# Patient Record
Sex: MALE | Race: WHITE | Employment: OTHER | URBAN - METROPOLITAN AREA
[De-identification: names, ages, dates, MRNs, and addresses within clinical notes are randomized per-mention and may not be internally consistent; named-entity substitution may affect disease eponyms.]

---

## 2017-05-11 ENCOUNTER — HOSPITAL ENCOUNTER (OUTPATIENT)
Dept: OTHER | Age: 57
Discharge: HOME | End: 2017-05-11
Attending: INTERNAL MEDICINE | Admitting: INTERNAL MEDICINE
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
## 2018-01-22 PROBLEM — A41.9 SEPSIS (HCC): Status: ACTIVE | Noted: 2018-01-22

## 2018-01-23 PROBLEM — J18.9 RLL PNEUMONIA: Status: ACTIVE | Noted: 2018-01-23

## 2018-01-23 PROBLEM — D68.32 WARFARIN-INDUCED COAGULOPATHY (HCC): Status: ACTIVE | Noted: 2018-01-23

## 2018-01-23 PROBLEM — I48.0 PAROXYSMAL ATRIAL FIBRILLATION (HCC): Status: ACTIVE | Noted: 2018-01-23

## 2018-01-23 PROBLEM — M79.606 LEG PAIN: Status: ACTIVE | Noted: 2018-01-23

## 2018-01-23 PROBLEM — T45.515A WARFARIN-INDUCED COAGULOPATHY (HCC): Status: ACTIVE | Noted: 2018-01-23

## 2018-01-24 PROBLEM — D64.9 NORMOCYTIC ANEMIA: Status: ACTIVE | Noted: 2018-01-24

## 2018-01-24 PROBLEM — D69.6 THROMBOCYTOPENIA (HCC): Status: ACTIVE | Noted: 2018-01-24

## 2021-03-05 ENCOUNTER — HOSPITAL ENCOUNTER (OUTPATIENT)
Dept: GENERAL RADIOLOGY | Age: 61
Discharge: HOME | End: 2021-03-05
Payer: MEDICARE

## 2021-03-05 ENCOUNTER — HOSPITAL ENCOUNTER (OUTPATIENT)
Age: 61
Discharge: HOME | End: 2021-03-05
Payer: MEDICARE

## 2021-03-05 DIAGNOSIS — M54.2 NECK PAIN: ICD-10-CM

## 2021-03-05 DIAGNOSIS — R76.11 PPD POSITIVE: ICD-10-CM

## 2021-03-05 PROCEDURE — 71046 X-RAY EXAM CHEST 2 VIEWS: CPT

## 2021-03-05 PROCEDURE — 72050 X-RAY EXAM NECK SPINE 4/5VWS: CPT

## 2021-05-09 ENCOUNTER — HOSPITAL ENCOUNTER (INPATIENT)
Age: 61
LOS: 1 days | Discharge: HOME | DRG: 640 | End: 2021-05-10
Attending: EMERGENCY MEDICINE | Admitting: INTERNAL MEDICINE
Payer: COMMERCIAL

## 2021-05-09 ENCOUNTER — APPOINTMENT (OUTPATIENT)
Dept: GENERAL RADIOLOGY | Age: 61
DRG: 640 | End: 2021-05-09
Payer: COMMERCIAL

## 2021-05-09 DIAGNOSIS — R06.02 SHORTNESS OF BREATH: ICD-10-CM

## 2021-05-09 DIAGNOSIS — Z99.2 ESRD NEEDING DIALYSIS (HCC): Primary | ICD-10-CM

## 2021-05-09 DIAGNOSIS — N18.6 ESRD ON HEMODIALYSIS (HCC): ICD-10-CM

## 2021-05-09 DIAGNOSIS — N18.6 ESRD NEEDING DIALYSIS (HCC): Primary | ICD-10-CM

## 2021-05-09 DIAGNOSIS — E87.5 HYPERKALEMIA: ICD-10-CM

## 2021-05-09 DIAGNOSIS — Z99.2 ESRD ON HEMODIALYSIS (HCC): ICD-10-CM

## 2021-05-09 DIAGNOSIS — R79.1 SUPRATHERAPEUTIC INR: ICD-10-CM

## 2021-05-09 PROBLEM — E87.70 FLUID OVERLOAD: Status: ACTIVE | Noted: 2021-05-09

## 2021-05-09 LAB
A/G RATIO: 1.3 (ref 1.1–2.2)
ALBUMIN SERPL-MCNC: 3.7 G/DL (ref 3.4–5)
ALP BLD-CCNC: 66 U/L (ref 40–129)
ALT SERPL-CCNC: 6 U/L (ref 10–40)
ANION GAP SERPL CALCULATED.3IONS-SCNC: 23 MMOL/L (ref 3–16)
AST SERPL-CCNC: 7 U/L (ref 15–37)
BASOPHILS ABSOLUTE: 0 K/UL (ref 0–0.2)
BASOPHILS RELATIVE PERCENT: 1.2 %
BILIRUB SERPL-MCNC: 0.4 MG/DL (ref 0–1)
BUN BLDV-MCNC: 74 MG/DL (ref 7–20)
CALCIUM SERPL-MCNC: 8.7 MG/DL (ref 8.3–10.6)
CHLORIDE BLD-SCNC: 96 MMOL/L (ref 99–110)
CO2: 21 MMOL/L (ref 21–32)
CREAT SERPL-MCNC: 12.7 MG/DL (ref 0.8–1.3)
EKG ATRIAL RATE: 91 BPM
EKG DIAGNOSIS: NORMAL
EKG P AXIS: 45 DEGREES
EKG P-R INTERVAL: 178 MS
EKG Q-T INTERVAL: 410 MS
EKG QRS DURATION: 138 MS
EKG QTC CALCULATION (BAZETT): 504 MS
EKG R AXIS: -78 DEGREES
EKG T AXIS: 31 DEGREES
EKG VENTRICULAR RATE: 91 BPM
EOSINOPHILS ABSOLUTE: 0.1 K/UL (ref 0–0.6)
EOSINOPHILS RELATIVE PERCENT: 3.8 %
GFR AFRICAN AMERICAN: 5
GFR NON-AFRICAN AMERICAN: 4
GLOBULIN: 2.8 G/DL
GLUCOSE BLD-MCNC: 113 MG/DL (ref 70–99)
GLUCOSE BLD-MCNC: 92 MG/DL (ref 70–99)
HBV SURFACE AB TITR SER: 74.56 MIU/ML
HCT VFR BLD CALC: 33.4 % (ref 40.5–52.5)
HEMOGLOBIN: 11.2 G/DL (ref 13.5–17.5)
HEPATITIS B SURFACE ANTIGEN INTERPRETATION: NORMAL
INR BLD: 3.75 (ref 0.86–1.14)
LYMPHOCYTES ABSOLUTE: 0.7 K/UL (ref 1–5.1)
LYMPHOCYTES RELATIVE PERCENT: 23.1 %
MCH RBC QN AUTO: 35.1 PG (ref 26–34)
MCHC RBC AUTO-ENTMCNC: 33.6 G/DL (ref 31–36)
MCV RBC AUTO: 104.3 FL (ref 80–100)
MONOCYTES ABSOLUTE: 0.3 K/UL (ref 0–1.3)
MONOCYTES RELATIVE PERCENT: 10.1 %
NEUTROPHILS ABSOLUTE: 2 K/UL (ref 1.7–7.7)
NEUTROPHILS RELATIVE PERCENT: 61.8 %
PDW BLD-RTO: 16.3 % (ref 12.4–15.4)
PERFORMED ON: ABNORMAL
PLATELET # BLD: 101 K/UL (ref 135–450)
PMV BLD AUTO: 7.7 FL (ref 5–10.5)
POTASSIUM SERPL-SCNC: 5.5 MMOL/L (ref 3.5–5.1)
PRO-BNP: ABNORMAL PG/ML (ref 0–124)
PROTHROMBIN TIME: 44.1 SEC (ref 10–13.2)
RBC # BLD: 3.2 M/UL (ref 4.2–5.9)
SODIUM BLD-SCNC: 140 MMOL/L (ref 136–145)
TOTAL PROTEIN: 6.5 G/DL (ref 6.4–8.2)
TROPONIN: 0.06 NG/ML
WBC # BLD: 3.2 K/UL (ref 4–11)

## 2021-05-09 PROCEDURE — 85025 COMPLETE CBC W/AUTO DIFF WBC: CPT

## 2021-05-09 PROCEDURE — 5A1D70Z PERFORMANCE OF URINARY FILTRATION, INTERMITTENT, LESS THAN 6 HOURS PER DAY: ICD-10-PCS | Performed by: INTERNAL MEDICINE

## 2021-05-09 PROCEDURE — 86704 HEP B CORE ANTIBODY TOTAL: CPT

## 2021-05-09 PROCEDURE — 85610 PROTHROMBIN TIME: CPT

## 2021-05-09 PROCEDURE — 1200000000 HC SEMI PRIVATE

## 2021-05-09 PROCEDURE — 71046 X-RAY EXAM CHEST 2 VIEWS: CPT

## 2021-05-09 PROCEDURE — 99283 EMERGENCY DEPT VISIT LOW MDM: CPT

## 2021-05-09 PROCEDURE — 86706 HEP B SURFACE ANTIBODY: CPT

## 2021-05-09 PROCEDURE — 6370000000 HC RX 637 (ALT 250 FOR IP): Performed by: INTERNAL MEDICINE

## 2021-05-09 PROCEDURE — 93010 ELECTROCARDIOGRAM REPORT: CPT | Performed by: INTERNAL MEDICINE

## 2021-05-09 PROCEDURE — 36415 COLL VENOUS BLD VENIPUNCTURE: CPT

## 2021-05-09 PROCEDURE — 93005 ELECTROCARDIOGRAM TRACING: CPT | Performed by: PHYSICIAN ASSISTANT

## 2021-05-09 PROCEDURE — 80053 COMPREHEN METABOLIC PANEL: CPT

## 2021-05-09 PROCEDURE — 90935 HEMODIALYSIS ONE EVALUATION: CPT

## 2021-05-09 PROCEDURE — 94760 N-INVAS EAR/PLS OXIMETRY 1: CPT

## 2021-05-09 PROCEDURE — 84484 ASSAY OF TROPONIN QUANT: CPT

## 2021-05-09 PROCEDURE — 2580000003 HC RX 258: Performed by: INTERNAL MEDICINE

## 2021-05-09 PROCEDURE — 83880 ASSAY OF NATRIURETIC PEPTIDE: CPT

## 2021-05-09 PROCEDURE — 87340 HEPATITIS B SURFACE AG IA: CPT

## 2021-05-09 RX ORDER — MIDODRINE HYDROCHLORIDE 10 MG/1
10 TABLET ORAL PRN
Status: DISCONTINUED | OUTPATIENT
Start: 2021-05-09 | End: 2021-05-10 | Stop reason: HOSPADM

## 2021-05-09 RX ORDER — SEVELAMER CARBONATE 800 MG/1
2400 TABLET, FILM COATED ORAL
Status: DISCONTINUED | OUTPATIENT
Start: 2021-05-09 | End: 2021-05-10 | Stop reason: HOSPADM

## 2021-05-09 RX ORDER — POLYETHYLENE GLYCOL 3350 17 G/17G
17 POWDER, FOR SOLUTION ORAL DAILY PRN
Status: DISCONTINUED | OUTPATIENT
Start: 2021-05-09 | End: 2021-05-10 | Stop reason: HOSPADM

## 2021-05-09 RX ORDER — ROPINIROLE 1 MG/1
1 TABLET, FILM COATED ORAL NIGHTLY
Status: DISCONTINUED | OUTPATIENT
Start: 2021-05-09 | End: 2021-05-10 | Stop reason: HOSPADM

## 2021-05-09 RX ORDER — SODIUM CHLORIDE 0.9 % (FLUSH) 0.9 %
5-40 SYRINGE (ML) INJECTION EVERY 12 HOURS SCHEDULED
Status: DISCONTINUED | OUTPATIENT
Start: 2021-05-09 | End: 2021-05-10 | Stop reason: HOSPADM

## 2021-05-09 RX ORDER — DEXTROSE MONOHYDRATE 50 MG/ML
100 INJECTION, SOLUTION INTRAVENOUS PRN
Status: DISCONTINUED | OUTPATIENT
Start: 2021-05-09 | End: 2021-05-10 | Stop reason: HOSPADM

## 2021-05-09 RX ORDER — GABAPENTIN 300 MG/1
300 CAPSULE ORAL DAILY
Status: DISCONTINUED | OUTPATIENT
Start: 2021-05-09 | End: 2021-05-10 | Stop reason: HOSPADM

## 2021-05-09 RX ORDER — ACETAMINOPHEN 650 MG/1
650 SUPPOSITORY RECTAL EVERY 6 HOURS PRN
Status: DISCONTINUED | OUTPATIENT
Start: 2021-05-09 | End: 2021-05-10

## 2021-05-09 RX ORDER — SODIUM CHLORIDE 0.9 % (FLUSH) 0.9 %
5-40 SYRINGE (ML) INJECTION PRN
Status: DISCONTINUED | OUTPATIENT
Start: 2021-05-09 | End: 2021-05-10 | Stop reason: HOSPADM

## 2021-05-09 RX ORDER — PROMETHAZINE HYDROCHLORIDE 25 MG/1
12.5 TABLET ORAL EVERY 6 HOURS PRN
Status: DISCONTINUED | OUTPATIENT
Start: 2021-05-09 | End: 2021-05-10 | Stop reason: HOSPADM

## 2021-05-09 RX ORDER — HEPARIN SODIUM 5000 [USP'U]/ML
5000 INJECTION, SOLUTION INTRAVENOUS; SUBCUTANEOUS EVERY 8 HOURS SCHEDULED
Status: DISCONTINUED | OUTPATIENT
Start: 2021-05-09 | End: 2021-05-09 | Stop reason: ALTCHOICE

## 2021-05-09 RX ORDER — ASPIRIN 81 MG/1
81 TABLET, CHEWABLE ORAL DAILY
Status: DISCONTINUED | OUTPATIENT
Start: 2021-05-09 | End: 2021-05-10 | Stop reason: HOSPADM

## 2021-05-09 RX ORDER — METOPROLOL TARTRATE 50 MG/1
50 TABLET, FILM COATED ORAL 2 TIMES DAILY
Status: DISCONTINUED | OUTPATIENT
Start: 2021-05-09 | End: 2021-05-10 | Stop reason: HOSPADM

## 2021-05-09 RX ORDER — DEXTROSE MONOHYDRATE 25 G/50ML
12.5 INJECTION, SOLUTION INTRAVENOUS PRN
Status: DISCONTINUED | OUTPATIENT
Start: 2021-05-09 | End: 2021-05-10 | Stop reason: HOSPADM

## 2021-05-09 RX ORDER — PANTOPRAZOLE SODIUM 40 MG/1
40 TABLET, DELAYED RELEASE ORAL DAILY
Status: DISCONTINUED | OUTPATIENT
Start: 2021-05-09 | End: 2021-05-10 | Stop reason: HOSPADM

## 2021-05-09 RX ORDER — WARFARIN SODIUM 5 MG/1
10 TABLET ORAL DAILY
Status: DISCONTINUED | OUTPATIENT
Start: 2021-05-09 | End: 2021-05-09 | Stop reason: DRUGHIGH

## 2021-05-09 RX ORDER — ONDANSETRON 2 MG/ML
4 INJECTION INTRAMUSCULAR; INTRAVENOUS EVERY 6 HOURS PRN
Status: DISCONTINUED | OUTPATIENT
Start: 2021-05-09 | End: 2021-05-10 | Stop reason: HOSPADM

## 2021-05-09 RX ORDER — ISOSORBIDE MONONITRATE 30 MG/1
30 TABLET, EXTENDED RELEASE ORAL DAILY
Status: DISCONTINUED | OUTPATIENT
Start: 2021-05-09 | End: 2021-05-10 | Stop reason: HOSPADM

## 2021-05-09 RX ORDER — NICOTINE POLACRILEX 4 MG
15 LOZENGE BUCCAL PRN
Status: DISCONTINUED | OUTPATIENT
Start: 2021-05-09 | End: 2021-05-10 | Stop reason: HOSPADM

## 2021-05-09 RX ORDER — CINACALCET 30 MG/1
120 TABLET, FILM COATED ORAL NIGHTLY
Status: DISCONTINUED | OUTPATIENT
Start: 2021-05-09 | End: 2021-05-10 | Stop reason: HOSPADM

## 2021-05-09 RX ORDER — ACETAMINOPHEN 325 MG/1
650 TABLET ORAL EVERY 6 HOURS PRN
Status: DISCONTINUED | OUTPATIENT
Start: 2021-05-09 | End: 2021-05-10

## 2021-05-09 RX ORDER — ATORVASTATIN CALCIUM 40 MG/1
40 TABLET, FILM COATED ORAL NIGHTLY
Status: DISCONTINUED | OUTPATIENT
Start: 2021-05-09 | End: 2021-05-10 | Stop reason: HOSPADM

## 2021-05-09 RX ORDER — SODIUM CHLORIDE 9 MG/ML
25 INJECTION, SOLUTION INTRAVENOUS PRN
Status: DISCONTINUED | OUTPATIENT
Start: 2021-05-09 | End: 2021-05-10 | Stop reason: HOSPADM

## 2021-05-09 RX ADMIN — CINACALCET HYDROCHLORIDE 120 MG: 30 TABLET, FILM COATED ORAL at 20:41

## 2021-05-09 RX ADMIN — GABAPENTIN 300 MG: 300 CAPSULE ORAL at 20:46

## 2021-05-09 RX ADMIN — ATORVASTATIN CALCIUM 40 MG: 40 TABLET, FILM COATED ORAL at 20:43

## 2021-05-09 RX ADMIN — ISOSORBIDE MONONITRATE 30 MG: 30 TABLET, EXTENDED RELEASE ORAL at 20:43

## 2021-05-09 RX ADMIN — ASPIRIN 81 MG: 81 TABLET, CHEWABLE ORAL at 20:43

## 2021-05-09 RX ADMIN — PANTOPRAZOLE SODIUM 40 MG: 40 TABLET, DELAYED RELEASE ORAL at 20:54

## 2021-05-09 RX ADMIN — METOPROLOL TARTRATE 50 MG: 50 TABLET, FILM COATED ORAL at 20:43

## 2021-05-09 RX ADMIN — ACETAMINOPHEN 650 MG: 325 TABLET ORAL at 20:43

## 2021-05-09 RX ADMIN — SODIUM CHLORIDE, PRESERVATIVE FREE 10 ML: 5 INJECTION INTRAVENOUS at 20:54

## 2021-05-09 RX ADMIN — ROPINIROLE HYDROCHLORIDE 1 MG: 1 TABLET, FILM COATED ORAL at 20:43

## 2021-05-09 ASSESSMENT — ENCOUNTER SYMPTOMS
COUGH: 1
SHORTNESS OF BREATH: 1
VOMITING: 0
ABDOMINAL PAIN: 0
NAUSEA: 0

## 2021-05-09 ASSESSMENT — PAIN SCALES - GENERAL: PAINLEVEL_OUTOF10: 4

## 2021-05-09 ASSESSMENT — PAIN DESCRIPTION - FREQUENCY: FREQUENCY: INTERMITTENT

## 2021-05-09 ASSESSMENT — PAIN DESCRIPTION - DESCRIPTORS
DESCRIPTORS: HEADACHE
DESCRIPTORS: HEADACHE

## 2021-05-09 ASSESSMENT — PAIN DESCRIPTION - ONSET: ONSET: ON-GOING

## 2021-05-09 ASSESSMENT — PAIN DESCRIPTION - PROGRESSION
CLINICAL_PROGRESSION: GRADUALLY WORSENING
CLINICAL_PROGRESSION: GRADUALLY IMPROVING

## 2021-05-09 ASSESSMENT — PAIN DESCRIPTION - LOCATION: LOCATION: HEAD

## 2021-05-09 NOTE — ED PROVIDER NOTES
[address] [facility]      Pt Name: Red Miller  MRN: [id number]  [place] 1960  Date of evaluation: 5/9/2021  Provider: JEMIMA Brown    This patient was seen and evaluated by the attending physician Tamie Heredia MD.        [address] [place] [address]       Chief Complaint   Patient presents with    Shortness of Breath     worsening over the past week. hasn't been to dialysis since tuesday          HISTORY OF PRESENT ILLNESS  (Location/Symptom, Timing/Onset, Context/Setting, Quality, Duration, Modifying Factors, Severity.)   Red Miller is a 57 y.o. male who presents to the emergency department for shortness of breath for the past 2 days. He is on dialysis Tu/Thur/Sat, but missed Thursday and Saturday due to transportation issues. Last had dialysis Tuesday which was 5 days ago. Started with shortness of breath 2 days ago and worsened last night. Reports prior episode in the past which was due  missing dialysis. Started with a cough last night productive of clear sputum. No hemoptysis. Did have some blood when he blew his nose but no nosebleed. He is on warfarin. Reports when he last had dialysis they told him his blood was too thin so adjusted his dose. He denies any chest pain. Denies fever, chills, nausea, vomiting, abdominal pain. Goes to [facility] for dialysis and nephrologist is Dr. Sherri Yates through [facility]. Nursing Notes were reviewed and I agree. REVIEW OF SYSTEMS    (2-9 systems for level 4, 10 or more for level 5)     Review of Systems   Constitutional: Negative for chills and fever. HENT: Negative for nosebleeds. Respiratory: Positive for cough and shortness of breath. Neg hemoptysis   Cardiovascular: Negative for chest pain. Gastrointestinal: Negative for abdominal pain, nausea and vomiting. Except as noted above the remainder of the review of systems was reviewed and negative.        PAST MEDICAL HISTORY         Diagnosis Date    Atrial fibrillation ([facility]     CAD (coronary artery disease) 4/3/2014    Chronic kidney disease     Depression     Diabetes mellitus ([facility]     Dialysis patient ([facility]     left upper arm fistula    Glaucoma     Hemodialysis patient ([facility]     Hyperlipidemia     Hypertension     RLL pneumonia 1/23/2018    Sleep apnea     uses CPAP       SURGICAL HISTORY           Procedure Laterality Date    COLONOSCOPY      PACEMAKER INSERTION      PACEMAKER PLACEMENT         CURRENT MEDICATIONS       Previous Medications    ASPIRIN 81 MG TABLET    Take 81 mg by mouth daily. ATORVASTATIN (LIPITOR) 40 MG TABLET    Take 40 mg by mouth daily. CINACALCET (SENSIPAR) 60 MG TABLET    Take 120 mg by mouth every evening Takes on dialysis days    GABAPENTIN (NEURONTIN) 300 MG CAPSULE    Take 300 mg by mouth daily . INSULIN GLARGINE (BASAGLAR KWIKPEN) 100 UNIT/ML INJECTION PEN    Inject 10 Units into the skin nightly    ISOSORBIDE MONONITRATE (IMDUR) 30 MG EXTENDED RELEASE TABLET    Take 30 mg by mouth daily    METOPROLOL (LOPRESSOR) 25 MG TABLET    Take 2 tablets by mouth 2 times daily. Need to verify if immediate release    PANTOPRAZOLE (PROTONIX) 40 MG TABLET    Take 40 mg by mouth daily. ROPINIROLE (REQUIP) 1 MG TABLET    Take 1 mg by mouth nightly. SERTRALINE (ZOLOFT) 50 MG TABLET    Take 50 mg by mouth daily. SEVELAMER (RENVELA) 800 MG TABLET    Take 3 tablets by mouth 3 times daily. WARFARIN (COUMADIN) 10 MG TABLET    Take 10 mg by mouth daily. ALLERGIES     Patient has no known allergies. FAMILY HISTORY           Problem Relation Age of Onset    Heart Disease Father     High Blood Pressure Sister      Family Status   Relation Name Status    Father  (Not Specified)    Sister  (Not Specified)        SOCIAL HISTORY      reports that he has never smoked.  He has never used smokeless tobacco. He reports current alcohol use of about 2.0 standard drinks of alcohol per week. He reports current drug use. Drug: Marijuana. PHYSICAL EXAM    (up to 7 for level 4, 8 or more for level 5)     ED Triage Vitals [05/09/21 0921]   BP Temp Temp Source Pulse Resp SpO2 Height Weight   (!) 182/91 98.4 °F (36.9 °C) Oral 102 16 100 % 5' 10\" (1.778 m) 247 lb 9.2 oz (112.3 kg)       Physical Exam  Constitutional:       General: He is not in acute distress. Appearance: Normal appearance. He is well-developed. He is not ill-appearing, toxic-appearing or diaphoretic. HENT:      Head: Normocephalic and atraumatic. Neck:      Musculoskeletal: Normal range of motion and neck supple. Cardiovascular:      Rate and Rhythm: Normal rate and regular rhythm. Heart sounds: Normal heart sounds. Pulmonary:      Effort: Pulmonary effort is normal. No respiratory distress. Breath sounds: No stridor. No wheezing or rhonchi. Comments: Diminished breath sounds bilaterally  Abdominal:      General: There is no distension. Palpations: Abdomen is soft. There is no mass. Tenderness: There is no abdominal tenderness. There is no guarding or rebound. Hernia: No hernia is present. Musculoskeletal:      Right lower leg: No edema. Left lower leg: No edema. Comments: No calf tenderness bilaterally   Skin:     General: Skin is warm. Neurological:      Mental Status: He is alert. Psychiatric:         Mood and Affect: Mood normal.         Behavior: Behavior normal.         Thought Content: Thought content normal.         Judgment: Judgment normal.         DIFFERENTIAL DIAGNOSIS   Volume overload, pulmonary edema, ACS, PE, other    DIAGNOSTICRESULTS     EKG: All EKG's are interpreted by JEMIMA Sánchez in the absence of a cardiologist.    EKG obtained. See Dr. Erendira Dia note for interpretation.     RADIOLOGY:   Non-plain film images such as CT, Ultrasound and MRI are read by the radiologist. Plain radiographic images are visualized and preliminarily interpreted by JEMIMA Brown with the below findings:      Interpretation per the Radiologist below, if available at the time of this note:    XR CHEST (2 VW)   Final Result   No acute cardiopulmonary disease. LABS:      All other labs were withinnormal range or not returned as of this dictation. EMERGENCY DEPARTMENT COURSE and DIFFERENTIAL DIAGNOSIS/MDM:   Vitals:    Vitals:    05/09/21 1030 05/09/21 1045 05/09/21 1100 05/09/21 1115   BP: (!) 174/85 (!) 178/83 (!) 167/77 (!) 168/82   Pulse: 83 82  83   Resp: 23 23 19 18   Temp:       TempSrc:       SpO2: 93% 99% 97% 99%   Weight:       Height:           Patient was nontoxic, well appearing, afebrile with normal vital signs with the exception of hypertension. . Saturating well on room air. Dry weight is 105 kg. Today, he is 112.3 kg so he is up 7.3 kg. Shortness o of breath is likely related to his volume overload. He denies chest pain. Laboratory work-up remarkable for potassium of 5.5, anion gap of 23, BUN 74, creatinine 12.7, BNP 44,589. Chest x-ray is negative. Troponin is 0.06 but that is consistent with prior. INR is 3.75 so he is supratherapeutic. I consulted his nephrologist Dr. Sherri Yates at [facility] and discussed labs along with possibility of dialysis tomorrow. Dr. Sherri Yates reports patient is very sensitive from cardiovascular standpoint to volume overload and would like him dialyzed today. Additionally cannot guarantee transport if he was able to arrange for dialysis tomorrow which may not be possible anyways.  will contact dialysis center  to address transportation issue. Medicine consulted. Upon reevaluations, patient is sitting in stretcher in no distress. Appears well. In agreement with plan for admission. I spoke with Dr. Arnie Medina who requested consult to Dr. Irma Lane group. I spoke with Dr. Luzmaria Edmonds who will arrange for dialysis today.           PROCEDURES:  None    FINAL IMPRESSION      1. ESRD needing dialysis ([facility]    2. Hyperkalemia    3. Shortness of breath    4. Supratherapeutic INR          DISPOSITION/PLAN   DISPOSITION Admitted 05/09/2021 12:40:58 PM      PATIENT REFERRED TO:  No follow-up provider specified.     DISCHARGE MEDICATIONS:  New Prescriptions    No medications on file       (Please note that portions of this note werecompleted with a voice recognition program.  Efforts were made to edit the dictations but occasionally words are mis-transcribed.)    Bre Dunbar, [address] [place] [address], [address] [place] [address]  05/09/21 [address]  05/09/21 [facility]

## 2021-05-09 NOTE — CONSULTS
Clinical Pharmacy Note  Warfarin Consult    Lai Rothman is a 57 y.o. male receiving warfarin managed by pharmacy. Patient being bridged with none. Warfarin Indication: afib  Target INR range:    Dose prior to admission:     Current warfarin drug-drug interactions:     Recent Labs     05/09/21  0942   HGB 11.2*   HCT 33.4*   INR 3.75*       Assessment/Plan:    Warfarin 0 mg tonight. Daily PT/INR until stable within therapeutic range. Thank you for the consult. Will continue to follow.

## 2021-05-09 NOTE — ED PROVIDER NOTES
[facility]  eMERGENCY dEPARTMENT eNCOUnter   Physician Attestation    Pt Name: Fernanda Bernabe  MRN: [id number]  [place] 1960  Date of evaluation: 5/9/21        Physician Note:    I havepersonally performed and/or participated in the history, exam and medical decision making and agree with all pertinent clinical information. I have also reviewed and agree with the past medical, family and social historyunless otherwise noted. I have personally performed a face to face diagnostic evaluation onthis patient. I have reviewed the mid-levels findings and agree. History: This is a 42-year-old gentleman who is on dialysis. Due to some transportation problems he was unable to go Thursday or yesterday. His usual regimen is Tuesday Thursday and Saturday. He believes he has transportation set up for Tuesday but that still just a little bit unclear. Basically last night he was very short of breath so he comes in to be evaluated. He does state that right now her shortness of breath is for the most part gone. Physical Exam  Constitutional:       Appearance: He is well-developed. He is not diaphoretic. HENT:      Head: Normocephalic and atraumatic. Right Ear: External ear normal.      Left Ear: External ear normal.   Eyes:      General: No scleral icterus. Right eye: No discharge. Left eye: No discharge. Neck:      Musculoskeletal: Normal range of motion. Thyroid: No thyromegaly. Vascular: No JVD. Trachea: No tracheal deviation. Cardiovascular:      Rate and Rhythm: Normal rate and regular rhythm. Heart sounds: No murmur. No friction rub. No gallop. Pulmonary:      Effort: Pulmonary effort is normal. No respiratory distress. Breath sounds: Normal breath sounds. No stridor. No wheezing or rales. Abdominal:      General: There is no distension. Palpations: Abdomen is soft. Tenderness: There is no abdominal tenderness.  There is no guarding or rebound. Musculoskeletal:         General: No tenderness. Skin:     General: Skin is warm and dry. Findings: No rash (On exposed body surfaces). Neurological:      Mental Status: He is alert and oriented to person, place, and time. Coordination: Coordination normal.   Psychiatric:         Behavior: Behavior normal.         Thought Content: Thought content normal.         EKG visualized preliminarily interpreted by myself. Rhythm appears to be sinus with a first-degree AV block. He does have a history of A. fib and there is few beats on the cardiogram that look like it might be atrial fibrillation. Regardless he has a bifascicular block which is a old finding. I am not seeing T wave changes consistent with hyperkalemia. QT is little bit prolonged compared to previous with a QTC of 504 ms. Xr Chest (2 Vw)    Result Date: 5/9/2021  EXAMINATION: TWO XRAY VIEWS OF THE CHEST 5/9/2021 9:48 am COMPARISON: 03/05/2021 HISTORY: ORDERING SYSTEM PROVIDED HISTORY: SOB TECHNOLOGIST PROVIDED HISTORY: Reason for exam:->SOB Reason for Exam: sob worsening over the past week. hasn't been to dialysis since tuesday Acuity: Acute Type of Exam: Initial FINDINGS: Right subclavian cardiac pacemaker with 2 lead wires is noted. A left subclavian metallic stent is present. The cardiac silhouette, mediastinal hilar contours are normal.  No consolidation, pleural effusion or pneumothorax is seen. No acute cardiopulmonary disease. 1. ESRD needing dialysis ([facility]    2. Hyperkalemia    3. Shortness of breath    4. Supratherapeutic INR          DISPOSITION/PLAN  PATIENT REFERRED TO:  No follow-up provider specified.   DISCHARGE MEDICATIONS:  New Prescriptions    No medications on file         MD Lynn Vera MD  05/09/21 2150

## 2021-05-09 NOTE — CONSULTS
[facility]  Consult Note           Reason for Consult:  ESRD Hyperkalemia  Requesting Physician:  Juliet Abel      History Obtained From:  patient, electronic medical record    History of Present Ilness:  58 y/o AAM with ESRD on HD at [address] [place] [address] under [facility] Nephrology care on TTS schedule  Missed HD on Thursday and Saturday last week due to transportation not picking him up  Presented to ER with SOB that started last night  No associated CP, fever chills cough  Voids minimal Urine  Noted to have K= 5.5 meq Elevated Pro BNP  On Warfarin for AF ib INR elevated      Past Medical History:        Diagnosis Date    Atrial fibrillation ([facility]     CAD (coronary artery disease) 4/3/2014    Chronic kidney disease     Depression     Diabetes mellitus ([facility]     Dialysis patient ([facility]     left upper arm fistula    Glaucoma     Hemodialysis patient ([facility]     Hyperlipidemia     Hypertension     RLL pneumonia 1/23/2018    Sleep apnea     uses CPAP       Past Surgical History:        Procedure Laterality Date    COLONOSCOPY      PACEMAKER INSERTION      PACEMAKER PLACEMENT         Home Medications:    No current facility-administered medications on file prior to encounter. Current Outpatient Medications on File Prior to Encounter   Medication Sig Dispense Refill    insulin glargine (BASAGLAR KWIKPEN) 100 UNIT/ML injection pen Inject 10 Units into the skin nightly      isosorbide mononitrate (IMDUR) 30 MG extended release tablet Take 30 mg by mouth daily      cinacalcet (SENSIPAR) 60 MG tablet Take 120 mg by mouth every evening Takes on dialysis days      sevelamer (RENVELA) 800 MG tablet Take 3 tablets by mouth 3 times daily.  atorvastatin (LIPITOR) 40 MG tablet Take 40 mg by mouth daily.  warfarin (COUMADIN) 10 MG tablet Take 10 mg by mouth daily.  metoprolol (LOPRESSOR) 25 MG tablet Take 2 tablets by mouth 2 times daily.  Need to verify if immediate release 60 tablet 0  aspirin 81 MG tablet Take 81 mg by mouth daily.  gabapentin (NEURONTIN) 300 MG capsule Take 300 mg by mouth daily .  rOPINIRole (REQUIP) 1 MG tablet Take 1 mg by mouth nightly.  sertraline (ZOLOFT) 50 MG tablet Take 50 mg by mouth daily.  pantoprazole (PROTONIX) 40 MG tablet Take 40 mg by mouth daily. Allergies:  Patient has no known allergies. Social History:    Social History     Socioeconomic History    Marital status: Single     Spouse name: Not on file    Number of children: Not on file    Years of education: Not on file    Highest education level: Not on file   Occupational History    Not on file   Social Needs    Financial resource strain: Not on file    Food insecurity     Worry: Not on file     Inability: Not on file    Transportation needs     Medical: Not on file     Non-medical: Not on file   Tobacco Use    Smoking status: Never Smoker    Smokeless tobacco: Never Used   Substance and Sexual Activity    Alcohol use:  Yes     Alcohol/week: 2.0 standard drinks     Types: 2 Cans of beer per week     Comment: once monthly    Drug use: Yes     Types: Marijuana    Sexual activity: Not on file     Comment: marijuana daily, heroin once monthly, smokes last used 2/6/16   Lifestyle    Physical activity     Days per week: Not on file     Minutes per session: Not on file    Stress: Not on file   Relationships    Social connections     Talks on phone: Not on file     Gets together: Not on file     Attends Gnosticist service: Not on file     Active member of club or organization: Not on file     Attends meetings of clubs or organizations: Not on file     Relationship status: Not on file    Intimate partner violence     Fear of current or ex partner: Not on file     Emotionally abused: Not on file     Physically abused: Not on file     Forced sexual activity: Not on file   Other Topics Concern    Not on file   Social History Narrative    Not on file       Family History:   Family History   Problem Relation Age of Onset    Heart Disease Father     High Blood Pressure Sister        Review of Systems:   Pertinent positives stated above in HPI. All other systems were reviewed and were negative.     Physical exam:   Constitutional:  VITALS:  BP (!) 211/108   Pulse 97   Temp 99.2 °F (37.3 °C)   Resp 22   Ht 5' 10\" (1.778 m)   Wt 243 lb 6.2 oz (110.4 kg)   SpO2 98%   BMI 34.92 kg/m²   Gen: alert, awake, nad  Skin: no rash, turgor wnl  Heent:  eomi, mmm  Neck: no bruits or jvd noted, thyroid normal  Cardiovascular:  S1, S2 without m/r/g  Respiratory: CTA B without w/r/r; respiratory effort normal  Abdomen:  +bs, soft, nt, nd, no hepatosplenomegaly  Ext: trace lower extremity edema L upper arm AVG+T/B  Psychiatric: mood and affect appropriate; judgement and insight intact  Musculoskeletal:  Rom, muscular strength intact; digits, nails normal    Data/  CBC:   Lab Results   Component Value Date    WBC 3.2 05/09/2021    RBC 3.20 05/09/2021    HGB 11.2 05/09/2021    HCT 33.4 05/09/2021    .3 05/09/2021    MCH 35.1 05/09/2021    MCHC 33.6 05/09/2021    RDW 16.3 05/09/2021     05/09/2021    MPV 7.7 05/09/2021     BMP:    Lab Results   Component Value Date     05/09/2021    K 5.5 05/09/2021    K 3.7 01/25/2018    CL 96 05/09/2021    CO2 21 05/09/2021    BUN 74 05/09/2021    LABALBU 3.7 05/09/2021    CREATININE 12.7 05/09/2021    CALCIUM 8.7 05/09/2021    GFRAA 5 05/09/2021    LABGLOM 4 05/09/2021    GLUCOSE 92 05/09/2021         Assessment/  1-ESRD HD TTS [facility] Under  Nephrology care Missed HD x 2 Last HD on Tuesday presents with Hyperkalemia and fluid overload  2-Hyperkalemia   3-HTN BP elevated  4 A Fib on Coumadin inr 3.75  5 Anemia ESRD    Plan/  1-Urgent HD for Hyperkalemia fluid overload and Azotemia  2-Monitor BP response to fluid removal Continue Metoprolol  3-No need for LARRY at this time HGB > 11 gm  4  consult to help arrange transportation     Thank you for the consultation. Please do not hesitate to call with questions.     Moises Carson MD, FACP        Addendum 5.51 pm    HD note    Seen on HD   Wt up 5.5 kg from TW of 105 kg  HD in progress 2 K bath UF Goal 3 L today  BP elevated but is sensitive to fluid removal BP suppoort with Midodrine prn   No LARRY at this time

## 2021-05-10 VITALS
DIASTOLIC BLOOD PRESSURE: 76 MMHG | WEIGHT: 239.86 LBS | RESPIRATION RATE: 19 BRPM | HEIGHT: 70 IN | TEMPERATURE: 98 F | BODY MASS INDEX: 34.34 KG/M2 | OXYGEN SATURATION: 98 % | SYSTOLIC BLOOD PRESSURE: 135 MMHG | HEART RATE: 61 BPM

## 2021-05-10 LAB
ANION GAP SERPL CALCULATED.3IONS-SCNC: 18 MMOL/L (ref 3–16)
BASOPHILS ABSOLUTE: 0.1 K/UL (ref 0–0.2)
BASOPHILS RELATIVE PERCENT: 1.2 %
BUN BLDV-MCNC: 44 MG/DL (ref 7–20)
CALCIUM SERPL-MCNC: 8.1 MG/DL (ref 8.3–10.6)
CHLORIDE BLD-SCNC: 95 MMOL/L (ref 99–110)
CO2: 25 MMOL/L (ref 21–32)
CREAT SERPL-MCNC: 8.3 MG/DL (ref 0.8–1.3)
EOSINOPHILS ABSOLUTE: 0.1 K/UL (ref 0–0.6)
EOSINOPHILS RELATIVE PERCENT: 2.5 %
GFR AFRICAN AMERICAN: 8
GFR NON-AFRICAN AMERICAN: 7
GLUCOSE BLD-MCNC: 102 MG/DL (ref 70–99)
GLUCOSE BLD-MCNC: 90 MG/DL (ref 70–99)
GLUCOSE BLD-MCNC: 93 MG/DL (ref 70–99)
HCT VFR BLD CALC: 31.6 % (ref 40.5–52.5)
HEMOGLOBIN: 10.9 G/DL (ref 13.5–17.5)
INR BLD: 3.7 (ref 0.86–1.14)
LYMPHOCYTES ABSOLUTE: 1.1 K/UL (ref 1–5.1)
LYMPHOCYTES RELATIVE PERCENT: 27 %
MCH RBC QN AUTO: 35.6 PG (ref 26–34)
MCHC RBC AUTO-ENTMCNC: 34.5 G/DL (ref 31–36)
MCV RBC AUTO: 103.3 FL (ref 80–100)
MONOCYTES ABSOLUTE: 0.6 K/UL (ref 0–1.3)
MONOCYTES RELATIVE PERCENT: 14.7 %
NEUTROPHILS ABSOLUTE: 2.3 K/UL (ref 1.7–7.7)
NEUTROPHILS RELATIVE PERCENT: 54.6 %
PDW BLD-RTO: 16.3 % (ref 12.4–15.4)
PERFORMED ON: ABNORMAL
PERFORMED ON: NORMAL
PLATELET # BLD: 123 K/UL (ref 135–450)
PMV BLD AUTO: 8 FL (ref 5–10.5)
POTASSIUM REFLEX MAGNESIUM: 4.8 MMOL/L (ref 3.5–5.1)
PROTHROMBIN TIME: 43.5 SEC (ref 10–13.2)
RBC # BLD: 3.06 M/UL (ref 4.2–5.9)
SODIUM BLD-SCNC: 138 MMOL/L (ref 136–145)
WBC # BLD: 4.2 K/UL (ref 4–11)

## 2021-05-10 PROCEDURE — 97165 OT EVAL LOW COMPLEX 30 MIN: CPT

## 2021-05-10 PROCEDURE — 2580000003 HC RX 258: Performed by: INTERNAL MEDICINE

## 2021-05-10 PROCEDURE — 97116 GAIT TRAINING THERAPY: CPT

## 2021-05-10 PROCEDURE — 97530 THERAPEUTIC ACTIVITIES: CPT

## 2021-05-10 PROCEDURE — 36415 COLL VENOUS BLD VENIPUNCTURE: CPT

## 2021-05-10 PROCEDURE — 6370000000 HC RX 637 (ALT 250 FOR IP): Performed by: INTERNAL MEDICINE

## 2021-05-10 PROCEDURE — 85025 COMPLETE CBC W/AUTO DIFF WBC: CPT

## 2021-05-10 PROCEDURE — 97162 PT EVAL MOD COMPLEX 30 MIN: CPT

## 2021-05-10 PROCEDURE — 6370000000 HC RX 637 (ALT 250 FOR IP): Performed by: NURSE PRACTITIONER

## 2021-05-10 PROCEDURE — 80048 BASIC METABOLIC PNL TOTAL CA: CPT

## 2021-05-10 PROCEDURE — 85610 PROTHROMBIN TIME: CPT

## 2021-05-10 PROCEDURE — 94760 N-INVAS EAR/PLS OXIMETRY 1: CPT

## 2021-05-10 PROCEDURE — 97535 SELF CARE MNGMENT TRAINING: CPT

## 2021-05-10 RX ORDER — ACETAMINOPHEN 650 MG/1
650 SUPPOSITORY RECTAL EVERY 4 HOURS PRN
Status: DISCONTINUED | OUTPATIENT
Start: 2021-05-10 | End: 2021-05-10 | Stop reason: HOSPADM

## 2021-05-10 RX ORDER — ACETAMINOPHEN 325 MG/1
650 TABLET ORAL EVERY 4 HOURS PRN
Status: DISCONTINUED | OUTPATIENT
Start: 2021-05-10 | End: 2021-05-10 | Stop reason: HOSPADM

## 2021-05-10 RX ADMIN — SERTRALINE 50 MG: 50 TABLET, FILM COATED ORAL at 08:09

## 2021-05-10 RX ADMIN — ISOSORBIDE MONONITRATE 30 MG: 30 TABLET, EXTENDED RELEASE ORAL at 08:09

## 2021-05-10 RX ADMIN — ACETAMINOPHEN 650 MG: 325 TABLET ORAL at 03:22

## 2021-05-10 RX ADMIN — ASPIRIN 81 MG: 81 TABLET, CHEWABLE ORAL at 08:09

## 2021-05-10 RX ADMIN — SEVELAMER CARBONATE 2400 MG: 800 TABLET, FILM COATED ORAL at 12:09

## 2021-05-10 RX ADMIN — GABAPENTIN 300 MG: 300 CAPSULE ORAL at 08:09

## 2021-05-10 RX ADMIN — SEVELAMER CARBONATE 2400 MG: 800 TABLET, FILM COATED ORAL at 08:09

## 2021-05-10 RX ADMIN — SODIUM CHLORIDE, PRESERVATIVE FREE 10 ML: 5 INJECTION INTRAVENOUS at 08:09

## 2021-05-10 RX ADMIN — PANTOPRAZOLE SODIUM 40 MG: 40 TABLET, DELAYED RELEASE ORAL at 08:09

## 2021-05-10 RX ADMIN — METOPROLOL TARTRATE 50 MG: 50 TABLET, FILM COATED ORAL at 08:09

## 2021-05-10 ASSESSMENT — PAIN DESCRIPTION - PROGRESSION
CLINICAL_PROGRESSION: GRADUALLY WORSENING

## 2021-05-10 ASSESSMENT — PAIN SCALES - GENERAL: PAINLEVEL_OUTOF10: 0

## 2021-05-10 ASSESSMENT — PAIN DESCRIPTION - PAIN TYPE: TYPE: ACUTE PAIN

## 2021-05-10 ASSESSMENT — PAIN DESCRIPTION - DESCRIPTORS: DESCRIPTORS: HEADACHE

## 2021-05-10 NOTE — DISCHARGE INSTR - COC
Continuity of Care Form    Patient Name: Danette Dias   :    MRN:  [id number]    Admit date:  2021  Discharge date:  ***    Code Status Order: Full Code   Advance Directives:   [facility] Documentation     Date/Time Healthcare Directive Type of Healthcare Directive Copy in [address] [place] [address] [address] Agent's Name Healthcare Agent's Phone Number    21 1742  No, patient does not have an advance directive for healthcare treatment -- -- -- -- --          Admitting Physician:  Nitin Griffin MD  PCP: [facility] (Inactive)    Discharging Nurse: [facility] Unit/Room#: M8O-9600/4709-33  Discharging Unit Phone Number: ***    Emergency Contact:   Extended Emergency Contact Information  Primary Emergency Contact: Gisel Torres  [place]  Home Phone: [phone]  Relation: Brother/Sister    Past Surgical History:  Past Surgical History:   Procedure Laterality Date    COLONOSCOPY      PACEMAKER INSERTION      PACEMAKER PLACEMENT         Immunization History:   Immunization History   Administered Date(s) Administered    Influenza Vaccine, unspecified formulation 2017    Influenza Virus Vaccine 10/09/2018       Active Problems:  Patient Active Problem List   Diagnosis Code    Chest pain R07.9    Type 2 diabetes mellitus ([facility] E11.9    Chronic renal failure N18.9    CAD (coronary artery disease) I25.10    ESRD on hemodialysis ([facility] N18.6, Z99.2    Diabetes type 2, controlled ([facility] E11.9    Obesity (BMI 30-39. 9) E66.9    Depression F32.9    Sepsis ([facility] A41.9    RLL pneumonia J18.9    Warfarin-induced coagulopathy (HCC) D68.32, T45.515A    Paroxysmal atrial fibrillation (HCC) I48.0    Leg pain M79.606    Normocytic anemia D64.9    Thrombocytopenia (HCC) D69.6    Fluid overload E87.70       Isolation/Infection:   Isolation          No Isolation        Patient Infection Status     None to display          Nurse Assessment:  Last Vital Signs: /81   Pulse 62   Temp 98.9 °F (37.2 °C) (Oral)   Resp 18   Ht 5' 10\" (1.778 m)   Wt 239 lb 13.8 oz (108.8 kg)   SpO2 96%   BMI 34.42 kg/m²     Last documented pain score (0-10 scale): Pain Level: 0  Last Weight:   Wt Readings from Last 1 Encounters:   05/10/21 239 lb 13.8 oz (108.8 kg)     Mental Status:  {IP PT MENTAL STATUS:}    IV Access:  { DELFINO IV ACCESS:246617798}    Nursing Mobility/ADLs:  Walking   {CHP DME DNPV:322263476}  Transfer  {CHP DME NZMK:921031749}  Bathing  {CHP DME KVLI:264391456}  Dressing  {CHP DME QKOO:984813714}  Toileting  {CHP DME FHDI:459669099}  Feeding  {CHP DME XBIA:500362592}  Med Admin  {P DME WLGD:729077044}  Med Delivery   { DELFINO MED Delivery:857109107}    Wound Care Documentation and Therapy:        Elimination:  Continence:   · Bowel: {YES / B}  · Bladder: {YES / OP:22666}  Urinary Catheter: {Urinary Catheter:659532062}   Colostomy/Ileostomy/Ileal Conduit: {YES / PV:32549}       Date of Last BM: ***    Intake/Output Summary (Last 24 hours) at 5/10/2021 1215  Last data filed at 5/10/2021 0811  Gross per 24 hour   Intake 740 ml   Output 3500 ml   Net -2760 ml     I/O last 3 completed shifts:   In: 500   Out: 3500     Safety Concerns:     [employer] Safety Concerns:811773239}    Impairments/Disabilities:      [employer] Impairments/Disabilities:446869899}    Nutrition Therapy:  Current Nutrition Therapy:   [employer] Diet List:316349915}    Routes of Feeding: {CHP DME Other Feedings:863925575}  Liquids: {Slp liquid thickness:01903}  Daily Fluid Restriction: {CHP DME Yes amt example:357049451}  Last Modified Barium Swallow with Video (Video Swallowing Test): {Done Not Done ORNF:410374502}    Treatments at the Time of Hospital Discharge:   Respiratory Treatments: ***  Oxygen Therapy:  {Therapy; copd oxygen:18148}  Ventilator:    {LILIAN ACOSTA [facility]1749[facility]}    Rehab Therapies: {THERAPEUTIC INTERVENTION:6742013945}  Weight Bearing Status/Restrictions: {LILIAN ACOSTA Weight Bearin}  Other Medical Equipment (for information only, NOT a DME order):  {EQUIPMENT:981498542}  Other Treatments: ***    Patient's personal belongings (please select all that are sent with patient):  {CHP DME Belongings:699420495}    RN SIGNATURE:  {Esignature:256464138}    CASE MANAGEMENT/SOCIAL WORK SECTION    Inpatient Status Date: 2021    Readmission Risk Assessment Score:  Readmission Risk              Risk of Unplanned Readmission:        23           Discharging to Facility/ Agency   · Name: [facility]  · Address:  · Phone:  · Fax:    Dialysis Facility (if applicable)   · Name: [address] [place] [address]   · Address: [facility], [address] [place] [address]  · Dialysis Schedule: TTS 7:00am  · Phone: [phone]    · Fax: [phone]    / signature:     PHYSICIAN SECTION    Prognosis: {Prognosis:1542429011}    Condition at Discharge: [address] [place] [address] Patient Condition:231757581}    Rehab Potential (if transferring to Rehab): {Prognosis:6336371680}    Recommended Labs or Other Treatments After Discharge: ***    Physician Certification: I certify the above information and transfer of Marily Barrett  is necessary for the continuing treatment of the diagnosis listed and that he requires {Admit to Appropriate Level of Care:70256} for {GREATER/LESS:381097524} 30 days.      Update Admission H&P: {CHP DME Changes in WCZBV:449716161}    PHYSICIAN SIGNATURE:  {Esignature:983420042}

## 2021-05-10 NOTE — PROGRESS NOTES
Per Rachel NP - Monitor fever, could be due to urgent HD, increase frequency of Tylenol. NP aware of 5 runs of V-tach.

## 2021-05-10 NOTE — PROGRESS NOTES
Occupational Therapy   Occupational Therapy Initial Assessment  Date: 5/10/2021   Patient Name: Fernanda Bernabe  MRN: [id number]     : 1960    Date of Service: 5/10/2021    Discharge Recommendations:  Home with assist PRN  OT Equipment Recommendations  Equipment Needed: Yes  Mobility Devices: ADL Assistive Devices  ADL Assistive Devices: Shower Chair with back(d/t decreased endurance and SOB)    Fernanda Bernabe scored a 19/24 on the AM-PAC ADL Inpatient form. At this time, no further OT is recommended upon discharge due to functioning near baseline. Recommend patient returns to prior setting with prior services. Assessment   Performance deficits / Impairments: Decreased functional mobility ; Decreased balance;Decreased ADL status; Decreased endurance  Assessment: 56 yo male admitted  for ESRD needing dialysis with SOB. Pt had missed last 2 dialysis d/t transportation issues. PMH: CAD, DM, HTN. PTA, pt reports living with niece and nephew in an apartment and was independent with ADLs, cleaning, laundry, and mobility without AD- with SOB at baseline with mobility. Pt's niece complets cooking and grocery shopping. Today, pt functioning near baseline, however, most limited by endurance. Pt SBA for transfers, fxl mobility without AD, seated socks, and standing sink side grooming. Anticipate SBA/CGA for LB ADLs and set up seated UB ADLs based on balance, endurance, cognition and BUE strength/ROM. Pt does require seated rest break within halls d/t SOB and SOB noted at EOS- however, resolves quickly. Pt educated on benefits of shower chair for energy conservation and interested in seeing if insurance can cover.  Cont acute OT to address above deficits and rec d/c home with current level of assist  Prognosis: Fair  Decision Making: Low Complexity  OT Education: OT Role;Plan of Care;Transfer Training;Energy Conservation  Patient Education: shower chair benefits  REQUIRES OT FOLLOW UP: Yes  Activity Tolerance  Activity Tolerance: Patient limited by fatigue  Activity Tolerance: does become SOB and fatigued requiring seated rest break half way in rubalcava. SOB resolves, however, noted again once back in room seated. Quickly resolves  Safety Devices  Safety Devices in place: Yes  Type of devices: Gait belt;Nurse notified; Patient at risk for falls; Left in chair(no alarm placed upon arrival)         Patient Diagnosis(es): The primary encounter diagnosis was ESRD needing dialysis [facility]). Diagnoses of Hyperkalemia, Shortness of breath, and Supratherapeutic INR were also pertinent to this visit. has a past medical history of Atrial fibrillation ([facility] CAD (coronary artery disease), Chronic kidney disease, Depression, Diabetes mellitus ([facility] Dialysis patient ([facility] Glaucoma, Hemodialysis patient ([facility] Hyperlipidemia, Hypertension, RLL pneumonia, and Sleep apnea. has a past surgical history that includes Pacemaker insertion; pacemaker placement; and Colonoscopy. Restrictions  Restrictions/Precautions  Restrictions/Precautions: Fall Risk, Up Ad Stefanie    Subjective   General  Chart Reviewed: Yes  Patient assessed for rehabilitation services?: Yes  Additional Pertinent Hx: 58 yo male admitted 5/9 for ESRD needing dialysis with SOB. Pt had missed last 2 dialysis d/t transportation issues. PMH: CAD, DM, HTN  Family / Caregiver Present: No  Referring Practitioner: Amos Adler MD  Diagnosis: Fluid overload  Subjective  Subjective: Pt resting in recliner upon arrival. Pt agreeable to OT/PT eval. No reports of pain.  reports SOB at baseline with community mobility  General Comment  Comments: RN ok to see    Social/Functional History  Social/Functional History  Lives With: Family(niece and nephew (reports sometimes there alone))  Type of Home: Apartment(2nd floor)  Home Layout: One level  Home Access: Stairs to enter with rails  Entrance Stairs - Number of Steps: 5 + 10 ANGELITO  Entrance Stairs - Rails: Both  Bathroom Shower/Tub: Tub/Shower unit  Bathroom Toilet: Standard  Bathroom Equipment: (no DME)  Bathroom Accessibility: Walker accessible  Home Equipment: Rolling walker, Cane  ADL Assistance: Independent  Homemaking Assistance: (niece does the cooking and gets groceries; the pt can do won laundry and clean)  Ambulation Assistance: Independent(no device)  Transfer Assistance: Independent(sleeps in regular bed)  Active : No  Patient's  Info: medical transportation  Occupation: On disability  Type of occupation: construction  Leisure & Hobbies: read; fish  Additional Comments: reports last fall was a month and a half ago       Objective   Vision: Impaired  Vision Exceptions: Wears glasses for reading(glaucoma)  Hearing: Exceptions to RawFlow  Hearing Exceptions: Hard of hearing/hearing concerns; No hearing aid    Orientation  Overall Orientation Status: Within Normal Limits     Balance  Sitting Balance: Supervision  Standing Balance: Stand by assistance  Standing Balance  Time: 5 min  Activity: SBA standing sink side for grooming  Functional Mobility  Functional - Mobility Device: No device  Activity: (recliner>bathroom>rubalcava (50 ft x2)>recliner)  Assist Level: Stand by assistance  Functional Mobility Comments: with wide KULWINDER and sways with mobility. No LOB or unsteadiness, does become SOB and fatigued requiring seated rest break half way in rubalcava  Toilet Transfers  Toilet - Technique: Ambulating  Equipment Used: Standard toilet  Toilet Transfer: Stand by assistance  ADL  Grooming: Stand by assistance(sink side face washing, hair brushing and oral care)  LE Dressing: Stand by assistance(increased time, seated socks)  Additional Comments: Anticipate SBA/CGA for LB ADLs and set up seated UB ADLs based on balance, endurance, cognition and BUE strength/ROM  Tone RUE  RUE Tone: Normotonic  Tone LUE  LUE Tone: Normotonic  Coordination  Movements Are Fluid And Coordinated: Yes     Bed mobility  Comment: unable to assess.  recliner at start and EOS  Transfers  Sit to stand: Stand by assistance  Stand to sit: Stand by assistance  Transfer Comments: No AD     Cognition  Overall Cognitive Status: WNL        Sensation  Overall Sensation Status: Impaired  Additional Comments: reports bottoms of B feet are numb from neuropathy        LUE AROM (degrees)  LUE AROM : WNL  RUE AROM (degrees)  RUE AROM : WNL  LUE Strength  LUE Strength Comment: strong  RUE Strength  RUE Strength Comment: strong                   Plan   Plan  Times per week: 3-5  Current Treatment Recommendations: Endurance Training, Patient/Caregiver Education & Training, Self-Care / ADL, Functional Mobility Training, Safety Education & Training    AM-PAC Score        AM-[facility] Inpatient Daily Activity Raw Score: 19 (05/10/21 0852)  AM-PAC Inpatient ADL T-Scale Score : 40.22 (05/10/21 0852)  ADL Inpatient CMS 0-100% Score: 42.8 (05/10/21 0852)  ADL Inpatient CMS G-Code Modifier : CK (05/10/21 9311)    Goals  Short term goals  Time Frame for Short term goals: prior to d/c  Short term goal 1: toileting Mod I  Short term goal 2: ADL tx Mod I  Short term goal 3: tolerate 5 min fxl standing task Mod I  Short term goal 4: LB dressing Mod I  Short term goal 5: tolerate BUE exercises in all planes to improve endurance for ADLs  Patient Goals   Patient goals : get fluid off me       Therapy Time   Individual Concurrent Group Co-treatment   Time In 0812         Time Out 0852         Minutes 40         Timed Code Treatment Minutes: 25 Minutes(15 eval)       WILLIAM Infante, OTR/L

## 2021-05-10 NOTE — PROGRESS NOTES
Nephrology ([facility]) Progress Note    CC: ESRD management    Subjective:    HPI:  Breathing comfortably. No CP.  ROS:  In bed. [address]:  medications reviewed. Objective:  Blood pressure 135/81, pulse 62, temperature 98.9 °F (37.2 °C), temperature source Oral, resp. rate 18, height 5' 10\" (1.778 m), weight 239 lb 13.8 oz (108.8 kg), SpO2 96 %. Intake/Output Summary (Last 24 hours) at 5/10/2021 1052  Last data filed at 5/10/2021 0811  Gross per 24 hour   Intake 740 ml   Output 3500 ml   Net -2760 ml     General:  NAD, A+Ox3  Chest:  CTAB  CVS:  RRR  Abdominal:  NTND, soft, +BS  Extremities:  no edema  Skin:  no rash    Labs:  Renal panel:  Lab Results   Component Value Date/Time     05/10/2021 05:58 AM    K 4.8 05/10/2021 05:58 AM    CO2 25 05/10/2021 05:58 AM    BUN 44 (H) 05/10/2021 05:58 AM    CREATININE 8.3 (HH) 05/10/2021 05:58 AM    CALCIUM 8.1 (L) 05/10/2021 05:58 AM    PHOS 3.5 01/23/2018 04:45 PM    MG 1.80 01/23/2018 04:58 AM     CBC:  Lab Results   Component Value Date/Time    WBC 4.2 05/10/2021 05:58 AM    HGB 10.9 (L) 05/10/2021 05:58 AM    HCT 31.6 (L) 05/10/2021 05:58 AM     (L) 05/10/2021 05:58 AM       Assessment/Plan:  Reviewed old records and labs. 1) ESRD   - belongs to [facility] nephrology   - HD TTS    2) pulmonary edema   - resolved    3) hyperkalemia   - corrected    4) HTN   - improved    Patient can be discharged from renal standpoint.

## 2021-05-10 NOTE — CARE COORDINATION
[facility] delivered requested Shower Chair w/Back to patient and reviewed insurance coverage and equipment set up with patient.     Thank you for the referral.  Electronically signed by Jd Wylie on 5/10/2021 at 4:00 PM Cell ph# [phone]

## 2021-05-10 NOTE — CARE COORDINATION
INITIAL CASE MANAGEMENT ASSESSMENT    Reviewed chart, met with patient to assess possible discharge needs. Explained Case Management role/services. SW interviewed patient alone at bedside today. Living Situation: Patient reports that he resides in an apartment home with his niece, nephew and one cat. There are 4-5 stairs leading up to the front door and another 10 stairs once inside to get up to his second floor apartment. Prior to medical admission patient reports that he had no trouble getting in and out of the property. ADLs: Prior to medical admission patient reports that he received assistance with cooking. He stated that he doesn't anticipate any needs at discharge. DME: Prior to medical admission patient reports that he used no durable medical equipment. He stated that he doesn't anticipate any needs at discharge. PT/OT Recs: Home with home care 2-3 times per week. Active Services: Prior to medical admission patient reports that he received assistance with cooking. He stated that he doesn't anticipate any needs at discharge. Transportation: Prior to medical admission patient reports that he used medical transportation to get back and forth to appointments and errands. RADHA called [facility] transportation at [phone]. His services were on hold due to COVID positive over two weeks ago. SW will ensure that transportation is resumed prior to discharge. Medications: Patient receives [facility]. He is agreeable to receiving discharge medications from [address] [place] [address]. PCP: [facility]specified [facility] (Inactive)       HD/PD: [facility] located at [facility], [address] [place] [address] [phone] (phone) and [phone] (fax number). Patient reports that he attends dialysis treatments every Tuesday, Thursday and Saturday at 7:00am.     PLAN/COMMENTS:   1) Verify dialysis transportation. They may need updated COVID. 2) Forward collateral info to dialysis. SW provided contact information for patient or family to call with any questions. SW will follow and assist as needed. Respectfully submitted,    WINNIE Munroe-S  [facility]   [phone]    Electronically signed by MUNIR Vargas on 5/10/2021 at 12:11 PM

## 2021-05-10 NOTE — CARE COORDINATION
SW placed phone call to dialysis social worker Domingo Laughlin at [facility] today regarding issues with the patient's transportation. She stated that the patient was tested again on 5/4 and was resulted on 5/6 for COVID. She stated that it was a PCR test.     SW informed that she spoke to [employer] [place] [employer] and they stated that his rides were on hold. She asked this writer to call [facility] transportation as they have the COVID results. She can fax them to us if needed. SW will follow up with her if we need her. Today she's at [facility] and their phone number is [phone].     Respectfully submitted,     JEREMIAH SaleemS  [facility]   [phone]    Electronically signed by MUNIR Ruiz on 5/10/2021 at 12:59 PM

## 2021-05-10 NOTE — CONSULTS
Clinical Pharmacy Note  Warfarin Consult    Red Miller is a 57 y.o. male receiving warfarin managed by pharmacy. Patient being bridged with none. Warfarin Indication: afib  Target INR range:    Dose prior to admission:     Current warfarin drug-drug interactions:     Recent Labs     05/09/21  0942 05/10/21  0558 05/10/21  1131   HGB 11.2* 10.9*  --    HCT 33.4* 31.6*  --    INR 3.75*  --  3.70*       Assessment/Plan:    No Warfarin tonight. Daily PT/INR until stable within therapeutic range. Thank you for the consult. Will continue to follow.   Jennifer Saab, [facility]

## 2021-05-10 NOTE — H&P
sevelamer (RENVELA) 800 MG tablet Take 3 tablets by mouth 3 times daily. Yes Historical Provider, MD   atorvastatin (LIPITOR) 40 MG tablet Take 40 mg by mouth daily. Yes Historical Provider, MD   warfarin (COUMADIN) 10 MG tablet Take 10 mg by mouth daily. Yes Historical Provider, MD   metoprolol (LOPRESSOR) 25 MG tablet Take 2 tablets by mouth 2 times daily. Need to verify if immediate release 4/3/14  Yes Robert Bolivar MD   aspirin 81 MG tablet Take 81 mg by mouth daily. Yes Historical Provider, MD   gabapentin (NEURONTIN) 300 MG capsule Take 300 mg by mouth daily . Yes Historical Provider, MD   rOPINIRole (REQUIP) 1 MG tablet Take 1 mg by mouth nightly. Yes Historical Provider, MD   sertraline (ZOLOFT) 50 MG tablet Take 50 mg by mouth daily. Historical Provider, MD   pantoprazole (PROTONIX) 40 MG tablet Take 40 mg by mouth daily. Historical Provider, MD       Allergies:  Patient has no known allergies. Social History:  The patient currently lives at home    TOBACCO:   reports that he has never smoked. He has never used smokeless tobacco.  ETOH:   reports current alcohol use of about 2.0 standard drinks of alcohol per week. Family History:  Reviewed in detail and negative for DM, Early CAD, Cancer, CVA. Positive as follows:        Problem Relation Age of Onset    Heart Disease Father     High Blood Pressure Sister        REVIEW OF SYSTEMS:   Positive for SOB and as noted in the HPI. All other systems reviewed and negative. PHYSICAL EXAM:    /61   Pulse 76   Temp 101.1 °F (38.4 °C) (Oral)   Resp 16   Ht 5' 10\" (1.778 m)   Wt 236 lb 12.4 oz (107.4 kg)   SpO2 96%   BMI 33.97 kg/m²     General appearance: No apparent distress appears stated age and cooperative. HEENT Normal cephalic, atraumatic without obvious deformity. Pupils equal, round, and reactive to light. Extra ocular muscles intact. Conjunctivae/corneas clear.   Neck: Supple, No jugular venous distention/bruits. Trachea midline without thyromegaly or adenopathy with full range of motion. Lungs: Clear to auscultation, bilaterally without Rales/Wheezes/Rhonchi with good respiratory effort. Heart: Regular rate and rhythm with Normal S1/S2   Abdomen: Soft, non-tender or non-distended without rigidity or guarding and positive bowel sounds   Extremities: No clubbing, cyanosis, or edema bilaterally. Skin: Skin color, texture, turgor normal.  No rashes or lesions. Neurologic: Alert and oriented X 3, neurovascularly intact with sensory/motor intact upper extremities/lower extremities, bilaterally. Cranial nerves: II-XII intact, grossly non-focal.  Mental status: Alert, oriented, thought content appropriate.   Capillary Refill: Acceptable  < 3 seconds  Peripheral Pulses: +3 Easily felt, not easily obliterated with pressure      CXR:  I have reviewed the CXR with the following interpretation: clear    CBC   Recent Labs     05/09/21  0942   WBC 3.2*   HGB 11.2*   HCT 33.4*   *      RENAL  Recent Labs     05/09/21 0942      K 5.5*   CL 96*   CO2 21   BUN 74*   CREATININE 12.7*     LFT'S  Recent Labs     05/09/21  0942   AST 7*   ALT 6*   BILITOT 0.4   ALKPHOS 66     COAG  Recent Labs     05/09/21 0942   INR 3.75*     CARDIAC ENZYMES  Recent Labs     05/09/21  0942   TROPONINI 0.06*       U/A:    Lab Results   Component Value Date    COLORU YELLOW 01/23/2018    WBCUA 2 01/23/2018    RBCUA 62 01/23/2018    CLARITYU Clear 01/23/2018    SPECGRAV 1.016 01/23/2018    LEUKOCYTESUR Negative 01/23/2018    BLOODU LARGE 01/23/2018    GLUCOSEU Negative 01/23/2018       ABG  No results found for: DMB0NZQ, BEART, G1JPHCPU, PHART, THGBART, TLM6YZA, PO2ART, HQP2TYE        Active Hospital Problems    Diagnosis Date Noted    Fluid overload [E87.70] 05/09/2021         PHYSICIANS CERTIFICATION:    I certify that Kathy Layne is expected to be hospitalized for > than 2 midnights based on the following assessment

## 2021-05-10 NOTE — CARE COORDINATION
Daren/Zain received referral from  for Shower Chair w/Back    Will need DME Orders. Will verify patient's insurance and follow up with patient to deliver the ordered item(s) prior to discharge.     Thank you for the referral.  Electronically signed by Les Cornejo on 5/10/2021 at 2:10 PM  Cell ph# [phone]

## 2021-05-10 NOTE — PROGRESS NOTES
Patient continues to c/o a headache, had a small BM temperature 99.5    Five beats of V-tach - per [facility] at this time.     Back of neck has hurt since the Covid vaccine    2345 - 101.1 - States headache is better 3-4

## 2021-05-12 LAB — HEPATITIS B CORE TOTAL ANTIBODY: NEGATIVE

## 2021-05-17 NOTE — DISCHARGE SUMMARY
bilaterally without Rales/Wheezes/Rhonchi with good respiratory effort. Heart: Regular rate and rhythm with Normal S1/S2   Abdomen: Soft, non-tender or non-distended without rigidity or guarding and positive bowel sounds   Extremities: No clubbing, cyanosis, or edema bilaterally. Skin: Skin color, texture, turgor normal.  No rashes or lesions. Neurologic: Alert and oriented X 3, neurovascularly intact with sensory/motor intact upper extremities/lower extremities, bilaterally. Cranial nerves: II-XII intact, grossly non-focal.  Mental status: Alert, oriented, thought content appropriate. Capillary Refill: Acceptable  < 3 seconds  Peripheral Pulses: +3 Easily felt, not easily obliterated with pressure       Labs: For convenience and continuity at follow-up the following most recent labs are provided:      CBC:    Lab Results   Component Value Date    WBC 4.2 05/10/2021    HGB 10.9 05/10/2021    HCT 31.6 05/10/2021     05/10/2021       Renal:    Lab Results   Component Value Date     05/10/2021    K 4.8 05/10/2021    CL 95 05/10/2021    CO2 25 05/10/2021    BUN 44 05/10/2021    CREATININE 8.3 05/10/2021    CALCIUM 8.1 05/10/2021    PHOS 3.5 01/23/2018         Significant Diagnostic Studies    Radiology:   XR CHEST (2 VW)   Final Result   No acute cardiopulmonary disease.                 Consults:     IP CONSULT TO NEPHROLOGY  IP CONSULT TO NEPHROLOGY  IP CONSULT TO NEPHROLOGY    Disposition:  home     Condition at Discharge: Stable    Discharge Instructions/Follow-up:  PCP in 1 week    Code Status:  Prior     Activity: activity as tolerated    Diet: diabetic diet      Discharge Medications:     Discharge Medication List as of 5/10/2021  3:42 PM           Details   insulin glargine (BASAGLAR KWIKPEN) 100 UNIT/ML injection pen Inject 10 Units into the skin nightlyHistorical Med      isosorbide mononitrate (IMDUR) 30 MG extended release tablet Take 30 mg by mouth dailyHistorical Med      cinacalcet (SENSIPAR) 60 MG tablet Take 120 mg by mouth every evening Takes on dialysis daysHistorical Med      sevelamer (RENVELA) 800 MG tablet Take 3 tablets by mouth 3 times daily. atorvastatin (LIPITOR) 40 MG tablet Take 40 mg by mouth daily. warfarin (COUMADIN) 10 MG tablet Take 10 mg by mouth daily. metoprolol (LOPRESSOR) 25 MG tablet Take 2 tablets by mouth 2 times daily. Need to verify if immediate release, Disp-60 tablet, R-0      aspirin 81 MG tablet Take 81 mg by mouth daily. gabapentin (NEURONTIN) 300 MG capsule Take 300 mg by mouth daily . Historical Med      rOPINIRole (REQUIP) 1 MG tablet Take 1 mg by mouth nightly. sertraline (ZOLOFT) 50 MG tablet Take 50 mg by mouth daily. pantoprazole (PROTONIX) 40 MG tablet Take 40 mg by mouth daily. Time Spent on discharge is more than 30 minutes in the examination, evaluation, counseling and review of medications and discharge plan. Signed:    Ernst Sorensen MD   5/17/2021      Thank you [facility] ([facility]) for the opportunity to be involved in this patient's care. If you have any questions or concerns please feel free to contact me at [phone].

## 2022-02-17 ENCOUNTER — HOSPITAL ENCOUNTER (EMERGENCY)
Age: 62
Discharge: HOME | End: 2022-02-18
Attending: EMERGENCY MEDICINE
Payer: COMMERCIAL

## 2022-02-17 DIAGNOSIS — U07.1 COVID: Primary | ICD-10-CM

## 2022-02-17 PROCEDURE — 99282 EMERGENCY DEPT VISIT SF MDM: CPT

## 2022-02-17 PROCEDURE — 93005 ELECTROCARDIOGRAM TRACING: CPT | Performed by: EMERGENCY MEDICINE

## 2022-02-18 ENCOUNTER — APPOINTMENT (OUTPATIENT)
Dept: GENERAL RADIOLOGY | Age: 62
End: 2022-02-18
Payer: COMMERCIAL

## 2022-02-18 VITALS
HEART RATE: 108 BPM | OXYGEN SATURATION: 96 % | RESPIRATION RATE: 23 BRPM | SYSTOLIC BLOOD PRESSURE: 96 MMHG | BODY MASS INDEX: 30.37 KG/M2 | WEIGHT: 211.64 LBS | DIASTOLIC BLOOD PRESSURE: 83 MMHG | TEMPERATURE: 98.4 F

## 2022-02-18 LAB
ANION GAP SERPL CALCULATED.3IONS-SCNC: 21 MMOL/L (ref 3–16)
BASOPHILS ABSOLUTE: 0.1 K/UL (ref 0–0.2)
BASOPHILS RELATIVE PERCENT: 2 %
BUN BLDV-MCNC: 30 MG/DL (ref 7–20)
CALCIUM SERPL-MCNC: 9.2 MG/DL (ref 8.3–10.6)
CHLORIDE BLD-SCNC: 91 MMOL/L (ref 99–110)
CO2: 22 MMOL/L (ref 21–32)
CREAT SERPL-MCNC: 5.7 MG/DL (ref 0.8–1.3)
EKG ATRIAL RATE: 78 BPM
EKG DIAGNOSIS: NORMAL
EKG Q-T INTERVAL: 394 MS
EKG QRS DURATION: 144 MS
EKG QTC CALCULATION (BAZETT): 516 MS
EKG R AXIS: -89 DEGREES
EKG T AXIS: 58 DEGREES
EKG VENTRICULAR RATE: 103 BPM
EOSINOPHILS ABSOLUTE: 0 K/UL (ref 0–0.6)
EOSINOPHILS RELATIVE PERCENT: 0.8 %
GFR AFRICAN AMERICAN: 12
GFR NON-AFRICAN AMERICAN: 10
GLUCOSE BLD-MCNC: 78 MG/DL (ref 70–99)
HCT VFR BLD CALC: 36 % (ref 40.5–52.5)
HEMOGLOBIN: 12.3 G/DL (ref 13.5–17.5)
INR BLD: 1.03 (ref 0.88–1.12)
LYMPHOCYTES ABSOLUTE: 0.9 K/UL (ref 1–5.1)
LYMPHOCYTES RELATIVE PERCENT: 31.5 %
MCH RBC QN AUTO: 32.6 PG (ref 26–34)
MCHC RBC AUTO-ENTMCNC: 34.1 G/DL (ref 31–36)
MCV RBC AUTO: 95.6 FL (ref 80–100)
MONOCYTES ABSOLUTE: 0.7 K/UL (ref 0–1.3)
MONOCYTES RELATIVE PERCENT: 23.1 %
NEUTROPHILS ABSOLUTE: 1.2 K/UL (ref 1.7–7.7)
NEUTROPHILS RELATIVE PERCENT: 42.6 %
PDW BLD-RTO: 16.8 % (ref 12.4–15.4)
PLATELET # BLD: ABNORMAL K/UL (ref 135–450)
PLATELET SLIDE REVIEW: ABNORMAL
PMV BLD AUTO: ABNORMAL FL (ref 5–10.5)
POTASSIUM REFLEX MAGNESIUM: 4.9 MMOL/L (ref 3.5–5.1)
PRO-BNP: ABNORMAL PG/ML (ref 0–124)
PROTHROMBIN TIME: 11.7 SEC (ref 9.9–12.7)
RBC # BLD: 3.77 M/UL (ref 4.2–5.9)
SARS-COV-2, NAAT: DETECTED
SLIDE REVIEW: ABNORMAL
SODIUM BLD-SCNC: 134 MMOL/L (ref 136–145)
TROPONIN: 0.06 NG/ML
WBC # BLD: 2.8 K/UL (ref 4–11)

## 2022-02-18 PROCEDURE — 85610 PROTHROMBIN TIME: CPT

## 2022-02-18 PROCEDURE — 87635 SARS-COV-2 COVID-19 AMP PRB: CPT

## 2022-02-18 PROCEDURE — 85025 COMPLETE CBC W/AUTO DIFF WBC: CPT

## 2022-02-18 PROCEDURE — 83880 ASSAY OF NATRIURETIC PEPTIDE: CPT

## 2022-02-18 PROCEDURE — 84484 ASSAY OF TROPONIN QUANT: CPT

## 2022-02-18 PROCEDURE — 80048 BASIC METABOLIC PNL TOTAL CA: CPT

## 2022-02-18 PROCEDURE — 93010 ELECTROCARDIOGRAM REPORT: CPT | Performed by: INTERNAL MEDICINE

## 2022-02-18 PROCEDURE — 71046 X-RAY EXAM CHEST 2 VIEWS: CPT

## 2022-02-18 NOTE — ED PROVIDER NOTES
[address] [facility]      Pt Name: Liam Mahan  MRN: [id number]  [place] 1960  Date of evaluation: 2/17/2022  Provider: Ellouise Riedel, MD    CHIEF COMPLAINT       Chief Complaint   Patient presents with    Chest Pain    Shortness of Breath       HISTORY OF PRESENT ILLNESS    Liam Mahan is a 57 y.o. male who presents to the emergency department with cough, chest pain, shortness of breath. Has been going on the last 24 hours. Endorses fatigue and generalized weakness. Chest pain is from coughing. 3 of 10 burning in nature. Positive for tactile fevers. Started spontaneously. Denies any sick contacts. No other associated symptoms. Nursing Notes were reviewed. Including nursing noted for FM, Surgical History, Past Medical History, Social History, vitals, and allergies; agree with all. REVIEW OF SYSTEMS       Review of Systems    Except as noted above the remainder of the review of systems was reviewed and negative.      PAST MEDICAL HISTORY     Past Medical History:   Diagnosis Date    Atrial fibrillation ([facility]     CAD (coronary artery disease) 4/3/2014    Chronic kidney disease     Depression     Diabetes mellitus ([address]     Dialysis patient ([facility]     left upper arm fistula    Glaucoma     Hemodialysis patient ([facility]     Hyperlipidemia     Hypertension     RLL pneumonia 1/23/2018    Sleep apnea     uses CPAP       SURGICAL HISTORY       Past Surgical History:   Procedure Laterality Date    COLONOSCOPY      PACEMAKER INSERTION      PACEMAKER PLACEMENT         CURRENT MEDICATIONS       Discharge Medication List as of 2/18/2022  1:46 AM      CONTINUE these medications which have NOT CHANGED    Details   insulin glargine (BASAGLAR KWIKPEN) 100 UNIT/ML injection pen Inject 10 Units into the skin nightlyHistorical Med      isosorbide mononitrate (IMDUR) 30 MG extended release tablet Take 30 mg by mouth dailyHistorical Med cinacalcet (SENSIPAR) 60 MG tablet Take 120 mg by mouth every evening Takes on dialysis daysHistorical Med      sevelamer (RENVELA) 800 MG tablet Take 3 tablets by mouth 3 times daily. atorvastatin (LIPITOR) 40 MG tablet Take 40 mg by mouth daily. warfarin (COUMADIN) 10 MG tablet Take 10 mg by mouth daily. metoprolol (LOPRESSOR) 25 MG tablet Take 2 tablets by mouth 2 times daily. Need to verify if immediate release, Disp-60 tablet, R-0      aspirin 81 MG tablet Take 81 mg by mouth daily. gabapentin (NEURONTIN) 300 MG capsule Take 300 mg by mouth daily . Historical Med      rOPINIRole (REQUIP) 1 MG tablet Take 1 mg by mouth nightly. sertraline (ZOLOFT) 50 MG tablet Take 50 mg by mouth daily. pantoprazole (PROTONIX) 40 MG tablet Take 40 mg by mouth daily. ALLERGIES     Patient has no known allergies. FAMILY HISTORY        Family History   Problem Relation Age of Onset    Heart Disease Father     High Blood Pressure Sister        SOCIAL HISTORY       Social History     Socioeconomic History    Marital status: Single     Spouse name: Not on file    Number of children: Not on file    Years of education: Not on file    Highest education level: Not on file   Occupational History    Not on file   Tobacco Use    Smoking status: Never Smoker    Smokeless tobacco: Never Used   Substance and Sexual Activity    Alcohol use:  Yes     Alcohol/week: 2.0 standard drinks     Types: 2 Cans of beer per week     Comment: once monthly    Drug use: Yes     Types: Marijuana Alpheus Flakita)    Sexual activity: Not on file     Comment: marijuana daily, heroin once monthly, smokes last used 2/6/16   Other Topics Concern    Not on file   Social History Narrative    Not on file     Social Determinants of Health     Financial Resource Strain:     Difficulty of Paying Living Expenses: Not on file   Food Insecurity:     Worried About Running Out of Food in the Last Year: Not on file    Prachi Palpations: Abdomen is soft. There is no mass. Tenderness: There is no abdominal tenderness. There is no guarding or rebound. Musculoskeletal:         General: No tenderness or deformity. Normal range of motion. Cervical back: Normal range of motion. Skin:     General: Skin is warm. Coloration: Skin is not pale. Findings: No erythema or rash. Neurological:      Mental Status: He is alert. Cranial Nerves: No cranial nerve deficit. Motor: No abnormal muscle tone.       Coordination: Coordination normal.         DIAGNOSTIC RESULTS     RADIOLOGY:   Non-plain film images such as CT, Ultrasoundand MRI are read by the radiologist. Plain radiographic images are visualized and preliminarily interpreted by the emergency physician with the below findings:    Impression   Stable chronic changes with no acute abnormality seen         ED BEDSIDE ULTRASOUND:   Performed by ED Physician - none    LABS:  Labs Reviewed   COVID-19, RAPID - Abnormal; Notable for the following components:       Result Value    SARS-CoV-2, NAAT DETECTED (*)     All other components within normal limits    Narrative:     Performed at:  [facility]  [address] [place], [place] [address]   Phone [phone]   CBC WITH AUTO DIFFERENTIAL - Abnormal; Notable for the following components:    WBC 2.8 (*)     RBC 3.77 (*)     Hemoglobin 12.3 (*)     Hematocrit 36.0 (*)     RDW 16.8 (*)     Neutrophils Absolute 1.2 (*)     Lymphocytes Absolute 0.9 (*)     All other components within normal limits    Narrative:     Performed at:  [facility]  [address] [place], [place] [address]   Phone [phone]   BASIC METABOLIC PANEL W/ REFLEX TO MG FOR LOW K - Abnormal; Notable for the following components:    Sodium 134 (*)     Chloride 91 (*)     Anion Gap 21 (*)     BUN 30 (*)     CREATININE 5.7 (*)     GFR Non- 10 (*)     GFR  12 (*)     All other components within normal limits    Narrative:     CALL  Parra  SKERK tel. [phone],  Previous panic on this admission - call not needed per SOP, 02/18/2022 00:47,  by SYLVIA  Performed at:  [place] [address], [place] [address]   Phone [phone]   TROPONIN - Abnormal; Notable for the following components:    Troponin 0.06 (*)     All other components within normal limits    Narrative:     Wyatt Spotted tel. [phone],  Previous panic on this admission - call not needed per SOP, 02/18/2022 00:47,  by SYLVIA  Performed at:  [place] [address] [facility] [employer] [address]   Phone [phone]   BRAIN NATRIURETIC PEPTIDE - Abnormal; Notable for the following components:    Pro-BNP 34,363 (*)     All other components within normal limits    Narrative:     Wyatt Spotted tel. [phone],  Previous panic on this admission - call not needed per SOP, 02/18/2022 00:47,  by SYLVIA  Performed at:  [place] [address], [facility] [place] [facility] [place] [address]   Phone [phone], RAPID   PROTIME-INR    Narrative:     Performed at:  [address] [place] [address]   Phone [phone]       All other labs were withinnormal range or not returned as of this dictation. EMERGENCY DEPARTMENT COURSE and DIFFERENTIAL DIAGNOSIS/MDM:     PMH, Surgical Hx, FH, Social Hx reviewed by myself (ETOH usage, Tobacco usage, Drug usage reviewed by myself, no pertinent Hx)- No Pertinent Hx     Old records were reviewed by me    69-year-old with flulike symptoms found to have Covid. Troponin at baseline. Nonexertional chest pain. Chest pain is from coughing. Supportive care. Not hypoxic. Ambulates with steady gait without desaturation.  Outpatient follow-up with PCP    I estimate there is LOW risk for Sepsis, MI, Stroke, Tamponade, PTX, Toxicity or other life threatening etiology thus I consider the discharge disposition reasonable. The patient is at low risk for mortality based on demographic, history and clinical factors. Given the best available information and clinical assessment, I estimate the risk of hospitalization to be greater than risk of treatment at home. I have explained to the patient that the risk could rapidly change, given precautions for return and instructions. Explained to patient that the risk for mortality is low based on demographic, history and clinical factors. I discussed with patient the results of evaluation in the ED, diagnosis, care, and prognosis. The plan is to discharge to home. Patient is in agreement with plan and questions have been answered. I also discussed with patient the reasons which may require a return visit and the importance of follow-up care. The patient is well-appearing, nontoxic, and improved at the time of discharge. Patient agrees to call to arrange follow-up care as directed. Patient understands to return immediately for worsening/change in symptoms. CRITICAL CARE TIME   Total Critical Caretime was 21 minutes, excluding separately reportable procedures. There was a high probability of clinically significant/life threatening deterioration in the patient's condition which required my urgent intervention.         PROCEDURES:  Unlessotherwise noted below, none    FINAL IMPRESSION      1. COVID          DISPOSITION/PLAN   DISPOSITION Decision To Discharge 02/18/2022 01:35:21 AM    PATIENT REFERRED TO:  PCP            DISCHARGE MEDICATIONS:  Discharge Medication List as of 2/18/2022  1:46 AM             (Please note that portions ofthis note were completed with a voice recognition program.  Efforts were made to edit the dictations but occasionally words are mis-transcribed.)    Kinza Beltrna MD(electronically signed)  Attending Emergency Physician        Mica Gibson Darell Zepeda MD  02/18/22 4129

## 2022-02-18 NOTE — ED NOTES
Provider order placed for patient's discharge. Provider reviewed decision to discharge with the patient. Discharge paperwork and any prescriptions were reviewed with the patient. Patient verbalized understanding of discharge education and any prescriptions and has no further questions or further needs at this time. Patient left with all personal belongings and was stable upon departure. Patient thanked for choosing [facility] ([facility]) and informed to return should any need arise.        Bisi Christianson RN  02/18/22 3530

## 2022-02-21 ENCOUNTER — CARE COORDINATION (OUTPATIENT)
Dept: CARE COORDINATION | Age: 62
End: 2022-02-21

## 2022-02-21 NOTE — CARE COORDINATION
Outreach attempt 1 regarding recent ED Visit. Patient was unable to reach, ACM unable to leave HIPAA compliant message with contact information d/t recording saying patient is unavailable.     Plan   Outreach attempt 2 regarding recent ED Visit      [facility]  [phone]  [facility] [address]  [address] [place] [address]

## 2022-02-22 ENCOUNTER — CARE COORDINATION (OUTPATIENT)
Dept: CARE COORDINATION | Age: 62
End: 2022-02-22

## 2022-02-22 NOTE — CARE COORDINATION
Outreach attempt 2 regarding recent ED Visit.  Patient was unable to reach, ACM unable to leave HIPAA compliant message with contact information d/t recording saying patient is unavailable.     Plan   Outreach attempt 3 regarding recent ED Visit        Venus Zavala 1999 Manager  [phone]  [facility] [address]  [address] [place] [address]

## 2022-03-02 ENCOUNTER — CARE COORDINATION (OUTPATIENT)
Dept: CARE COORDINATION | Age: 62
End: 2022-03-02

## 2022-03-02 NOTE — CARE COORDINATION
Outreach attempt 3 regarding recent ED Visit. Patient was unable to reach, ACM unable to leave HIPAA compliant message with contact information d/t recording saying patient is unavailable and unable to leave VM. No further outreach scheduled with this ACM/CTN. Episode of Care resolved.        [phone]  [address]  [address] [facility]

## 2022-08-25 NOTE — PROGRESS NOTES
that the pt will be safe for home with current level of assist from family but the pt reports his MD wanted him to have some out pt PT a month ago and he is interested in this. The pt would benefit from con't skilled PT at d/c for strengthening and balance training along with high level gait activities. Will con't to follow while on acute. Prognosis: Good  Decision Making: Medium Complexity  History: see above  Exam: see above  Clinical Presentation: evolving  PT Education: PT Role;General Safety; Energy Conservation  Barriers to Learning: [place]  REQUIRES PT FOLLOW UP: Yes  Activity Tolerance  Activity Tolerance: Patient limited by endurance  Activity Tolerance: the pt gets SOB with activity and needed seated rest break       Patient Diagnosis(es): The primary encounter diagnosis was ESRD needing dialysis ([facility] Diagnoses of Hyperkalemia, Shortness of breath, and Supratherapeutic INR were also pertinent to this visit. has a past medical history of Atrial fibrillation ([facility] CAD (coronary artery disease), Chronic kidney disease, Depression, Diabetes mellitus ([facility] Dialysis patient ([facility] Glaucoma, Hemodialysis patient ([facility] Hyperlipidemia, Hypertension, RLL pneumonia, and Sleep apnea. has a past surgical history that includes Pacemaker insertion; pacemaker placement; and Colonoscopy. Restrictions  Restrictions/Precautions  Restrictions/Precautions: Fall Risk, Up Ad Stefanie  Vision/Hearing  Vision: Impaired  Vision Exceptions: Wears glasses for reading(glaucoma)  Hearing: Exceptions to [facility] [address] [facility]  Hearing Exceptions: Hard of hearing/hearing concerns; No hearing aid     Subjective  General  Chart Reviewed: Yes  Patient assessed for rehabilitation services?: Yes  Additional Pertinent Hx: Per ED note from Roberth Booker on 5-9-2021: The pt is a 56 yo male who came to the ED reporting SOB x 2 days. He missed 2 outpt dialysis appointments due to transportation.       PMHx: a-fib, CAD, CKD, depression, DM, 75 HD(T-Th-Sat), glaucoma, HTN, sleep apnea, pacemaker  Response To Previous Treatment: Not applicable  Family / Caregiver Present: No  Referring Practitioner: Dinora Farmer MD  Referral Date : 05/09/21  Diagnosis: ESRD needing dialysis, hyperkalemia, SOB, Supratherapeutic INR  Follows Commands: Within Functional Limits  Subjective  Subjective: the pt found to be up in the chair; denies pain but reports he had a headache last night  Pain Screening  Patient Currently in Pain: Yes          Orientation  Orientation  Overall Orientation Status: Within Functional Limits  Social/Functional History  Social/Functional History  Lives With: Family(niece and nephew (reports sometimes there alone))  Type of Home: Apartment(2nd floor)  Home Layout: One level  Home Access: Stairs to enter with rails  Entrance Stairs - Number of Steps: 5 + 10 ANGELITO  Entrance Stairs - Rails: Both  Bathroom Shower/Tub: Tub/Shower unit  Bathroom Toilet: Standard  Bathroom Equipment: (no DME)  Bathroom Accessibility: Walker accessible  Home Equipment: Rolling walker, Cane  ADL Assistance: Independent  Homemaking Assistance: (niece does the cooking and gets groceries; the pt can do won laundry and clean)  Ambulation Assistance: Independent(no device)  Transfer Assistance: Independent(sleeps in regular bed)  Active : No  Patient's  Info: medical transportation  Occupation: On disability  Type of occupation: construction  Leisure & Hobbies: read; fish  Additional Comments: reports last fall was a month and a half ago  Cognition   Cognition  Overall Cognitive Status: WFL    Objective  AROM RLE (degrees)  RLE AROM: WFL  AROM LLE (degrees)  LLE AROM : WFL  Strength RLE  Strength RLE: WNL  Strength LLE  Strength LLE: WNL     Sensation  Overall Sensation Status: Impaired  Additional Comments: reports bottoms of B feet are numb from neuropathy  Bed mobility  Comment: n/a this session, the pt already up in the chair but anticipate that he will be indep  Transfers  Sit to Stand: Stand by assistance  Stand to sit: Stand by assistance  Ambulation  Ambulation?: Yes  Ambulation 1  Surface: level tile  Device: No Device  Assistance: Stand by assistance  Quality of Gait: slowed pace, slightly deviated gait path with scissoring of feet; the pt able to recover per self and had no real LOB; becomes SOB with activity and needed seated rest break  Distance: 25 feet in the room and then 50 feet x 2 in the hallway  Comments: the pt complete grooming during session (see OT note for assist levels)  Stairs/Curb  Stairs?: No     Balance  Sitting - Static: Good  Sitting - Dynamic: Good  Standing - Static: Good(at the sink)  Standing - Dynamic: Good;-(without device)  Comments: the pt stood at the sink to complete grooming tasks x 5 minutes with SBA        Plan   Plan  Times per week: 2-3x/week  Current Treatment Recommendations: Functional Mobility Training, Transfer Training, Gait Training, Stair training, Strengthening  Safety Devices  Type of devices: Call light within reach, Gait belt, Left in chair, Patient at risk for falls, Nurse notified(the pt is up ad antoni in the room per nursing)      AM-PAC Score  AM-PAC Inpatient Mobility Raw Score : 22 (05/10/21 0843)  AM-PAC Inpatient T-Scale Score : 53.28 (05/10/21 0843)  Mobility Inpatient CMS 0-100% Score: 20.91 (05/10/21 0843)  Mobility Inpatient CMS G-Code Modifier : Sapphire Roach (05/10/21 8637)          Goals  Short term goals  Time Frame for Short term goals: upon d/c  Short term goal 1: Bed mobility indep. Short term goal 2: Transfers sit <> stand with mod I/I.   Short term goal 3: Ambulate without device 100 feet with mod I/I  Short term goal 4: Negotiate steps with B rails with SBA  Patient Goals   Patient goals : to go home       Therapy Time   Individual Concurrent Group Co-treatment   Time In [address]         Time Out 0852         Minutes 40         Timed Code Treatment Minutes: 25 Minutes       Electronically signed by Andrew Rivera JANY Tello [id number] on 5/10/2021 at 8:53 AM

## 2022-12-17 ENCOUNTER — APPOINTMENT (OUTPATIENT)
Dept: GENERAL RADIOLOGY | Age: 62
End: 2022-12-17
Payer: COMMERCIAL

## 2022-12-17 ENCOUNTER — HOSPITAL ENCOUNTER (EMERGENCY)
Age: 62
Discharge: HOME | End: 2022-12-17
Attending: EMERGENCY MEDICINE
Payer: COMMERCIAL

## 2022-12-17 VITALS
WEIGHT: 211.64 LBS | SYSTOLIC BLOOD PRESSURE: 122 MMHG | TEMPERATURE: 97.7 F | HEART RATE: 101 BPM | RESPIRATION RATE: 15 BRPM | OXYGEN SATURATION: 96 % | DIASTOLIC BLOOD PRESSURE: 97 MMHG | BODY MASS INDEX: 30.37 KG/M2

## 2022-12-17 DIAGNOSIS — R06.02 SHORTNESS OF BREATH: Primary | ICD-10-CM

## 2022-12-17 LAB
ANION GAP SERPL CALCULATED.3IONS-SCNC: 22 MMOL/L (ref 3–16)
ANISOCYTOSIS: ABNORMAL
BASOPHILS ABSOLUTE: 0 K/UL (ref 0–0.2)
BASOPHILS RELATIVE PERCENT: 0.5 %
BUN BLDV-MCNC: 28 MG/DL (ref 7–20)
CALCIUM SERPL-MCNC: 9.9 MG/DL (ref 8.3–10.6)
CHLORIDE BLD-SCNC: 95 MMOL/L (ref 99–110)
CO2: 19 MMOL/L (ref 21–32)
CREAT SERPL-MCNC: 5.7 MG/DL (ref 0.8–1.3)
EOSINOPHILS ABSOLUTE: 0 K/UL (ref 0–0.6)
EOSINOPHILS RELATIVE PERCENT: 0.9 %
GFR SERPL CREATININE-BSD FRML MDRD: 10 ML/MIN/{1.73_M2}
GLUCOSE BLD-MCNC: 125 MG/DL (ref 70–99)
HCT VFR BLD CALC: 49.6 % (ref 40.5–52.5)
HEMATOLOGY PATH CONSULT: YES
HEMOGLOBIN: 16.2 G/DL (ref 13.5–17.5)
INR BLD: 0.98 (ref 0.87–1.14)
LYMPHOCYTES ABSOLUTE: 1.1 K/UL (ref 1–5.1)
LYMPHOCYTES RELATIVE PERCENT: 22.6 %
MCH RBC QN AUTO: 36.2 PG (ref 26–34)
MCHC RBC AUTO-ENTMCNC: 32.7 G/DL (ref 31–36)
MCV RBC AUTO: 110.7 FL (ref 80–100)
MONOCYTES ABSOLUTE: 0.6 K/UL (ref 0–1.3)
MONOCYTES RELATIVE PERCENT: 11.8 %
NEUTROPHILS ABSOLUTE: 3.1 K/UL (ref 1.7–7.7)
NEUTROPHILS RELATIVE PERCENT: 64.2 %
PDW BLD-RTO: 17.3 % (ref 12.4–15.4)
PLATELET # BLD: 123 K/UL (ref 135–450)
PLATELET SLIDE REVIEW: ABNORMAL
PMV BLD AUTO: 8.5 FL (ref 5–10.5)
POTASSIUM SERPL-SCNC: 4.9 MMOL/L (ref 3.5–5.1)
PROTHROMBIN TIME: 12.9 SEC (ref 11.7–14.5)
RAPID INFLUENZA  B AGN: NEGATIVE
RAPID INFLUENZA A AGN: NEGATIVE
RBC # BLD: 4.48 M/UL (ref 4.2–5.9)
SARS-COV-2, NAAT: NOT DETECTED
SLIDE REVIEW: ABNORMAL
SODIUM BLD-SCNC: 136 MMOL/L (ref 136–145)
TROPONIN: 0.04 NG/ML
TROPONIN: 0.04 NG/ML
WBC # BLD: 4.9 K/UL (ref 4–11)

## 2022-12-17 PROCEDURE — 85025 COMPLETE CBC W/AUTO DIFF WBC: CPT

## 2022-12-17 PROCEDURE — 87804 INFLUENZA ASSAY W/OPTIC: CPT

## 2022-12-17 PROCEDURE — 85610 PROTHROMBIN TIME: CPT

## 2022-12-17 PROCEDURE — 6370000000 HC RX 637 (ALT 250 FOR IP): Performed by: EMERGENCY MEDICINE

## 2022-12-17 PROCEDURE — 87635 SARS-COV-2 COVID-19 AMP PRB: CPT

## 2022-12-17 PROCEDURE — 36415 COLL VENOUS BLD VENIPUNCTURE: CPT

## 2022-12-17 PROCEDURE — 94640 AIRWAY INHALATION TREATMENT: CPT

## 2022-12-17 PROCEDURE — 99285 EMERGENCY DEPT VISIT HI MDM: CPT

## 2022-12-17 PROCEDURE — 94760 N-INVAS EAR/PLS OXIMETRY 1: CPT

## 2022-12-17 PROCEDURE — 80048 BASIC METABOLIC PNL TOTAL CA: CPT

## 2022-12-17 PROCEDURE — 93005 ELECTROCARDIOGRAM TRACING: CPT | Performed by: EMERGENCY MEDICINE

## 2022-12-17 PROCEDURE — 84484 ASSAY OF TROPONIN QUANT: CPT

## 2022-12-17 PROCEDURE — 71045 X-RAY EXAM CHEST 1 VIEW: CPT

## 2022-12-17 RX ORDER — IPRATROPIUM BROMIDE AND ALBUTEROL SULFATE 2.5; .5 MG/3ML; MG/3ML
1 SOLUTION RESPIRATORY (INHALATION) ONCE
Status: COMPLETED | OUTPATIENT
Start: 2022-12-17 | End: 2022-12-17

## 2022-12-17 RX ADMIN — IPRATROPIUM BROMIDE AND ALBUTEROL SULFATE 1 AMPULE: .5; 3 SOLUTION RESPIRATORY (INHALATION) at 12:12

## 2022-12-17 ASSESSMENT — PAIN - FUNCTIONAL ASSESSMENT
PAIN_FUNCTIONAL_ASSESSMENT: NONE - DENIES PAIN
PAIN_FUNCTIONAL_ASSESSMENT: NONE - DENIES PAIN

## 2022-12-17 NOTE — LETTER
December 21, 2022    Arminda Tony  [address]       Dear  Arminda Hensonwijaqueline,    During your Emergency Department visit on 12/17/2022, radiology and/or lab tests were taken and sent for analysis. The Emergency Department physician has reviewed the results and would like to discuss the findings with you. As of this time, attempts to reach you have been unsuccessful. Please contact the Emergency Department at [phone] and ask to speak to the Charge Nurse regarding your results and the possible need for further treatment.     Sincerely,     Dr. Elberta Skiff  [facility]  [address]

## 2022-12-17 NOTE — ED NOTES
Pt arrived via EMS from [facility] after dialysis feeling short of breath. EMS reports difficulty getting SPO2 reading but reported intermittent 77% upon arrival at the scene. Pt arrived on 15 liters NRB, normal saline infusing to gravity in a 20ga PIV in the right wrist, FSBG 150. Pt AOx4. Skin warm and dry. Tachycardic on the monitor. Respirations even, easy, unlabored.      Misael Davey RN  12/17/22 9052

## 2022-12-17 NOTE — PROGRESS NOTES
[facility]  Respiratory Therapy  Patient Education      Name:  Bryce Figueroa  Medical Record Number:  [id number]  Age: 60 y.o.   : 1960  Today's Date:  2022  Room:  [address] [place] [address]         Assessment      BP 86/67   Pulse (!) 114   Temp 97.7 °F (36.5 °C) (Oral)   Resp 15   Wt 211 lb 10.3 oz (96 kg)   SpO2 98%   BMI 30.37 kg/m²     Patient Active Problem List   Diagnosis    Chest pain    Type 2 diabetes mellitus (HCC)    Chronic renal failure    CAD (coronary artery disease)    ESRD on hemodialysis ([facility]    Diabetes type 2, [facility] ([facility]    Obesity (BMI 30-39. 9)    Depression    Sepsis ([facility]    RLL pneumonia    Warfarin-induced coagulopathy (HCC)    Paroxysmal atrial fibrillation (HCC)    Leg pain    Normocytic anemia    Thrombocytopenia (HCC)    Fluid overload       Social History  Social History     Tobacco Use    Smoking status: Never    Smokeless tobacco: Never   Substance Use Topics    Alcohol use: Not Currently     Alcohol/week: 2.0 standard drinks     Types: 2 Cans of beer per week     Comment: once monthly    Drug use: Yes     Types: Marijuana Hugo Calamity)       Modality:     HHN      Education       Patient/caregiver was educated on the proper method of use:  Yes        Level of patient/caregiver understanding able to:  Verbalize understanding and Demonstrate understanding     Education status:   Provided instructions on medications and Provided instructions on technique and indications      Response to education:    Good     Teaching Time: 10  minutes         Electronically signed by Ghassan Archibald RCP on 2022 at 12:15 PM

## 2022-12-17 NOTE — ED PROVIDER NOTES
[facility]  eMERGENCY dEPARTMENT eNCOUnter      Pt Name: Jose Manuel Ferrer  MRN: [id number]  [place] 1960  Date of evaluation: 12/17/2022  Provider: Paty Villarreal MD    [address]       Chief Complaint   Patient presents with    Shortness of Breath     SOB shortly after finishing dialysis. Pt walked from dialysis to [address], became SOB, bystander called [address] time was 0 minutes, excluding separately reportable procedures. There was a high probability of clinically significant/life threatening deterioration in the patient's condition which required my urgent intervention. HISTORY OF PRESENT ILLNESS  (Location/Symptom, Timing/Onset, Context/Setting, Quality, Duration, Modifying Factors, Severity.)   Jose Manuel Ferrer is a 60 y.o. male who presents to the emergency department via life squad with difficulty breathing. Patient has chronic kidney disease. He is on dialysis. He states he had his dialysis today. He came off 10 or 15 minutes early because he had to go to the grocery store and do some shopping. When he got to the grocery store he got short of breath after walking across a store. EMS was called. They reported oxygen saturations in the 70s. They put him on a nonrebreather. He denies chest pain. He denies recent illness or cough. He does have a history of COPD. He states he normally uses inhalers at home. On arrival here he was placed on room air. His oxygen saturations are in the mid to high 90s. Nursing Notes were reviewed and I agree. REVIEW OF SYSTEMS    (2-9 systems for level 4, 10 or more for level 5)     HEENT: No earache rhinorrhea or sore throat. Cardiovascular: No chest pain. Pulmonary: Shortness of breath. Denies cough. GI: No abdominal pain, nausea, vomiting. Musculoskeletal: No leg pain or leg swelling.     Except as noted above the remainder of the review of systems was reviewed and negative. PAST MEDICAL HISTORY     Past Medical History:   Diagnosis Date    Atrial fibrillation ([facility]     CAD (coronary artery disease) 4/3/2014    Chronic kidney disease     Depression     Diabetes mellitus ([facility]     Dialysis patient [facility])     left upper arm fistula    Glaucoma     Hemodialysis patient ([facility]     Hyperlipidemia     Hypertension     RLL pneumonia 1/23/2018    Sleep apnea     uses CPAP         SURGICAL HISTORY       Past Surgical History:   Procedure Laterality Date    COLONOSCOPY      PACEMAKER INSERTION      PACEMAKER PLACEMENT           CURRENT MEDICATIONS       Previous Medications    ASPIRIN 81 MG TABLET    Take 81 mg by mouth daily. ATORVASTATIN (LIPITOR) 40 MG TABLET    Take 40 mg by mouth daily. CINACALCET (SENSIPAR) 60 MG TABLET    Take 120 mg by mouth every evening Takes on dialysis days    GABAPENTIN (NEURONTIN) 300 MG CAPSULE    Take 300 mg by mouth daily . INSULIN GLARGINE (BASAGLAR KWIKPEN) 100 UNIT/ML INJECTION PEN    Inject 10 Units into the skin nightly    ISOSORBIDE MONONITRATE (IMDUR) 30 MG EXTENDED RELEASE TABLET    Take 30 mg by mouth daily    METOPROLOL (LOPRESSOR) 25 MG TABLET    Take 2 tablets by mouth 2 times daily. Need to verify if immediate release    PANTOPRAZOLE (PROTONIX) 40 MG TABLET    Take 40 mg by mouth daily. ROPINIROLE (REQUIP) 1 MG TABLET    Take 1 mg by mouth nightly. SERTRALINE (ZOLOFT) 50 MG TABLET    Take 50 mg by mouth daily. SEVELAMER (RENVELA) 800 MG TABLET    Take 3 tablets by mouth 3 times daily. WARFARIN (COUMADIN) 10 MG TABLET    Take 10 mg by mouth daily. ALLERGIES     Patient has no known allergies.     FAMILY HISTORY       Family History   Problem Relation Age of Onset    Heart Disease Father     High Blood Pressure Sister           SOCIAL HISTORY       Social History     Socioeconomic History    Marital status: Single     Spouse name: None    Number of children: None    Years of education: None Highest education level: None   Tobacco Use    Smoking status: Never    Smokeless tobacco: Never   Substance and Sexual Activity    Alcohol use: Not Currently     Alcohol/week: 2.0 standard drinks     Types: 2 Cans of beer per week     Comment: once monthly    Drug use: Yes     Types: Marijuana (Weed)         PHYSICAL EXAM    (up to 7 for level 4, 8 or more for level 5)     ED Triage Vitals   BP Temp Temp src Pulse Resp SpO2 Height Weight   -- -- -- -- -- -- -- --       General: Alert white male no acute distress. Head: Atraumatic and normocephalic. Eyes: No conjunctival injection. No pallor. Pupils equal round reactive. ENT: Eligah Slider is clear. Oropharynx moist without erythema. Neck: Supple, nontender, no adenopathy. Heart: Tachycardic, irregularly irregular. No murmurs or gallops noted. Lungs: Breath sounds decreased bilaterally, no rales or rhonchi. Prolonged expiratory phase. No wheezing. Abdomen: Soft, nondistended, nontender. Musculoskeletal: 1+ nonpitting pretibial edema. No calf tenderness. Skin: Warm and dry, good turgor. No pallor or cyanosis. No diaphoresis. Neuro: Awake, alert, oriented. No focal motor deficits. DIFFERENTIAL DIAGNOSIS   Differential includes but is not limited to COPD exacerbation, pneumonia, congestive heart failure, COVID-19, influenza, other. DIAGNOSTIC RESULTS     EKG: All EKG's are interpreted by Ivon Smith MD in the absence of a cardiologist.    Atrial fibrillation with RVR, ventricular rate of 108, right bundle branch block. Rhythm strip shows atrial fibrillation with RVR with a ventricular rate of 108,  ms with no other ectopy as interpreted by me. Compared to 2/17/2022, previously noted lateral ischemic changes have resolved. No other significant changes noted.     RADIOLOGY:   Non-plain film images such as CT, Ultrasound and MRI are read by the radiologist. Plain radiographic images are visualized and preliminarily interpreted Ivon Smith MD with the below findings:        Interpretation per the Radiologist below, if available at the time of this note:    XR CHEST PORTABLE   Final Result   No acute process. ED BEDSIDE ULTRASOUND:   Performed by ED Physician - none    LABS:  Labs Reviewed   CBC WITH AUTO DIFFERENTIAL - Abnormal; Notable for the following components:       Result Value    .7 (*)     MCH 36.2 (*)     RDW 17.3 (*)     Platelets 287 (*)     Anisocytosis 1+ (*)     All other components within normal limits   BASIC METABOLIC PANEL - Abnormal; Notable for the following components:    Chloride 95 (*)     CO2 19 (*)     Anion Gap 22 (*)     Glucose 125 (*)     BUN 28 (*)     Creatinine 5.7 (*)     Est, Glom Filt Rate 10 (*)     All other components within normal limits    Narrative:     Navid Marinelli tel. [phone],  Chemistry results called to and read back by Anamaria Perez, 12/17/2022 13:04,  by [facility]   TROPONIN - Abnormal; Notable for the following components:    Troponin 0.04 (*)     All other components within normal limits    Narrative:     Navid Marinelli tel. [phone],  Chemistry results called to and read back by Anamaria Perez, 12/17/2022 13:04,  by [facility]   TROPONIN - Abnormal; Notable for the following components:    Troponin 0.04 (*)     All other components within normal limits   COVID-19, RAPID   RAPID INFLUENZA A/B ANTIGENS   PROTIME-INR       All other labs were within normal range or not returned as of this dictation. EMERGENCY DEPARTMENT COURSE and DIFFERENTIAL DIAGNOSIS/MDM:   Vitals:    Vitals:    12/17/22 1345 12/17/22 1516 12/17/22 1530 12/17/22 1600   BP: 120/81 108/77 121/83 118/88   Pulse: (!) 101 98 (!) 101 99   Resp: 17 21 22 21   Temp:       TempSrc:       SpO2:  96% 94%    Weight: This patient presented via EMS with shortness of breath. They reported low oxygen saturations as above.   We put him on room air when he got here and got normal oxygen

## 2022-12-17 NOTE — ED NOTES
Pt discharged at this time. Discharge instructions and medications reviewed,  Questions were answered. PT verbalized understanding. VSS, Afebrile. Follow up appointments were discussed.            Archie Gupta RN  12/17/22 2605

## 2022-12-17 NOTE — DISCHARGE INSTRUCTIONS
Return as needed for any new symptoms of concern.   Otherwise follow-up with your nephrologist.  Continue with your routine dialysis

## 2022-12-18 LAB
EKG DIAGNOSIS: NORMAL
EKG Q-T INTERVAL: 344 MS
EKG QRS DURATION: 152 MS
EKG QTC CALCULATION (BAZETT): 460 MS
EKG R AXIS: 260 DEGREES
EKG T AXIS: 62 DEGREES
EKG VENTRICULAR RATE: 108 BPM

## 2022-12-18 PROCEDURE — 93010 ELECTROCARDIOGRAM REPORT: CPT | Performed by: INTERNAL MEDICINE

## 2022-12-19 LAB — HEMATOLOGY PATH CONSULT: NORMAL

## 2023-09-01 ENCOUNTER — HOSPITAL ENCOUNTER (INPATIENT)
Age: 63
LOS: 1 days | Discharge: HOME | DRG: 640 | End: 2023-09-02
Attending: EMERGENCY MEDICINE | Admitting: INTERNAL MEDICINE
Payer: COMMERCIAL

## 2023-09-01 ENCOUNTER — APPOINTMENT (OUTPATIENT)
Dept: GENERAL RADIOLOGY | Age: 63
DRG: 640 | End: 2023-09-01
Payer: COMMERCIAL

## 2023-09-01 DIAGNOSIS — Z91.199 MEDICALLY NONCOMPLIANT: ICD-10-CM

## 2023-09-01 DIAGNOSIS — Z99.2 END-STAGE RENAL DISEASE NEEDING DIALYSIS (HCC): ICD-10-CM

## 2023-09-01 DIAGNOSIS — N18.6 ENCOUNTER FOR HEMODIALYSIS FOR ESRD (HCC): Primary | ICD-10-CM

## 2023-09-01 DIAGNOSIS — N18.6 END-STAGE RENAL DISEASE NEEDING DIALYSIS (HCC): ICD-10-CM

## 2023-09-01 DIAGNOSIS — Z99.2 ENCOUNTER FOR HEMODIALYSIS FOR ESRD (HCC): Primary | ICD-10-CM

## 2023-09-01 DIAGNOSIS — E87.5 HYPERKALEMIA: ICD-10-CM

## 2023-09-01 LAB
ALBUMIN SERPL-MCNC: 3.9 G/DL (ref 3.4–5)
ALP SERPL-CCNC: 79 U/L (ref 40–129)
ALT SERPL-CCNC: 20 U/L (ref 10–40)
ANION GAP SERPL CALCULATED.3IONS-SCNC: 23 MMOL/L (ref 3–16)
ANION GAP SERPL CALCULATED.3IONS-SCNC: 24 MMOL/L (ref 3–16)
AST SERPL-CCNC: 22 U/L (ref 15–37)
BASE EXCESS BLDV CALC-SCNC: -4.6 MMOL/L
BASOPHILS # BLD: 0 K/UL (ref 0–0.2)
BASOPHILS NFR BLD: 0.7 %
BILIRUB DIRECT SERPL-MCNC: <0.2 MG/DL (ref 0–0.3)
BILIRUB INDIRECT SERPL-MCNC: NORMAL MG/DL (ref 0–1)
BILIRUB SERPL-MCNC: 0.3 MG/DL (ref 0–1)
BUN SERPL-MCNC: 110 MG/DL (ref 7–20)
BUN SERPL-MCNC: 111 MG/DL (ref 7–20)
CALCIUM SERPL-MCNC: 9.8 MG/DL (ref 8.3–10.6)
CALCIUM SERPL-MCNC: 9.9 MG/DL (ref 8.3–10.6)
CHLORIDE SERPL-SCNC: 93 MMOL/L (ref 99–110)
CHLORIDE SERPL-SCNC: 93 MMOL/L (ref 99–110)
CO2 BLDV-SCNC: 24 MMOL/L
CO2 SERPL-SCNC: 18 MMOL/L (ref 21–32)
CO2 SERPL-SCNC: 19 MMOL/L (ref 21–32)
COHGB MFR BLDV: 2.1 %
CREAT SERPL-MCNC: 14.5 MG/DL (ref 0.8–1.3)
CREAT SERPL-MCNC: 14.9 MG/DL (ref 0.8–1.3)
DEPRECATED RDW RBC AUTO: 17.9 % (ref 12.4–15.4)
EKG DIAGNOSIS: NORMAL
EKG Q-T INTERVAL: 416 MS
EKG QRS DURATION: 168 MS
EKG QTC CALCULATION (BAZETT): 503 MS
EKG R AXIS: -89 DEGREES
EKG T AXIS: 32 DEGREES
EKG VENTRICULAR RATE: 88 BPM
EOSINOPHIL # BLD: 0.1 K/UL (ref 0–0.6)
EOSINOPHIL NFR BLD: 1.7 %
GFR SERPLBLD CREATININE-BSD FMLA CKD-EPI: 3 ML/MIN/{1.73_M2}
GFR SERPLBLD CREATININE-BSD FMLA CKD-EPI: 3 ML/MIN/{1.73_M2}
GLUCOSE BLD-MCNC: 82 MG/DL (ref 70–99)
GLUCOSE SERPL-MCNC: 71 MG/DL (ref 70–99)
GLUCOSE SERPL-MCNC: 80 MG/DL (ref 70–99)
HCO3 BLDV-SCNC: 22 MMOL/L (ref 23–29)
HCT VFR BLD AUTO: 41.7 % (ref 40.5–52.5)
HGB BLD-MCNC: 13.9 G/DL (ref 13.5–17.5)
INR PPP: 0.92 (ref 0.84–1.16)
LACTATE BLDV-SCNC: 1.3 MMOL/L (ref 0.4–2)
LYMPHOCYTES # BLD: 1.2 K/UL (ref 1–5.1)
LYMPHOCYTES NFR BLD: 18.6 %
MCH RBC QN AUTO: 34.3 PG (ref 26–34)
MCHC RBC AUTO-ENTMCNC: 33.3 G/DL (ref 31–36)
MCV RBC AUTO: 103 FL (ref 80–100)
METHGB MFR BLDV: 0.6 %
MONOCYTES # BLD: 0.6 K/UL (ref 0–1.3)
MONOCYTES NFR BLD: 8.6 %
NEUTROPHILS # BLD: 4.5 K/UL (ref 1.7–7.7)
NEUTROPHILS NFR BLD: 70.4 %
NT-PROBNP SERPL-MCNC: ABNORMAL PG/ML (ref 0–124)
O2 THERAPY: ABNORMAL
PCO2 BLDV: 47.7 MMHG (ref 40–50)
PERFORMED ON: NORMAL
PH BLDV: 7.28 [PH] (ref 7.35–7.45)
PLATELET # BLD AUTO: 114 K/UL (ref 135–450)
PLATELET BLD QL SMEAR: ABNORMAL
PMV BLD AUTO: 8.6 FL (ref 5–10.5)
PO2 BLDV: <30 MMHG
POTASSIUM SERPL-SCNC: 6.8 MMOL/L (ref 3.5–5.1)
POTASSIUM SERPL-SCNC: 6.9 MMOL/L (ref 3.5–5.1)
PROT SERPL-MCNC: 6.7 G/DL (ref 6.4–8.2)
PROTHROMBIN TIME: 12.3 SEC (ref 11.5–14.8)
RBC # BLD AUTO: 4.05 M/UL (ref 4.2–5.9)
REASON FOR REJECTION: NORMAL
REJECTED TEST: NORMAL
SAO2 % BLDV: 41 %
SLIDE REVIEW: ABNORMAL
SODIUM SERPL-SCNC: 135 MMOL/L (ref 136–145)
SODIUM SERPL-SCNC: 135 MMOL/L (ref 136–145)
TROPONIN, HIGH SENSITIVITY: 136 NG/L (ref 0–22)
WBC # BLD AUTO: 6.5 K/UL (ref 4–11)

## 2023-09-01 PROCEDURE — 36415 COLL VENOUS BLD VENIPUNCTURE: CPT

## 2023-09-01 PROCEDURE — 6370000000 HC RX 637 (ALT 250 FOR IP): Performed by: INTERNAL MEDICINE

## 2023-09-01 PROCEDURE — 5A1D70Z PERFORMANCE OF URINARY FILTRATION, INTERMITTENT, LESS THAN 6 HOURS PER DAY: ICD-10-PCS | Performed by: INTERNAL MEDICINE

## 2023-09-01 PROCEDURE — 6360000002 HC RX W HCPCS: Performed by: INTERNAL MEDICINE

## 2023-09-01 PROCEDURE — 99285 EMERGENCY DEPT VISIT HI MDM: CPT

## 2023-09-01 PROCEDURE — 93010 ELECTROCARDIOGRAM REPORT: CPT | Performed by: INTERNAL MEDICINE

## 2023-09-01 PROCEDURE — 2060000000 HC ICU INTERMEDIATE R&B

## 2023-09-01 PROCEDURE — 84484 ASSAY OF TROPONIN QUANT: CPT

## 2023-09-01 PROCEDURE — 83605 ASSAY OF LACTIC ACID: CPT

## 2023-09-01 PROCEDURE — 80048 BASIC METABOLIC PNL TOTAL CA: CPT

## 2023-09-01 PROCEDURE — 93005 ELECTROCARDIOGRAM TRACING: CPT | Performed by: PHYSICIAN ASSISTANT

## 2023-09-01 PROCEDURE — 85610 PROTHROMBIN TIME: CPT

## 2023-09-01 PROCEDURE — 83880 ASSAY OF NATRIURETIC PEPTIDE: CPT

## 2023-09-01 PROCEDURE — 2580000003 HC RX 258: Performed by: INTERNAL MEDICINE

## 2023-09-01 PROCEDURE — 85025 COMPLETE CBC W/AUTO DIFF WBC: CPT

## 2023-09-01 PROCEDURE — 71045 X-RAY EXAM CHEST 1 VIEW: CPT

## 2023-09-01 PROCEDURE — 80076 HEPATIC FUNCTION PANEL: CPT

## 2023-09-01 PROCEDURE — 82803 BLOOD GASES ANY COMBINATION: CPT

## 2023-09-01 PROCEDURE — 90935 HEMODIALYSIS ONE EVALUATION: CPT

## 2023-09-01 RX ORDER — INSULIN LISPRO 100 [IU]/ML
0-4 INJECTION, SOLUTION INTRAVENOUS; SUBCUTANEOUS NIGHTLY
Status: DISCONTINUED | OUTPATIENT
Start: 2023-09-01 | End: 2023-09-02 | Stop reason: HOSPADM

## 2023-09-01 RX ORDER — MIDODRINE HYDROCHLORIDE 10 MG/1
10 TABLET ORAL SEE ADMIN INSTRUCTIONS
COMMUNITY
Start: 2023-08-31

## 2023-09-01 RX ORDER — POLYETHYLENE GLYCOL 3350 17 G/17G
17 POWDER, FOR SOLUTION ORAL DAILY PRN
Status: DISCONTINUED | OUTPATIENT
Start: 2023-09-01 | End: 2023-09-02 | Stop reason: HOSPADM

## 2023-09-01 RX ORDER — VENLAFAXINE HYDROCHLORIDE 75 MG/1
75 CAPSULE, EXTENDED RELEASE ORAL
Status: DISCONTINUED | OUTPATIENT
Start: 2023-09-01 | End: 2023-09-02 | Stop reason: HOSPADM

## 2023-09-01 RX ORDER — SODIUM CHLORIDE 0.9 % (FLUSH) 0.9 %
5-40 SYRINGE (ML) INJECTION EVERY 12 HOURS SCHEDULED
Status: DISCONTINUED | OUTPATIENT
Start: 2023-09-01 | End: 2023-09-02 | Stop reason: HOSPADM

## 2023-09-01 RX ORDER — ACETAMINOPHEN 325 MG/1
650 TABLET ORAL EVERY 6 HOURS PRN
Status: DISCONTINUED | OUTPATIENT
Start: 2023-09-01 | End: 2023-09-02 | Stop reason: HOSPADM

## 2023-09-01 RX ORDER — QUETIAPINE 150 MG/1
150 TABLET, FILM COATED, EXTENDED RELEASE ORAL NIGHTLY
Status: DISCONTINUED | OUTPATIENT
Start: 2023-09-01 | End: 2023-09-02 | Stop reason: HOSPADM

## 2023-09-01 RX ORDER — CALCIUM GLUCONATE 94 MG/ML
1000 INJECTION, SOLUTION INTRAVENOUS ONCE
Status: DISCONTINUED | OUTPATIENT
Start: 2023-09-01 | End: 2023-09-01

## 2023-09-01 RX ORDER — HEPARIN SODIUM 1000 [USP'U]/ML
3800 INJECTION, SOLUTION INTRAVENOUS; SUBCUTANEOUS PRN
Status: DISCONTINUED | OUTPATIENT
Start: 2023-09-01 | End: 2023-09-02 | Stop reason: HOSPADM

## 2023-09-01 RX ORDER — ASPIRIN 325 MG
325 TABLET ORAL DAILY
COMMUNITY
Start: 2023-08-31

## 2023-09-01 RX ORDER — SODIUM CHLORIDE 0.9 % (FLUSH) 0.9 %
5-40 SYRINGE (ML) INJECTION PRN
Status: DISCONTINUED | OUTPATIENT
Start: 2023-09-01 | End: 2023-09-02 | Stop reason: HOSPADM

## 2023-09-01 RX ORDER — BUPROPION HYDROCHLORIDE 75 MG/1
150 TABLET ORAL
Status: DISCONTINUED | OUTPATIENT
Start: 2023-09-02 | End: 2023-09-02 | Stop reason: HOSPADM

## 2023-09-01 RX ORDER — SODIUM CHLORIDE 9 MG/ML
INJECTION, SOLUTION INTRAVENOUS PRN
Status: DISCONTINUED | OUTPATIENT
Start: 2023-09-01 | End: 2023-09-02 | Stop reason: HOSPADM

## 2023-09-01 RX ORDER — CINACALCET 60 MG/1
60 TABLET, FILM COATED ORAL
Status: DISCONTINUED | OUTPATIENT
Start: 2023-09-01 | End: 2023-09-02 | Stop reason: HOSPADM

## 2023-09-01 RX ORDER — ONDANSETRON 2 MG/ML
4 INJECTION INTRAMUSCULAR; INTRAVENOUS EVERY 6 HOURS PRN
Status: DISCONTINUED | OUTPATIENT
Start: 2023-09-01 | End: 2023-09-02 | Stop reason: HOSPADM

## 2023-09-01 RX ORDER — DEXTROSE MONOHYDRATE 100 MG/ML
INJECTION, SOLUTION INTRAVENOUS CONTINUOUS PRN
Status: DISCONTINUED | OUTPATIENT
Start: 2023-09-01 | End: 2023-09-02 | Stop reason: HOSPADM

## 2023-09-01 RX ORDER — ONDANSETRON 4 MG/1
4 TABLET, ORALLY DISINTEGRATING ORAL EVERY 8 HOURS PRN
Status: DISCONTINUED | OUTPATIENT
Start: 2023-09-01 | End: 2023-09-02 | Stop reason: HOSPADM

## 2023-09-01 RX ORDER — PANTOPRAZOLE SODIUM 40 MG/1
40 TABLET, DELAYED RELEASE ORAL DAILY
Status: DISCONTINUED | OUTPATIENT
Start: 2023-09-01 | End: 2023-09-02 | Stop reason: HOSPADM

## 2023-09-01 RX ORDER — QUETIAPINE 150 MG/1
150 TABLET, FILM COATED, EXTENDED RELEASE ORAL NIGHTLY
COMMUNITY
Start: 2023-08-31

## 2023-09-01 RX ORDER — GABAPENTIN 300 MG/1
300 CAPSULE ORAL NIGHTLY
Status: DISCONTINUED | OUTPATIENT
Start: 2023-09-01 | End: 2023-09-02 | Stop reason: HOSPADM

## 2023-09-01 RX ORDER — SEVELAMER CARBONATE 800 MG/1
2400 TABLET, FILM COATED ORAL
Status: DISCONTINUED | OUTPATIENT
Start: 2023-09-01 | End: 2023-09-02 | Stop reason: HOSPADM

## 2023-09-01 RX ORDER — ASPIRIN 325 MG
325 TABLET ORAL DAILY
Status: DISCONTINUED | OUTPATIENT
Start: 2023-09-01 | End: 2023-09-02 | Stop reason: HOSPADM

## 2023-09-01 RX ORDER — ACETAMINOPHEN 650 MG/1
650 SUPPOSITORY RECTAL EVERY 6 HOURS PRN
Status: DISCONTINUED | OUTPATIENT
Start: 2023-09-01 | End: 2023-09-02 | Stop reason: HOSPADM

## 2023-09-01 RX ORDER — HEPARIN SODIUM 5000 [USP'U]/ML
5000 INJECTION, SOLUTION INTRAVENOUS; SUBCUTANEOUS EVERY 8 HOURS SCHEDULED
Status: DISCONTINUED | OUTPATIENT
Start: 2023-09-01 | End: 2023-09-02 | Stop reason: HOSPADM

## 2023-09-01 RX ORDER — VENLAFAXINE HYDROCHLORIDE 75 MG/1
75 CAPSULE, EXTENDED RELEASE ORAL
COMMUNITY
Start: 2023-08-31

## 2023-09-01 RX ORDER — INSULIN LISPRO 100 [IU]/ML
0-4 INJECTION, SOLUTION INTRAVENOUS; SUBCUTANEOUS
Status: DISCONTINUED | OUTPATIENT
Start: 2023-09-01 | End: 2023-09-02 | Stop reason: HOSPADM

## 2023-09-01 RX ORDER — BUPROPION HYDROCHLORIDE 75 MG/1
150 TABLET ORAL
COMMUNITY
Start: 2023-08-31

## 2023-09-01 RX ORDER — MIDODRINE HYDROCHLORIDE 10 MG/1
10 TABLET ORAL DAILY PRN
Status: DISCONTINUED | OUTPATIENT
Start: 2023-09-01 | End: 2023-09-02 | Stop reason: HOSPADM

## 2023-09-01 RX ADMIN — ONDANSETRON 4 MG: 4 TABLET, ORALLY DISINTEGRATING ORAL at 22:58

## 2023-09-01 RX ADMIN — GABAPENTIN 300 MG: 300 CAPSULE ORAL at 22:58

## 2023-09-01 RX ADMIN — QUETIAPINE FUMARATE 150 MG: 150 TABLET, EXTENDED RELEASE ORAL at 22:58

## 2023-09-01 RX ADMIN — Medication 10 ML: at 22:59

## 2023-09-01 RX ADMIN — HEPARIN SODIUM 5000 UNITS: 5000 INJECTION INTRAVENOUS; SUBCUTANEOUS at 22:58

## 2023-09-01 ASSESSMENT — PAIN - FUNCTIONAL ASSESSMENT: PAIN_FUNCTIONAL_ASSESSMENT: NONE - DENIES PAIN

## 2023-09-01 NOTE — H&P
Rales/Wheezes/Rhonchi. Cardiovascular:  Regular rate and rhythm with normal S1/S2 without murmurs, rubs or gallops. Abdomen: Soft, non-tender  Musculoskeletal:  No clubbing, cyanosis  Skin: Skin color, texture, turgor normal.  No rashes or lesions. Neurologic: No focal weakness  Psychiatric:  Alert and oriented  Capillary Refill: Brisk,3 seconds, normal  Peripheral Pulses: +2 palpable, equal bilaterally       Labs:     Recent Labs     09/01/23  1524   WBC 6.5   HGB 13.9   HCT 41.7   *     Recent Labs     09/01/23  1524   *   K 6.9*   CL 93*   CO2 19*   *   CREATININE 14.5*   CALCIUM 9.8     Recent Labs     09/01/23  1524   AST 22   ALT 20   BILIDIR <0.2   BILITOT 0.3   ALKPHOS 79     Recent Labs     09/01/23  1524   INR 0.92     Recent Labs     09/01/23  1524   TROPHS 136*       Urinalysis:      Lab Results   Component Value Date/Time    NITRU Negative 01/23/2018 02:58 AM    WBCUA 2 01/23/2018 02:58 AM    RBCUA 62 01/23/2018 02:58 AM    BLOODU LARGE 01/23/2018 02:58 AM    SPECGRAV 1.016 01/23/2018 02:58 AM    GLUCOSEU Negative 01/23/2018 02:58 AM       Radiology:       EKG:  I have reviewed the EKG with the following interpretation: Bifascicular block A-fib    XR CHEST PORTABLE    (Results Pending)       Consults:    IP CONSULT TO NEPHROLOGY    ASSESSMENT:    Active Hospital Problems    Diagnosis Date Noted    Hyperkalemia [E87.5] 09/01/2023         PLAN:    Hyperkalemia, sample hemolyzed, nephrology consulted recommended to admit to the hospital for hemodialysis, discussed with ED MD as repeated potassium pending EDMD will treat as an acute hyperkalemia based on pending results.   End-stage renal disease with multiple missed dialysis, constipation, nephrology already consulted for dialysis tonight  A-fib, not on anticoagulation apparently  Generalized weakness due to above  Elevated troponin due to end-stage renal disease no chest pain  Reported diabetes mellitus not on home medications

## 2023-09-01 NOTE — ED NOTES
Call placed to lab for difficult stick, potassium recollect     Jose Guadalupe Hoffman, RN  09/01/23 1911

## 2023-09-01 NOTE — PROGRESS NOTES
Medication Reconciliation    List of medications patient is currently taking is complete. Source of information: 1. Conversation with patient at bedside                                      2. EPIC records      Allergies  Patient has no known allergies. Notes regarding home medications:   1. Patient did not receive any of his home medications prior to arrival to the emergency department.   2. Atorvastatin, Basaglar, metoprolol tartrate, isosorbide mononitrate, ropinirole, sertraline, and warfarin removed from list.     Oanh Bradshaw [facility], PharmD, 9/1/2023 5:16 PM

## 2023-09-01 NOTE — ED PROVIDER NOTES
[facility] [address] [address]      Pt Name: Eliseo Padilla  MRN: [id number]  [address] [facility], [place], [facility] 1960  Date of evaluation: 9/1/2023  Provider: JEMIMA Jerome  [facility]P: [facility] (Inactive)  Note Started: 2:10 PM EDT     This patient was also seen and evaluated by the attending physician Thais Mera       Chief Complaint   Patient presents with    Shortness of Breath     Missed dialysis appointments all week, complains of SOB when lying down       HISTORY OF PRESENT ILLNESS   (Location, Timing/Onset, Context/Setting, Quality, Duration, Modifying Factors, Severity, Associated Signs and Symptoms)  Note limiting factors. Eliseo Padilla is a 58 y.o. male who presents by EMS reporting that he is in need of dialysis. He states his last dialysis was 8 days ago, and his ride service has been unreliable and has not shown up to take him to dialysis since then. Says he has been feeling somewhat short of breath with exertion of the last few days. Somewhat decreased appetite. No significant abdominal pain. No vomiting. Denies lightheadedness or loss of consciousness. No chest pain. Nursing Notes were all reviewed and agreed with or any disagreements were addressed in the HPI. REVIEW OF SYSTEMS    (2-9 systems for level 4, 10 or more for level 5)     Positives and pertinent negatives as per HPI.      PAST MEDICAL HISTORY     Past Medical History:   Diagnosis Date    Atrial fibrillation ([address])     CAD (coronary artery disease) 4/3/2014    Chronic kidney disease     Depression     Diabetes mellitus ([facility])     Dialysis patient [facility])     left upper arm fistula    Glaucoma     Hemodialysis patient ([facility])     Hyperlipidemia     Hypertension     RLL pneumonia 1/23/2018    Sleep apnea     uses CPAP       SURGICAL HISTORY     Past Surgical History:   Procedure Laterality Date    COLONOSCOPY      PACEMAKER INSERTION      PACEMAKER

## 2023-09-01 NOTE — ED PROVIDER NOTES
[address]      Pt Name: Patricia Muse  MRN: [id number]  [address] [facility] [place] 1960  Date of evaluation: 9/1/2023  Provider: Thais Paniagua  Chief Complaint   Patient presents with    Shortness of Breath     Missed dialysis appointments all week, complains of SOB when lying down       I have fully participated in the care of Patricia Muse and have had a face-to-face evaluation. I have reviewed and agree with all pertinent clinical information, and midlevel provider's history, and physical exam. I have also reviewed the labs, EKG, and imaging studies and treatment plan. I have also reviewed and agree with the medications, allergies and past medical history section for this Patricia Muse. I agree with the diagnosis, and I concur. This patient is at risk for a communicable infection. Therefore, personal protection equipment consisting of a mask was worn for the exam.    Past Medical History:   Diagnosis Date    Atrial fibrillation ([address])     CAD (coronary artery disease) 4/3/2014    Chronic kidney disease     Depression     Diabetes mellitus ([address])     Dialysis patient ([address])     left upper arm fistula    Glaucoma     Hemodialysis patient ([address])     Hyperlipidemia     Hypertension     RLL pneumonia 1/23/2018    Sleep apnea     uses CPAP       MDM:  Patricia Muse is a 58 y.o. male who presents with no dialysis for 8 days. He denies any fevers or chills. [place] any chest pain. Is been short of breath. He has not been able go to dialysis because his \"right is unreliable. \"  He dialyzes at [address] on Tuesday Thursday Saturday. He denies any fevers or chills. Denies any vomiting but has been nauseated. He denies any diarrhea. He has had generalized weakness. He has a catheter in the right upper chest for dialysis. Physical exam reveals heart to be regular rate and rhythm without murmurs clicks or rubs.   Lungs are clear auscultation equal

## 2023-09-02 VITALS
HEIGHT: 70 IN | DIASTOLIC BLOOD PRESSURE: 89 MMHG | WEIGHT: 199.74 LBS | RESPIRATION RATE: 16 BRPM | OXYGEN SATURATION: 99 % | TEMPERATURE: 97.4 F | BODY MASS INDEX: 28.59 KG/M2 | HEART RATE: 82 BPM | SYSTOLIC BLOOD PRESSURE: 146 MMHG

## 2023-09-02 LAB
ALBUMIN SERPL-MCNC: 3.4 G/DL (ref 3.4–5)
ALBUMIN/GLOB SERPL: 1.4 {RATIO} (ref 1.1–2.2)
ALP SERPL-CCNC: 73 U/L (ref 40–129)
ALT SERPL-CCNC: 17 U/L (ref 10–40)
ANION GAP SERPL CALCULATED.3IONS-SCNC: 17 MMOL/L (ref 3–16)
AST SERPL-CCNC: 19 U/L (ref 15–37)
BILIRUB SERPL-MCNC: 0.3 MG/DL (ref 0–1)
BUN SERPL-MCNC: 64 MG/DL (ref 7–20)
CALCIUM SERPL-MCNC: 8.7 MG/DL (ref 8.3–10.6)
CHLORIDE SERPL-SCNC: 94 MMOL/L (ref 99–110)
CO2 SERPL-SCNC: 23 MMOL/L (ref 21–32)
CREAT SERPL-MCNC: 9.3 MG/DL (ref 0.8–1.3)
GFR SERPLBLD CREATININE-BSD FMLA CKD-EPI: 6 ML/MIN/{1.73_M2}
GLUCOSE BLD-MCNC: 68 MG/DL (ref 70–99)
GLUCOSE BLD-MCNC: 80 MG/DL (ref 70–99)
GLUCOSE SERPL-MCNC: 62 MG/DL (ref 70–99)
PERFORMED ON: ABNORMAL
PERFORMED ON: NORMAL
POTASSIUM SERPL-SCNC: 5.1 MMOL/L (ref 3.5–5.1)
PROT SERPL-MCNC: 5.9 G/DL (ref 6.4–8.2)
SODIUM SERPL-SCNC: 134 MMOL/L (ref 136–145)

## 2023-09-02 PROCEDURE — 6370000000 HC RX 637 (ALT 250 FOR IP): Performed by: INTERNAL MEDICINE

## 2023-09-02 PROCEDURE — 80053 COMPREHEN METABOLIC PANEL: CPT

## 2023-09-02 PROCEDURE — 2580000003 HC RX 258: Performed by: INTERNAL MEDICINE

## 2023-09-02 PROCEDURE — 36415 COLL VENOUS BLD VENIPUNCTURE: CPT

## 2023-09-02 PROCEDURE — 94760 N-INVAS EAR/PLS OXIMETRY 1: CPT

## 2023-09-02 PROCEDURE — 6360000002 HC RX W HCPCS: Performed by: INTERNAL MEDICINE

## 2023-09-02 RX ADMIN — VENLAFAXINE HYDROCHLORIDE 75 MG: 75 CAPSULE, EXTENDED RELEASE ORAL at 09:35

## 2023-09-02 RX ADMIN — ASPIRIN 325 MG: 325 TABLET ORAL at 09:35

## 2023-09-02 RX ADMIN — SEVELAMER CARBONATE 2400 MG: 800 TABLET, FILM COATED ORAL at 09:35

## 2023-09-02 RX ADMIN — Medication 10 ML: at 09:35

## 2023-09-02 RX ADMIN — PANTOPRAZOLE SODIUM 40 MG: 40 TABLET, DELAYED RELEASE ORAL at 06:10

## 2023-09-02 RX ADMIN — BUPROPION HYDROCHLORIDE 150 MG: 75 TABLET, FILM COATED ORAL at 09:35

## 2023-09-02 RX ADMIN — ONDANSETRON 4 MG: 2 INJECTION INTRAMUSCULAR; INTRAVENOUS at 06:13

## 2023-09-02 RX ADMIN — SEVELAMER CARBONATE 2400 MG: 800 TABLET, FILM COATED ORAL at 12:29

## 2023-09-02 RX ADMIN — HEPARIN SODIUM 5000 UNITS: 5000 INJECTION INTRAVENOUS; SUBCUTANEOUS at 06:10

## 2023-09-02 NOTE — DISCHARGE INSTR - COC
oxygen:91098}  Ventilator:    {MH CC Vent VCJO:784025908}    Rehab Therapies: {THERAPEUTIC INTERVENTION:6892696463}  Weight Bearing Status/Restrictions: [address] CC Weight Bearin}  Other Medical Equipment (for information only, NOT a DME order):  {EQUIPMENT:960920509}  Other Treatments: ***    Patient's personal belongings (please select all that are sent with patient):  {CHP DME Belongings:461713351}    RN SIGNATURE:  {Esignature:313524630}    CASE MANAGEMENT/SOCIAL WORK SECTION    Inpatient Status Date: 23    Readmission Risk Assessment Score:  Readmission Risk              Risk of Unplanned Readmission:  19           Discharging to Facility/ Agency   Name:   Address:  Phone:  Fax:    Dialysis Facility (if applicable)   Kianna K[facility]  STIMTTV:[id number], [place], [address] [facility]  Dialysis Schedule:700  Phone:[phone]  Fax:[phone]    / signature: Electronically signed by Steffi Bonner RN Case Management on 23 at 1:51 PM EDT    PHYSICIAN SECTION    Prognosis: {Prognosis:4884796017}    Condition at Discharge: [address] Patient Condition:806445650}    Rehab Potential (if transferring to Rehab): {Prognosis:7783872689}    Recommended Labs or Other Treatments After Discharge: ***    Physician Certification: I certify the above information and transfer of Anali Morse  is necessary for the continuing treatment of the diagnosis listed and that he requires {Admit to Appropriate Level of Care:40320} for {GREATER/LESS:491890711} 30 days.      Update Admission H&P: {CHP DME Changes in CVCPN:971367282}    PHYSICIAN SIGNATURE:  {Esignature:314986681}

## 2023-09-02 NOTE — CARE COORDINATION
CASE MANAGEMENT DISCHARGE SUMMARY:    DISCHARGE DATE: 9/2/23    DISCHARGED TO: Home with niece     HOME CARE AGENCY: [facility] home [facility]      TRANSPORTATION: [employer] ride             TIME: TBD    DIALYSIS: [facility]   Address: [address], [place], [address] [place] [address]  Schedule: TTS 0700  Phone: [phone]  Fax: [phone]    COMMENTS: Patient will discharge home today & resume outpatient HD at [facility]. Confirmed patient has standing orders for transportation to & from dialysis with his [employer]. Patient says they came late last time and he just decided to go back to sleep. Educated patient on compliance with dialysis and encouraged him to call insurance himself if issues arise, number provided. No additional needs to note. RN aware of dc plan.     Electronically signed by Brandon Aguilar RN Case Management on 9/2/2023 at 1:46 PM

## 2023-09-02 NOTE — PROGRESS NOTES
Discharge orders acknowledged by RN . Discharge teaching completed with pt and family. AVS reviewed and all questions answered. Medication regimen reviewed and pt understands schedule. No medication changes. Follow up appointments also reviewed with pt and resources given for discharge. IV removed. Bedside monitor removed from pt. Pt vitals WNL. Pt discharged with all belongings to home. Pt transported off of unit via wheelchair. En route to home via lyft ride. No complications.      Electronically signed by Willy Hansen RN on 9/2/2023 at 2:07 PM

## 2023-09-02 NOTE — DIALYSIS
Treatment time: 3 hours  Net UF: 3800 ml     Pre weight: 96.1 kg  Post weight: 92.3 kg    Access used: R TDC     Access function: well with  ml/min     Medications or blood products given: None      Regular outpatient schedule: TTS     Summary of response to treatment: Patient tolerated treatment well and without any complications. Patient remained stable throughout entire treatment and upon exiting the dialysis suite via transport. Report given to Marylene Pol, RN and copy of dialysis treatment record placed in chart, to be scanned into EMR.

## 2023-09-02 NOTE — PLAN OF CARE
Problem: Discharge Planning  Goal: Discharge to home or other facility with appropriate resources  9/2/2023 1405 by Kaylen Jarrett RN  Outcome: Completed     Problem: Safety - Adult  Goal: Free from fall injury  9/2/2023 1405 by Kaylen Jarrett RN  Outcome: Completed     Problem: Skin/Tissue Integrity  Goal: Absence of new skin breakdown  Description: 1. Monitor for areas of redness and/or skin breakdown  2. Assess vascular access sites hourly  3. Every 4-6 hours minimum:  Change oxygen saturation probe site  4. Every 4-6 hours:  If on nasal continuous positive airway pressure, respiratory therapy assess nares and determine need for appliance change or resting period.   9/2/2023 1405 by Kaylen Jarrett RN  Outcome: Completed     Problem: Cardiovascular - Adult  Goal: Maintains optimal cardiac output and hemodynamic stability  9/2/2023 1405 by Kaylen Jarrett RN  Outcome: Completed     Problem: Cardiovascular - Adult  Goal: Absence of cardiac dysrhythmias or at baseline  9/2/2023 1405 by Kaylen Jarrett RN  Outcome: Completed     Problem: Skin/Tissue Integrity - Adult  Goal: Skin integrity remains intact  9/2/2023 1405 by Kaylen Jarrett RN  Outcome: Completed     Problem: Skin/Tissue Integrity - Adult  Goal: Incisions, wounds, or drain sites healing without S/S of infection  9/2/2023 1405 by Kaylen Jarrett RN  Outcome: Completed     Problem: Metabolic/Fluid and Electrolytes - Adult  Goal: Electrolytes maintained within normal limits  9/2/2023 1405 by Kaylen Jarrett RN  Outcome: Completed     Problem: Metabolic/Fluid and Electrolytes - Adult  Goal: Hemodynamic stability and optimal renal function maintained  9/2/2023 1405 by Kaylen Jarrett RN  Outcome: Completed     Problem: Metabolic/Fluid and Electrolytes - Adult  Goal: Glucose maintained within prescribed range  9/2/2023 1405 by Kaylen Jarrett RN  Outcome: Completed

## 2023-09-02 NOTE — PLAN OF CARE
Problem: Discharge Planning  Goal: Discharge to home or other facility with appropriate resources  9/2/2023 1013 by Daphne Gerardo RN  Outcome: Progressing     Problem: Safety - Adult  Goal: Free from fall injury  9/2/2023 1013 by Daphne Gerardo RN  Outcome: Progressing     Problem: Skin/Tissue Integrity  Goal: Absence of new skin breakdown  Description: 1. Monitor for areas of redness and/or skin breakdown  2. Assess vascular access sites hourly  3. Every 4-6 hours minimum:  Change oxygen saturation probe site  4. Every 4-6 hours:  If on nasal continuous positive airway pressure, respiratory therapy assess nares and determine need for appliance change or resting period.   Outcome: Progressing     Problem: Cardiovascular - Adult  Goal: Maintains optimal cardiac output and hemodynamic stability  Outcome: Progressing  Goal: Absence of cardiac dysrhythmias or at baseline  Outcome: Progressing     Problem: Skin/Tissue Integrity - Adult  Goal: Skin integrity remains intact  Outcome: Progressing  Goal: Incisions, wounds, or drain sites healing without S/S of infection  Outcome: Progressing     Problem: Metabolic/Fluid and Electrolytes - Adult  Goal: Electrolytes maintained within normal limits  Outcome: Progressing  Goal: Hemodynamic stability and optimal renal function maintained  Outcome: Progressing  Goal: Glucose maintained within prescribed range  Outcome: Progressing

## 2023-09-02 NOTE — PROGRESS NOTES
4 Eyes Skin Assessment     NAME:  Rina Vera Danger OF BIRTH:  1960  MEDICAL RECORD NUMBER:  [id number]    The patient is being assessed for  Admission    I agree that at least one RN has performed a thorough Head to Toe Skin Assessment on the patient. ALL assessment sites listed below have been assessed. Areas assessed by both nurses:    Head, Face, Ears, Shoulders, Back, Chest, Arms, Elbows, Hands, Sacrum. Buttock, Coccyx, Ischium, Legs. Feet and Heels, and Under Medical Devices         Does the Patient have a Wound?  No noted wound(s)       Smith Prevention initiated by RN: No  Wound Care Orders initiated by RN: No    Pressure Injury (Stage 3,4, Unstageable, DTI, NWPT, and Complex wounds) if present, place Wound referral order by RN under : No    New Ostomies, if present place, Ostomy referral order under : No     Nurse 1 eSignature: Electronically signed by Angela Burnette RN on 9/2/23 at 5:29 AM EDT    **SHARE this note so that the co-signing nurse can place an eSignature**    Nurse 2 eSignature: Electronically signed by Baron Mario RN on 9/2/23 at 5:31 AM EDT

## 2023-11-29 ENCOUNTER — APPOINTMENT (OUTPATIENT)
Dept: CARDIAC CATH/INVASIVE PROCEDURES | Age: 63
DRG: 252 | End: 2023-11-29
Payer: COMMERCIAL

## 2023-11-29 ENCOUNTER — HOSPITAL ENCOUNTER (INPATIENT)
Age: 63
LOS: 8 days | Discharge: HOME | DRG: 252 | End: 2023-12-07
Attending: EMERGENCY MEDICINE | Admitting: INTERNAL MEDICINE
Payer: COMMERCIAL

## 2023-11-29 ENCOUNTER — APPOINTMENT (OUTPATIENT)
Dept: GENERAL RADIOLOGY | Age: 63
DRG: 252 | End: 2023-11-29
Payer: COMMERCIAL

## 2023-11-29 DIAGNOSIS — I96 TOE GANGRENE (HCC): ICD-10-CM

## 2023-11-29 DIAGNOSIS — Z99.2 ESRD (END STAGE RENAL DISEASE) ON DIALYSIS (HCC): ICD-10-CM

## 2023-11-29 DIAGNOSIS — N18.6 ESRD (END STAGE RENAL DISEASE) ON DIALYSIS (HCC): ICD-10-CM

## 2023-11-29 DIAGNOSIS — I96 NECROTIC TOES (HCC): Primary | ICD-10-CM

## 2023-11-29 PROBLEM — I70.223 CRITICAL LIMB ISCHEMIA OF BOTH LOWER EXTREMITIES (HCC): Status: ACTIVE | Noted: 2023-11-29

## 2023-11-29 LAB
ALBUMIN SERPL-MCNC: 4 G/DL (ref 3.4–5)
ALBUMIN/GLOB SERPL: 1.3 {RATIO} (ref 1.1–2.2)
ALP SERPL-CCNC: 91 U/L (ref 40–129)
ALT SERPL-CCNC: 13 U/L (ref 10–40)
ANION GAP SERPL CALCULATED.3IONS-SCNC: 15 MMOL/L (ref 3–16)
AST SERPL-CCNC: 19 U/L (ref 15–37)
BASOPHILS # BLD: 0.1 K/UL (ref 0–0.2)
BASOPHILS NFR BLD: 2.7 %
BILIRUB SERPL-MCNC: 0.3 MG/DL (ref 0–1)
BUN SERPL-MCNC: 18 MG/DL (ref 7–20)
CALCIUM SERPL-MCNC: 9.5 MG/DL (ref 8.3–10.6)
CHLORIDE SERPL-SCNC: 96 MMOL/L (ref 99–110)
CO2 SERPL-SCNC: 27 MMOL/L (ref 21–32)
CREAT SERPL-MCNC: 4.1 MG/DL (ref 0.8–1.3)
DEPRECATED RDW RBC AUTO: 15.9 % (ref 12.4–15.4)
EOSINOPHIL # BLD: 0.1 K/UL (ref 0–0.6)
EOSINOPHIL NFR BLD: 2.3 %
GFR SERPLBLD CREATININE-BSD FMLA CKD-EPI: 15 ML/MIN/{1.73_M2}
GLUCOSE BLD-MCNC: 73 MG/DL (ref 70–99)
GLUCOSE BLD-MCNC: 85 MG/DL (ref 70–99)
GLUCOSE SERPL-MCNC: 67 MG/DL (ref 70–99)
HCT VFR BLD AUTO: 40.2 % (ref 40.5–52.5)
HGB BLD-MCNC: 13.7 G/DL (ref 13.5–17.5)
LACTATE BLDV-SCNC: 1.5 MMOL/L (ref 0.4–1.9)
LYMPHOCYTES # BLD: 1 K/UL (ref 1–5.1)
LYMPHOCYTES NFR BLD: 17.9 %
MCH RBC QN AUTO: 36.1 PG (ref 26–34)
MCHC RBC AUTO-ENTMCNC: 33.9 G/DL (ref 31–36)
MCV RBC AUTO: 106.2 FL (ref 80–100)
MONOCYTES # BLD: 0.5 K/UL (ref 0–1.3)
MONOCYTES NFR BLD: 9.2 %
NEUTROPHILS # BLD: 3.7 K/UL (ref 1.7–7.7)
NEUTROPHILS NFR BLD: 67.9 %
PERFORMED ON: NORMAL
PERFORMED ON: NORMAL
PLATELET # BLD AUTO: 113 K/UL (ref 135–450)
PMV BLD AUTO: 7.6 FL (ref 5–10.5)
POTASSIUM SERPL-SCNC: 3.9 MMOL/L (ref 3.5–5.1)
PROT SERPL-MCNC: 7 G/DL (ref 6.4–8.2)
RBC # BLD AUTO: 3.79 M/UL (ref 4.2–5.9)
SODIUM SERPL-SCNC: 138 MMOL/L (ref 136–145)
WBC # BLD AUTO: 5.5 K/UL (ref 4–11)

## 2023-11-29 PROCEDURE — C1894 INTRO/SHEATH, NON-LASER: HCPCS | Performed by: INTERNAL MEDICINE

## 2023-11-29 PROCEDURE — 87040 BLOOD CULTURE FOR BACTERIA: CPT

## 2023-11-29 PROCEDURE — 99285 EMERGENCY DEPT VISIT HI MDM: CPT

## 2023-11-29 PROCEDURE — 2500000003 HC RX 250 WO HCPCS

## 2023-11-29 PROCEDURE — 37224 HC FEM POP TERRITORY PLASTY: CPT

## 2023-11-29 PROCEDURE — C1760 CLOSURE DEV, VASC: HCPCS | Performed by: INTERNAL MEDICINE

## 2023-11-29 PROCEDURE — 96365 THER/PROPH/DIAG IV INF INIT: CPT

## 2023-11-29 PROCEDURE — 85025 COMPLETE CBC W/AUTO DIFF WBC: CPT

## 2023-11-29 PROCEDURE — 6370000000 HC RX 637 (ALT 250 FOR IP): Performed by: INTERNAL MEDICINE

## 2023-11-29 PROCEDURE — 6360000002 HC RX W HCPCS

## 2023-11-29 PROCEDURE — 2709999900 HC NON-CHARGEABLE SUPPLY: Performed by: INTERNAL MEDICINE

## 2023-11-29 PROCEDURE — 6360000004 HC RX CONTRAST MEDICATION: Performed by: INTERNAL MEDICINE

## 2023-11-29 PROCEDURE — 75710 ARTERY X-RAYS ARM/LEG: CPT

## 2023-11-29 PROCEDURE — 96375 TX/PRO/DX INJ NEW DRUG ADDON: CPT

## 2023-11-29 PROCEDURE — 83036 HEMOGLOBIN GLYCOSYLATED A1C: CPT

## 2023-11-29 PROCEDURE — C1887 CATHETER, GUIDING: HCPCS | Performed by: INTERNAL MEDICINE

## 2023-11-29 PROCEDURE — 2580000003 HC RX 258: Performed by: INTERNAL MEDICINE

## 2023-11-29 PROCEDURE — 37233 PR REVSC OPN/PRQ TIB/PERO W/ATHRC/ANGIOP UNI EA VSL: CPT | Performed by: INTERNAL MEDICINE

## 2023-11-29 PROCEDURE — C1725 CATH, TRANSLUMIN NON-LASER: HCPCS | Performed by: INTERNAL MEDICINE

## 2023-11-29 PROCEDURE — 87340 HEPATITIS B SURFACE AG IA: CPT

## 2023-11-29 PROCEDURE — 047Q3ZZ DILATION OF LEFT ANTERIOR TIBIAL ARTERY, PERCUTANEOUS APPROACH: ICD-10-PCS | Performed by: INTERNAL MEDICINE

## 2023-11-29 PROCEDURE — 75710 ARTERY X-RAYS ARM/LEG: CPT | Performed by: INTERNAL MEDICINE

## 2023-11-29 PROCEDURE — 6360000002 HC RX W HCPCS: Performed by: INTERNAL MEDICINE

## 2023-11-29 PROCEDURE — 80053 COMPREHEN METABOLIC PANEL: CPT

## 2023-11-29 PROCEDURE — 047N34Z DILATION OF LEFT POPLITEAL ARTERY WITH DRUG-ELUTING INTRALUMINAL DEVICE, PERCUTANEOUS APPROACH: ICD-10-PCS | Performed by: INTERNAL MEDICINE

## 2023-11-29 PROCEDURE — 37228 HC TIB PER TERRITORY PLASTY: CPT

## 2023-11-29 PROCEDURE — 37224 PR REVSC OPN/PRG FEM/POP W/ANGIOPLASTY UNI: CPT | Performed by: INTERNAL MEDICINE

## 2023-11-29 PROCEDURE — 75774 ARTERY X-RAY EACH VESSEL: CPT

## 2023-11-29 PROCEDURE — 37228 PR REVSC OPN/PRQ TIB/PERO W/ANGIOPLASTY UNI: CPT | Performed by: INTERNAL MEDICINE

## 2023-11-29 PROCEDURE — C1769 GUIDE WIRE: HCPCS | Performed by: INTERNAL MEDICINE

## 2023-11-29 PROCEDURE — 047U3ZZ DILATION OF LEFT PERONEAL ARTERY, PERCUTANEOUS APPROACH: ICD-10-PCS | Performed by: INTERNAL MEDICINE

## 2023-11-29 PROCEDURE — 6360000002 HC RX W HCPCS: Performed by: PHYSICIAN ASSISTANT

## 2023-11-29 PROCEDURE — 86706 HEP B SURFACE ANTIBODY: CPT

## 2023-11-29 PROCEDURE — 99153 MOD SED SAME PHYS/QHP EA: CPT

## 2023-11-29 PROCEDURE — 99152 MOD SED SAME PHYS/QHP 5/>YRS: CPT

## 2023-11-29 PROCEDURE — 2580000003 HC RX 258: Performed by: PHYSICIAN ASSISTANT

## 2023-11-29 PROCEDURE — 1200000000 HC SEMI PRIVATE

## 2023-11-29 PROCEDURE — 37232 HC TIB PER TERR ADDL PLASTY: CPT

## 2023-11-29 PROCEDURE — 6360000002 HC RX W HCPCS: Performed by: REGISTERED NURSE

## 2023-11-29 PROCEDURE — 73630 X-RAY EXAM OF FOOT: CPT

## 2023-11-29 PROCEDURE — B41G1ZZ FLUOROSCOPY OF LEFT LOWER EXTREMITY ARTERIES USING LOW OSMOLAR CONTRAST: ICD-10-PCS | Performed by: INTERNAL MEDICINE

## 2023-11-29 PROCEDURE — 83605 ASSAY OF LACTIC ACID: CPT

## 2023-11-29 RX ORDER — ASPIRIN 325 MG
325 TABLET ORAL DAILY
Status: DISCONTINUED | OUTPATIENT
Start: 2023-11-29 | End: 2023-12-07 | Stop reason: HOSPADM

## 2023-11-29 RX ORDER — INSULIN LISPRO 100 [IU]/ML
0-4 INJECTION, SOLUTION INTRAVENOUS; SUBCUTANEOUS NIGHTLY
Status: DISCONTINUED | OUTPATIENT
Start: 2023-11-29 | End: 2023-12-07 | Stop reason: HOSPADM

## 2023-11-29 RX ORDER — ONDANSETRON 2 MG/ML
4 INJECTION INTRAMUSCULAR; INTRAVENOUS ONCE
Status: COMPLETED | OUTPATIENT
Start: 2023-11-29 | End: 2023-11-29

## 2023-11-29 RX ORDER — DEXTROSE MONOHYDRATE 100 MG/ML
INJECTION, SOLUTION INTRAVENOUS CONTINUOUS PRN
Status: DISCONTINUED | OUTPATIENT
Start: 2023-11-29 | End: 2023-12-07 | Stop reason: HOSPADM

## 2023-11-29 RX ORDER — ACETAMINOPHEN 325 MG/1
650 TABLET ORAL EVERY 6 HOURS PRN
Status: DISCONTINUED | OUTPATIENT
Start: 2023-11-29 | End: 2023-12-07 | Stop reason: HOSPADM

## 2023-11-29 RX ORDER — SODIUM CHLORIDE 9 MG/ML
INJECTION, SOLUTION INTRAVENOUS PRN
Status: DISCONTINUED | OUTPATIENT
Start: 2023-11-29 | End: 2023-12-07 | Stop reason: HOSPADM

## 2023-11-29 RX ORDER — ONDANSETRON 2 MG/ML
4 INJECTION INTRAMUSCULAR; INTRAVENOUS EVERY 6 HOURS PRN
Status: DISCONTINUED | OUTPATIENT
Start: 2023-11-29 | End: 2023-12-07 | Stop reason: HOSPADM

## 2023-11-29 RX ORDER — ONDANSETRON 2 MG/ML
4 INJECTION INTRAMUSCULAR; INTRAVENOUS EVERY 6 HOURS PRN
Status: DISCONTINUED | OUTPATIENT
Start: 2023-11-29 | End: 2023-12-01 | Stop reason: SDUPTHER

## 2023-11-29 RX ORDER — OXYCODONE HYDROCHLORIDE AND ACETAMINOPHEN 5; 325 MG/1; MG/1
1 TABLET ORAL EVERY 6 HOURS PRN
Status: ACTIVE | OUTPATIENT
Start: 2023-11-29 | End: 2023-11-30

## 2023-11-29 RX ORDER — OXYCODONE AND ACETAMINOPHEN 10; 325 MG/1; MG/1
1 TABLET ORAL EVERY 6 HOURS PRN
Status: DISPENSED | OUTPATIENT
Start: 2023-11-29 | End: 2023-11-30

## 2023-11-29 RX ORDER — SODIUM CHLORIDE 0.9 % (FLUSH) 0.9 %
5-40 SYRINGE (ML) INJECTION EVERY 12 HOURS SCHEDULED
Status: DISCONTINUED | OUTPATIENT
Start: 2023-11-29 | End: 2023-12-07 | Stop reason: HOSPADM

## 2023-11-29 RX ORDER — SODIUM CHLORIDE 9 MG/ML
INJECTION, SOLUTION INTRAVENOUS PRN
Status: DISCONTINUED | OUTPATIENT
Start: 2023-11-29 | End: 2023-11-29

## 2023-11-29 RX ORDER — MORPHINE SULFATE 4 MG/ML
4 INJECTION, SOLUTION INTRAMUSCULAR; INTRAVENOUS ONCE
Status: COMPLETED | OUTPATIENT
Start: 2023-11-29 | End: 2023-11-29

## 2023-11-29 RX ORDER — ACETAMINOPHEN 325 MG/1
650 TABLET ORAL EVERY 4 HOURS PRN
Status: DISCONTINUED | OUTPATIENT
Start: 2023-11-29 | End: 2023-11-29

## 2023-11-29 RX ORDER — SODIUM CHLORIDE 0.9 % (FLUSH) 0.9 %
5-40 SYRINGE (ML) INJECTION PRN
Status: DISCONTINUED | OUTPATIENT
Start: 2023-11-29 | End: 2023-11-29

## 2023-11-29 RX ORDER — BUPROPION HYDROCHLORIDE 75 MG/1
150 TABLET ORAL
Status: DISCONTINUED | OUTPATIENT
Start: 2023-11-30 | End: 2023-12-07 | Stop reason: HOSPADM

## 2023-11-29 RX ORDER — ONDANSETRON 4 MG/1
4 TABLET, ORALLY DISINTEGRATING ORAL EVERY 8 HOURS PRN
Status: DISCONTINUED | OUTPATIENT
Start: 2023-11-29 | End: 2023-12-07 | Stop reason: HOSPADM

## 2023-11-29 RX ORDER — SODIUM CHLORIDE 0.9 % (FLUSH) 0.9 %
5-40 SYRINGE (ML) INJECTION PRN
Status: DISCONTINUED | OUTPATIENT
Start: 2023-11-29 | End: 2023-12-07 | Stop reason: HOSPADM

## 2023-11-29 RX ORDER — MIDODRINE HYDROCHLORIDE 10 MG/1
10 TABLET ORAL
Status: DISCONTINUED | OUTPATIENT
Start: 2023-12-01 | End: 2023-12-07 | Stop reason: HOSPADM

## 2023-11-29 RX ORDER — SEVELAMER CARBONATE 800 MG/1
2400 TABLET, FILM COATED ORAL
Status: DISCONTINUED | OUTPATIENT
Start: 2023-11-29 | End: 2023-12-07 | Stop reason: HOSPADM

## 2023-11-29 RX ORDER — GABAPENTIN 300 MG/1
300 CAPSULE ORAL DAILY
Status: DISCONTINUED | OUTPATIENT
Start: 2023-11-29 | End: 2023-12-07 | Stop reason: HOSPADM

## 2023-11-29 RX ORDER — INSULIN LISPRO 100 [IU]/ML
0-4 INJECTION, SOLUTION INTRAVENOUS; SUBCUTANEOUS
Status: DISCONTINUED | OUTPATIENT
Start: 2023-11-29 | End: 2023-12-07 | Stop reason: HOSPADM

## 2023-11-29 RX ORDER — POLYETHYLENE GLYCOL 3350 17 G/17G
17 POWDER, FOR SOLUTION ORAL DAILY PRN
Status: DISCONTINUED | OUTPATIENT
Start: 2023-11-29 | End: 2023-12-07 | Stop reason: HOSPADM

## 2023-11-29 RX ORDER — QUETIAPINE 150 MG/1
150 TABLET, FILM COATED, EXTENDED RELEASE ORAL NIGHTLY
Status: DISCONTINUED | OUTPATIENT
Start: 2023-11-29 | End: 2023-12-07 | Stop reason: HOSPADM

## 2023-11-29 RX ORDER — SODIUM CHLORIDE 0.9 % (FLUSH) 0.9 %
5-40 SYRINGE (ML) INJECTION EVERY 12 HOURS SCHEDULED
Status: DISCONTINUED | OUTPATIENT
Start: 2023-11-29 | End: 2023-11-29

## 2023-11-29 RX ORDER — ACETAMINOPHEN 650 MG/1
650 SUPPOSITORY RECTAL EVERY 6 HOURS PRN
Status: DISCONTINUED | OUTPATIENT
Start: 2023-11-29 | End: 2023-12-07 | Stop reason: HOSPADM

## 2023-11-29 RX ORDER — CINACALCET 30 MG/1
60 TABLET, FILM COATED ORAL
Status: DISCONTINUED | OUTPATIENT
Start: 2023-11-29 | End: 2023-12-07 | Stop reason: HOSPADM

## 2023-11-29 RX ORDER — ENOXAPARIN SODIUM 100 MG/ML
30 INJECTION SUBCUTANEOUS DAILY
Status: DISCONTINUED | OUTPATIENT
Start: 2023-11-29 | End: 2023-12-01

## 2023-11-29 RX ORDER — VENLAFAXINE HYDROCHLORIDE 75 MG/1
75 CAPSULE, EXTENDED RELEASE ORAL
Status: DISCONTINUED | OUTPATIENT
Start: 2023-11-30 | End: 2023-12-07 | Stop reason: HOSPADM

## 2023-11-29 RX ORDER — PANTOPRAZOLE SODIUM 40 MG/1
40 TABLET, DELAYED RELEASE ORAL DAILY
Status: CANCELLED | OUTPATIENT
Start: 2023-11-29

## 2023-11-29 RX ADMIN — GABAPENTIN 300 MG: 300 CAPSULE ORAL at 18:51

## 2023-11-29 RX ADMIN — ONDANSETRON 4 MG: 2 INJECTION INTRAMUSCULAR; INTRAVENOUS at 13:10

## 2023-11-29 RX ADMIN — CEFEPIME 2000 MG: 2 INJECTION, POWDER, FOR SOLUTION INTRAVENOUS at 13:16

## 2023-11-29 RX ADMIN — SEVELAMER CARBONATE 2400 MG: 800 TABLET, FILM COATED ORAL at 18:51

## 2023-11-29 RX ADMIN — VANCOMYCIN HYDROCHLORIDE 1500 MG: 1.5 INJECTION, POWDER, LYOPHILIZED, FOR SOLUTION INTRAVENOUS at 14:17

## 2023-11-29 RX ADMIN — CINACALCET HYDROCHLORIDE 60 MG: 30 TABLET, FILM COATED ORAL at 18:51

## 2023-11-29 RX ADMIN — ENOXAPARIN SODIUM 30 MG: 100 INJECTION SUBCUTANEOUS at 18:51

## 2023-11-29 RX ADMIN — HYDROMORPHONE HYDROCHLORIDE 1 MG: 1 INJECTION, SOLUTION INTRAMUSCULAR; INTRAVENOUS; SUBCUTANEOUS at 22:02

## 2023-11-29 RX ADMIN — MORPHINE SULFATE 4 MG: 4 INJECTION, SOLUTION INTRAMUSCULAR; INTRAVENOUS at 13:11

## 2023-11-29 RX ADMIN — Medication 10 ML: at 22:02

## 2023-11-29 RX ADMIN — IOPAMIDOL 85 ML: 755 INJECTION, SOLUTION INTRAVENOUS at 16:24

## 2023-11-29 RX ADMIN — QUETIAPINE FUMARATE 150 MG: 150 TABLET, EXTENDED RELEASE ORAL at 22:02

## 2023-11-29 RX ADMIN — ASPIRIN 325 MG: 325 TABLET ORAL at 18:51

## 2023-11-29 ASSESSMENT — PAIN DESCRIPTION - LOCATION
LOCATION: TOE (COMMENT WHICH ONE)
LOCATION: LEG;FOOT
LOCATION: TOE (COMMENT WHICH ONE)
LOCATION: TOE (COMMENT WHICH ONE)

## 2023-11-29 ASSESSMENT — PAIN DESCRIPTION - PAIN TYPE
TYPE: ACUTE PAIN
TYPE: ACUTE PAIN

## 2023-11-29 ASSESSMENT — PAIN DESCRIPTION - ORIENTATION
ORIENTATION: LEFT

## 2023-11-29 ASSESSMENT — PAIN DESCRIPTION - DESCRIPTORS
DESCRIPTORS: BURNING;SHARP
DESCRIPTORS: BURNING;THROBBING
DESCRIPTORS: ACHING;THROBBING

## 2023-11-29 ASSESSMENT — PAIN SCALES - GENERAL
PAINLEVEL_OUTOF10: 9
PAINLEVEL_OUTOF10: 8

## 2023-11-29 ASSESSMENT — LIFESTYLE VARIABLES: HOW OFTEN DO YOU HAVE A DRINK CONTAINING ALCOHOL: MONTHLY OR LESS

## 2023-11-29 ASSESSMENT — PAIN DESCRIPTION - FREQUENCY
FREQUENCY: CONTINUOUS
FREQUENCY: CONTINUOUS

## 2023-11-29 ASSESSMENT — PAIN - FUNCTIONAL ASSESSMENT: PAIN_FUNCTIONAL_ASSESSMENT: 0-10

## 2023-11-29 NOTE — PROGRESS NOTES
4 Eyes Skin Assessment     NAME:  Karrie Smith OF BIRTH:  1960  MEDICAL RECORD NUMBER:  [id number]    The patient is being assessed for  Admission    I agree that at least one RN has performed a thorough Head to Toe Skin Assessment on the patient. ALL assessment sites listed below have been assessed. Areas assessed by both nurses:    Head, Face, Ears, Shoulders, Back, Chest, Arms, Elbows, Hands, Sacrum. Buttock, Coccyx, Ischium, and Legs. Feet and Heels        Does the Patient have a Wound? Yes wound(s) were present on assessment.  LDA wound assessment was Initiated and completed by RN       Smith Prevention initiated by RN: Yes  Wound Care Orders initiated by RN: Yes    Pressure Injury (Stage 3,4, Unstageable, DTI, NWPT, and Complex wounds) if present, place Wound referral order by RN under : No    New Ostomies, if present place, Ostomy referral order under : No     Nurse 1 eSignature: Electronically signed by Jon Jason RN on 11/29/23 at 6:37 PM EST    **SHARE this note so that the co-signing nurse can place an eSignature**    Nurse 2 eSignature: Electronically signed by Yue Baum RN on 11/29/23 at 6:40 PM EST

## 2023-11-29 NOTE — ED PROVIDER NOTES
[address] [facility] [address] [address]        Pt Name: Sammi Wilcox  MRN: [id number]  [address] [facility], [place] 1960  Date of evaluation: 11/29/2023  Provider: JEMIMA Eason  PCP: Corby Coughlin MD  Note Started: 12:38 PM EST 11/29/23       I have seen and evaluated this patient with my supervising physician Dr. Eric Zimmerman       Chief Complaint   Patient presents with    Toe Pain     Left 2nd/3rd toe pain/discolored starting 3 weeks ago. Pt is dialysis mwf hx of diabetes, afibb, pacemaker        HISTORY OF PRESENT ILLNESS: 1 or more Elements     History From: patient    Sammi Wilcox is a 58 y.o. male who presents for pain in the left second and third toe with discoloration that started about 3 weeks ago. Also reports there is a cut or wound on one of the toes but unsure which 1. He is unsure what fluid that is draining from it looks like. He reports dialysis nurses have been trying to get him to come to ED and finally agreed to today. He gets dialysis MWF at [facility] in [place]. Went to dialysis today and had basically full run of dialysis (stopped 5 minutes early due to cramping). Nephrologist was Bob Brown but reports he left and he is not sure who new nephrologist is that he has been assigned to. Denies fever nausea vomiting. Hx jossy. His is on ASA 81 mg. No other anticoagulant. Nursing Notes were reviewed and agreed with or any disagreements were addressed in the HPI. REVIEW OF SYSTEMS :      Review of Systems    Positives and Pertinent negatives as per HPI.      SURGICAL HISTORY     Past Surgical History:   Procedure Laterality Date    COLONOSCOPY      PACEMAKER INSERTION      PACEMAKER PLACEMENT         CURRENTMEDICATIONS       Previous Medications    ASPIRIN 325 MG TABLET    Take 1 tablet by mouth daily    BUPROPION (WELLBUTRIN) 75 MG TABLET    Take 2 tablets by mouth every 72 hours    CINACALCET (SENSIPAR) 60 MG

## 2023-11-29 NOTE — CONSULTS
[facility]  Cardiology Consult    Missy Brown  [birth year]    November 29, 2023        CC: Left lower extremity toe black change in coloration of the foot        Subjective:     History of Present Illness:    Missy Brown is a 58 y.o. patient with a PMH significant for end-stage renal disease coronary disease atrial fibrillation presented with complaints of left lower extremity pain     He complains of rest pain left lower extremity with change in color. Pain gets worse when he hangs his foot down. Limb or leg pain present at rest     ? Sores or wounds on toes, feet,     ? Color changes in the skin of the feet, including increasing erythema. ? A lower temperature in one leg compared to the other leg. ? Poor nail growth and decreased hair growth on toes and legs. Past Medical History:   has a past medical history of Atrial fibrillation ([address]), CAD (coronary artery disease), Chronic kidney disease, Depression, Diabetes mellitus ([address]), Dialysis patient ([address]), Glaucoma, Hemodialysis patient ([address]), Hyperlipidemia, Hypertension, RLL pneumonia, and Sleep apnea. Surgical History:   has a past surgical history that includes Pacemaker insertion; pacemaker placement; and Colonoscopy. Social History:   reports that he has never smoked. He has never used smokeless tobacco. He reports that he does not currently use alcohol after a past usage of about 2.0 standard drinks of alcohol per week. He reports current drug use. Drugs: Marijuana (Weed) and Opiates . Family History:  family history includes Heart Disease in his father; High Blood Pressure in his sister. Home Medications:  Were reviewed and are listed in nursing record and/or below  Prior to Admission medications    Medication Sig Start Date End Date Taking?  Authorizing Provider   aspirin 325 MG tablet Take 1 tablet by mouth daily 8/31/23   Provider, MD Khris   buPROPion (WELLBUTRIN) 75 MG tablet Take 2 tablets by mouth

## 2023-11-29 NOTE — PROCEDURES
[address], [address]                            CARDIAC CATHETERIZATION    PATIENT NAME: Esthela Watters                     :        1960  MED REC NO:   [id number]                          ROOM:       9485  ACCOUNT NO:   [de-identified]                           ADMIT DATE: 2023  PROVIDER:     Ricci Giles MD    DATE OF PROCEDURE:  2023    PROCEDURES PERFORMED:  1. Left lower extremity selective angiography. 2.  Balloon angioplasty of the left popliteal artery. 3.  Balloon angioplasty of the left anterior tibial artery. 4.  Balloon angioplasty of the left peroneal artery. INDICATION FOR PROCEDURE:  Critical limb ischemia. Informed consent obtained. ASA is II. Mallampati score II. No complications. Estimated blood loss less than 30 mL. PROCEDURE IN DETAIL:  Right groin prepped and draped in sterile fashion. Accessed right common femoral artery. SARAH inserted in the left common  iliac artery and performed left lower extremity selective angiography. A peripheral sheath was then advanced and crossed into the left common  femoral artery. Using a 0.014 wire crossed into the peroneal artery  performed balloon angioplasty of the left peroneal artery first and the  wire positioned in the left anterior tibial artery and performed balloon  angioplasty of the left anterior tibial artery using a 2.5-mm balloon  catheter. Then, using a drug-eluting balloon 5 mm performed a balloon  angioplasty of the left popliteal artery. Final angiography was  performed showed flow. All catheters, wires, and sheaths were removed. MYNX applied to achieve hemostasis in the right common femoral artery. ANGIOGRAPHIC FINDINGS:  1. Shows patent left common external iliac artery. 2.  Patent left common femoral artery and superficial femoral arteries. 3.  99% stenosis of the left popliteal artery.   4.  99%

## 2023-11-29 NOTE — PROGRESS NOTES
Medication Reconciliation    List of medications patient is currently taking is complete. Source of information: 1. Conversation with patient at bedside                                      2. EPIC records      Allergies  Patient has no known allergies. Notes regarding home medications:   1.  Patient reports not having any medication this morning PASCALE [facility]   11/29/2023  3:05 PM

## 2023-11-29 NOTE — ED TRIAGE NOTES
Left 2nd/3rd toe pain/discolored starting 3 weeks ago. Pt is dialysis mwf hx of diabetes, afibb, pacemaker. Pt rates pain as 8/10 describing as throbbing and burning that radiates up to his knee. Pt alert and oriented x4. 2nd and 3rd left toes appear necrotic upon assessment. Pt states not as much feeling on those toes. Pain is coming from big toe. No sores noted on big toe. No acute distress noted at this time.  Call light in reach and pt resting

## 2023-11-29 NOTE — CONSULTS
Clinical Pharmacy Note  Vancomycin Consult    Pharmacy consult received for one-time dose of vancomycin in the Emergency Department per JEMIMA Ochoa. Ht Readings from Last 1 Encounters:   11/29/23 1.778 m (5' 10\")        Wt Readings from Last 1 Encounters:   11/29/23 91.9 kg (202 lb 9.6 oz)         Assessment/Plan:  Vancomycin 1500 mg x 1 in ED. If vancomycin is to continue on admission and pharmacy is to manage dosing, please re-consult with admission orders.       Henny Benites [facility]  11/29/2023 1:53 PM

## 2023-11-29 NOTE — H&P
Hospital Medicine History & Physical      PCP: Emma Garzon MD    Date of Admission: 11/29/2023    Date of Service: Pt seen/examined on 11/29/2023 and Admitted to Inpatient with expected LOS greater than two midnights due to medical therapy. Chief Complaint: Toe gangrene      History Of Present Illness:      58 y.o. male who presented to [facility] with increased pain in left second and third toe with dark discoloration that started 3 weeks ago, pain is dull, 3-4 out of 10 no obvious alleviating or aggravating factors, in the context of above and end-stage renal disease. Apparently his dialysis nurse was trying to convince him to go to ED for some time now went to dialysis today and was advised to come to emergency department in emergency department lab work done and patient's having darkish discoloration of his toes being admitted for further management and treatment discussed with ED provider agree with plan to admit    Past Medical History:          Diagnosis Date    Atrial fibrillation ([address] )     CAD (coronary artery disease) 4/3/2014    Chronic kidney disease     Depression     Diabetes mellitus ([address] [facility])     Dialysis patient [facility])     left upper arm fistula    Glaucoma     Hemodialysis patient ([address] [facility])     Hyperlipidemia     Hypertension     RLL pneumonia 1/23/2018    Sleep apnea     uses CPAP       Past Surgical History:          Procedure Laterality Date    COLONOSCOPY      PACEMAKER INSERTION      PACEMAKER PLACEMENT         Medications Prior to Admission:      Prior to Admission medications    Medication Sig Start Date End Date Taking?  Authorizing Provider   aspirin 325 MG tablet Take 1 tablet by mouth daily 8/31/23   Khris Zavala MD   buPROPion (WELLBUTRIN) 75 MG tablet Take 2 tablets by mouth every 72 hours 8/31/23   Khris Zavala MD   midodrine (PROAMATINE) 10 MG tablet Take 1 tablet by mouth See Admin Instructions Before and during dialysis three times weekly Tues, Rales/Wheezes/Rhonchi. Cardiovascular:  Regular rate and rhythm with normal S1/S2 without murmurs, rubs or gallops. Abdomen: Soft, non-tender, non-distended with normal bowel sounds. Musculoskeletal:  No clubbing, cyanosis. Discoloration blackish discoloration tips of second and third toe on the left, ulceration of the medial aspect of second toe minimal erythema of distal foot  Skin: As above  Neurologic: No focal weakness  Psychiatric:  Alert and oriented  Capillary Refill: Brisk,3 seconds, normal  Peripheral Pulses: +2 palpable, equal bilaterally       Labs:     Recent Labs     11/29/23  1304   WBC 5.5   HGB 13.7   HCT 40.2*   *     Recent Labs     11/29/23  1304      K 3.9   CL 96*   CO2 27   BUN 18   CREATININE 4.1*   CALCIUM 9.5     Recent Labs     11/29/23  1304   AST 19   ALT 13   BILITOT 0.3   ALKPHOS 91     No results for input(s): \"INR\" in the last 72 hours. No results for input(s): \"CKTOTAL\", \"TROPHS\" in the last 72 hours. Urinalysis:      Lab Results   Component Value Date/Time    NITRU Negative 01/23/2018 02:58 AM    WBCUA 2 01/23/2018 02:58 AM    RBCUA 62 01/23/2018 02:58 AM    BLOODU LARGE 01/23/2018 02:58 AM    SPECGRAV 1.016 01/23/2018 02:58 AM    GLUCOSEU Negative 01/23/2018 02:58 AM       Radiology:       XR FOOT LEFT (MIN 3 VIEWS)   Final Result   No acute osseous abnormality. Consults:    PHARMACY TO DOSE VANCOMYCIN  IP CONSULT TO PHARMACY  IP CONSULT TO PODIATRY  IP CONSULT TO NEPHROLOGY    ASSESSMENT:    Active Hospital Problems    Diagnosis Date Noted    Toe gangrene ([address]) Justo Sanchez 11/29/2023         PLAN:    Possible gangrene of second and third toe, could be infectious, does not look septic at this time still will start with vancomycin and cefepime podiatry consulted, discussed with ED provider requested to consult cardiovascular for further recommendations from vascular perspective as certainly patient is having peripheral vascular disease to some extent.

## 2023-11-29 NOTE — ED PROVIDER NOTES
[address]      Pt Name: Levy Mejia  MRN: [id number]  [address] [facility] [place] 1960  Date of evaluation: 11/29/2023  Provider: Thais Ross  Chief Complaint   Patient presents with    Toe Pain     Left 2nd/3rd toe pain/discolored starting 3 weeks ago. Pt is dialysis mwf hx of diabetes, afibb, pacemaker        I have fully participated in the care of Levy Mejia and have had a face-to-face evaluation. I have reviewed and agree with all pertinent clinical information, and midlevel provider's history, and physical exam. I have also reviewed the labs and imaging studies and treatment plan. I have also reviewed and agree with the medications, allergies and past medical history section for this Levy Mejia. I agree with the diagnosis, and I concur. This patient is at risk for a communicable infection. Therefore, personal protection equipment consisting of a mask was worn for the exam.    Past Medical History:   Diagnosis Date    Atrial fibrillation ([address])     CAD (coronary artery disease) 4/3/2014    Chronic kidney disease     Depression     Diabetes mellitus ([address])     Dialysis patient ([address])     left upper arm fistula    Glaucoma     Hemodialysis patient ([address])     Hyperlipidemia     Hypertension     RLL pneumonia 1/23/2018    Sleep apnea     uses CPAP       MDM:  Levy Mejia is a 58 y.o. male who presents with pain in left second toe been present for 3 weeks. Is gotten progressively worse. Pain started extending up his foot. He denies any fevers or chills. [place] any nausea or vomiting. He is a dialysis patient. He cut his dialysis short today by 5 minutes due to cramping of his muscles. He does not know of any injuries to his toe. He states that the pain is gotten progressively worse over the past 3 weeks. Nothing makes it better or worse. He describes it as moderate.   Physical exam reveals dark necrotic skin desquamation of the ceFEPIme (MAXIPIME) 2,000 mg in sodium chloride 0.9 % 100 mL IVPB (mini-bag) (has no administration in time range)   glucose chewable tablet 16 g (has no administration in time range)   dextrose bolus 10% 125 mL (has no administration in time range)     Or   dextrose bolus 10% 250 mL (has no administration in time range)   glucagon (rDNA) injection 1 mg (has no administration in time range)   dextrose 10 % infusion (has no administration in time range)   insulin lispro (HUMALOG) injection vial 0-4 Units ( SubCUTAneous Not Given 11/29/23 1747)   insulin lispro (HUMALOG) injection vial 0-4 Units ( SubCUTAneous Not Given 11/29/23 2021)   sodium chloride flush 0.9 % injection 5-40 mL (has no administration in time range)   sodium chloride flush 0.9 % injection 5-40 mL (has no administration in time range)   0.9 % sodium chloride infusion (has no administration in time range)   ondansetron (ZOFRAN) injection 4 mg (has no administration in time range)   vancomycin (VANCOCIN) intermittent dosing (placeholder) (has no administration in time range)   morphine (PF) injection 4 mg (4 mg IntraVENous Given 11/29/23 1311)   ondansetron (ZOFRAN) injection 4 mg (4 mg IntraVENous Given 11/29/23 1310)   ceFEPIme (MAXIPIME) 2,000 mg in sodium chloride 0.9 % 100 mL IVPB (mini-bag) (0 mg IntraVENous Stopped 11/29/23 1351)   vancomycin (VANCOCIN) 1,500 mg in sodium chloride 0.9 % 250 mL IVPB (ADDAVIAL) (0 mg IntraVENous Stopped 11/29/23 1640)   iopamidol (ISOVUE-370) 76 % injection 85 mL (85 mLs Other Given by Other 11/29/23 1624)       Current Discharge Medication List          The patient's blood pressure was found to be elevated according to CMS/Medicare and the Affordable Care Act/[facility] criteria. Elevated blood pressure could occur because of pain or anxiety or other reasons and does not mean that they need to have their blood pressure treated or medications otherwise adjusted.  However, this could also be a sign that they will

## 2023-11-30 LAB
EST. AVERAGE GLUCOSE BLD GHB EST-MCNC: 76.7 MG/DL
GLUCOSE BLD-MCNC: 107 MG/DL (ref 70–99)
GLUCOSE BLD-MCNC: 114 MG/DL (ref 70–99)
GLUCOSE BLD-MCNC: 88 MG/DL (ref 70–99)
GLUCOSE BLD-MCNC: 96 MG/DL (ref 70–99)
HBA1C MFR BLD: 4.3 %
HBV SURFACE AB SERPL IA-ACNC: 114.9 MIU/ML
HBV SURFACE AG SERPL QL IA: NORMAL
PERFORMED ON: ABNORMAL
PERFORMED ON: ABNORMAL
PERFORMED ON: NORMAL
PERFORMED ON: NORMAL

## 2023-11-30 PROCEDURE — 1200000000 HC SEMI PRIVATE

## 2023-11-30 PROCEDURE — 99232 SBSQ HOSP IP/OBS MODERATE 35: CPT | Performed by: NURSE PRACTITIONER

## 2023-11-30 PROCEDURE — 97116 GAIT TRAINING THERAPY: CPT | Performed by: PHYSICAL THERAPIST

## 2023-11-30 PROCEDURE — 97166 OT EVAL MOD COMPLEX 45 MIN: CPT

## 2023-11-30 PROCEDURE — 6360000002 HC RX W HCPCS: Performed by: INTERNAL MEDICINE

## 2023-11-30 PROCEDURE — 97530 THERAPEUTIC ACTIVITIES: CPT

## 2023-11-30 PROCEDURE — 97535 SELF CARE MNGMENT TRAINING: CPT

## 2023-11-30 PROCEDURE — 97162 PT EVAL MOD COMPLEX 30 MIN: CPT | Performed by: PHYSICAL THERAPIST

## 2023-11-30 PROCEDURE — 6370000000 HC RX 637 (ALT 250 FOR IP): Performed by: INTERNAL MEDICINE

## 2023-11-30 PROCEDURE — 93925 LOWER EXTREMITY STUDY: CPT

## 2023-11-30 PROCEDURE — 6370000000 HC RX 637 (ALT 250 FOR IP): Performed by: STUDENT IN AN ORGANIZED HEALTH CARE EDUCATION/TRAINING PROGRAM

## 2023-11-30 PROCEDURE — 6370000000 HC RX 637 (ALT 250 FOR IP): Performed by: REGISTERED NURSE

## 2023-11-30 PROCEDURE — 94760 N-INVAS EAR/PLS OXIMETRY 1: CPT

## 2023-11-30 PROCEDURE — 97530 THERAPEUTIC ACTIVITIES: CPT | Performed by: PHYSICAL THERAPIST

## 2023-11-30 PROCEDURE — 2580000003 HC RX 258: Performed by: INTERNAL MEDICINE

## 2023-11-30 RX ORDER — OXYCODONE HYDROCHLORIDE AND ACETAMINOPHEN 5; 325 MG/1; MG/1
1 TABLET ORAL EVERY 6 HOURS PRN
Status: DISCONTINUED | OUTPATIENT
Start: 2023-11-30 | End: 2023-12-07 | Stop reason: HOSPADM

## 2023-11-30 RX ADMIN — SEVELAMER CARBONATE 2400 MG: 800 TABLET, FILM COATED ORAL at 15:42

## 2023-11-30 RX ADMIN — CEFEPIME 2000 MG: 2 INJECTION, POWDER, FOR SOLUTION INTRAVENOUS at 15:55

## 2023-11-30 RX ADMIN — GABAPENTIN 300 MG: 300 CAPSULE ORAL at 09:13

## 2023-11-30 RX ADMIN — OXYCODONE AND ACETAMINOPHEN 1 TABLET: 5; 325 TABLET ORAL at 15:42

## 2023-11-30 RX ADMIN — ENOXAPARIN SODIUM 30 MG: 100 INJECTION SUBCUTANEOUS at 09:13

## 2023-11-30 RX ADMIN — SEVELAMER CARBONATE 2400 MG: 800 TABLET, FILM COATED ORAL at 09:12

## 2023-11-30 RX ADMIN — ASPIRIN 325 MG: 325 TABLET ORAL at 09:13

## 2023-11-30 RX ADMIN — OXYCODONE AND ACETAMINOPHEN 1 TABLET: 10; 325 TABLET ORAL at 03:59

## 2023-11-30 RX ADMIN — QUETIAPINE FUMARATE 150 MG: 150 TABLET, EXTENDED RELEASE ORAL at 21:14

## 2023-11-30 RX ADMIN — ONDANSETRON 4 MG: 4 TABLET, ORALLY DISINTEGRATING ORAL at 21:14

## 2023-11-30 RX ADMIN — OXYCODONE AND ACETAMINOPHEN 1 TABLET: 10; 325 TABLET ORAL at 09:13

## 2023-11-30 RX ADMIN — SEVELAMER CARBONATE 2400 MG: 800 TABLET, FILM COATED ORAL at 11:51

## 2023-11-30 RX ADMIN — BUPROPION HYDROCHLORIDE 150 MG: 75 TABLET, FILM COATED ORAL at 09:13

## 2023-11-30 RX ADMIN — VENLAFAXINE HYDROCHLORIDE 75 MG: 75 CAPSULE, EXTENDED RELEASE ORAL at 09:13

## 2023-11-30 RX ADMIN — ACETAMINOPHEN 650 MG: 325 TABLET ORAL at 09:13

## 2023-11-30 ASSESSMENT — PAIN SCALES - GENERAL
PAINLEVEL_OUTOF10: 8
PAINLEVEL_OUTOF10: 7
PAINLEVEL_OUTOF10: 7

## 2023-11-30 ASSESSMENT — PAIN DESCRIPTION - LOCATION
LOCATION: LEG
LOCATION: FOOT
LOCATION: TOE (COMMENT WHICH ONE)

## 2023-11-30 ASSESSMENT — PAIN DESCRIPTION - ORIENTATION
ORIENTATION: LEFT

## 2023-11-30 NOTE — PROGRESS NOTES
V2.0  [facility] Hospitalist Progress Note      Name:  Larry Jeffrey /Age/Sex: 1960  (58 y.o. male)   MRN & CSN:  [id number] & [id number] Encounter Date/Time: 2023 10:33 AM EST    Location:  [id number] PCP: Renard Barnes MD       Hospital Day: 2      Subjective:     Chief Complaint:  Toe Pain (Left 2nd/3rd toe pain/discolored starting 3 weeks ago. Pt is dialysis mwf hx of diabetes, afibb, pacemaker )     Pt had cath angiogram yesterday with successful revascularization. Had pain overnight but slightly better today. Awaiting to see podiatry. No new complaints or concerns. Assessment and Plan:   Possible gangrene of second and third toe. Could be infectious, pt does not look septic   - continue with vancomycin and cefepime   - podiatry consulted  - cardiology consulted as below  - follow blood cx x2    Peripheral vascular disease. S/p urgent left lower extremity angiography w successful revascularization of the left popliteal artery, left anterior tibial artery, and left peroneal artery  - continue ASA  - f/u cards recs  - f/u arterial duplex ordered by cards    ESRD. consulted nephrology    Diabetes mellitus per pt. Last HAC1 2023 4.3. LDSSI    Coronary artery disease. no chest pain    Personally reviewed Lab Studies and Imaging     Drugs that require monitoring for toxicity include lovenox and the method of monitoring was cbc      Diet ADULT DIET; Regular; 4 carb choices (60 gm/meal); Low Potassium (Less than 3000 mg/day)   DVT Prophylaxis [x] Lovenox, []  Heparin, [] SCDs, [] Ambulation,  [] Eliquis, [] Xarelto  [] Coumadin   Code Status Full Code   Disposition From: home  Expected Disposition: home  Estimated Date of Discharge: 1-2 days  Patient requires continued admission due to podiatry, cardiology clearance   Surrogate Decision Maker/ POA      Review of Systems:    Review of Systems    See above    Objective:      Intake/Output Summary (Last 24 hours) at Performed on ACCU-CHEK    POCT Glucose    Collection Time: 11/29/23  7:34 PM   Result Value Ref Range    POC Glucose 73 70 - 99 mg/dl    Performed on ACCU-CHEK    POCT Glucose    Collection Time: 11/30/23  8:20 AM   Result Value Ref Range    POC Glucose 88 70 - 99 mg/dl    Performed on ACCU-CatapoooltK         Imaging/Diagnostics Last 24 Hours   XR FOOT LEFT (MIN 3 VIEWS)    Result Date: 11/29/2023  EXAMINATION: THREE XRAY VIEWS OF THE LEFT FOOT 11/29/2023 12:26 pm COMPARISON: None. HISTORY: ORDERING SYSTEM PROVIDED HISTORY: discoloration 2/3 toe TECHNOLOGIST PROVIDED HISTORY: Reason for exam:->discoloration 2/3 toe Reason for Exam: discoloration 2/3 toe FINDINGS: There is no evidence of acute fracture. There is normal alignment of the tarsometatarsal joints. No acute joint abnormality. No focal osseous lesion. No focal soft tissue abnormality. No acute osseous abnormality.        Electronically signed by Ivonne Hernandes MD on 11/30/2023 at 12:29 PM

## 2023-11-30 NOTE — PROGRESS NOTES
Physical Therapy  Facility/Department: [address] PROGRESSIVE CARE  Physical Therapy Initial Assessment    Name: Amarilys Mantilla  : 1960  MRN: [id number]  Date of Service: 2023    Discharge Recommendations:  Home with assist PRN, Continue to assess pending progress (possible home PT if pt's WB status changes)   PT Equipment Recommendations  Equipment Needed: No      Amarilys Mantilla scored a 18/24 on the AM-PAC short mobility form. Current research shows that an AM-PAC score of 18 or greater is typically associated with a discharge to the patient's home setting. Based on the patient's AM-PAC score and their current functional mobility deficits, no further therapy is recommended at home unless pt's WB status changes and becomes more limited. Will follow for podiatry orders. If patient discharges prior to next session this note will serve as a discharge summary. Please see below for the latest assessment towards goals. Patient Diagnosis(es): The primary encounter diagnosis was Necrotic toes ([address]). A diagnosis of ESRD (end stage renal disease) on dialysis [facility]) was also pertinent to this visit. Past Medical History:  has a past medical history of Atrial fibrillation ([address]), CAD (coronary artery disease), Chronic kidney disease, Depression, Diabetes mellitus ([address]), Dialysis patient ([address]), Glaucoma, Hemodialysis patient ([address]), Hyperlipidemia, Hypertension, RLL pneumonia, and Sleep apnea. Past Surgical History:  has a past surgical history that includes Pacemaker insertion; pacemaker placement; and Colonoscopy. Assessment   Body Structures, Functions, Activity Limitations Requiring Skilled Therapeutic Intervention: Decreased functional mobility   Assessment: pt is a 59 yo male who was adm to hosp with L foot toe gangrene s/p angioplasty; Pt reports at baseline he is Ind without an AD; pt currently is SBA for bed mob and transfers and CGA/SBA for amb with RW due to foot pain.   Pt appears to be safe at end of session  Transfers  Sit to Stand: Stand by assistance  Stand to Sit: Stand by assistance  Ambulation  Surface: Level tile  Device: Rolling Walker  Assistance: Contact guard assistance;Stand by assistance  Quality of Gait: slightly unsteady due to increased pain in L foot; walks on toes of his left foot with some difficulty placing foot flat due to pain; slighlty impulsive with quick pace  Distance: 10', 3' and 25'  Comments: pt used commode and stood at sink to wash his hands; set up in chair for comfort with LEs elevated and set up for breakfast tray; waffle cushion in place and pillows placed for support     Balance  Sitting - Static: Good  Sitting - Dynamic: Good  Standing - Static: Fair;+  Standing - Dynamic: Fair;+           OutComes Score                                                  -PAC - Mobility    AM-PAC Basic Mobility - Inpatient   How much help is needed turning from your back to your side while in a flat bed without using bedrails?: None  How much help is needed moving from lying on your back to sitting on the side of a flat bed without using bedrails?: A Little  How much help is needed moving to and from a bed to a chair?: A Little  How much help is needed standing up from a chair using your arms?: A Little  How much help is needed walking in hospital room?: A Little  How much help is needed climbing 3-5 steps with a railing?: A Lot  AM-PAC Inpatient Mobility Raw Score : 18  AM-PAC Inpatient T-Scale Score : 43.63  Mobility Inpatient CMS 0-100% Score: 46.58  Mobility Inpatient CMS G-Code Modifier : CK         Tinneti Score       Goals  Short Term Goals  Time Frame for Short Term Goals: by discharge  Short Term Goal 1: bed mob MI  Short Term Goal 2: transfers MI  Short Term Goal 3: amb 100' with or without AD SBA/sup  Patient Goals   Patient Goals : to go home       Education  Patient Education  Education Given To: Patient  Education Provided: Role of Therapy  Education Provided Comments:

## 2023-11-30 NOTE — CARE COORDINATION
Case Management Assessment  Initial Evaluation    Date/Time of Evaluation: 11/30/2023 12:40 PM  Assessment Completed by: MIGUEL Teague    If patient is discharged prior to next notation, then this note serves as note for discharge by case management. Patient Name: Barbara Carvajal                   YOB: 1960  Diagnosis: Toe gangrene ([address]) Will Raúl  ESRD (end stage renal disease) on dialysis ([address]) [N18.6, Z99.2]  Necrotic toes ([address]) Will Olsen                   Date / Time: 11/29/2023 11:55 AM    Patient Admission Status: Inpatient   Readmission Risk (Low < 19, Mod (19-27), High > 27): Readmission Risk Score: 15.5    Current PCP: Carola Licona MD  PCP verified by CM? (P) Yes    Chart Reviewed: Yes      History Provided by: (P) Patient  Patient Orientation:      Patient Cognition: (P) Alert    Hospitalization in the last 30 days (Readmission):  No    If yes, Readmission Assessment in  Navigator will be completed.     Advance Directives:      Code Status: Full Code   Patient's Primary Decision Maker is: (P) Legal Next of Kin      Discharge Planning:    Patient lives with: (P) Alone Type of Home: (P) House  Primary Care Giver: (P) Self  Patient Support Systems include: (P) None   Current Financial resources: (P) None  Current community resources: (P) None  Current services prior to admission: (P) None            Current DME:              Type of Home Care services:  (P) None    ADLS  Prior functional level: (P) Independent in ADLs/IADLs  Current functional level: (P) Independent in ADLs/IADLs    PT AM-PAC: 18 /24  OT AM-PAC: 20 /24    Family can provide assistance at DC: (P) No  Would you like Case Management to discuss the discharge plan with any other family members/significant others, and if so, who? (P) No  Plans to Return to Present Housing: (P) Yes  Other Identified Issues/Barriers to RETURNING to current housing: Dialysis, lives alone  Potential Assistance needed at discharge: (P)

## 2023-11-30 NOTE — CARE COORDINATION
Wound consult noted; chart reviewed. Pt admitted with critical limb ischemia. Taken to cath lab by Dr. Belén Bunn for revascularization. Podiatry consulted. Defer to podiatry to wound care. Please re-consult if needed.  Carrie Donaldson, MSN, RN, [employer]

## 2023-11-30 NOTE — CONSULTS
[address], [facility] [address]                                  CONSULTATION    PATIENT NAME: Barbara Carvajal                     :        1960  MED REC NO:   [id number]                          ROOM:       6521  ACCOUNT NO:   [de-identified]                           ADMIT DATE: 2023  PROVIDER:     Kimberlyn Liu MD    CONSULT DATE:  2023    REASON FOR CONSULTATION:  Maintenance dialysis. HISTORY OF PRESENTING ILLNESS:  He is a 65-year-old male with past  medical history significant for diabetes mellitus type 2, hypertension,  hypercholesterolemia, peripheral vascular disease, chronic atrial  fibrillation, coronary artery disease, endstage renal disease on dialysis F  follows up with   Nephrology came to ER complaining of second and third toe pain and  discoloration. Apparently, the patient has been struggling with this  symptom for the last three weeks, but came to ER because of worsening  symptoms. He denies any fever, chills, or rigors, but admit to  worsening discoloration. He has been admitted for further workup and  management. Renal consultation has been called for maintenance  dialysis. At the time of consultation, the patient denies any chest pain,  shortness of breath, nausea, or vomiting, but admit to poor appetite. PAST MEDICAL HISTORY:  1.  Obstructive sleep apnea. 2.  Chronic atrial fibrillation. 3.  Coronary artery disease. 4.  Endstage renal disease on hemodialysis Monday, Wednesday, and  Friday. 5.  Depression. 6.  Anemia of chronic kidney disease. 7.  Hypercholesterolemia. 8.  Renal osteodystrophy. 9.  Peripheral vascular disease. PAST SURGICAL HISTORY:  1.  Status post pacemaker placement. 2.  Status post colonoscopy. 3.  Status post dialysis access placement.     FAMILY HISTORY:  Not significant for any kidney disease, but multiple  family members had high blood

## 2023-11-30 NOTE — CONSULTS
Full note dictated. Patient seen at bedside. Gangrenous digits 2,3, left foot, s/p successful revascularization by Dr. Mariella Salinas. To OR tomorrow at 11 am for amputation of 2nd and 3rd digits by Dr. Jazz Baca. NPO after midnight  Lovenox held  Consent to be signed. All risk vs benefits of the planned procedure were discussed with the patient. All questions were answered. No guarantees were given.      Wyatt Kumari DPM

## 2023-11-30 NOTE — CONSULTS
PACEMAKER PLACEMENT              Allergies:  No Known Allergies     Social Determinants of Health with Concerns     Alcohol Use: Unknown (11/29/2023)    AUDIT-C     Frequency of Alcohol Consumption: Monthly or less     Average Number of Drinks: Not on file     Frequency of Binge Drinking: Not on file   Financial Resource Strain: Not on file   Food Insecurity: Food Insecurity Present (11/29/2023)    Hunger Vital Sign     Worried About Running Out of Food in the Last Year: Sometimes true     Ran Out of Food in the Last Year: Sometimes true   Transportation Needs: Unmet Transportation Needs (11/29/2023)    PRAPARE - Transportation     Lack of Transportation (Medical): Yes     Lack of Transportation (Non-Medical): Yes   Physical Activity: Not on file   Stress: Not on file   Social Connections: Not on file   Intimate Partner Violence: Not on file   Depression: Not on file   Housing Stability: High Risk (11/29/2023)    Housing Stability Vital Sign     Unable to Pay for Housing in the Last Year: Yes     Number of [address] in the Last Year: 1     Unstable Housing in the Last Year: No   Interpersonal Safety: Not on file (11/29/2023)          LAB DATA:    CBC:   Lab Results   Component Value Date/Time    WBC 5.5 11/29/2023 01:04 PM    RBC 3.79 11/29/2023 01:04 PM    HGB 13.7 11/29/2023 01:04 PM    HCT 40.2 11/29/2023 01:04 PM    .2 11/29/2023 01:04 PM    MCH 36.1 11/29/2023 01:04 PM    MCHC 33.9 11/29/2023 01:04 PM    RDW 15.9 11/29/2023 01:04 PM     11/29/2023 01:04 PM    MPV 7.6 11/29/2023 01:04 PM     BMP:    Lab Results   Component Value Date/Time     11/29/2023 01:04 PM    K 3.9 11/29/2023 01:04 PM    CL 96 11/29/2023 01:04 PM    CO2 27 11/29/2023 01:04 PM    BUN 18 11/29/2023 01:04 PM    CREATININE 4.1 11/29/2023 01:04 PM    CALCIUM 9.5 11/29/2023 01:04 PM    GFRAA 12 02/18/2022 12:11 AM    LABGLOM 15 11/29/2023 01:04 PM    GLUCOSE 67 11/29/2023 01:04 PM     Ionized Calcium:  No results found

## 2023-11-30 NOTE — PROGRESS NOTES
Occupational Therapy  Facility/Department: [facility]  Occupational Therapy Initial Assessment    Name: Barbara Carvajal  :   MRN: [id number]  Date of Service: 2023    Discharge Recommendations:  Home with assist PRN, Continue to assess pending progress  OT Equipment Recommendations  Other: TBD  Barbara Carvajal scored a 20/24 on the AM-PAC ADL Inpatient form. At this time, no further OT is recommended upon discharge due to no needs. Recommend patient returns to prior setting with prior services. Patient Diagnosis(es): The primary encounter diagnosis was Necrotic toes ([address] A diagnosis of ESRD (end stage renal disease) on dialysis [facility] was also pertinent to this visit. Past Medical History:  has a past medical history of Atrial fibrillation ([address]), CAD (coronary artery disease), Chronic kidney disease, Depression, Diabetes mellitus ([address]), Dialysis patient ([address]), Glaucoma, Hemodialysis patient ([address]), Hyperlipidemia, Hypertension, RLL pneumonia, and Sleep apnea. Past Surgical History:  has a past surgical history that includes Pacemaker insertion; pacemaker placement; and Colonoscopy. Treatment Diagnosis: decreased fxl transfers/mobility and ADL status      Assessment   Performance deficits / Impairments: Decreased functional mobility ; Decreased safe awareness;Decreased balance;Decreased coordination;Decreased ADL status; Decreased cognition;Decreased high-level IADLs  Assessment: PTA, pt reporting he was able to complete fxl transfers/mobility and ADLs w/ Ind. Today- pt is functioning below his baseline as he required SBA for bed mobility and fxl transfers to/from RW, SBA-CGA for fxl mobility w/ RW, toileting, LB dressing, and grooming at the sink. Pt is anticipated to require up to CGA for full ADLs based on his current strength, endurance, and coordination as observed today. Pt able to complete feeding w/ Ind.  Pt will benefit from being seen on acute to address his listed

## 2023-12-01 ENCOUNTER — APPOINTMENT (OUTPATIENT)
Dept: GENERAL RADIOLOGY | Age: 63
DRG: 252 | End: 2023-12-01
Payer: COMMERCIAL

## 2023-12-01 ENCOUNTER — ANESTHESIA EVENT (OUTPATIENT)
Dept: OPERATING ROOM | Age: 63
End: 2023-12-01
Payer: COMMERCIAL

## 2023-12-01 ENCOUNTER — ANESTHESIA (OUTPATIENT)
Dept: OPERATING ROOM | Age: 63
End: 2023-12-01
Payer: COMMERCIAL

## 2023-12-01 LAB
ALBUMIN SERPL-MCNC: 3.3 G/DL (ref 3.4–5)
ALBUMIN SERPL-MCNC: 3.4 G/DL (ref 3.4–5)
ALP SERPL-CCNC: 75 U/L (ref 40–129)
ALT SERPL-CCNC: 8 U/L (ref 10–40)
ANION GAP SERPL CALCULATED.3IONS-SCNC: 19 MMOL/L (ref 3–16)
AST SERPL-CCNC: 12 U/L (ref 15–37)
BILIRUB DIRECT SERPL-MCNC: <0.2 MG/DL (ref 0–0.3)
BILIRUB INDIRECT SERPL-MCNC: ABNORMAL MG/DL (ref 0–1)
BILIRUB SERPL-MCNC: <0.2 MG/DL (ref 0–1)
BUN SERPL-MCNC: 36 MG/DL (ref 7–20)
CALCIUM SERPL-MCNC: 9.4 MG/DL (ref 8.3–10.6)
CHLORIDE SERPL-SCNC: 90 MMOL/L (ref 99–110)
CO2 SERPL-SCNC: 24 MMOL/L (ref 21–32)
CREAT SERPL-MCNC: 7.1 MG/DL (ref 0.8–1.3)
DEPRECATED RDW RBC AUTO: 15.2 % (ref 12.4–15.4)
GFR SERPLBLD CREATININE-BSD FMLA CKD-EPI: 8 ML/MIN/{1.73_M2}
GLUCOSE BLD-MCNC: 74 MG/DL (ref 70–99)
GLUCOSE BLD-MCNC: 76 MG/DL (ref 70–99)
GLUCOSE BLD-MCNC: 78 MG/DL (ref 70–99)
GLUCOSE BLD-MCNC: 91 MG/DL (ref 70–99)
GLUCOSE SERPL-MCNC: 75 MG/DL (ref 70–99)
HCT VFR BLD AUTO: 36.4 % (ref 40.5–52.5)
HGB BLD-MCNC: 12.7 G/DL (ref 13.5–17.5)
MCH RBC QN AUTO: 36.6 PG (ref 26–34)
MCHC RBC AUTO-ENTMCNC: 34.9 G/DL (ref 31–36)
MCV RBC AUTO: 104.7 FL (ref 80–100)
PERFORMED ON: NORMAL
PHOSPHATE SERPL-MCNC: 4.1 MG/DL (ref 2.5–4.9)
PLATELET # BLD AUTO: 100 K/UL (ref 135–450)
PMV BLD AUTO: 7.8 FL (ref 5–10.5)
POTASSIUM SERPL-SCNC: 4.7 MMOL/L (ref 3.5–5.1)
PROT SERPL-MCNC: 6.2 G/DL (ref 6.4–8.2)
RBC # BLD AUTO: 3.47 M/UL (ref 4.2–5.9)
SODIUM SERPL-SCNC: 133 MMOL/L (ref 136–145)
VANCOMYCIN SERPL-MCNC: 13.1 UG/ML
WBC # BLD AUTO: 5.6 K/UL (ref 4–11)

## 2023-12-01 PROCEDURE — 1200000000 HC SEMI PRIVATE

## 2023-12-01 PROCEDURE — 36415 COLL VENOUS BLD VENIPUNCTURE: CPT

## 2023-12-01 PROCEDURE — 90935 HEMODIALYSIS ONE EVALUATION: CPT

## 2023-12-01 PROCEDURE — 6360000002 HC RX W HCPCS

## 2023-12-01 PROCEDURE — 6370000000 HC RX 637 (ALT 250 FOR IP)

## 2023-12-01 PROCEDURE — 80069 RENAL FUNCTION PANEL: CPT

## 2023-12-01 PROCEDURE — 80202 ASSAY OF VANCOMYCIN: CPT

## 2023-12-01 PROCEDURE — 3600000012 HC SURGERY LEVEL 2 ADDTL 15MIN: Performed by: PODIATRIST

## 2023-12-01 PROCEDURE — 97530 THERAPEUTIC ACTIVITIES: CPT | Performed by: PHYSICAL THERAPIST

## 2023-12-01 PROCEDURE — 0Y6S0Z1 DETACHMENT AT LEFT 2ND TOE, HIGH, OPEN APPROACH: ICD-10-PCS | Performed by: PODIATRIST

## 2023-12-01 PROCEDURE — 2709999900 HC NON-CHARGEABLE SUPPLY: Performed by: PODIATRIST

## 2023-12-01 PROCEDURE — 94760 N-INVAS EAR/PLS OXIMETRY 1: CPT

## 2023-12-01 PROCEDURE — 73630 X-RAY EXAM OF FOOT: CPT

## 2023-12-01 PROCEDURE — 3700000001 HC ADD 15 MINUTES (ANESTHESIA): Performed by: PODIATRIST

## 2023-12-01 PROCEDURE — 7100000001 HC PACU RECOVERY - ADDTL 15 MIN: Performed by: PODIATRIST

## 2023-12-01 PROCEDURE — 2580000003 HC RX 258: Performed by: INTERNAL MEDICINE

## 2023-12-01 PROCEDURE — 88305 TISSUE EXAM BY PATHOLOGIST: CPT

## 2023-12-01 PROCEDURE — 5A1D70Z PERFORMANCE OF URINARY FILTRATION, INTERMITTENT, LESS THAN 6 HOURS PER DAY: ICD-10-PCS | Performed by: INTERNAL MEDICINE

## 2023-12-01 PROCEDURE — 6360000002 HC RX W HCPCS: Performed by: PODIATRIST

## 2023-12-01 PROCEDURE — 2500000003 HC RX 250 WO HCPCS

## 2023-12-01 PROCEDURE — 88311 DECALCIFY TISSUE: CPT

## 2023-12-01 PROCEDURE — 80076 HEPATIC FUNCTION PANEL: CPT

## 2023-12-01 PROCEDURE — 3600000002 HC SURGERY LEVEL 2 BASE: Performed by: PODIATRIST

## 2023-12-01 PROCEDURE — 6370000000 HC RX 637 (ALT 250 FOR IP): Performed by: STUDENT IN AN ORGANIZED HEALTH CARE EDUCATION/TRAINING PROGRAM

## 2023-12-01 PROCEDURE — 6370000000 HC RX 637 (ALT 250 FOR IP): Performed by: INTERNAL MEDICINE

## 2023-12-01 PROCEDURE — 3700000000 HC ANESTHESIA ATTENDED CARE: Performed by: PODIATRIST

## 2023-12-01 PROCEDURE — A4217 STERILE WATER/SALINE, 500 ML: HCPCS | Performed by: PODIATRIST

## 2023-12-01 PROCEDURE — 85027 COMPLETE CBC AUTOMATED: CPT

## 2023-12-01 PROCEDURE — 6360000002 HC RX W HCPCS: Performed by: INTERNAL MEDICINE

## 2023-12-01 PROCEDURE — 6360000002 HC RX W HCPCS: Performed by: ANESTHESIOLOGY

## 2023-12-01 PROCEDURE — 7100000000 HC PACU RECOVERY - FIRST 15 MIN: Performed by: PODIATRIST

## 2023-12-01 PROCEDURE — 2580000003 HC RX 258: Performed by: REGISTERED NURSE

## 2023-12-01 PROCEDURE — 2580000003 HC RX 258: Performed by: STUDENT IN AN ORGANIZED HEALTH CARE EDUCATION/TRAINING PROGRAM

## 2023-12-01 PROCEDURE — 2580000003 HC RX 258: Performed by: PODIATRIST

## 2023-12-01 PROCEDURE — 0Y6U0Z1 DETACHMENT AT LEFT 3RD TOE, HIGH, OPEN APPROACH: ICD-10-PCS | Performed by: PODIATRIST

## 2023-12-01 PROCEDURE — 6360000002 HC RX W HCPCS: Performed by: STUDENT IN AN ORGANIZED HEALTH CARE EDUCATION/TRAINING PROGRAM

## 2023-12-01 PROCEDURE — 6360000002 HC RX W HCPCS: Performed by: REGISTERED NURSE

## 2023-12-01 RX ORDER — KETAMINE HCL IN NACL, ISO-OSM 100MG/10ML
SYRINGE (ML) INJECTION PRN
Status: DISCONTINUED | OUTPATIENT
Start: 2023-12-01 | End: 2023-12-01 | Stop reason: SDUPTHER

## 2023-12-01 RX ORDER — ENOXAPARIN SODIUM 100 MG/ML
30 INJECTION SUBCUTANEOUS DAILY
Status: DISCONTINUED | OUTPATIENT
Start: 2023-12-02 | End: 2023-12-01

## 2023-12-01 RX ORDER — MAGNESIUM HYDROXIDE 1200 MG/15ML
LIQUID ORAL CONTINUOUS PRN
Status: COMPLETED | OUTPATIENT
Start: 2023-12-01 | End: 2023-12-01

## 2023-12-01 RX ORDER — ONDANSETRON 2 MG/ML
4 INJECTION INTRAMUSCULAR; INTRAVENOUS
Status: DISCONTINUED | OUTPATIENT
Start: 2023-12-01 | End: 2023-12-01 | Stop reason: HOSPADM

## 2023-12-01 RX ORDER — FENTANYL CITRATE 50 UG/ML
INJECTION, SOLUTION INTRAMUSCULAR; INTRAVENOUS PRN
Status: DISCONTINUED | OUTPATIENT
Start: 2023-12-01 | End: 2023-12-01 | Stop reason: SDUPTHER

## 2023-12-01 RX ORDER — SODIUM CHLORIDE 0.9 % (FLUSH) 0.9 %
5-40 SYRINGE (ML) INJECTION PRN
Status: DISCONTINUED | OUTPATIENT
Start: 2023-12-01 | End: 2023-12-01 | Stop reason: HOSPADM

## 2023-12-01 RX ORDER — SODIUM CHLORIDE 0.9 % (FLUSH) 0.9 %
5-40 SYRINGE (ML) INJECTION EVERY 12 HOURS SCHEDULED
Status: DISCONTINUED | OUTPATIENT
Start: 2023-12-01 | End: 2023-12-01 | Stop reason: HOSPADM

## 2023-12-01 RX ORDER — SODIUM CHLORIDE 9 MG/ML
INJECTION, SOLUTION INTRAVENOUS PRN
Status: DISCONTINUED | OUTPATIENT
Start: 2023-12-01 | End: 2023-12-01 | Stop reason: HOSPADM

## 2023-12-01 RX ORDER — FENTANYL CITRATE 50 UG/ML
25 INJECTION, SOLUTION INTRAMUSCULAR; INTRAVENOUS EVERY 5 MIN PRN
Status: DISCONTINUED | OUTPATIENT
Start: 2023-12-01 | End: 2023-12-01 | Stop reason: HOSPADM

## 2023-12-01 RX ORDER — PROPOFOL 10 MG/ML
INJECTION, EMULSION INTRAVENOUS PRN
Status: DISCONTINUED | OUTPATIENT
Start: 2023-12-01 | End: 2023-12-01 | Stop reason: SDUPTHER

## 2023-12-01 RX ORDER — HEPARIN SODIUM 1000 [USP'U]/ML
3600 INJECTION, SOLUTION INTRAVENOUS; SUBCUTANEOUS PRN
Status: DISCONTINUED | OUTPATIENT
Start: 2023-12-01 | End: 2023-12-07 | Stop reason: HOSPADM

## 2023-12-01 RX ORDER — HEPARIN SODIUM 5000 [USP'U]/ML
5000 INJECTION, SOLUTION INTRAVENOUS; SUBCUTANEOUS EVERY 8 HOURS SCHEDULED
Status: DISCONTINUED | OUTPATIENT
Start: 2023-12-02 | End: 2023-12-07 | Stop reason: HOSPADM

## 2023-12-01 RX ORDER — LIDOCAINE HYDROCHLORIDE 20 MG/ML
INJECTION, SOLUTION EPIDURAL; INFILTRATION; INTRACAUDAL; PERINEURAL PRN
Status: DISCONTINUED | OUTPATIENT
Start: 2023-12-01 | End: 2023-12-01 | Stop reason: SDUPTHER

## 2023-12-01 RX ORDER — PROPOFOL 10 MG/ML
INJECTION, EMULSION INTRAVENOUS CONTINUOUS PRN
Status: DISCONTINUED | OUTPATIENT
Start: 2023-12-01 | End: 2023-12-01 | Stop reason: SDUPTHER

## 2023-12-01 RX ADMIN — Medication 10 MG: at 10:30

## 2023-12-01 RX ADMIN — Medication 10 MG: at 10:33

## 2023-12-01 RX ADMIN — ZIPRASIDONE MESYLATE 20 MG: 20 INJECTION, POWDER, LYOPHILIZED, FOR SOLUTION INTRAMUSCULAR at 23:49

## 2023-12-01 RX ADMIN — HYDROMORPHONE HYDROCHLORIDE 0.5 MG: 1 INJECTION, SOLUTION INTRAMUSCULAR; INTRAVENOUS; SUBCUTANEOUS at 17:43

## 2023-12-01 RX ADMIN — PROPOFOL 75 MCG/KG/MIN: 10 INJECTION, EMULSION INTRAVENOUS at 10:30

## 2023-12-01 RX ADMIN — GABAPENTIN 300 MG: 300 CAPSULE ORAL at 09:12

## 2023-12-01 RX ADMIN — Medication 10 ML: at 09:12

## 2023-12-01 RX ADMIN — SODIUM CHLORIDE: 9 INJECTION, SOLUTION INTRAVENOUS at 10:25

## 2023-12-01 RX ADMIN — FENTANYL CITRATE 25 MCG: 50 INJECTION INTRAMUSCULAR; INTRAVENOUS at 10:25

## 2023-12-01 RX ADMIN — FENTANYL CITRATE 25 MCG: 50 INJECTION, SOLUTION INTRAMUSCULAR; INTRAVENOUS at 12:08

## 2023-12-01 RX ADMIN — PROPOFOL 20 MG: 10 INJECTION, EMULSION INTRAVENOUS at 10:32

## 2023-12-01 RX ADMIN — VENLAFAXINE HYDROCHLORIDE 75 MG: 75 CAPSULE, EXTENDED RELEASE ORAL at 09:12

## 2023-12-01 RX ADMIN — LIDOCAINE HYDROCHLORIDE 50 MG: 20 INJECTION, SOLUTION EPIDURAL; INFILTRATION; INTRACAUDAL; PERINEURAL at 10:30

## 2023-12-01 RX ADMIN — FENTANYL CITRATE 25 MCG: 50 INJECTION INTRAMUSCULAR; INTRAVENOUS at 10:28

## 2023-12-01 RX ADMIN — OXYCODONE AND ACETAMINOPHEN 1 TABLET: 5; 325 TABLET ORAL at 14:57

## 2023-12-01 RX ADMIN — ASPIRIN 325 MG: 325 TABLET ORAL at 09:12

## 2023-12-01 RX ADMIN — ONDANSETRON 4 MG: 2 INJECTION INTRAMUSCULAR; INTRAVENOUS at 17:33

## 2023-12-01 RX ADMIN — CINACALCET HYDROCHLORIDE 60 MG: 30 TABLET, FILM COATED ORAL at 09:12

## 2023-12-01 RX ADMIN — PROPOFOL 30 MG: 10 INJECTION, EMULSION INTRAVENOUS at 10:30

## 2023-12-01 RX ADMIN — VANCOMYCIN HYDROCHLORIDE 1500 MG: 1.5 INJECTION, POWDER, LYOPHILIZED, FOR SOLUTION INTRAVENOUS at 17:37

## 2023-12-01 RX ADMIN — QUETIAPINE FUMARATE 150 MG: 150 TABLET, EXTENDED RELEASE ORAL at 21:39

## 2023-12-01 RX ADMIN — HEPARIN SODIUM 3600 UNITS: 1000 INJECTION INTRAVENOUS; SUBCUTANEOUS at 15:35

## 2023-12-01 ASSESSMENT — PAIN - FUNCTIONAL ASSESSMENT
PAIN_FUNCTIONAL_ASSESSMENT: NONE - DENIES PAIN
PAIN_FUNCTIONAL_ASSESSMENT: PREVENTS OR INTERFERES SOME ACTIVE ACTIVITIES AND ADLS
PAIN_FUNCTIONAL_ASSESSMENT: PREVENTS OR INTERFERES SOME ACTIVE ACTIVITIES AND ADLS

## 2023-12-01 ASSESSMENT — PAIN DESCRIPTION - LOCATION
LOCATION: FOOT

## 2023-12-01 ASSESSMENT — PAIN DESCRIPTION - DESCRIPTORS
DESCRIPTORS: THROBBING
DESCRIPTORS: ACHING;DISCOMFORT
DESCRIPTORS: SORE
DESCRIPTORS: DISCOMFORT
DESCRIPTORS: BURNING;SHARP

## 2023-12-01 ASSESSMENT — PAIN DESCRIPTION - ORIENTATION
ORIENTATION: LEFT

## 2023-12-01 ASSESSMENT — PAIN DESCRIPTION - PAIN TYPE: TYPE: SURGICAL PAIN

## 2023-12-01 ASSESSMENT — PAIN SCALES - GENERAL
PAINLEVEL_OUTOF10: 6
PAINLEVEL_OUTOF10: 6
PAINLEVEL_OUTOF10: 8
PAINLEVEL_OUTOF10: 4

## 2023-12-01 ASSESSMENT — PAIN DESCRIPTION - ONSET: ONSET: GRADUAL

## 2023-12-01 ASSESSMENT — PAIN DESCRIPTION - FREQUENCY: FREQUENCY: CONTINUOUS

## 2023-12-01 NOTE — PLAN OF CARE
Problem: Chronic Conditions and Co-morbidities  Goal: Patient's chronic conditions and co-morbidity symptoms are monitored and maintained or improved  Outcome: Progressing     Problem: Discharge Planning  Goal: Discharge to home or other facility with appropriate resources  Outcome: Progressing     Problem: Pain  Goal: Verbalizes/displays adequate comfort level or baseline comfort level  Outcome: Progressing     Problem: Safety - Adult  Goal: Free from fall injury  Outcome: Progressing     Problem: Chronic Conditions and Co-morbidities  Goal: Patient's chronic conditions and co-morbidity symptoms are monitored and maintained or improved  Outcome: Progressing     Problem: Discharge Planning  Goal: Discharge to home or other facility with appropriate resources  Outcome: Progressing     Problem: Pain  Goal: Verbalizes/displays adequate comfort level or baseline comfort level  Outcome: Progressing     Problem: Safety - Adult  Goal: Free from fall injury  Outcome: Progressing     Problem: Chronic Conditions and Co-morbidities  Goal: Patient's chronic conditions and co-morbidity symptoms are monitored and maintained or improved  Outcome: Progressing     Problem: Discharge Planning  Goal: Discharge to home or other facility with appropriate resources  Outcome: Progressing     Problem: Pain  Goal: Verbalizes/displays adequate comfort level or baseline comfort level  Outcome: Progressing     Problem: Safety - Adult  Goal: Free from fall injury  Outcome: Progressing

## 2023-12-01 NOTE — BRIEF OP NOTE
Brief Postoperative Note      Patient: Chuckie Mcgarry  YOB: 1960  MRN: [id number]    Date of Procedure: 12/1/2023    Pre-Op Diagnosis Codes:     * Toe gangrene ([address]) Franklyn Flood    Post-Op Diagnosis: Same       Procedure(s):  AMPUTATION SECOND AND THIRD DIGITS LEFT FOOT    Surgeon(s):  Riddhi Villagomez DPM    Assistant:  Jamey Stacy PGY-1    Anesthesia: General    Hemostasis: anatomic dissection     Injectables: Pre-Op 10 cc of 1% Polocaine plain    Materials: 4-0 and 3-0 Nylon    Estimated Blood Loss (mL): Minimal    Complications: None    Specimens:   ID Type Source Tests Collected by Time Destination   A : A. SECOND AND THIRD DIGIT OF LEFT FOOT Tissue Tissue SURGICAL PATHOLOGY Riddhi Villagomez DPM 12/1/2023 1043        Implants:  * No implants in log *      Drains: * No LDAs found *    Findings: no purulence encountered. Bone appeared to be hard and white consistent with healthy bone at the time of surgery. DISPO: S/P 2nd and 3rd digit partial amputations. Will follow up on intra-op pathology. No antibiotics necessary from podiatric standpoint 2/2 procedure was felt to be definitive. Patient is stable for dc from podiatric standpoint pending medical clearance.      Jamey Stacy DPM   Podiatric Resident PGY1  Pager [phone] or Perfect[employer]  12/1/2023, 11:07 AM

## 2023-12-01 NOTE — ANESTHESIA PRE PROCEDURE
[facility] Department of Anesthesiology  Pre-Anesthesia Evaluation/Consultation       Name:  Donna Prescott  : 1960  Age:  58 y.o. MRN:  [id number]  Date: 2023           Surgeon: Priya Mcmillan):  Rudy Benavides DPM    Procedure: Procedure(s):  AMPUTATION SECOND AND THIRD DIGITS LEFT FOOT     No Known Allergies  Patient Active Problem List   Diagnosis    Chest pain    Type 2 diabetes mellitus (HCC)    Chronic renal failure    CAD (coronary artery disease)    ESRD on hemodialysis ([address]    Diabetes type 2, controlled ([address])    Obesity (BMI 30-39. 9)    Depression    Sepsis ([address])    RLL pneumonia    Warfarin-induced coagulopathy (HCC)    Paroxysmal atrial fibrillation (HCC)    Leg pain    Normocytic anemia    Thrombocytopenia (HCC)    Fluid overload    Hyperkalemia    Toe gangrene (HCC)    Critical limb ischemia of both lower extremities (HCC)     Past Medical History:   Diagnosis Date    Atrial fibrillation (HCC)     CAD (coronary artery disease) 4/3/2014    Chronic kidney disease     Depression     Diabetes mellitus ([facility])     Dialysis patient [facility])     left upper arm fistula    Glaucoma     Hemodialysis patient ([address] [facility])     Hyperlipidemia     Hypertension     RLL pneumonia 2018    Sleep apnea     uses CPAP     Past Surgical History:   Procedure Laterality Date    COLONOSCOPY      PACEMAKER INSERTION      PACEMAKER PLACEMENT       Social History     Tobacco Use    Smoking status: Never    Smokeless tobacco: Never   Substance Use Topics    Alcohol use: Not Currently     Alcohol/week: 2.0 standard drinks of alcohol     Types: 2 Cans of beer per week     Comment: once monthly    Drug use: Yes     Types: Marijuana (Trace Janet), Opiates      Medications  No current facility-administered medications on file prior to encounter.      Current Outpatient Medications on File Prior to Encounter   Medication Sig Dispense Refill    aspirin 325 MG tablet Take 1 tablet by mouth daily

## 2023-12-01 NOTE — PROGRESS NOTES
Received a call from lab for a critical lab value. Creatinine 7.1, hospitalist on call notified via [employer].

## 2023-12-01 NOTE — PROGRESS NOTES
Hand off report from [facility]. Patient awake and alert. Resp easy unlabored on room air O2 with SaO2 97%. Left foot surgical dressing dry and intact with surgical shoe on. FOB elevated. Dressing to right foot dry and intact. Patient denies C/O pain or nausea. IV patent to right hand. VSS. Moving all extremities to command.

## 2023-12-01 NOTE — PROGRESS NOTES
Patient threatening to leave AMA but he has no one to come get him. Hospitalist notified. Patient agrees to stay currently. Patient blood sugar 76, patient refusing anything to eat or drink.     Electronically signed by Oumou Jensen RN on 12/1/2023 at 5:58 PM

## 2023-12-01 NOTE — PROGRESS NOTES
Patient has removed leads several times. I could not find the wires the last time I went in. He is refusing to have any placed stating he plans on leaving AMA in the morning when his brother can pick him up.

## 2023-12-01 NOTE — PROGRESS NOTES
Physical Therapy      Delgado Bell  [id number]  [id number]    Attempted to see pt for PT session however he declined getting up. Pt scheduled for toe amputations later this date due to gangrene. Discussed in depth with pt that after surgery he will likely need to avoid applying pressure to his amp site for healing and will need to keep the site clean and dry. Pt voiced understanding but then stated \"what will happen will\". Discussed that its important to protect the incision site for healing as his DM can impede healing and he doesn't want to lose more of his foot. Pt then stated \"If it happens it does, that's the way it goes. I don't really care\". Pt reported he is amb in room without assist and is planning on leaving the hospital after dialysis and his surgery.   Will continue to follow  Electronically signed by IVY DIALLO PT on 12/1/2023 at 8:17 AM

## 2023-12-01 NOTE — OP NOTE
APRIL in his private office within 5-7 days. The patient is to keep dressing clean, dry and intact at all times. The patient is to call if any complications occur. This operative report was dictated on behalf of Dr. Rajesh Calvin DPM.    Findings: no purulence encountered. Bone appeared to be hard and white consistent with healthy bone at the time of surgery. DISPO: S/P 2nd and 3rd digit partial amputations. Will follow up on intra-op pathology. No antibiotics necessary from podiatric standpoint 2/2 procedure was felt to be definitive. Patient is stable for dc from podiatric standpoint pending medical clearance.      Vesta Tesfaye DPM   Podiatric Resident PGY1  Pager [phone] or Perfect[employer]erve  12/1/2023, 2:39 PM

## 2023-12-01 NOTE — PROGRESS NOTES
Report called to [facility] for room 0072. Made aware of need to do foot x-rays in room when patient returns to room 1757. Patient currently in PACU isolation room and cannot get xray machine into room.

## 2023-12-01 NOTE — ANESTHESIA POSTPROCEDURE EVALUATION
Department of Anesthesiology  Postprocedure Note    Patient: José Manuel Mustafa  MRN: [id number]  YOB: 1960  Date of evaluation: 12/1/2023      Procedure Summary       Date: 12/01/23 Room / Location: [facility] [place] OR [address] [place] [address]    Anesthesia Start: 1025 Anesthesia Stop: 1110    Procedure: AMPUTATION SECOND AND THIRD DIGITS LEFT FOOT (Left: Foot) Diagnosis:       Toe gangrene ([address])      (Toe gangrene ([address]) Beramy Sanchez)    Surgeons: Tani Cates DPM Responsible Provider: Danae Ernandez MD    Anesthesia Type: MAC ASA Status: 3            Anesthesia Type: No value filed.     Braulio Phase I: Braulio Score: 10    Braulio Phase II:        Anesthesia Post Evaluation    Patient location during evaluation: PACU  Level of consciousness: awake and alert  Airway patency: patent  Nausea & Vomiting: no nausea and no vomiting  Complications: no  Cardiovascular status: blood pressure returned to baseline  Respiratory status: acceptable  Hydration status: euvolemic  Comments: Postoperative Anesthesia Note    Name:    José Manuel Mustafa  MRN:      [id number]    Patient Vitals in the past 12 hrs:  12/01/23 1235, Pulse:87, Resp:14, SpO2:100 %  12/01/23 1234, Pulse:85, Resp:24, SpO2:100 %  12/01/23 1233, Pulse:89, Resp:17, SpO2:90 %  12/01/23 1232, Pulse:91, Resp:11, SpO2:97 %  12/01/23 1231, Pulse:95, Resp:10, SpO2:93 %  12/01/23 1230, BP:(!) 167/92, Pulse:81, Resp:(!) 9, SpO2:97 %  12/01/23 1229, Pulse:89, Resp:13, SpO2:90 %  12/01/23 1228, Pulse:83, Resp:16, SpO2:92 %  12/01/23 1227, Pulse:91, Resp:11, SpO2:100 %  12/01/23 1226, Pulse:88, Resp:11, SpO2:95 %  12/01/23 1225, Pulse:96, Resp:(!) 7, SpO2:94 %  12/01/23 1224, Pulse:87, Resp:(!) 9, SpO2:96 %  12/01/23 1223, Pulse:92, Resp:(!) 9, SpO2:95 %  12/01/23 1222, Pulse:85, Resp:16, SpO2:100 %  12/01/23 1221, Pulse:89, Resp:26, SpO2:100 %  12/01/23 1220, Pulse:90, Resp:22, SpO2:97 %  12/01/23 1219, BP:(!) 173/93, Pulse:82, Resp:(!) 1, SpO2:94 %  12/01/23 1218, Pulse:92,

## 2023-12-01 NOTE — PROGRESS NOTES
Treatment time: 2.5 hours  Net UF: 1209 ml     Pre weight: 88.3 kg  Post weight:87.1 kg    Access used: rtdc    Access function: well with  ml/min     Medications or blood products given: heparin dwells     Regular outpatient schedule: mwf     Summary of response to treatment: Patient tolerated treatment fair. Tx ended 27 min early d/t severe nausea and pain per pt request.  Patient remained stable throughout entire treatment and upon the pt exiting the dialysis suite via transport. Report given to [facility], RN and copy of dialysis treatment record placed in chart, to be scanned into EMR.

## 2023-12-01 NOTE — PROGRESS NOTES
Pt refused instruction on I/S, he states he is leaving.      Electronically signed by Harrison Henson RCP on 12/1/2023 at 4:57 PM

## 2023-12-01 NOTE — CONSULTS
[address], [facility] [address]                                  CONSULTATION    PATIENT NAME: Haroldo Day                     :        1960  MED REC NO:   [id number]                          ROOM:       6799  ACCOUNT NO:   [de-identified]                           ADMIT DATE: 2023  PROVIDER:     Medhat Samayoa DPM    CONSULT DATE:  2023    PODIATRY CONSULTATION    CHIEF COMPLAINT/HISTORY OF PRESENT ILLNESS:  This 27-year-old gentleman  was admitted to the [facility] Emergency Department for chief complaint  of discoloration of his second third toes which he relates started about  3 weeks ago. The patient was seen and found to have critical limb  ischemia complicated by diabetes and end-stage renal disease. He was  then seen by Dr. Jai Horton and underwent successful percutaneous  revascularization with restoration of in-line flow to the foot. Podiatry was consulted for further treatment recommendations. PAST MEDICAL HISTORY:  Significant for coronary artery disease, atrial  fibrillation, end-stage renal disease on hemodialysis, diabetes  mellitus, depression, hyperlipidemia, hypertension, and sleep apnea. PAST SURGICAL HISTORY:  Significant for previous pacemaker placement. CURRENT MEDICATIONS:  The patient is currently on IV vancomycin. ALLERGIES:  No known drug allergies. SOCIAL HISTORY:  The patient denies tobacco, alcohol, or drug use. REVIEW OF SYSTEMS:  Significant for left lower extremity pain. He  denies nausea, fever, vomiting, chills, shortness of breath, or pain in  his chest.    PHYSICAL EXAMINATION:  VITAL SIGNS:  The patient's vital signs are stable. He is currently  afebrile. EXTREMITIES:  The patient has nonpalpable pedal pulses noted  bilaterally. He does have a biphasic dorsalis pedis on the left by  handheld Doppler.   He has absent pedal hair growth with atrophic skin  changes noted bilaterally. The patient's protective sensation is grossly intact bilaterally. The patient has shani necrotic left second digit. The left third digit  is blue and cold, but that is frankly necrotic at this time. The  patient has what appears to be dependent rubor noted on the left lower  extremity as the redness significantly improved with elevation. There  is no warmth and crepitus associated with this. LABORATORY DATA:  His white blood cell count is within normal limits. His blood cultures are pending. X-rays; 3-views of left lower were negative of any evidence of  osteomyelitis or soft tissue emphysema. ASSESSMENT:  1. Dry gangrene, left second digit with cyanosis of the left third  digit. 2.  Diabetes mellitus. 3.  Peripheral vascular disease, status post endovascular  revascularization. TREATMENT:  1. The patient is seen and evaluated at bedside. 2.  Prolonged discussion with the patient due to significant peripheral  vascular disease and successful revascularization. Recommend aggressive  management of these necrotic toes by way of primary amputation. All  risks versus benefits of the planned procedure were discussed with the  patient in detail. All questions were answered, no guarantees were  given. The patient was agreeable to consenting to primary amputation. This will be performed by Dr. Tisha Astudillo due to scheduling and  availability tomorrow morning. He is n.p.o. after midnight. Consent to  be signed. 3.  To OR tomorrow morning at 10 to 11 o'clock for primary amputation of  second and possible third digit due to recent cyanosis without recover  after endovascular revascularization per Dr. Kevin Bowles. Thank you for allowing me to participate in the care of this patient.         Raul Zhu DPM    D: 11/30/2023 19:49:54       T: 12/01/2023 0:28:27     [email or web address]  Job#: [id number]     Doc#: [id number]

## 2023-12-01 NOTE — CARE COORDINATION
RADHA completed chart review, pt is ERSD pt, and received dialysis today. Pt is currently in OR for toe amputee. Pt has been confused, and also threatening leaving AMA, asking for brother. Bedbug was found in beard during procedure.      RADHA follow,     Jefry Garcia LMSW, [address] [facility] Social Work Case Management   Phone: [phone]  Fax: [phone]

## 2023-12-02 LAB
ALBUMIN SERPL-MCNC: 3.4 G/DL (ref 3.4–5)
ANION GAP SERPL CALCULATED.3IONS-SCNC: 16 MMOL/L (ref 3–16)
ANION GAP SERPL CALCULATED.3IONS-SCNC: 20 MMOL/L (ref 3–16)
BUN SERPL-MCNC: 29 MG/DL (ref 7–20)
BUN SERPL-MCNC: 30 MG/DL (ref 7–20)
CALCIUM SERPL-MCNC: 9.4 MG/DL (ref 8.3–10.6)
CALCIUM SERPL-MCNC: 9.5 MG/DL (ref 8.3–10.6)
CHLORIDE SERPL-SCNC: 93 MMOL/L (ref 99–110)
CHLORIDE SERPL-SCNC: 94 MMOL/L (ref 99–110)
CO2 SERPL-SCNC: 20 MMOL/L (ref 21–32)
CO2 SERPL-SCNC: 23 MMOL/L (ref 21–32)
CREAT SERPL-MCNC: 5.8 MG/DL (ref 0.8–1.3)
CREAT SERPL-MCNC: 6.3 MG/DL (ref 0.8–1.3)
DEPRECATED RDW RBC AUTO: 15.5 % (ref 12.4–15.4)
GFR SERPLBLD CREATININE-BSD FMLA CKD-EPI: 10 ML/MIN/{1.73_M2}
GFR SERPLBLD CREATININE-BSD FMLA CKD-EPI: 9 ML/MIN/{1.73_M2}
GLUCOSE BLD-MCNC: 105 MG/DL (ref 70–99)
GLUCOSE BLD-MCNC: 122 MG/DL (ref 70–99)
GLUCOSE BLD-MCNC: 124 MG/DL (ref 70–99)
GLUCOSE BLD-MCNC: 99 MG/DL (ref 70–99)
GLUCOSE SERPL-MCNC: 47 MG/DL (ref 70–99)
GLUCOSE SERPL-MCNC: 49 MG/DL (ref 70–99)
HCT VFR BLD AUTO: 38.2 % (ref 40.5–52.5)
HGB BLD-MCNC: 13.1 G/DL (ref 13.5–17.5)
MCH RBC QN AUTO: 36.4 PG (ref 26–34)
MCHC RBC AUTO-ENTMCNC: 34.4 G/DL (ref 31–36)
MCV RBC AUTO: 105.7 FL (ref 80–100)
PERFORMED ON: ABNORMAL
PERFORMED ON: NORMAL
PHOSPHATE SERPL-MCNC: 3.9 MG/DL (ref 2.5–4.9)
PLATELET # BLD AUTO: 109 K/UL (ref 135–450)
PMV BLD AUTO: 7.8 FL (ref 5–10.5)
POTASSIUM SERPL-SCNC: 4.3 MMOL/L (ref 3.5–5.1)
POTASSIUM SERPL-SCNC: 4.4 MMOL/L (ref 3.5–5.1)
RBC # BLD AUTO: 3.61 M/UL (ref 4.2–5.9)
SODIUM SERPL-SCNC: 132 MMOL/L (ref 136–145)
SODIUM SERPL-SCNC: 134 MMOL/L (ref 136–145)
WBC # BLD AUTO: 6.5 K/UL (ref 4–11)

## 2023-12-02 PROCEDURE — 6360000002 HC RX W HCPCS

## 2023-12-02 PROCEDURE — 6370000000 HC RX 637 (ALT 250 FOR IP)

## 2023-12-02 PROCEDURE — 1200000000 HC SEMI PRIVATE

## 2023-12-02 PROCEDURE — 80069 RENAL FUNCTION PANEL: CPT

## 2023-12-02 PROCEDURE — 94760 N-INVAS EAR/PLS OXIMETRY 1: CPT

## 2023-12-02 PROCEDURE — 6360000002 HC RX W HCPCS: Performed by: STUDENT IN AN ORGANIZED HEALTH CARE EDUCATION/TRAINING PROGRAM

## 2023-12-02 PROCEDURE — 2580000003 HC RX 258

## 2023-12-02 PROCEDURE — 6360000002 HC RX W HCPCS: Performed by: REGISTERED NURSE

## 2023-12-02 PROCEDURE — 2580000003 HC RX 258: Performed by: REGISTERED NURSE

## 2023-12-02 PROCEDURE — 85027 COMPLETE CBC AUTOMATED: CPT

## 2023-12-02 PROCEDURE — 36415 COLL VENOUS BLD VENIPUNCTURE: CPT

## 2023-12-02 RX ADMIN — HYDROMORPHONE HYDROCHLORIDE 0.5 MG: 1 INJECTION, SOLUTION INTRAMUSCULAR; INTRAVENOUS; SUBCUTANEOUS at 05:17

## 2023-12-02 RX ADMIN — Medication 10 ML: at 09:19

## 2023-12-02 RX ADMIN — HEPARIN SODIUM 5000 UNITS: 5000 INJECTION INTRAVENOUS; SUBCUTANEOUS at 20:47

## 2023-12-02 RX ADMIN — CEFEPIME 1000 MG: 1 INJECTION, POWDER, FOR SOLUTION INTRAMUSCULAR; INTRAVENOUS at 16:43

## 2023-12-02 RX ADMIN — GABAPENTIN 300 MG: 300 CAPSULE ORAL at 09:19

## 2023-12-02 RX ADMIN — SEVELAMER CARBONATE 2400 MG: 800 TABLET, FILM COATED ORAL at 13:47

## 2023-12-02 RX ADMIN — HYDROMORPHONE HYDROCHLORIDE 0.5 MG: 1 INJECTION, SOLUTION INTRAMUSCULAR; INTRAVENOUS; SUBCUTANEOUS at 20:44

## 2023-12-02 RX ADMIN — QUETIAPINE FUMARATE 150 MG: 150 TABLET, EXTENDED RELEASE ORAL at 20:46

## 2023-12-02 RX ADMIN — SEVELAMER CARBONATE 2400 MG: 800 TABLET, FILM COATED ORAL at 09:19

## 2023-12-02 RX ADMIN — HYDROMORPHONE HYDROCHLORIDE 0.5 MG: 1 INJECTION, SOLUTION INTRAMUSCULAR; INTRAVENOUS; SUBCUTANEOUS at 00:34

## 2023-12-02 RX ADMIN — HEPARIN SODIUM 5000 UNITS: 5000 INJECTION INTRAVENOUS; SUBCUTANEOUS at 13:47

## 2023-12-02 RX ADMIN — ASPIRIN 325 MG: 325 TABLET ORAL at 09:19

## 2023-12-02 RX ADMIN — DEXTROSE MONOHYDRATE 250 ML: 100 INJECTION, SOLUTION INTRAVENOUS at 06:29

## 2023-12-02 RX ADMIN — Medication 10 ML: at 20:46

## 2023-12-02 RX ADMIN — ZIPRASIDONE MESYLATE 20 MG: 20 INJECTION, POWDER, LYOPHILIZED, FOR SOLUTION INTRAMUSCULAR at 20:46

## 2023-12-02 RX ADMIN — ONDANSETRON 4 MG: 4 TABLET, ORALLY DISINTEGRATING ORAL at 21:00

## 2023-12-02 RX ADMIN — VENLAFAXINE HYDROCHLORIDE 75 MG: 75 CAPSULE, EXTENDED RELEASE ORAL at 09:19

## 2023-12-02 ASSESSMENT — PAIN SCALES - GENERAL
PAINLEVEL_OUTOF10: 8
PAINLEVEL_OUTOF10: 10
PAINLEVEL_OUTOF10: 8
PAINLEVEL_OUTOF10: 8

## 2023-12-02 ASSESSMENT — PAIN DESCRIPTION - ORIENTATION
ORIENTATION: LEFT

## 2023-12-02 ASSESSMENT — PAIN DESCRIPTION - LOCATION
LOCATION: FOOT

## 2023-12-02 NOTE — PLAN OF CARE
Problem: Chronic Conditions and Co-morbidities  Goal: Patient's chronic conditions and co-morbidity symptoms are monitored and maintained or improved  Outcome: Progressing     Problem: Discharge Planning  Goal: Discharge to home or other facility with appropriate resources  Outcome: Progressing     Problem: Pain  Goal: Verbalizes/displays adequate comfort level or baseline comfort level  Outcome: Progressing     Problem: Safety - Adult  Goal: Free from fall injury  Outcome: Progressing     Problem: Safety - Medical Restraint  Goal: Remains free of injury from restraints (Restraint for Interference with Medical Device)  Description: INTERVENTIONS:  1. Determine that other, less restrictive measures have been tried or would not be effective before applying the restraint  2. Evaluate the patient's condition at the time of restraint application  3. Inform patient/family regarding the reason for restraint  4.  Q2H: Monitor safety, psychosocial status, comfort, nutrition and hydration  Outcome: Progressing

## 2023-12-02 NOTE — PROGRESS NOTES
with other providers  Reviewed clinical lab tests  Reviewed radiology tests  Reviewed other diagnostic tests/interventions  Independent review of radiologic images  Microbiology cultures and other micro tests reviewed        Electronically signed by Dao Naik MD on 12/2/2023 at 10:08 AM  Comment: Please note this report has been produced using speech recognition software and may contain errors related to that system including errors in grammar, punctuation, and spelling, as well as words and phrases that may be inappropriate. If there are any questions or concerns, please feel free to contact the dictating provider for clarification.

## 2023-12-02 NOTE — PROGRESS NOTES
Patient is still confused. Abrasion from left arm got bloody and was all over the bed. RN and PCA cleaned up pt and put new sheets and gown on. Pt is cleaned now and had restrains changed on him. Bed alarm on, call light within reach. Bed locked and low.

## 2023-12-02 NOTE — PROGRESS NOTES
RN entered patient's room just after 2100 to patient having pulled out his IV, bed was saturated with blood, pulled off his vascath dressing and pulled it out approximately 4 cm. Patient was completely nude, agitated, and combative. On call provider was notified and told RN a doctor would take a look at it today. Patient also removed ace bandage from his foot. Patient unable to/refused to answer any orientation questions. Orders were placed for bilateral soft wrist restraints and bilateral soft ankle restrains. RN performed complete bed change and cleaned patient up. Once a new IV was placed, ordered geodon was administered. Patient still agitated, but improved from earlier. PRN pain medication was also administered for patient complaining of 9/10 pain on his left foot. Patient accepting of fluids, but not any snacks.

## 2023-12-02 NOTE — PROGRESS NOTES
Patient is alert to self. Patient refuses to state name and  or anything at all. Pt states, \"I need to get out of here because the amilcar next to me needs the bed. \" Pt is positive for hallucinations. Pt wants to leave AMA but patient does not have the mental capacity to fully grasp a sense of why he is at the hospital. Restrains protocol followed, see flowsheet. Pt refuses to eat or drink anything offered to him. Assessment completed to what patient would allow RN to complete. Video monitoring is on. Bed alarm on. Bed low and locked.

## 2023-12-02 NOTE — PLAN OF CARE
Problem: Safety - Medical Restraint  Goal: Remains free of injury from restraints (Restraint for Interference with Medical Device)  Description: INTERVENTIONS:  1. Determine that other, less restrictive measures have been tried or would not be effective before applying the restraint  2. Evaluate the patient's condition at the time of restraint application  3. Inform patient/family regarding the reason for restraint  4.  Q2H: Monitor safety, psychosocial status, comfort, nutrition and hydration  12/2/2023 1003 by Karolyn Llamas RN  Outcome: Not Progressing  12/2/2023 0116 by Cathy Almazan RN  Outcome: Progressing

## 2023-12-03 LAB
ALBUMIN SERPL-MCNC: 3.5 G/DL (ref 3.4–5)
ALP SERPL-CCNC: 80 U/L (ref 40–129)
ALT SERPL-CCNC: 9 U/L (ref 10–40)
ANION GAP SERPL CALCULATED.3IONS-SCNC: 21 MMOL/L (ref 3–16)
AST SERPL-CCNC: 24 U/L (ref 15–37)
BACTERIA BLD CULT: NORMAL
BASOPHILS # BLD: 0.1 K/UL (ref 0–0.2)
BASOPHILS NFR BLD: 0.8 %
BILIRUB DIRECT SERPL-MCNC: <0.2 MG/DL (ref 0–0.3)
BILIRUB INDIRECT SERPL-MCNC: ABNORMAL MG/DL (ref 0–1)
BILIRUB SERPL-MCNC: 0.3 MG/DL (ref 0–1)
BUN SERPL-MCNC: 40 MG/DL (ref 7–20)
CALCIUM SERPL-MCNC: 9.9 MG/DL (ref 8.3–10.6)
CHLORIDE SERPL-SCNC: 93 MMOL/L (ref 99–110)
CO2 SERPL-SCNC: 23 MMOL/L (ref 21–32)
CREAT SERPL-MCNC: 7.9 MG/DL (ref 0.8–1.3)
DEPRECATED RDW RBC AUTO: 15.1 % (ref 12.4–15.4)
EOSINOPHIL # BLD: 0.1 K/UL (ref 0–0.6)
EOSINOPHIL NFR BLD: 1.1 %
GFR SERPLBLD CREATININE-BSD FMLA CKD-EPI: 7 ML/MIN/{1.73_M2}
GLUCOSE BLD-MCNC: 109 MG/DL (ref 70–99)
GLUCOSE BLD-MCNC: 114 MG/DL (ref 70–99)
GLUCOSE BLD-MCNC: 74 MG/DL (ref 70–99)
GLUCOSE BLD-MCNC: 79 MG/DL (ref 70–99)
GLUCOSE SERPL-MCNC: 77 MG/DL (ref 70–99)
HCT VFR BLD AUTO: 38.5 % (ref 40.5–52.5)
HGB BLD-MCNC: 13.2 G/DL (ref 13.5–17.5)
LYMPHOCYTES # BLD: 0.8 K/UL (ref 1–5.1)
LYMPHOCYTES NFR BLD: 10.8 %
MCH RBC QN AUTO: 36 PG (ref 26–34)
MCHC RBC AUTO-ENTMCNC: 34.3 G/DL (ref 31–36)
MCV RBC AUTO: 105 FL (ref 80–100)
MONOCYTES # BLD: 0.9 K/UL (ref 0–1.3)
MONOCYTES NFR BLD: 11.4 %
NEUTROPHILS # BLD: 5.8 K/UL (ref 1.7–7.7)
NEUTROPHILS NFR BLD: 75.9 %
PERFORMED ON: ABNORMAL
PERFORMED ON: ABNORMAL
PERFORMED ON: NORMAL
PERFORMED ON: NORMAL
PHOSPHATE SERPL-MCNC: 4.7 MG/DL (ref 2.5–4.9)
PLATELET # BLD AUTO: 108 K/UL (ref 135–450)
PMV BLD AUTO: 7.7 FL (ref 5–10.5)
POTASSIUM SERPL-SCNC: 4.8 MMOL/L (ref 3.5–5.1)
PROT SERPL-MCNC: 6.4 G/DL (ref 6.4–8.2)
RBC # BLD AUTO: 3.67 M/UL (ref 4.2–5.9)
SODIUM SERPL-SCNC: 137 MMOL/L (ref 136–145)
WBC # BLD AUTO: 7.7 K/UL (ref 4–11)

## 2023-12-03 PROCEDURE — 6360000002 HC RX W HCPCS

## 2023-12-03 PROCEDURE — 36415 COLL VENOUS BLD VENIPUNCTURE: CPT

## 2023-12-03 PROCEDURE — 94760 N-INVAS EAR/PLS OXIMETRY 1: CPT

## 2023-12-03 PROCEDURE — 80076 HEPATIC FUNCTION PANEL: CPT

## 2023-12-03 PROCEDURE — 85025 COMPLETE CBC W/AUTO DIFF WBC: CPT

## 2023-12-03 PROCEDURE — 6360000002 HC RX W HCPCS: Performed by: STUDENT IN AN ORGANIZED HEALTH CARE EDUCATION/TRAINING PROGRAM

## 2023-12-03 PROCEDURE — 6370000000 HC RX 637 (ALT 250 FOR IP)

## 2023-12-03 PROCEDURE — 80069 RENAL FUNCTION PANEL: CPT

## 2023-12-03 PROCEDURE — 2580000003 HC RX 258: Performed by: STUDENT IN AN ORGANIZED HEALTH CARE EDUCATION/TRAINING PROGRAM

## 2023-12-03 PROCEDURE — 2580000003 HC RX 258

## 2023-12-03 PROCEDURE — 6360000002 HC RX W HCPCS: Performed by: REGISTERED NURSE

## 2023-12-03 PROCEDURE — 2580000003 HC RX 258: Performed by: REGISTERED NURSE

## 2023-12-03 PROCEDURE — 1200000000 HC SEMI PRIVATE

## 2023-12-03 RX ORDER — HYDRALAZINE HYDROCHLORIDE 20 MG/ML
5 INJECTION INTRAMUSCULAR; INTRAVENOUS EVERY 6 HOURS PRN
Status: DISCONTINUED | OUTPATIENT
Start: 2023-12-03 | End: 2023-12-07 | Stop reason: HOSPADM

## 2023-12-03 RX ADMIN — ZIPRASIDONE MESYLATE 20 MG: 20 INJECTION, POWDER, LYOPHILIZED, FOR SOLUTION INTRAMUSCULAR at 16:23

## 2023-12-03 RX ADMIN — BUPROPION HYDROCHLORIDE 150 MG: 75 TABLET, FILM COATED ORAL at 09:09

## 2023-12-03 RX ADMIN — HYDROMORPHONE HYDROCHLORIDE 0.5 MG: 1 INJECTION, SOLUTION INTRAMUSCULAR; INTRAVENOUS; SUBCUTANEOUS at 16:01

## 2023-12-03 RX ADMIN — HEPARIN SODIUM 5000 UNITS: 5000 INJECTION INTRAVENOUS; SUBCUTANEOUS at 22:20

## 2023-12-03 RX ADMIN — ASPIRIN 325 MG: 325 TABLET ORAL at 09:08

## 2023-12-03 RX ADMIN — PIPERACILLIN AND TAZOBACTAM 3375 MG: 3; .375 INJECTION, POWDER, LYOPHILIZED, FOR SOLUTION INTRAVENOUS at 17:52

## 2023-12-03 RX ADMIN — SEVELAMER CARBONATE 2400 MG: 800 TABLET, FILM COATED ORAL at 13:11

## 2023-12-03 RX ADMIN — HYDROMORPHONE HYDROCHLORIDE 0.5 MG: 1 INJECTION, SOLUTION INTRAMUSCULAR; INTRAVENOUS; SUBCUTANEOUS at 09:30

## 2023-12-03 RX ADMIN — ONDANSETRON 4 MG: 2 INJECTION INTRAMUSCULAR; INTRAVENOUS at 15:59

## 2023-12-03 RX ADMIN — OXYCODONE AND ACETAMINOPHEN 1 TABLET: 5; 325 TABLET ORAL at 05:23

## 2023-12-03 RX ADMIN — Medication 10 ML: at 09:14

## 2023-12-03 RX ADMIN — SEVELAMER CARBONATE 2400 MG: 800 TABLET, FILM COATED ORAL at 09:09

## 2023-12-03 RX ADMIN — VENLAFAXINE HYDROCHLORIDE 75 MG: 75 CAPSULE, EXTENDED RELEASE ORAL at 09:09

## 2023-12-03 RX ADMIN — POLYETHYLENE GLYCOL 3350 17 G: 17 POWDER, FOR SOLUTION ORAL at 16:14

## 2023-12-03 RX ADMIN — QUETIAPINE FUMARATE 150 MG: 150 TABLET, EXTENDED RELEASE ORAL at 22:20

## 2023-12-03 RX ADMIN — OXYCODONE AND ACETAMINOPHEN 1 TABLET: 5; 325 TABLET ORAL at 13:11

## 2023-12-03 RX ADMIN — PIPERACILLIN AND TAZOBACTAM 4500 MG: 4; .5 INJECTION, POWDER, LYOPHILIZED, FOR SOLUTION INTRAVENOUS at 09:40

## 2023-12-03 RX ADMIN — HEPARIN SODIUM 5000 UNITS: 5000 INJECTION INTRAVENOUS; SUBCUTANEOUS at 13:13

## 2023-12-03 RX ADMIN — GABAPENTIN 300 MG: 300 CAPSULE ORAL at 09:09

## 2023-12-03 ASSESSMENT — PAIN SCALES - GENERAL
PAINLEVEL_OUTOF10: 0
PAINLEVEL_OUTOF10: 7
PAINLEVEL_OUTOF10: 8
PAINLEVEL_OUTOF10: 4
PAINLEVEL_OUTOF10: 7
PAINLEVEL_OUTOF10: 7

## 2023-12-03 NOTE — PROGRESS NOTES
D: pt's BP have been running high. first ready today was 170/100 (pt said that he was in pain, so I gave him pain medication). second reading is also high, 172/107 and pt denies pain at this time. it looks like pt's BP have been running high. A: this RN sent secure message to Dr. Diomedes Brandt R: no additional BP meds ordered at this time. Will give pt pain medication for now as pt appears to be stressed with sighing and some agitation.

## 2023-12-03 NOTE — PROGRESS NOTES
Pt able to tell this RN his name, but confused when asked about date of birth. Pt alert to self only. Pt swallowed pills whole and able to follow commands. No identification bracelet on pt, pt as pt able to identify name, this RN administered medications to pt and asked unit secretary to print new bracelet for pt.

## 2023-12-03 NOTE — PROGRESS NOTES
Patient is very agitated, cussing at staff, and banging his head on the siderail. On call hospitalist notified and geodon ordered.

## 2023-12-03 NOTE — PROGRESS NOTES
Pt continues to be restless at times and confused. Pt pulls himself to one side of the bed or the other and has acquired abrasions on left hand that have reopened during this shift and previous shift. Nursing staff changed linens twice during this shift as spots of blood are on pt's sheets. This RN has cleansed left hand with alcohol and applied band aids twice to left hand. Pt is reoriented and redirected, but still is very confused about situation. Even when assisting pt to the bathroom by mani because pt \"has to pee,\" while sitting on the toilet, pt continues to say \"Lauren, I have to pee. \" Pt is confused when commanded to try to pee as he is sitting on toilet. Restraints continue to help pt stay safe at this time. When returned to bed after taking to bathroom, pt continues to try to climb out of bed. Pt offered both urinal and bedpan and pt doesn't seem to grasp how to use either item. Will continue to monitor pt.

## 2023-12-03 NOTE — PROGRESS NOTES
D:  Dr. Shira Arriaga, with nephrology came to bedside to assess pt and asked if pt is still confused A: this RN reported assessment findings and elevated BPs, no prn BP meds ordered R: Dr. Shira Arriaga said that he will review pt's chart

## 2023-12-03 NOTE — PROGRESS NOTES
Extended Infusion B-Lactam Antibiotics   piperacillin/tazobactam    Pharmacist Verification:   1. Review indication, allergies, suspected pathogens, drug interactions, and renal function   2. Automatically interchange intermittent infusion orders with extended infusion (unless exclusion criteria met)   3. Automatically adjust dose based on indication and renal function   4. Automatically adjust timing of antibiotics to avoid compatibility issues, if applicable     Exclusions:   1. Pediatric patients   2. Patients in charisse-operative settings   3. Patients in ambulatory clinics or infusion centers   4. If a patient has restricted IV access or drug compatibility concerns, patients may receive standard intermittent infusions of antibiotics to minimize antibiotic line time and avoid drug incompatibilities, following discussion between pharmacist and prescriber. Compatibility Information: For updated compatibility information please refer to [employer] IV Compatibility database in [address].          Intermittent Therapy Dosing Recommendations - ONLY to be utilized if Exclusion criteria met

## 2023-12-03 NOTE — PLAN OF CARE
Problem: Chronic Conditions and Co-morbidities  Goal: Patient's chronic conditions and co-morbidity symptoms are monitored and maintained or improved  12/3/2023 1125 by Clair Aranda RN  Outcome: Progressing

## 2023-12-03 NOTE — PLAN OF CARE
Problem: Chronic Conditions and Co-morbidities  Goal: Patient's chronic conditions and co-morbidity symptoms are monitored and maintained or improved  Outcome: Progressing     Problem: Discharge Planning  Goal: Discharge to home or other facility with appropriate resources  Outcome: Progressing     Problem: Pain  Goal: Verbalizes/displays adequate comfort level or baseline comfort level  Outcome: Progressing     Problem: Safety - Adult  Goal: Free from fall injury  Outcome: Progressing     Problem: Safety - Medical Restraint  Goal: Remains free of injury from restraints (Restraint for Interference with Medical Device)  Description: INTERVENTIONS:  1. Determine that other, less restrictive measures have been tried or would not be effective before applying the restraint  2. Evaluate the patient's condition at the time of restraint application  3. Inform patient/family regarding the reason for restraint  4.  Q2H: Monitor safety, psychosocial status, comfort, nutrition and hydration  Outcome: Progressing  Flowsheets  Taken 12/2/2023 1833 by Denisa Hidalgo RN  Remains free of injury from restraints (restraint for interference with medical device):   Determine that other, less restrictive measures have been tried or would not be effective before applying the restraint   Evaluate the patient's condition at the time of restraint application   Inform patient/family regarding the reason for restraint   Every 2 hours: Monitor safety, psychosocial status, comfort, nutrition and hydration  Taken 12/2/2023 1831 by Denisa Hidalgo RN  Remains free of injury from restraints (restraint for interference with medical device):   Determine that other, less restrictive measures have been tried or would not be effective before applying the restraint   Evaluate the patient's condition at the time of restraint application   Inform patient/family regarding the reason for restraint   Every 2 hours: Monitor safety, psychosocial status, comfort, nutrition and hydration  Taken 12/2/2023 1626 by Tish Jones RN  Remains free of injury from restraints (restraint for interference with medical device):   Determine that other, less restrictive measures have been tried or would not be effective before applying the restraint   Evaluate the patient's condition at the time of restraint application   Every 2 hours: Monitor safety, psychosocial status, comfort, nutrition and hydration   Inform patient/family regarding the reason for restraint  Taken 12/2/2023 1624 by Tish Jones RN  Remains free of injury from restraints (restraint for interference with medical device):   Every 2 hours: Monitor safety, psychosocial status, comfort, nutrition and hydration   Evaluate the patient's condition at the time of restraint application   Determine that other, less restrictive measures have been tried or would not be effective before applying the restraint   Inform patient/family regarding the reason for restraint

## 2023-12-04 ENCOUNTER — APPOINTMENT (OUTPATIENT)
Dept: CT IMAGING | Age: 63
DRG: 252 | End: 2023-12-04
Payer: COMMERCIAL

## 2023-12-04 LAB
ALBUMIN SERPL-MCNC: 3.1 G/DL (ref 3.4–5)
ANION GAP SERPL CALCULATED.3IONS-SCNC: 21 MMOL/L (ref 3–16)
BASOPHILS # BLD: 0.1 K/UL (ref 0–0.2)
BASOPHILS NFR BLD: 0.7 %
BUN SERPL-MCNC: 50 MG/DL (ref 7–20)
CALCIUM SERPL-MCNC: 9.7 MG/DL (ref 8.3–10.6)
CHLORIDE SERPL-SCNC: 93 MMOL/L (ref 99–110)
CO2 SERPL-SCNC: 20 MMOL/L (ref 21–32)
CREAT SERPL-MCNC: 9.2 MG/DL (ref 0.8–1.3)
DEPRECATED RDW RBC AUTO: 15.6 % (ref 12.4–15.4)
EOSINOPHIL # BLD: 0.1 K/UL (ref 0–0.6)
EOSINOPHIL NFR BLD: 1.5 %
GFR SERPLBLD CREATININE-BSD FMLA CKD-EPI: 6 ML/MIN/{1.73_M2}
GLUCOSE BLD-MCNC: 103 MG/DL (ref 70–99)
GLUCOSE BLD-MCNC: 105 MG/DL (ref 70–99)
GLUCOSE BLD-MCNC: 108 MG/DL (ref 70–99)
GLUCOSE BLD-MCNC: 70 MG/DL (ref 70–99)
GLUCOSE SERPL-MCNC: 73 MG/DL (ref 70–99)
HCT VFR BLD AUTO: 37.4 % (ref 40.5–52.5)
HGB BLD-MCNC: 12.9 G/DL (ref 13.5–17.5)
LYMPHOCYTES # BLD: 0.8 K/UL (ref 1–5.1)
LYMPHOCYTES NFR BLD: 8 %
MCH RBC QN AUTO: 36.5 PG (ref 26–34)
MCHC RBC AUTO-ENTMCNC: 34.4 G/DL (ref 31–36)
MCV RBC AUTO: 105.9 FL (ref 80–100)
MONOCYTES # BLD: 1.1 K/UL (ref 0–1.3)
MONOCYTES NFR BLD: 11.7 %
NEUTROPHILS # BLD: 7.6 K/UL (ref 1.7–7.7)
NEUTROPHILS NFR BLD: 78.1 %
PERFORMED ON: ABNORMAL
PERFORMED ON: NORMAL
PHOSPHATE SERPL-MCNC: 4.9 MG/DL (ref 2.5–4.9)
PLATELET # BLD AUTO: 112 K/UL (ref 135–450)
PMV BLD AUTO: 8 FL (ref 5–10.5)
POTASSIUM SERPL-SCNC: 4.7 MMOL/L (ref 3.5–5.1)
RBC # BLD AUTO: 3.53 M/UL (ref 4.2–5.9)
SODIUM SERPL-SCNC: 134 MMOL/L (ref 136–145)
VANCOMYCIN SERPL-MCNC: 8.7 UG/ML
WBC # BLD AUTO: 9.8 K/UL (ref 4–11)

## 2023-12-04 PROCEDURE — 2580000003 HC RX 258: Performed by: STUDENT IN AN ORGANIZED HEALTH CARE EDUCATION/TRAINING PROGRAM

## 2023-12-04 PROCEDURE — 80069 RENAL FUNCTION PANEL: CPT

## 2023-12-04 PROCEDURE — 90935 HEMODIALYSIS ONE EVALUATION: CPT

## 2023-12-04 PROCEDURE — 9990000010 HC NO CHARGE VISIT

## 2023-12-04 PROCEDURE — 2580000003 HC RX 258

## 2023-12-04 PROCEDURE — 97530 THERAPEUTIC ACTIVITIES: CPT

## 2023-12-04 PROCEDURE — 85025 COMPLETE CBC W/AUTO DIFF WBC: CPT

## 2023-12-04 PROCEDURE — 36415 COLL VENOUS BLD VENIPUNCTURE: CPT

## 2023-12-04 PROCEDURE — 80202 ASSAY OF VANCOMYCIN: CPT

## 2023-12-04 PROCEDURE — 6370000000 HC RX 637 (ALT 250 FOR IP)

## 2023-12-04 PROCEDURE — 94760 N-INVAS EAR/PLS OXIMETRY 1: CPT

## 2023-12-04 PROCEDURE — 70450 CT HEAD/BRAIN W/O DYE: CPT

## 2023-12-04 PROCEDURE — 97116 GAIT TRAINING THERAPY: CPT

## 2023-12-04 PROCEDURE — 6360000002 HC RX W HCPCS: Performed by: STUDENT IN AN ORGANIZED HEALTH CARE EDUCATION/TRAINING PROGRAM

## 2023-12-04 PROCEDURE — 1200000000 HC SEMI PRIVATE

## 2023-12-04 PROCEDURE — 6370000000 HC RX 637 (ALT 250 FOR IP): Performed by: STUDENT IN AN ORGANIZED HEALTH CARE EDUCATION/TRAINING PROGRAM

## 2023-12-04 RX ORDER — BISACODYL 10 MG
10 SUPPOSITORY, RECTAL RECTAL DAILY PRN
Status: DISCONTINUED | OUTPATIENT
Start: 2023-12-04 | End: 2023-12-07 | Stop reason: HOSPADM

## 2023-12-04 RX ADMIN — ASPIRIN 325 MG: 325 TABLET ORAL at 09:58

## 2023-12-04 RX ADMIN — VANCOMYCIN HYDROCHLORIDE 1500 MG: 1 INJECTION, POWDER, LYOPHILIZED, FOR SOLUTION INTRAVENOUS at 14:03

## 2023-12-04 RX ADMIN — BISACODYL 10 MG: 10 SUPPOSITORY RECTAL at 18:58

## 2023-12-04 RX ADMIN — HEPARIN SODIUM 5000 UNITS: 5000 INJECTION INTRAVENOUS; SUBCUTANEOUS at 22:37

## 2023-12-04 RX ADMIN — ACETAMINOPHEN 650 MG: 325 TABLET ORAL at 11:50

## 2023-12-04 RX ADMIN — QUETIAPINE FUMARATE 150 MG: 150 TABLET, EXTENDED RELEASE ORAL at 23:24

## 2023-12-04 RX ADMIN — VENLAFAXINE HYDROCHLORIDE 75 MG: 75 CAPSULE, EXTENDED RELEASE ORAL at 09:58

## 2023-12-04 RX ADMIN — POLYETHYLENE GLYCOL 3350 17 G: 17 POWDER, FOR SOLUTION ORAL at 17:03

## 2023-12-04 RX ADMIN — HEPARIN SODIUM 5000 UNITS: 5000 INJECTION INTRAVENOUS; SUBCUTANEOUS at 05:58

## 2023-12-04 RX ADMIN — HEPARIN SODIUM 5000 UNITS: 5000 INJECTION INTRAVENOUS; SUBCUTANEOUS at 16:00

## 2023-12-04 RX ADMIN — GABAPENTIN 300 MG: 300 CAPSULE ORAL at 09:58

## 2023-12-04 RX ADMIN — SEVELAMER CARBONATE 2400 MG: 800 TABLET, FILM COATED ORAL at 16:00

## 2023-12-04 RX ADMIN — SEVELAMER CARBONATE 2400 MG: 800 TABLET, FILM COATED ORAL at 09:58

## 2023-12-04 RX ADMIN — PIPERACILLIN AND TAZOBACTAM 3375 MG: 3; .375 INJECTION, POWDER, LYOPHILIZED, FOR SOLUTION INTRAVENOUS at 05:57

## 2023-12-04 RX ADMIN — Medication 10 ML: at 10:07

## 2023-12-04 RX ADMIN — Medication 10 ML: at 22:37

## 2023-12-04 RX ADMIN — CINACALCET HYDROCHLORIDE 60 MG: 30 TABLET, FILM COATED ORAL at 09:58

## 2023-12-04 RX ADMIN — PIPERACILLIN AND TAZOBACTAM 3375 MG: 3; .375 INJECTION, POWDER, LYOPHILIZED, FOR SOLUTION INTRAVENOUS at 17:10

## 2023-12-04 ASSESSMENT — PAIN DESCRIPTION - LOCATION: LOCATION: FOOT

## 2023-12-04 ASSESSMENT — PAIN DESCRIPTION - PAIN TYPE: TYPE: SURGICAL PAIN

## 2023-12-04 ASSESSMENT — PAIN DESCRIPTION - DESCRIPTORS: DESCRIPTORS: THROBBING

## 2023-12-04 ASSESSMENT — PAIN DESCRIPTION - FREQUENCY: FREQUENCY: INTERMITTENT

## 2023-12-04 ASSESSMENT — PAIN - FUNCTIONAL ASSESSMENT: PAIN_FUNCTIONAL_ASSESSMENT: PREVENTS OR INTERFERES SOME ACTIVE ACTIVITIES AND ADLS

## 2023-12-04 ASSESSMENT — PAIN SCALES - GENERAL: PAINLEVEL_OUTOF10: 4

## 2023-12-04 ASSESSMENT — PAIN DESCRIPTION - ONSET: ONSET: ON-GOING

## 2023-12-04 ASSESSMENT — PAIN DESCRIPTION - ORIENTATION: ORIENTATION: LEFT

## 2023-12-04 NOTE — PROGRESS NOTES
Pt transferred to room 4116 for 5N. Pt alert and oriented to self. Vital sign stable, pt denies pain. Bilat wrists and ankles restraints continues for pt safety. Oriented to room and call light. Plan of care and order reviewed with pt.

## 2023-12-04 NOTE — PROGRESS NOTES
Treatment time: 2 hrs, 30 min  Net UF: 1650    Pre weight: 93.2 kg  Post weight: 91.6 kg  EDW: 90 kg    Access used: R [facility]  Access function: . Access runs without malfunction. Medications or blood products given: Vancomycin 1500 mg    Regular outpatient schedule: MWF    Summary of response to treatment: Pt. Tolerates treatment fair and refuses to complete entire treatment     Copy of dialysis treatment record placed in chart, to be scanned into EMR.

## 2023-12-04 NOTE — FLOWSHEET NOTE
4 Eyes Skin Assessment     NAME:  Aakash Olivares [place] BIRTH:  1960  MEDICAL RECORD NUMBER:  [id number]    The patient is being assessed for  Transfer to New Unit    I agree that at least one RN has performed a thorough Head to Toe Skin Assessment on the patient. ALL assessment sites listed below have been assessed. Areas assessed by both nurses:    Head, Face, Ears, Shoulders, Back, Chest, Arms, Elbows, Hands, Sacrum. Buttock, Coccyx, Ischium, Legs. Feet and Heels, and Under Medical Devices         Does the Patient have a Wound?  No noted wound(s)       Smith Prevention initiated by RN: Yes  Wound Care Orders initiated by RN: No    Pressure Injury (Stage 3,4, Unstageable, DTI, NWPT, and Complex wounds) if present, place Wound referral order by RN under : No    New Ostomies, if present place, Ostomy referral order under : No     Nurse 1 eSignature: Electronically signed by Teddy Lai RN on 12/4/23 at 7:05 AM EST    **SHARE this note so that the co-signing nurse can place an eSignature**    Nurse 2 eSignature: {Esignature:[id number]}

## 2023-12-04 NOTE — PROGRESS NOTES
Occupational Therapy  Chart review completed, OT attempted PM tx however he is out of the room to CT scan. Will attempt to tx at a later date as pt tolerates.  Tx variance: 30 min, cont w/ POC Giovani Oneal, OTR/L #[id number]

## 2023-12-04 NOTE — PLAN OF CARE
Problem: Chronic Conditions and Co-morbidities  Goal: Patient's chronic conditions and co-morbidity symptoms are monitored and maintained or improved  Outcome: Progressing     Problem: Discharge Planning  Goal: Discharge to home or other facility with appropriate resources  Outcome: Progressing     Problem: Pain  Goal: Verbalizes/displays adequate comfort level or baseline comfort level  Outcome: Progressing     Problem: Safety - Adult  Goal: Free from fall injury  Outcome: Progressing     Problem: Safety - Medical Restraint  Goal: Remains free of injury from restraints (Restraint for Interference with Medical Device)  Description: INTERVENTIONS:  1. Determine that other, less restrictive measures have been tried or would not be effective before applying the restraint  2. Evaluate the patient's condition at the time of restraint application  3. Inform patient/family regarding the reason for restraint  4. Q2H: Monitor safety, psychosocial status, comfort, nutrition and hydration  Outcome: Progressing     Problem: Skin/Tissue Integrity  Goal: Absence of new skin breakdown  Description: 1. Monitor for areas of redness and/or skin breakdown  2. Assess vascular access sites hourly  3. Every 4-6 hours minimum:  Change oxygen saturation probe site  4. Every 4-6 hours:  If on nasal continuous positive airway pressure, respiratory therapy assess nares and determine need for appliance change or resting period.   Outcome: Progressing

## 2023-12-04 NOTE — PLAN OF CARE
Problem: Chronic Conditions and Co-morbidities  Goal: Patient's chronic conditions and co-morbidity symptoms are monitored and maintained or improved  12/4/2023 1144 by Stephany Domingo RN  Outcome: Progressing  12/4/2023 0128 by Alfredito Amos RN  Outcome: Progressing     Problem: Discharge Planning  Goal: Discharge to home or other facility with appropriate resources  12/4/2023 1144 by Stephany Domingo RN  Outcome: Progressing  12/4/2023 0128 by Alfredito Amos RN  Outcome: Progressing     Problem: Pain  Goal: Verbalizes/displays adequate comfort level or baseline comfort level  12/4/2023 1144 by Stephany Domingo RN  Outcome: Progressing  12/4/2023 0128 by lAfredito Amos RN  Outcome: Progressing     Problem: Safety - Adult  Goal: Free from fall injury  12/4/2023 1144 by Stephany Domingo RN  Outcome: Progressing  12/4/2023 0128 by Alfredito Amos RN  Outcome: Progressing     Problem: Safety - Medical Restraint  Goal: Remains free of injury from restraints (Restraint for Interference with Medical Device)  Description: INTERVENTIONS:  1. Determine that other, less restrictive measures have been tried or would not be effective before applying the restraint  2. Evaluate the patient's condition at the time of restraint application  3. Inform patient/family regarding the reason for restraint  4.  Q2H: Monitor safety, psychosocial status, comfort, nutrition and hydration  12/4/2023 1144 by Stephany Domingo RN  Outcome: Progressing  Flowsheets (Taken 12/4/2023 0600 by Alfreidto Amos RN)  Remains free of injury from restraints (restraint for interference with medical device):   Every 2 hours: Monitor safety, psychosocial status, comfort, nutrition and hydration   Evaluate the patient's condition at the time of restraint application   Determine that other, less restrictive measures have been tried or would not be effective before applying the restraint  12/4/2023 0128 by Alfredito Amos RN  Outcome: Progressing

## 2023-12-04 NOTE — CARE COORDINATION
12/4 Chart reviewed. It is unclear if patient lives with his niece Silverio Hathaway [phone] or if he lives alone. Call placed to Junie with message left for return call. Patient is currently restrained with intermittent agitation. Therapy recommends Home with assist. Continue efforts to contact family members. Electronically signed by Prince Malgorzata RN on 12/4/2023 at 10:29 AM

## 2023-12-05 ENCOUNTER — APPOINTMENT (OUTPATIENT)
Dept: CT IMAGING | Age: 63
DRG: 252 | End: 2023-12-05
Payer: COMMERCIAL

## 2023-12-05 LAB
BASOPHILS # BLD: 0.1 K/UL (ref 0–0.2)
BASOPHILS NFR BLD: 0.8 %
DEPRECATED RDW RBC AUTO: 15.1 % (ref 12.4–15.4)
EOSINOPHIL # BLD: 0.2 K/UL (ref 0–0.6)
EOSINOPHIL NFR BLD: 1.8 %
GLUCOSE BLD-MCNC: 103 MG/DL (ref 70–99)
GLUCOSE BLD-MCNC: 105 MG/DL (ref 70–99)
GLUCOSE BLD-MCNC: 136 MG/DL (ref 70–99)
GLUCOSE BLD-MCNC: 155 MG/DL (ref 70–99)
HCT VFR BLD AUTO: 35.9 % (ref 40.5–52.5)
HGB BLD-MCNC: 12.4 G/DL (ref 13.5–17.5)
LYMPHOCYTES # BLD: 0.8 K/UL (ref 1–5.1)
LYMPHOCYTES NFR BLD: 8 %
MCH RBC QN AUTO: 36.4 PG (ref 26–34)
MCHC RBC AUTO-ENTMCNC: 34.5 G/DL (ref 31–36)
MCV RBC AUTO: 105.6 FL (ref 80–100)
MONOCYTES # BLD: 1.2 K/UL (ref 0–1.3)
MONOCYTES NFR BLD: 11.5 %
NEUTROPHILS # BLD: 7.8 K/UL (ref 1.7–7.7)
NEUTROPHILS NFR BLD: 77.9 %
PERFORMED ON: ABNORMAL
PLATELET # BLD AUTO: 120 K/UL (ref 135–450)
PMV BLD AUTO: 8.3 FL (ref 5–10.5)
RBC # BLD AUTO: 3.4 M/UL (ref 4.2–5.9)
WBC # BLD AUTO: 10 K/UL (ref 4–11)

## 2023-12-05 PROCEDURE — 97530 THERAPEUTIC ACTIVITIES: CPT

## 2023-12-05 PROCEDURE — 70450 CT HEAD/BRAIN W/O DYE: CPT

## 2023-12-05 PROCEDURE — 6370000000 HC RX 637 (ALT 250 FOR IP)

## 2023-12-05 PROCEDURE — 6360000002 HC RX W HCPCS: Performed by: STUDENT IN AN ORGANIZED HEALTH CARE EDUCATION/TRAINING PROGRAM

## 2023-12-05 PROCEDURE — 94760 N-INVAS EAR/PLS OXIMETRY 1: CPT

## 2023-12-05 PROCEDURE — 2580000003 HC RX 258: Performed by: STUDENT IN AN ORGANIZED HEALTH CARE EDUCATION/TRAINING PROGRAM

## 2023-12-05 PROCEDURE — 36415 COLL VENOUS BLD VENIPUNCTURE: CPT

## 2023-12-05 PROCEDURE — 97535 SELF CARE MNGMENT TRAINING: CPT

## 2023-12-05 PROCEDURE — 1200000000 HC SEMI PRIVATE

## 2023-12-05 PROCEDURE — 2580000003 HC RX 258

## 2023-12-05 PROCEDURE — 85025 COMPLETE CBC W/AUTO DIFF WBC: CPT

## 2023-12-05 RX ADMIN — GABAPENTIN 300 MG: 300 CAPSULE ORAL at 08:35

## 2023-12-05 RX ADMIN — HEPARIN SODIUM 5000 UNITS: 5000 INJECTION INTRAVENOUS; SUBCUTANEOUS at 07:15

## 2023-12-05 RX ADMIN — POLYETHYLENE GLYCOL 3350 17 G: 17 POWDER, FOR SOLUTION ORAL at 08:35

## 2023-12-05 RX ADMIN — SEVELAMER CARBONATE 2400 MG: 800 TABLET, FILM COATED ORAL at 08:35

## 2023-12-05 RX ADMIN — Medication 10 ML: at 21:46

## 2023-12-05 RX ADMIN — Medication 10 ML: at 08:34

## 2023-12-05 RX ADMIN — SEVELAMER CARBONATE 2400 MG: 800 TABLET, FILM COATED ORAL at 18:00

## 2023-12-05 RX ADMIN — ASPIRIN 325 MG: 325 TABLET ORAL at 08:35

## 2023-12-05 RX ADMIN — HEPARIN SODIUM 5000 UNITS: 5000 INJECTION INTRAVENOUS; SUBCUTANEOUS at 14:00

## 2023-12-05 RX ADMIN — PIPERACILLIN AND TAZOBACTAM 3375 MG: 3; .375 INJECTION, POWDER, LYOPHILIZED, FOR SOLUTION INTRAVENOUS at 08:34

## 2023-12-05 RX ADMIN — ACETAMINOPHEN 650 MG: 325 TABLET ORAL at 08:35

## 2023-12-05 RX ADMIN — OXYCODONE AND ACETAMINOPHEN 1 TABLET: 5; 325 TABLET ORAL at 16:32

## 2023-12-05 RX ADMIN — QUETIAPINE FUMARATE 150 MG: 150 TABLET, EXTENDED RELEASE ORAL at 21:45

## 2023-12-05 RX ADMIN — PIPERACILLIN AND TAZOBACTAM 3375 MG: 3; .375 INJECTION, POWDER, LYOPHILIZED, FOR SOLUTION INTRAVENOUS at 16:32

## 2023-12-05 RX ADMIN — HEPARIN SODIUM 5000 UNITS: 5000 INJECTION INTRAVENOUS; SUBCUTANEOUS at 21:45

## 2023-12-05 RX ADMIN — SEVELAMER CARBONATE 2400 MG: 800 TABLET, FILM COATED ORAL at 14:00

## 2023-12-05 RX ADMIN — VENLAFAXINE HYDROCHLORIDE 75 MG: 75 CAPSULE, EXTENDED RELEASE ORAL at 08:35

## 2023-12-05 ASSESSMENT — PAIN DESCRIPTION - PAIN TYPE: TYPE: SURGICAL PAIN

## 2023-12-05 ASSESSMENT — PAIN - FUNCTIONAL ASSESSMENT: PAIN_FUNCTIONAL_ASSESSMENT: PREVENTS OR INTERFERES SOME ACTIVE ACTIVITIES AND ADLS

## 2023-12-05 ASSESSMENT — PAIN DESCRIPTION - LOCATION: LOCATION: FOOT

## 2023-12-05 ASSESSMENT — PAIN DESCRIPTION - ONSET: ONSET: ON-GOING

## 2023-12-05 ASSESSMENT — PAIN SCALES - GENERAL
PAINLEVEL_OUTOF10: 0
PAINLEVEL_OUTOF10: 6

## 2023-12-05 ASSESSMENT — PAIN DESCRIPTION - DESCRIPTORS: DESCRIPTORS: THROBBING

## 2023-12-05 ASSESSMENT — PAIN DESCRIPTION - FREQUENCY: FREQUENCY: INTERMITTENT

## 2023-12-05 ASSESSMENT — PAIN DESCRIPTION - ORIENTATION: ORIENTATION: LEFT

## 2023-12-05 NOTE — PROGRESS NOTES
Pt provided soap ney enema per order. Pt tolerated well. Small amount of  stool debris noted at this time. Pt left to rest with bed alarm in place and call light within reach.

## 2023-12-05 NOTE — CARE COORDINATION
A quick chart review reveals that this patient is post surgery and waiting for podiatry and nephrology clearances. Per prior  he is an outpatient dialysis patient at [facility]. Phone: [phone] Fax: [phone]. He is not on IVAB today and appears to be out of restraints. There is an order for geodon so we will continue to follow this. We are unsure as to if he was prescribed anything for psych prior to admission. Respectfully submitted,    Yuliya RIVERA, [facility]   [phone]    Electronically signed by MIGUEL Castro, LSW on 12/5/2023 at 7:28 AM

## 2023-12-05 NOTE — PROGRESS NOTES
Provider notified of fall and patient on Heparin. Per Provider Dr Jitendra Duckworth. CT of head. Pt transported via stretcher. 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

## 2023-12-06 LAB
ALBUMIN SERPL-MCNC: 2.9 G/DL (ref 3.4–5)
ANION GAP SERPL CALCULATED.3IONS-SCNC: 18 MMOL/L (ref 3–16)
BASOPHILS # BLD: 0.1 K/UL (ref 0–0.2)
BASOPHILS NFR BLD: 1 %
BUN SERPL-MCNC: 43 MG/DL (ref 7–20)
CALCIUM SERPL-MCNC: 9.1 MG/DL (ref 8.3–10.6)
CHLORIDE SERPL-SCNC: 94 MMOL/L (ref 99–110)
CO2 SERPL-SCNC: 23 MMOL/L (ref 21–32)
CREAT SERPL-MCNC: 8.3 MG/DL (ref 0.8–1.3)
DEPRECATED RDW RBC AUTO: 15.1 % (ref 12.4–15.4)
EOSINOPHIL # BLD: 0.2 K/UL (ref 0–0.6)
EOSINOPHIL NFR BLD: 3.4 %
GFR SERPLBLD CREATININE-BSD FMLA CKD-EPI: 7 ML/MIN/{1.73_M2}
GLUCOSE BLD-MCNC: 105 MG/DL (ref 70–99)
GLUCOSE BLD-MCNC: 106 MG/DL (ref 70–99)
GLUCOSE BLD-MCNC: 88 MG/DL (ref 70–99)
GLUCOSE BLD-MCNC: 95 MG/DL (ref 70–99)
GLUCOSE SERPL-MCNC: 94 MG/DL (ref 70–99)
HCT VFR BLD AUTO: 33.9 % (ref 40.5–52.5)
HGB BLD-MCNC: 11.5 G/DL (ref 13.5–17.5)
LYMPHOCYTES # BLD: 1.2 K/UL (ref 1–5.1)
LYMPHOCYTES NFR BLD: 16 %
MCH RBC QN AUTO: 36 PG (ref 26–34)
MCHC RBC AUTO-ENTMCNC: 34 G/DL (ref 31–36)
MCV RBC AUTO: 106.2 FL (ref 80–100)
MONOCYTES # BLD: 1 K/UL (ref 0–1.3)
MONOCYTES NFR BLD: 13.1 %
NEUTROPHILS # BLD: 4.9 K/UL (ref 1.7–7.7)
NEUTROPHILS NFR BLD: 66.5 %
PERFORMED ON: ABNORMAL
PERFORMED ON: ABNORMAL
PERFORMED ON: NORMAL
PERFORMED ON: NORMAL
PHOSPHATE SERPL-MCNC: 4.9 MG/DL (ref 2.5–4.9)
PLATELET # BLD AUTO: 124 K/UL (ref 135–450)
PMV BLD AUTO: 8 FL (ref 5–10.5)
POTASSIUM SERPL-SCNC: 4.4 MMOL/L (ref 3.5–5.1)
RBC # BLD AUTO: 3.19 M/UL (ref 4.2–5.9)
SODIUM SERPL-SCNC: 135 MMOL/L (ref 136–145)
WBC # BLD AUTO: 7.3 K/UL (ref 4–11)

## 2023-12-06 PROCEDURE — 2580000003 HC RX 258: Performed by: STUDENT IN AN ORGANIZED HEALTH CARE EDUCATION/TRAINING PROGRAM

## 2023-12-06 PROCEDURE — 1200000000 HC SEMI PRIVATE

## 2023-12-06 PROCEDURE — 85025 COMPLETE CBC W/AUTO DIFF WBC: CPT

## 2023-12-06 PROCEDURE — 36415 COLL VENOUS BLD VENIPUNCTURE: CPT

## 2023-12-06 PROCEDURE — 97530 THERAPEUTIC ACTIVITIES: CPT

## 2023-12-06 PROCEDURE — 6360000002 HC RX W HCPCS: Performed by: STUDENT IN AN ORGANIZED HEALTH CARE EDUCATION/TRAINING PROGRAM

## 2023-12-06 PROCEDURE — 6370000000 HC RX 637 (ALT 250 FOR IP): Performed by: INTERNAL MEDICINE

## 2023-12-06 PROCEDURE — 90935 HEMODIALYSIS ONE EVALUATION: CPT

## 2023-12-06 PROCEDURE — 6370000000 HC RX 637 (ALT 250 FOR IP)

## 2023-12-06 PROCEDURE — 2580000003 HC RX 258

## 2023-12-06 PROCEDURE — 80069 RENAL FUNCTION PANEL: CPT

## 2023-12-06 PROCEDURE — 97535 SELF CARE MNGMENT TRAINING: CPT

## 2023-12-06 PROCEDURE — 94760 N-INVAS EAR/PLS OXIMETRY 1: CPT

## 2023-12-06 PROCEDURE — 97116 GAIT TRAINING THERAPY: CPT

## 2023-12-06 PROCEDURE — 6360000002 HC RX W HCPCS

## 2023-12-06 RX ORDER — DOXYCYCLINE HYCLATE 100 MG
100 TABLET ORAL 2 TIMES DAILY
Qty: 28 TABLET | Refills: 0 | DISCHARGE
Start: 2023-12-06 | End: 2023-12-07 | Stop reason: SDUPTHER

## 2023-12-06 RX ORDER — CALCIUM CARBONATE 500 MG/1
1000 TABLET, CHEWABLE ORAL 3 TIMES DAILY PRN
Status: DISCONTINUED | OUTPATIENT
Start: 2023-12-06 | End: 2023-12-07 | Stop reason: HOSPADM

## 2023-12-06 RX ORDER — OXYCODONE HYDROCHLORIDE AND ACETAMINOPHEN 5; 325 MG/1; MG/1
1 TABLET ORAL EVERY 6 HOURS PRN
Qty: 12 TABLET | Refills: 0 | Status: SHIPPED | OUTPATIENT
Start: 2023-12-06 | End: 2023-12-07 | Stop reason: SDUPTHER

## 2023-12-06 RX ORDER — GABAPENTIN 300 MG/1
300 CAPSULE ORAL DAILY
Qty: 5 CAPSULE | Refills: 0 | Status: SHIPPED | OUTPATIENT
Start: 2023-12-06 | End: 2023-12-07 | Stop reason: SDUPTHER

## 2023-12-06 RX ADMIN — PIPERACILLIN AND TAZOBACTAM 3375 MG: 3; .375 INJECTION, POWDER, LYOPHILIZED, FOR SOLUTION INTRAVENOUS at 05:13

## 2023-12-06 RX ADMIN — ANTACID TABLETS 1000 MG: 500 TABLET, CHEWABLE ORAL at 15:54

## 2023-12-06 RX ADMIN — HEPARIN SODIUM 5000 UNITS: 5000 INJECTION INTRAVENOUS; SUBCUTANEOUS at 21:02

## 2023-12-06 RX ADMIN — BUPROPION HYDROCHLORIDE 150 MG: 75 TABLET, FILM COATED ORAL at 10:14

## 2023-12-06 RX ADMIN — ONDANSETRON 4 MG: 2 INJECTION INTRAMUSCULAR; INTRAVENOUS at 21:02

## 2023-12-06 RX ADMIN — SEVELAMER CARBONATE 2400 MG: 800 TABLET, FILM COATED ORAL at 10:12

## 2023-12-06 RX ADMIN — Medication 10 ML: at 20:31

## 2023-12-06 RX ADMIN — POLYETHYLENE GLYCOL 3350 17 G: 17 POWDER, FOR SOLUTION ORAL at 17:30

## 2023-12-06 RX ADMIN — CINACALCET HYDROCHLORIDE 60 MG: 30 TABLET, FILM COATED ORAL at 10:11

## 2023-12-06 RX ADMIN — PIPERACILLIN AND TAZOBACTAM 3375 MG: 3; .375 INJECTION, POWDER, LYOPHILIZED, FOR SOLUTION INTRAVENOUS at 17:29

## 2023-12-06 RX ADMIN — Medication 10 ML: at 10:12

## 2023-12-06 RX ADMIN — HEPARIN SODIUM 5000 UNITS: 5000 INJECTION INTRAVENOUS; SUBCUTANEOUS at 15:54

## 2023-12-06 RX ADMIN — SEVELAMER CARBONATE 2400 MG: 800 TABLET, FILM COATED ORAL at 17:07

## 2023-12-06 RX ADMIN — MIDODRINE HYDROCHLORIDE 10 MG: 10 TABLET ORAL at 10:12

## 2023-12-06 RX ADMIN — QUETIAPINE FUMARATE 150 MG: 150 TABLET, EXTENDED RELEASE ORAL at 20:31

## 2023-12-06 RX ADMIN — GABAPENTIN 300 MG: 300 CAPSULE ORAL at 10:12

## 2023-12-06 RX ADMIN — HEPARIN SODIUM 5000 UNITS: 5000 INJECTION INTRAVENOUS; SUBCUTANEOUS at 05:14

## 2023-12-06 RX ADMIN — ASPIRIN 325 MG: 325 TABLET ORAL at 10:12

## 2023-12-06 NOTE — PROGRESS NOTES
Treatment time: 3 hours    Net UF: 2000 ml    Pre weight: 91.9 kg  Post weight: 89.9 kg  EDW: 90 kg    Access used: R [facility]  Access function: . [facility] Access runs without malfunction. Medications or blood products given: None    Regular outpatient schedule: MWF    Summary of response to treatment: Pt. Tolerates treatment well today. Copy of dialysis treatment record placed in chart, to be scanned into EMR.

## 2023-12-06 NOTE — PROGRESS NOTES
Patient had a fall in the bathroom, staff was present patient's head didn't hit the floor. Vital signs were stable. Provider notified, STAT CT ordered. Patient had a bruise on left arm fall minor bleeding on amputated toes. CT was clear patient not complaining of any pain.

## 2023-12-06 NOTE — PROGRESS NOTES
Occupational Therapy  Facility/Department: [facility]  Occupational Therapy Daily Note    Name: Anna Ortega  : [birth year]  MRN: [id number]  Date of Service: 2023    Discharge Recommendations:  Patient would benefit from continued therapy after discharge, 3-5 sessions per week   Anna Ortega scored a 17/24 on the AM-PAC ADL Inpatient form. Current research shows that an AM-PAC score of 17 or less is typically not associated with a discharge to the patient's home setting. Based on the patient's AM-PAC score and their current ADL deficits, it is recommended that the patient have 3-5 sessions per week of Occupational Therapy at d/c to increase the patient's independence. Please see assessment section for further patient specific details. If patient discharges prior to next session this note will serve as a discharge summary. Please see below for the latest assessment towards goals. Patient Diagnosis(es): The primary encounter diagnosis was Necrotic toes ([address]). Diagnoses of ESRD (end stage renal disease) on dialysis ([address]) and Toe gangrene ([address]) were also pertinent to this visit. Past Medical History:  has a past medical history of Atrial fibrillation ([address]), CAD (coronary artery disease), Chronic kidney disease, Depression, Diabetes mellitus ([address]), Dialysis patient ([address]), Glaucoma, Hemodialysis patient ([address]), Hyperlipidemia, Hypertension, RLL pneumonia, and Sleep apnea. Past Surgical History:  has a past surgical history that includes Pacemaker insertion; pacemaker placement; Colonoscopy; and Toe amputation (Left, 2023). Treatment Diagnosis: decreased fxl transfers/mobility and ADL status      Assessment   Performance deficits / Impairments: Decreased functional mobility ; Decreased safe awareness;Decreased balance;Decreased coordination;Decreased ADL status; Decreased cognition;Decreased high-level IADLs  Assessment: Discussed with OTR: Pt completed further distance amb in room with watches tv and reads the paper  Additional Comments: niece home all the time and can assist as needed as she isn't working right now       Objective      Observation/Palpation  Observation: dressing and surgical shoe on the L foot  Safety Devices  Type of Devices: Call light within reach;Nurse notified; Patient at risk for falls; All jolie prominences offloaded;Gait belt;Left in chair;Chair alarm in place           ADL  Feeding Skilled Clinical Factors: Feeds self after set up  Grooming: Setup  Grooming Skilled Clinical Factors: with prepared wash cloth to wash face  UE Bathing: Setup  UE Bathing Skilled Clinical Factors: Seated from commode in BR; CHG wipe used around port on R neck  LE Bathing: Moderate assistance  LE Bathing Skilled Clinical Factors: Assist to wash charisse/buttock areas in stance at walker, CGA for balance. UE Dressing: Minimal assistance  UE Dressing Skilled Clinical Factors: gown changed  LE Dressing: Maximum assistance  LE Dressing Skilled Clinical Factors: to don pull up briefs  Toileting: Moderate assistance  Toileting Skilled Clinical Factors: Pt voided loose small bloody/mucus stool (RN made aware). Pt assisted with hygiene and managing briefs over hips after donned while seated on commode. Functional Mobility: Minimal assistance  Functional Mobility Skilled Clinical Factors: MIn A x1 for amb in room between transfers, with assist of another for managing IV pole. (see PT note for distance ambulated). Pt unable to tolerate putting full wt thru RLE, d/t c/o R knee pain. Skin Care: Bath wipes       Bed mobility  Supine to Sit: Contact guard assistance;Minimal assistance  Sit to Supine: Unable to assess (up to chair)  Bed Mobility Comments: C/o dizziness upon sitting up to EOB. Transfers  Sit to stand: Minimal assistance  Stand to sit: Minimal assistance; Moderate assistance  Transfer Comments: to/from RW, at bed, commode in BR and to recliner.  Cues for safety-hand and body

## 2023-12-06 NOTE — PROGRESS NOTES
Physical Therapy  Facility/Department: [address] MED SURG  Physical Therapy Daily Note  (Cotx for ambulation)  If patient discharges prior to next session this note will serve as a discharge summary. Please see below for the latest assessment towards goals. Name: Val Bunn  :   MRN: [id number]  Date of Service: 2023    Discharge Recommendations:  Patient would benefit from continued therapy after discharge (3-5)   Val Bnun scored a 16/24 on the AM-PAC short mobility form. Current research shows that an AM-PAC score of 17 or less is typically not associated with a discharge to the patient's home setting. Based on the patient's AM-PAC score and their current functional mobility deficits, it is recommended that the patient have 3-5 sessions per week of Physical Therapy at d/c to increase the patient's independence. Please see assessment section for further patient specific details. PT Equipment Recommendations  Equipment Needed: No  Other: will monitor      Patient Diagnosis(es): The primary encounter diagnosis was Necrotic toes ([address]). Diagnoses of ESRD (end stage renal disease) on dialysis ([address]) and Toe gangrene ([address]) were also pertinent to this visit. Past Medical History:  has a past medical history of Atrial fibrillation ([address]), CAD (coronary artery disease), Chronic kidney disease, Depression, Diabetes mellitus ([address]), Dialysis patient ([address]), Glaucoma, Hemodialysis patient ([address]), Hyperlipidemia, Hypertension, RLL pneumonia, and Sleep apnea. Past Surgical History:  has a past surgical history that includes Pacemaker insertion; pacemaker placement; Colonoscopy; and Toe amputation (Left, 2023). Assessment   Assessment: Today, the pt is demonstrating that he is functioning well below his reported baseline of being indep in ambulation w/o a device. Today, the pt is limited by his cognitive deficts, his decreased safety awareness, need for tele-sitter and pain.  The pt con't to

## 2023-12-06 NOTE — PLAN OF CARE
Problem: Chronic Conditions and Co-morbidities  Goal: Patient's chronic conditions and co-morbidity symptoms are monitored and maintained or improved  12/6/2023 0910 by Delonte David RN  Flowsheets (Taken 12/6/2023 2834)  Care Plan - Patient's Chronic Conditions and Co-Morbidity Symptoms are Monitored and Maintained or Improved: Monitor and assess patient's chronic conditions and comorbid symptoms for stability, deterioration, or improvement     Problem: Discharge Planning  Goal: Discharge to home or other facility with appropriate resources  12/6/2023 0910 by Delonte David RN  Flowsheets (Taken 12/6/2023 4923)  Discharge to home or other facility with appropriate resources:   Identify barriers to discharge with patient and caregiver   Arrange for needed discharge resources and transportation as appropriate     Problem: Pain  Goal: Verbalizes/displays adequate comfort level or baseline comfort level  12/6/2023 0910 by Delonte David RN  Flowsheets (Taken 12/6/2023 0910)  Verbalizes/displays adequate comfort level or baseline comfort level:   Encourage patient to monitor pain and request assistance   Assess pain using appropriate pain scale

## 2023-12-06 NOTE — CARE COORDINATION
12/6 Plan: [facility] Danial canceled. New Psych Eval ordered. Appears that patient would not be safe to return to home. Not clear if patient lives alone or with his Niece Juana Vernon [phone] (not ret. calls). Indecisive about going to SNF. [facility] can accept and will start precert. HD at [facility]. Follow for recommendations.  Electronically signed by Korin Garcia RN on 12/6/2023 at 3:37 PM

## 2023-12-06 NOTE — PROGRESS NOTES
Manual manipulation done with lubricant per NP request small amount of stool removed from rectum, scant amount of pink blood noted. Patient tolerated well.

## 2023-12-06 NOTE — CARE COORDINATION
12/6 Chart reviewed. It appears that patient would not be safe to return to home. It is not clear if patient lives alone or with his Niece [facility] [phone]. Call placed to Junie and message left for return call. MD states patient is not \"competent. \" Discharge plan is uncertain at this time as patient appears to need SNF level of care but is refusing. Await call from Niece to gather more information about patient' home situation.  Electronically signed by April Deal RN on 12/6/2023 at 8:50 AM

## 2023-12-07 VITALS
BODY MASS INDEX: 26.67 KG/M2 | HEIGHT: 70 IN | HEART RATE: 86 BPM | WEIGHT: 186.29 LBS | RESPIRATION RATE: 16 BRPM | TEMPERATURE: 98.3 F | SYSTOLIC BLOOD PRESSURE: 126 MMHG | OXYGEN SATURATION: 97 % | DIASTOLIC BLOOD PRESSURE: 87 MMHG

## 2023-12-07 LAB
BASOPHILS # BLD: 0.1 K/UL (ref 0–0.2)
BASOPHILS NFR BLD: 1.3 %
DEPRECATED RDW RBC AUTO: 14.4 % (ref 12.4–15.4)
EOSINOPHIL # BLD: 0.2 K/UL (ref 0–0.6)
EOSINOPHIL NFR BLD: 3 %
GLUCOSE BLD-MCNC: 81 MG/DL (ref 70–99)
GLUCOSE BLD-MCNC: 88 MG/DL (ref 70–99)
HCT VFR BLD AUTO: 35.3 % (ref 40.5–52.5)
HGB BLD-MCNC: 12.3 G/DL (ref 13.5–17.5)
LYMPHOCYTES # BLD: 1.4 K/UL (ref 1–5.1)
LYMPHOCYTES NFR BLD: 19.2 %
MCH RBC QN AUTO: 36.4 PG (ref 26–34)
MCHC RBC AUTO-ENTMCNC: 35 G/DL (ref 31–36)
MCV RBC AUTO: 104.3 FL (ref 80–100)
MONOCYTES # BLD: 1.3 K/UL (ref 0–1.3)
MONOCYTES NFR BLD: 17.5 %
NEUTROPHILS # BLD: 4.4 K/UL (ref 1.7–7.7)
NEUTROPHILS NFR BLD: 59 %
PERFORMED ON: NORMAL
PERFORMED ON: NORMAL
PLATELET # BLD AUTO: 154 K/UL (ref 135–450)
PMV BLD AUTO: 8 FL (ref 5–10.5)
RBC # BLD AUTO: 3.39 M/UL (ref 4.2–5.9)
WBC # BLD AUTO: 7.4 K/UL (ref 4–11)

## 2023-12-07 PROCEDURE — 2580000003 HC RX 258

## 2023-12-07 PROCEDURE — 6360000002 HC RX W HCPCS: Performed by: STUDENT IN AN ORGANIZED HEALTH CARE EDUCATION/TRAINING PROGRAM

## 2023-12-07 PROCEDURE — 85025 COMPLETE CBC W/AUTO DIFF WBC: CPT

## 2023-12-07 PROCEDURE — 6370000000 HC RX 637 (ALT 250 FOR IP)

## 2023-12-07 PROCEDURE — 36415 COLL VENOUS BLD VENIPUNCTURE: CPT

## 2023-12-07 PROCEDURE — 2580000003 HC RX 258: Performed by: STUDENT IN AN ORGANIZED HEALTH CARE EDUCATION/TRAINING PROGRAM

## 2023-12-07 PROCEDURE — 99222 1ST HOSP IP/OBS MODERATE 55: CPT | Performed by: NURSE PRACTITIONER

## 2023-12-07 RX ORDER — GABAPENTIN 300 MG/1
300 CAPSULE ORAL DAILY
Qty: 10 CAPSULE | Refills: 0 | Status: SHIPPED | OUTPATIENT
Start: 2023-12-07 | End: 2023-12-17

## 2023-12-07 RX ORDER — OXYCODONE HYDROCHLORIDE AND ACETAMINOPHEN 5; 325 MG/1; MG/1
1 TABLET ORAL EVERY 6 HOURS PRN
Qty: 12 TABLET | Refills: 0 | Status: SHIPPED | OUTPATIENT
Start: 2023-12-07 | End: 2023-12-10

## 2023-12-07 RX ORDER — DOXYCYCLINE HYCLATE 100 MG
100 TABLET ORAL 2 TIMES DAILY
Qty: 28 TABLET | Refills: 0 | Status: SHIPPED | OUTPATIENT
Start: 2023-12-07 | End: 2023-12-21

## 2023-12-07 RX ADMIN — PIPERACILLIN AND TAZOBACTAM 3375 MG: 3; .375 INJECTION, POWDER, LYOPHILIZED, FOR SOLUTION INTRAVENOUS at 05:20

## 2023-12-07 RX ADMIN — HEPARIN SODIUM 5000 UNITS: 5000 INJECTION INTRAVENOUS; SUBCUTANEOUS at 05:22

## 2023-12-07 RX ADMIN — GABAPENTIN 300 MG: 300 CAPSULE ORAL at 08:42

## 2023-12-07 RX ADMIN — ASPIRIN 325 MG: 325 TABLET ORAL at 08:42

## 2023-12-07 RX ADMIN — VENLAFAXINE HYDROCHLORIDE 75 MG: 75 CAPSULE, EXTENDED RELEASE ORAL at 08:41

## 2023-12-07 RX ADMIN — SEVELAMER CARBONATE 2400 MG: 800 TABLET, FILM COATED ORAL at 08:41

## 2023-12-07 RX ADMIN — Medication 10 ML: at 08:42

## 2023-12-07 NOTE — PLAN OF CARE
Problem: Chronic Conditions and Co-morbidities  Goal: Patient's chronic conditions and co-morbidity symptoms are monitored and maintained or improved  12/7/2023 0909 by Dilshad Beyer RN  Flowsheets (Taken 12/7/2023 0909)  Care Plan - Patient's Chronic Conditions and Co-Morbidity Symptoms are Monitored and Maintained or Improved: Monitor and assess patient's chronic conditions and comorbid symptoms for stability, deterioration, or improvement     Problem: Discharge Planning  Goal: Discharge to home or other facility with appropriate resources  12/7/2023 0909 by Dilshad Beyer RN  Flowsheets (Taken 12/7/2023 6757)  Discharge to home or other facility with appropriate resources:   Identify barriers to discharge with patient and caregiver   Arrange for needed discharge resources and transportation as appropriate     Problem: Pain  Goal: Verbalizes/displays adequate comfort level or baseline comfort level  12/7/2023 0909 by Dilshad Beyer RN  Flowsheets (Taken 12/7/2023 0909)  Verbalizes/displays adequate comfort level or baseline comfort level:   Encourage patient to monitor pain and request assistance   Assess pain using appropriate pain scale

## 2023-12-07 NOTE — CARE COORDINATION
12/7 Call received from the [facility] Department. They were able to speak with alyssa Spears and report that she stated that she will call her uncle and pick him up from the hospital today. Home Care order is noted. The Plan for Transition of Care is related to the following treatment goals: wound healing    The Patient was provided with a choice of provider and agrees   with the discharge plan. [x] Yes [] No    Freedom of choice list was provided with basic dialogue that supports the patient's individualized plan of care/goals, treatment preferences and shares the quality data associated with the providers. [x] Yes [] No     Patient has no home health agency preference. Referral sent to [facility]. Electronically signed by Massiel Lane RN on 12/7/2023 at 11:15 AM

## 2023-12-07 NOTE — CARE COORDINATION
[facility]    Referral received from  to follow for home care services. I will follow for needs, and speak with patient to verify demos.     Michelle Mcneil RN, BSN CTN  [facility] [phone]

## 2023-12-07 NOTE — CARE COORDINATION
12/7 Plan: Return to home with alyssa Zaman and [facility]. Ref. sent to [facility]. [place] [employer] were able to contact [place] and the pt's Brother will pick him up. HD at [facility] called to notify of d/c. HD noted faxed to [facility].  Electronically signed by Aaron Gay RN on 12/7/2023 at 11:23 AM

## 2023-12-07 NOTE — CARE COORDINATION
DISCHARGE SUMMARY     DATE OF DISCHARGE: 12/7/23    DISCHARGE DESTINATION: Home with Niece    HOME CARE: Yes  Discharging to Facility/ Agency   Name:  [facility] care    Address: [place]  Phone: [phone]  Fax: [phone]      Notified: RN, Family, and Facility/Agency    HEMODIALYSIS: Yes    Location: [address]  [address]  Phone:  [phone]  Fax: [phone]    Schedule: M,W,F    TRANSPORTATION: Private Car    NEW DME ORDERED: no    COMMENTS: Brother Luis Bragg will transport to home.   Electronically signed by Catrachito Coy RN on 12/7/2023 at 11:28 AM

## 2023-12-07 NOTE — PLAN OF CARE
Problem: Chronic Conditions and Co-morbidities  Goal: Patient's chronic conditions and co-morbidity symptoms are monitored and maintained or improved  12/7/2023 1245 by Rose Mary Thompson RN  Outcome: Completed  12/7/2023 0909 by Rose Mary Thompson RN  Flowsheets (Taken 12/7/2023 1629)  Care Plan - Patient's Chronic Conditions and Co-Morbidity Symptoms are Monitored and Maintained or Improved: Monitor and assess patient's chronic conditions and comorbid symptoms for stability, deterioration, or improvement     Problem: Discharge Planning  Goal: Discharge to home or other facility with appropriate resources  12/7/2023 1245 by Rose Mary Thompson RN  Outcome: Completed  12/7/2023 0909 by Rose Mary Thompson RN  Flowsheets (Taken 12/7/2023 0437)  Discharge to home or other facility with appropriate resources:   Identify barriers to discharge with patient and caregiver   Arrange for needed discharge resources and transportation as appropriate     Problem: Pain  Goal: Verbalizes/displays adequate comfort level or baseline comfort level  12/7/2023 1245 by Rose Mary Thompson RN  Outcome: Completed  12/7/2023 0909 by Rose Mary Thompson RN  Flowsheets (Taken 12/7/2023 9636)  Verbalizes/displays adequate comfort level or baseline comfort level:   Encourage patient to monitor pain and request assistance   Assess pain using appropriate pain scale     Problem: Safety - Adult  Goal: Free from fall injury  Outcome: Completed     Problem: Safety - Medical Restraint  Goal: Remains free of injury from restraints (Restraint for Interference with Medical Device)  Description: INTERVENTIONS:  1. Determine that other, less restrictive measures have been tried or would not be effective before applying the restraint  2. Evaluate the patient's condition at the time of restraint application  3. Inform patient/family regarding the reason for restraint  4.  Q2H: Monitor safety, psychosocial status, comfort, nutrition and hydration  Outcome: Completed Problem: Skin/Tissue Integrity  Goal: Absence of new skin breakdown  Description: 1. Monitor for areas of redness and/or skin breakdown  2. Assess vascular access sites hourly  3. Every 4-6 hours minimum:  Change oxygen saturation probe site  4. Every 4-6 hours:  If on nasal continuous positive airway pressure, respiratory therapy assess nares and determine need for appliance change or resting period.   Outcome: Completed

## 2023-12-07 NOTE — CONSULTS
Capacity/competency evaluation       Referring Provider:  Italo Vásquez MD    CC/Reason for Consult: \"Competency\"      HPI:   context: Patient is a 58 y.o.a. M with hx of CAD, CM, CKD, Hemodialysis patient, sleep apnea admitted to [facility] with increased pain in left second and third toe with dark discoloration that started 3 weeks prior. Diagnosed with PVD, Gangrene of second and third top. Underwent amputation. Psych consulted for competency for discharge. associated symptoms: Patient denies anxiety. Endorses moderate depression r/t his foot pain, recent surgery, having CKD and needing dialysis. He denies anxiety. Denies AV hallucinations. Denies Paranoia. Denies Delusions. Denies SI. Denies HI.     modifying factors: Chronic medical conditions  Timing: Chronic  duration: Years  severity: Severe medically. Mild-moderate psych      Capacity evaluation     \" Adolm Bur"   - Patient is demonstrating probable \"knowing\" by discussing his medical history, current medical conditions, why he is currently in the hospital, and his future decisions regarding discharge. 2. \" Intelligent\"   - Patient is demonstrating the ability to understand proposed SNF v. Discharge to home. Understands risks v. Benefits of returning home v. SNF. 3. \" Voluntary\"     - Patient is making a voluntary decision. 4. In summary: . ... Patient does have capacity currently to make a decision regarding discharge. Capacity can wax and wane depending on the patient's mental status. A person can be capable of medical decision making, have a TIA for example, and be rendered incapable in seconds to minutes. Likewise, incapacity can quickly become capacity if a patient's mental status improves. MSE:   Appearance    alert, cooperative  Motor: Normal strength and tone, No abnormal movements, tics or mannerisms.   Speech    spontaneous, normal rate, and normal volume  Language    0 - no of the proposed treatment. If the proposed treatment is very high risk, the patient should demonstrate more thorough \"knowing\" and \"intelligent\" decision-making than if the proposed treatment is essentially risk-free. Thank you for this consult, please call the psychiatry consult line for further questions. Vitcor Manuel Trejo, KARISHMA-BC, [facility]  Behavioral Health Service Detail Level: Detailed Depth Of Biopsy: dermis Was A Bandage Applied: Yes Size Of Lesion In Cm: 0.5 X Size Of Lesion In Cm: 0 Biopsy Type: H and E Biopsy Method: Personna blade Anesthesia Type: 1% lidocaine with epinephrine Hemostasis: Drysol Wound Care: Polysporin ointment Dressing: bandage Destruction After The Procedure: No Type Of Destruction Used: Curettage Curettage Text: The wound bed was treated with curettage after the biopsy was performed. Cryotherapy Text: The wound bed was treated with cryotherapy after the biopsy was performed. Electrodesiccation Text: The wound bed was treated with electrodesiccation after the biopsy was performed. Electrodesiccation And Curettage Text: The wound bed was treated with electrodesiccation and curettage after the biopsy was performed. Silver Nitrate Text: The wound bed was treated with silver nitrate after the biopsy was performed. Lab: [id number] Lab Facility: [id number] Consent: Verbal consent was obtained and risks were reviewed including but not limited to scarring, infection, bleeding, scabbing, incomplete removal, nerve damage and allergy to anesthesia. Post-Care Instructions: I reviewed with the patient in detail post-care instructions. Patient is to keep the biopsy site dry overnight, and then apply Vaseline daily until healed. Notification Instructions: Patient will be notified of biopsy results. However, patient instructed to call the office if not contacted within 2 weeks. Billing Type: Third-Party Bill Information: Selecting Yes will display possible errors in your note based on the variables you have selected. This validation is only offered as a suggestion for you. PLEASE NOTE THAT THE VALIDATION TEXT WILL BE REMOVED WHEN YOU FINALIZE YOUR NOTE. IF YOU WANT TO FAX A PRELIMINARY NOTE YOU WILL NEED TO TOGGLE THIS TO 'NO' IF YOU DO NOT WANT IT IN YOUR FAXED NOTE.

## 2023-12-07 NOTE — CARE COORDINATION
12/7 Plan: Patient is refusing snf level of care today. He wants to return to home with his Niece Melissa Vargas [phone] but she has not returned CM's calls for 3 days. The address was incorrect in the chart and has been corrected. The patient is unable to tell me his address but the [facility] was able to give me the correct address. The patient states,\"Just send me home and I'll wait on the porch until somebody comes home\". He does not have keys to the house. Call placed to the [facility]. ([phone]). To request a well-check for niece. They will return call after they have gone to the house. Electronically signed by Corrinne Rei, RN on 12/7/2023 at 10:22 AM

## 2023-12-07 NOTE — CARE COORDINATION
[facility]    DC order noted, all docs needed have been faxed to [facility] for home care services.     Home care to see patient within 24-48 hrs    Toshia Mittal RN, BSN CTN  [facility] [phone]

## 2023-12-07 NOTE — PROGRESS NOTES
Pt discharged. IV taken out. No complications. All paper work reviewed. All questions answered. Prescriptions and belongings sent with patient. Pt transported via wheelchair. Pt going home with his brother.

## 2023-12-07 NOTE — PLAN OF CARE
Problem: Chronic Conditions and Co-morbidities  Goal: Patient's chronic conditions and co-morbidity symptoms are monitored and maintained or improved  12/6/2023 2241 by Derek Hawkins RN  Outcome: Progressing     Problem: Discharge Planning  Goal: Discharge to home or other facility with appropriate resources  12/6/2023 2241 by Derek Hawkins RN  Outcome: Progressing     Problem: Pain  Goal: Verbalizes/displays adequate comfort level or baseline comfort level  12/6/2023 2241 by Derek Hawkins RN  Outcome: Progressing     Problem: Safety - Adult  Goal: Free from fall injury  Outcome: Progressing

## 2023-12-08 ENCOUNTER — OFFICE VISIT (OUTPATIENT)
Dept: CARDIOLOGY CLINIC | Age: 63
End: 2023-12-08
Payer: COMMERCIAL

## 2023-12-08 ENCOUNTER — TELEPHONE (OUTPATIENT)
Dept: CARDIOLOGY CLINIC | Age: 63
End: 2023-12-08

## 2023-12-08 VITALS
BODY MASS INDEX: 26.48 KG/M2 | HEIGHT: 70 IN | OXYGEN SATURATION: 90 % | WEIGHT: 185 LBS | HEART RATE: 114 BPM | DIASTOLIC BLOOD PRESSURE: 70 MMHG | SYSTOLIC BLOOD PRESSURE: 138 MMHG

## 2023-12-08 DIAGNOSIS — I25.10 CORONARY ARTERY DISEASE INVOLVING NATIVE CORONARY ARTERY OF NATIVE HEART WITHOUT ANGINA PECTORIS: Chronic | ICD-10-CM

## 2023-12-08 DIAGNOSIS — I48.0 PAROXYSMAL ATRIAL FIBRILLATION (HCC): ICD-10-CM

## 2023-12-08 DIAGNOSIS — I70.223 CRITICAL LIMB ISCHEMIA OF BOTH LOWER EXTREMITIES (HCC): Primary | ICD-10-CM

## 2023-12-08 DIAGNOSIS — Z99.2 ESRD ON HEMODIALYSIS (HCC): ICD-10-CM

## 2023-12-08 DIAGNOSIS — N18.6 ESRD ON HEMODIALYSIS (HCC): ICD-10-CM

## 2023-12-08 PROCEDURE — G8419 CALC BMI OUT NRM PARAM NOF/U: HCPCS | Performed by: INTERNAL MEDICINE

## 2023-12-08 PROCEDURE — 1036F TOBACCO NON-USER: CPT | Performed by: INTERNAL MEDICINE

## 2023-12-08 PROCEDURE — 3017F COLORECTAL CA SCREEN DOC REV: CPT | Performed by: INTERNAL MEDICINE

## 2023-12-08 PROCEDURE — G8427 DOCREV CUR MEDS BY ELIG CLIN: HCPCS | Performed by: INTERNAL MEDICINE

## 2023-12-08 PROCEDURE — 99214 OFFICE O/P EST MOD 30 MIN: CPT | Performed by: INTERNAL MEDICINE

## 2023-12-08 PROCEDURE — G8484 FLU IMMUNIZE NO ADMIN: HCPCS | Performed by: INTERNAL MEDICINE

## 2023-12-08 PROCEDURE — 1111F DSCHRG MED/CURRENT MED MERGE: CPT | Performed by: INTERNAL MEDICINE

## 2023-12-08 NOTE — PROGRESS NOTES
[facility]  Cardiology Consult    Eliseo Padilla  [birth year]    December 8, 2023      Reason for Referral: Left leg pain    Referring physician:     CC: \" Not doing that great I was just discharged from the hospital\"      Subjective:     History of Present Illness:    Eliseo Padilla is a 58 y.o. patient with a PMH significant for ESRD. hemodialysis, CAD, Afib, DM, HTN, and s/p PPM and who is seeing us for follow up. Who presented with complaints of left leg pain. Most recent, presented to ED woth critical limb ischemia after havinf LLE pain at rest with wounds and color changes. Underwent peripheral angiography with intervention, 11/2023    He was discharged yesterday. Today he presents with his left foot bandaged and bleeding. He reports he can \"barely walk and left the hospital yesterday\". He has not gotten his prescriptions filled , antibiotics, we have instructed him to go to pharmacy and get this filled and start taking. He verbalizes understanding. Past Medical History:   has a past medical history of Atrial fibrillation ([address]), CAD (coronary artery disease), Chronic kidney disease, Depression, Diabetes mellitus ([address]), Dialysis patient ([address]), Glaucoma, Hemodialysis patient ([address]), Hyperlipidemia, Hypertension, RLL pneumonia, and Sleep apnea. Surgical History:   has a past surgical history that includes Pacemaker insertion; pacemaker placement; Colonoscopy; and Toe amputation (Left, 12/1/2023). Social History:   reports that he has never smoked. He has never used smokeless tobacco. He reports that he does not currently use alcohol after a past usage of about 2.0 standard drinks of alcohol per week. He reports current drug use. Drugs: Marijuana (Weed) and Opiates . Family History:  family history includes Heart Disease in his father; High Blood Pressure in his sister.     Home Medications:  Were reviewed and are listed in nursing record and/or below  Prior to Admission medications

## 2024-01-08 NOTE — PROGRESS NOTES
of the left popliteal artery.  4.  99% stenosis of the left anterior tibial artery and peroneal artery  with occlusion of the left posterior tibial artery.     SUMMARY:  Successful revascularization of the left popliteal artery,  left anterior tibial artery, and left peroneal artery with good flow to  the foot.    Assessment:   Plan:    PAD/critical limb ischemia  Hx of amputation of 2nd and 3rd digit of L foot    S/p peripheral angiogram     Foot wrapped and unable to undress to assess     Skin at ankle blanchable    Continue ASA,    Pt follows with Dr Jordan   Discussed with Dr Sima Mondragon referral-fax sent 1/11/24    Pt currently with home health one time/wk    May benefit from more frequent wound care     2.   CAD   Hx of [facility] 2014 with non-obstructive dx    No beta-blocker secondary to hypotension    Continue ASA    Risk factor modifications   3.   ESRD    Follows with  nephrology     HD [facility]   4 HTN   Elevated   Needs to take home regimen       Further evaluation will be based upon the patient's clinical course and testing results.     All questions and concerns were addressed to the patient/family. Alternatives to my treatment were discussed.     CLEMENTE Kay-CNP  [facility] [place]  Cardiology  1/22/2024  5:53 PM

## 2024-01-11 ENCOUNTER — OFFICE VISIT (OUTPATIENT)
Dept: CARDIOLOGY CLINIC | Age: 64
End: 2024-01-11
Payer: COMMERCIAL

## 2024-01-11 VITALS
HEIGHT: 70 IN | SYSTOLIC BLOOD PRESSURE: 142 MMHG | WEIGHT: 197 LBS | DIASTOLIC BLOOD PRESSURE: 92 MMHG | BODY MASS INDEX: 28.2 KG/M2

## 2024-01-11 DIAGNOSIS — I10 PRIMARY HYPERTENSION: ICD-10-CM

## 2024-01-11 DIAGNOSIS — I70.222 CRITICAL LIMB ISCHEMIA OF LEFT LOWER EXTREMITY (HCC): Primary | ICD-10-CM

## 2024-01-11 DIAGNOSIS — N18.6 ESRD ON HEMODIALYSIS (HCC): ICD-10-CM

## 2024-01-11 DIAGNOSIS — Z99.2 ESRD ON HEMODIALYSIS (HCC): ICD-10-CM

## 2024-01-11 DIAGNOSIS — I25.10 CORONARY ARTERY DISEASE INVOLVING NATIVE CORONARY ARTERY OF NATIVE HEART WITHOUT ANGINA PECTORIS: ICD-10-CM

## 2024-01-11 PROCEDURE — 3080F DIAST BP >= 90 MM HG: CPT | Performed by: NURSE PRACTITIONER

## 2024-01-11 PROCEDURE — 1036F TOBACCO NON-USER: CPT | Performed by: NURSE PRACTITIONER

## 2024-01-11 PROCEDURE — G8427 DOCREV CUR MEDS BY ELIG CLIN: HCPCS | Performed by: NURSE PRACTITIONER

## 2024-01-11 PROCEDURE — 3017F COLORECTAL CA SCREEN DOC REV: CPT | Performed by: NURSE PRACTITIONER

## 2024-01-11 PROCEDURE — G8484 FLU IMMUNIZE NO ADMIN: HCPCS | Performed by: NURSE PRACTITIONER

## 2024-01-11 PROCEDURE — 99214 OFFICE O/P EST MOD 30 MIN: CPT | Performed by: NURSE PRACTITIONER

## 2024-01-11 PROCEDURE — 3077F SYST BP >= 140 MM HG: CPT | Performed by: NURSE PRACTITIONER

## 2024-01-11 PROCEDURE — G8419 CALC BMI OUT NRM PARAM NOF/U: HCPCS | Performed by: NURSE PRACTITIONER

## 2024-01-11 RX ORDER — AMLODIPINE BESYLATE 5 MG/1
TABLET ORAL
COMMUNITY
Start: 2024-01-10

## 2024-01-22 PROBLEM — I70.222 CRITICAL LIMB ISCHEMIA OF LEFT LOWER EXTREMITY (HCC): Status: ACTIVE | Noted: 2024-01-22

## 2024-01-22 PROBLEM — I10 PRIMARY HYPERTENSION: Status: ACTIVE | Noted: 2024-01-22

## 2024-01-22 ASSESSMENT — ENCOUNTER SYMPTOMS
APNEA: 0
COUGH: 0
SHORTNESS OF BREATH: 0
CHEST TIGHTNESS: 0
WHEEZING: 0

## 2024-02-04 ENCOUNTER — HOSPITAL ENCOUNTER (INPATIENT)
Age: 64
LOS: 9 days | Discharge: HOME HEALTH | DRG: 853 | End: 2024-02-13
Attending: INTERNAL MEDICINE | Admitting: INTERNAL MEDICINE
Payer: COMMERCIAL

## 2024-02-04 ENCOUNTER — APPOINTMENT (OUTPATIENT)
Dept: GENERAL RADIOLOGY | Age: 64
DRG: 853 | End: 2024-02-04
Payer: COMMERCIAL

## 2024-02-04 DIAGNOSIS — Z99.2 ESRD ON HEMODIALYSIS (HCC): ICD-10-CM

## 2024-02-04 DIAGNOSIS — E87.5 HYPERKALEMIA: ICD-10-CM

## 2024-02-04 DIAGNOSIS — A41.9 SEPTICEMIA (HCC): Primary | ICD-10-CM

## 2024-02-04 DIAGNOSIS — M86.9 OSTEOMYELITIS OF LEFT FOOT, UNSPECIFIED TYPE (HCC): ICD-10-CM

## 2024-02-04 DIAGNOSIS — M86.172 ACUTE OSTEOMYELITIS OF TOE OF LEFT FOOT (HCC): ICD-10-CM

## 2024-02-04 DIAGNOSIS — N18.6 ESRD ON HEMODIALYSIS (HCC): ICD-10-CM

## 2024-02-04 PROBLEM — L08.9 DIABETIC INFECTION OF LEFT FOOT (HCC): Status: ACTIVE | Noted: 2024-02-04

## 2024-02-04 PROBLEM — E11.628 DIABETIC INFECTION OF LEFT FOOT (HCC): Status: ACTIVE | Noted: 2024-02-04

## 2024-02-04 LAB
ALBUMIN SERPL-MCNC: 3.7 G/DL (ref 3.4–5)
ALBUMIN/GLOB SERPL: 1.4 {RATIO} (ref 1.1–2.2)
ALP SERPL-CCNC: 83 U/L (ref 40–129)
ALT SERPL-CCNC: 11 U/L (ref 10–40)
ANION GAP SERPL CALCULATED.3IONS-SCNC: 23 MMOL/L (ref 3–16)
AST SERPL-CCNC: 13 U/L (ref 15–37)
BACTERIA URNS QL MICRO: ABNORMAL /HPF
BASOPHILS # BLD: 0 K/UL (ref 0–0.2)
BASOPHILS NFR BLD: 0.3 %
BILIRUB SERPL-MCNC: 0.5 MG/DL (ref 0–1)
BILIRUB UR QL STRIP.AUTO: NEGATIVE
BUN SERPL-MCNC: 42 MG/DL (ref 7–20)
CALCIUM SERPL-MCNC: 10.3 MG/DL (ref 8.3–10.6)
CHLORIDE SERPL-SCNC: 91 MMOL/L (ref 99–110)
CLARITY UR: ABNORMAL
CO2 SERPL-SCNC: 20 MMOL/L (ref 21–32)
COLOR UR: YELLOW
CREAT SERPL-MCNC: 6.5 MG/DL (ref 0.8–1.3)
DEPRECATED RDW RBC AUTO: 15.3 % (ref 12.4–15.4)
EOSINOPHIL # BLD: 0 K/UL (ref 0–0.6)
EOSINOPHIL NFR BLD: 0.1 %
EPI CELLS #/AREA URNS AUTO: 0 /HPF (ref 0–5)
FLUAV RNA UPPER RESP QL NAA+PROBE: NEGATIVE
FLUBV AG NPH QL: NEGATIVE
GFR SERPLBLD CREATININE-BSD FMLA CKD-EPI: 9 ML/MIN/{1.73_M2}
GLUCOSE BLD-MCNC: 78 MG/DL (ref 70–99)
GLUCOSE SERPL-MCNC: 96 MG/DL (ref 70–99)
GLUCOSE UR STRIP.AUTO-MCNC: 100 MG/DL
HCT VFR BLD AUTO: 35.4 % (ref 40.5–52.5)
HGB BLD-MCNC: 12 G/DL (ref 13.5–17.5)
HGB UR QL STRIP.AUTO: NEGATIVE
HYALINE CASTS #/AREA URNS AUTO: 2 /LPF (ref 0–8)
KETONES UR STRIP.AUTO-MCNC: NEGATIVE MG/DL
LACTATE BLDV-SCNC: 2 MMOL/L (ref 0.4–1.9)
LACTATE BLDV-SCNC: 2.3 MMOL/L (ref 0.4–1.9)
LEUKOCYTE ESTERASE UR QL STRIP.AUTO: ABNORMAL
LYMPHOCYTES # BLD: 0.2 K/UL (ref 1–5.1)
LYMPHOCYTES NFR BLD: 4.8 %
MCH RBC QN AUTO: 34.3 PG (ref 26–34)
MCHC RBC AUTO-ENTMCNC: 34 G/DL (ref 31–36)
MCV RBC AUTO: 100.7 FL (ref 80–100)
MONOCYTES # BLD: 0.1 K/UL (ref 0–1.3)
MONOCYTES NFR BLD: 3 %
NEUTROPHILS # BLD: 3.8 K/UL (ref 1.7–7.7)
NEUTROPHILS NFR BLD: 91.8 %
NITRITE UR QL STRIP.AUTO: NEGATIVE
PERFORMED ON: NORMAL
PH UR STRIP.AUTO: 8.5 [PH] (ref 5–8)
PLATELET # BLD AUTO: 124 K/UL (ref 135–450)
PMV BLD AUTO: 8 FL (ref 5–10.5)
POTASSIUM SERPL-SCNC: 5.2 MMOL/L (ref 3.5–5.1)
PROCALCITONIN SERPL IA-MCNC: 1.04 NG/ML (ref 0–0.15)
PROT SERPL-MCNC: 6.4 G/DL (ref 6.4–8.2)
PROT UR STRIP.AUTO-MCNC: 100 MG/DL
RBC # BLD AUTO: 3.51 M/UL (ref 4.2–5.9)
RBC CLUMPS #/AREA URNS AUTO: 3 /HPF (ref 0–4)
SARS-COV-2 RDRP RESP QL NAA+PROBE: NOT DETECTED
SODIUM SERPL-SCNC: 134 MMOL/L (ref 136–145)
SP GR UR STRIP.AUTO: 1.01 (ref 1–1.03)
UA COMPLETE W REFLEX CULTURE PNL UR: ABNORMAL
UA DIPSTICK W REFLEX MICRO PNL UR: YES
URN SPEC COLLECT METH UR: ABNORMAL
UROBILINOGEN UR STRIP-ACNC: 0.2 E.U./DL
WBC # BLD AUTO: 4.2 K/UL (ref 4–11)
WBC #/AREA URNS AUTO: 6 /HPF (ref 0–5)

## 2024-02-04 PROCEDURE — 87635 SARS-COV-2 COVID-19 AMP PRB: CPT

## 2024-02-04 PROCEDURE — 87804 INFLUENZA ASSAY W/OPTIC: CPT

## 2024-02-04 PROCEDURE — 6370000000 HC RX 637 (ALT 250 FOR IP)

## 2024-02-04 PROCEDURE — 2060000000 HC ICU INTERMEDIATE R&B

## 2024-02-04 PROCEDURE — 87150 DNA/RNA AMPLIFIED PROBE: CPT

## 2024-02-04 PROCEDURE — 99285 EMERGENCY DEPT VISIT HI MDM: CPT

## 2024-02-04 PROCEDURE — 96365 THER/PROPH/DIAG IV INF INIT: CPT

## 2024-02-04 PROCEDURE — 87186 SC STD MICRODIL/AGAR DIL: CPT

## 2024-02-04 PROCEDURE — 71045 X-RAY EXAM CHEST 1 VIEW: CPT

## 2024-02-04 PROCEDURE — 2580000003 HC RX 258

## 2024-02-04 PROCEDURE — 96366 THER/PROPH/DIAG IV INF ADDON: CPT

## 2024-02-04 PROCEDURE — 96375 TX/PRO/DX INJ NEW DRUG ADDON: CPT

## 2024-02-04 PROCEDURE — 6360000002 HC RX W HCPCS

## 2024-02-04 PROCEDURE — 84145 PROCALCITONIN (PCT): CPT

## 2024-02-04 PROCEDURE — 85025 COMPLETE CBC W/AUTO DIFF WBC: CPT

## 2024-02-04 PROCEDURE — 81001 URINALYSIS AUTO W/SCOPE: CPT

## 2024-02-04 PROCEDURE — 80053 COMPREHEN METABOLIC PANEL: CPT

## 2024-02-04 PROCEDURE — 87040 BLOOD CULTURE FOR BACTERIA: CPT

## 2024-02-04 PROCEDURE — 36415 COLL VENOUS BLD VENIPUNCTURE: CPT

## 2024-02-04 PROCEDURE — 6370000000 HC RX 637 (ALT 250 FOR IP): Performed by: INTERNAL MEDICINE

## 2024-02-04 PROCEDURE — 83605 ASSAY OF LACTIC ACID: CPT

## 2024-02-04 PROCEDURE — 93005 ELECTROCARDIOGRAM TRACING: CPT

## 2024-02-04 PROCEDURE — 73630 X-RAY EXAM OF FOOT: CPT

## 2024-02-04 RX ORDER — QUETIAPINE 150 MG/1
150 TABLET, FILM COATED, EXTENDED RELEASE ORAL NIGHTLY
Status: DISCONTINUED | OUTPATIENT
Start: 2024-02-04 | End: 2024-02-13 | Stop reason: HOSPADM

## 2024-02-04 RX ORDER — BUPROPION HYDROCHLORIDE 75 MG/1
150 TABLET ORAL
Status: DISCONTINUED | OUTPATIENT
Start: 2024-02-04 | End: 2024-02-13 | Stop reason: HOSPADM

## 2024-02-04 RX ORDER — HEPARIN SODIUM 5000 [USP'U]/ML
5000 INJECTION, SOLUTION INTRAVENOUS; SUBCUTANEOUS EVERY 8 HOURS SCHEDULED
Status: DISCONTINUED | OUTPATIENT
Start: 2024-02-04 | End: 2024-02-13 | Stop reason: HOSPADM

## 2024-02-04 RX ORDER — ASPIRIN 325 MG
325 TABLET ORAL DAILY
Status: DISCONTINUED | OUTPATIENT
Start: 2024-02-05 | End: 2024-02-09

## 2024-02-04 RX ORDER — ACETAMINOPHEN 650 MG/1
650 SUPPOSITORY RECTAL EVERY 6 HOURS PRN
Status: DISCONTINUED | OUTPATIENT
Start: 2024-02-04 | End: 2024-02-13 | Stop reason: HOSPADM

## 2024-02-04 RX ORDER — SODIUM CHLORIDE 0.9 % (FLUSH) 0.9 %
5-40 SYRINGE (ML) INJECTION PRN
Status: DISCONTINUED | OUTPATIENT
Start: 2024-02-04 | End: 2024-02-13 | Stop reason: HOSPADM

## 2024-02-04 RX ORDER — ACETAMINOPHEN 500 MG
1000 TABLET ORAL ONCE
Status: COMPLETED | OUTPATIENT
Start: 2024-02-04 | End: 2024-02-04

## 2024-02-04 RX ORDER — POLYETHYLENE GLYCOL 3350 17 G/17G
17 POWDER, FOR SOLUTION ORAL DAILY PRN
Status: DISCONTINUED | OUTPATIENT
Start: 2024-02-04 | End: 2024-02-13 | Stop reason: HOSPADM

## 2024-02-04 RX ORDER — SODIUM CHLORIDE 0.9 % (FLUSH) 0.9 %
5-40 SYRINGE (ML) INJECTION EVERY 12 HOURS SCHEDULED
Status: DISCONTINUED | OUTPATIENT
Start: 2024-02-04 | End: 2024-02-11

## 2024-02-04 RX ORDER — AMLODIPINE BESYLATE 5 MG/1
5 TABLET ORAL DAILY
Status: DISCONTINUED | OUTPATIENT
Start: 2024-02-05 | End: 2024-02-05

## 2024-02-04 RX ORDER — INSULIN LISPRO 100 [IU]/ML
0-4 INJECTION, SOLUTION INTRAVENOUS; SUBCUTANEOUS NIGHTLY
Status: DISCONTINUED | OUTPATIENT
Start: 2024-02-04 | End: 2024-02-13 | Stop reason: HOSPADM

## 2024-02-04 RX ORDER — VENLAFAXINE HYDROCHLORIDE 75 MG/1
75 CAPSULE, EXTENDED RELEASE ORAL
Status: DISCONTINUED | OUTPATIENT
Start: 2024-02-05 | End: 2024-02-13 | Stop reason: HOSPADM

## 2024-02-04 RX ORDER — ACETAMINOPHEN 325 MG/1
650 TABLET ORAL EVERY 6 HOURS PRN
Status: DISCONTINUED | OUTPATIENT
Start: 2024-02-04 | End: 2024-02-13 | Stop reason: HOSPADM

## 2024-02-04 RX ORDER — INSULIN LISPRO 100 [IU]/ML
0-4 INJECTION, SOLUTION INTRAVENOUS; SUBCUTANEOUS
Status: DISCONTINUED | OUTPATIENT
Start: 2024-02-05 | End: 2024-02-13 | Stop reason: HOSPADM

## 2024-02-04 RX ORDER — ONDANSETRON 2 MG/ML
4 INJECTION INTRAMUSCULAR; INTRAVENOUS EVERY 6 HOURS PRN
Status: DISCONTINUED | OUTPATIENT
Start: 2024-02-04 | End: 2024-02-13 | Stop reason: HOSPADM

## 2024-02-04 RX ORDER — SEVELAMER CARBONATE 800 MG/1
2400 TABLET, FILM COATED ORAL
Status: DISCONTINUED | OUTPATIENT
Start: 2024-02-05 | End: 2024-02-13 | Stop reason: HOSPADM

## 2024-02-04 RX ORDER — ONDANSETRON 4 MG/1
4 TABLET, ORALLY DISINTEGRATING ORAL EVERY 8 HOURS PRN
Status: DISCONTINUED | OUTPATIENT
Start: 2024-02-04 | End: 2024-02-13 | Stop reason: HOSPADM

## 2024-02-04 RX ORDER — CINACALCET 30 MG/1
60 TABLET, FILM COATED ORAL
Status: DISCONTINUED | OUTPATIENT
Start: 2024-02-05 | End: 2024-02-13 | Stop reason: HOSPADM

## 2024-02-04 RX ORDER — SODIUM CHLORIDE 9 MG/ML
INJECTION, SOLUTION INTRAVENOUS PRN
Status: DISCONTINUED | OUTPATIENT
Start: 2024-02-04 | End: 2024-02-13 | Stop reason: HOSPADM

## 2024-02-04 RX ADMIN — QUETIAPINE FUMARATE 150 MG: 150 TABLET, EXTENDED RELEASE ORAL at 23:59

## 2024-02-04 RX ADMIN — ACETAMINOPHEN 650 MG: 325 TABLET ORAL at 23:59

## 2024-02-04 RX ADMIN — ACETAMINOPHEN 1000 MG: 500 TABLET ORAL at 18:57

## 2024-02-04 RX ADMIN — VANCOMYCIN HYDROCHLORIDE 1500 MG: 1.5 INJECTION, POWDER, LYOPHILIZED, FOR SOLUTION INTRAVENOUS at 21:12

## 2024-02-04 RX ADMIN — CEFEPIME 2000 MG: 2 INJECTION, POWDER, FOR SOLUTION INTRAVENOUS at 18:58

## 2024-02-04 RX ADMIN — BUPROPION HYDROCHLORIDE 150 MG: 75 TABLET, FILM COATED ORAL at 23:59

## 2024-02-04 NOTE — ED PROVIDER NOTES
LEFT FOOT performed by Horace Jordan DPM at [facility] OR       CURRENTMEDICATIONS       Current Discharge Medication List        CONTINUE these medications which have NOT CHANGED    Details   amLODIPine (NORVASC) 5 MG tablet       gabapentin (NEURONTIN) 300 MG capsule Take 1 capsule by mouth daily for 10 days.  Qty: 10 capsule, Refills: 0      aspirin 325 MG tablet Take 1 tablet by mouth daily      buPROPion (WELLBUTRIN) 75 MG tablet Take 2 tablets by mouth every 72 hours      midodrine (PROAMATINE) 10 MG tablet Take 1 tablet by mouth See Admin Instructions Before and during dialysis three times weekly Mon, Wed, Fri      QUEtiapine (SEROQUEL XR) 150 MG TB24 extended release tablet Take 1 tablet by mouth at bedtime at bedtime.      venlafaxine (EFFEXOR XR) 75 MG extended release capsule Take 1 capsule by mouth daily (with breakfast)      cinacalcet (SENSIPAR) 60 MG tablet Take 1 tablet by mouth three times a week Takes on dialysis days at bedtime Mon, Wed, Fri      sevelamer (RENVELA) 800 MG tablet Take 3 tablets by mouth 3 times daily (with meals)             ALLERGIES     Patient has no known allergies.    FAMILYHISTORY       Family History   Problem Relation Age of Onset    Heart Disease Father     High Blood Pressure Sister         SOCIAL HISTORY       Social History     Tobacco Use    Smoking status: Never    Smokeless tobacco: Never   Substance Use Topics    Alcohol use: Not Currently     Alcohol/week: 2.0 standard drinks of alcohol     Types: 2 Cans of beer per week     Comment: once monthly    Drug use: Yes     Types: Marijuana (Weed), Opiates        SCREENINGS          Michaela Coma Scale  Eye Opening: Spontaneous  Best Verbal Response: Confused  Best Motor Response: Obeys commands  Michaela Coma Scale Score: 14              CIWA Assessment  BP: 95/62  Pulse: 99             PHYSICAL EXAM  1 or more Elements     ED Triage Vitals [02/04/24 1807]   BP Temp Temp Source Pulse Respirations SpO2 Height Weight - Scale

## 2024-02-04 NOTE — ED TRIAGE NOTES
Pt into ER from home via EMS with c/c AMS since this morning.  Pt c/o abd pain with N&V.  Per EMS, pt temp 100.3.  EMS gave 4 mg Zofran.

## 2024-02-05 PROBLEM — R06.82 TACHYPNEA: Status: ACTIVE | Noted: 2024-02-05

## 2024-02-05 PROBLEM — A41.9 SEVERE SEPSIS (HCC): Status: ACTIVE | Noted: 2024-02-05

## 2024-02-05 PROBLEM — R79.89 ELEVATED LACTIC ACID LEVEL: Status: ACTIVE | Noted: 2024-02-05

## 2024-02-05 PROBLEM — I95.9 HYPOTENSION: Status: ACTIVE | Noted: 2024-02-05

## 2024-02-05 PROBLEM — R50.9 FEVER: Status: ACTIVE | Noted: 2024-02-05

## 2024-02-05 PROBLEM — G93.41 ACUTE METABOLIC ENCEPHALOPATHY: Status: ACTIVE | Noted: 2024-02-05

## 2024-02-05 PROBLEM — R00.0 TACHYCARDIA: Status: ACTIVE | Noted: 2024-02-05

## 2024-02-05 PROBLEM — R65.20 SEVERE SEPSIS (HCC): Status: ACTIVE | Noted: 2024-02-05

## 2024-02-05 LAB
ANION GAP SERPL CALCULATED.3IONS-SCNC: 20 MMOL/L (ref 3–16)
BASOPHILS # BLD: 0 K/UL (ref 0–0.2)
BASOPHILS NFR BLD: 0.5 %
BUN SERPL-MCNC: 52 MG/DL (ref 7–20)
CALCIUM SERPL-MCNC: 9.8 MG/DL (ref 8.3–10.6)
CHLORIDE SERPL-SCNC: 92 MMOL/L (ref 99–110)
CO2 SERPL-SCNC: 26 MMOL/L (ref 21–32)
CREAT SERPL-MCNC: 6.8 MG/DL (ref 0.8–1.3)
CRP SERPL-MCNC: 341 MG/L (ref 0–5.1)
DEPRECATED RDW RBC AUTO: 14.9 % (ref 12.4–15.4)
EKG DIAGNOSIS: NORMAL
EKG Q-T INTERVAL: 340 MS
EKG QRS DURATION: 142 MS
EKG QTC CALCULATION (BAZETT): 486 MS
EKG R AXIS: 270 DEGREES
EKG T AXIS: 17 DEGREES
EKG VENTRICULAR RATE: 123 BPM
EOSINOPHIL # BLD: 0 K/UL (ref 0–0.6)
EOSINOPHIL NFR BLD: 0 %
ERYTHROCYTE [SEDIMENTATION RATE] IN BLOOD BY WESTERGREN METHOD: 40 MM/HR (ref 0–20)
GFR SERPLBLD CREATININE-BSD FMLA CKD-EPI: 8 ML/MIN/{1.73_M2}
GLUCOSE BLD-MCNC: 101 MG/DL (ref 70–99)
GLUCOSE BLD-MCNC: 105 MG/DL (ref 70–99)
GLUCOSE BLD-MCNC: 76 MG/DL (ref 70–99)
GLUCOSE BLD-MCNC: 85 MG/DL (ref 70–99)
GLUCOSE SERPL-MCNC: 96 MG/DL (ref 70–99)
HCT VFR BLD AUTO: 33.7 % (ref 40.5–52.5)
HGB BLD-MCNC: 11.3 G/DL (ref 13.5–17.5)
LACTATE BLDV-SCNC: 1.5 MMOL/L (ref 0.4–2)
LACTATE BLDV-SCNC: 2.2 MMOL/L (ref 0.4–2)
LYMPHOCYTES # BLD: 0.3 K/UL (ref 1–5.1)
LYMPHOCYTES NFR BLD: 3.6 %
MCH RBC QN AUTO: 34.4 PG (ref 26–34)
MCHC RBC AUTO-ENTMCNC: 33.7 G/DL (ref 31–36)
MCV RBC AUTO: 102.1 FL (ref 80–100)
MONOCYTES # BLD: 0.8 K/UL (ref 0–1.3)
MONOCYTES NFR BLD: 8.5 %
NEUTROPHILS # BLD: 7.9 K/UL (ref 1.7–7.7)
NEUTROPHILS NFR BLD: 87.4 %
PERFORMED ON: ABNORMAL
PERFORMED ON: ABNORMAL
PERFORMED ON: NORMAL
PERFORMED ON: NORMAL
PLATELET # BLD AUTO: 113 K/UL (ref 135–450)
PMV BLD AUTO: 8.1 FL (ref 5–10.5)
POTASSIUM SERPL-SCNC: 6 MMOL/L (ref 3.5–5.1)
RBC # BLD AUTO: 3.3 M/UL (ref 4.2–5.9)
REPORT: NORMAL
SODIUM SERPL-SCNC: 138 MMOL/L (ref 136–145)
VANCOMYCIN SERPL-MCNC: 13 UG/ML
WBC # BLD AUTO: 9 K/UL (ref 4–11)

## 2024-02-05 PROCEDURE — 93010 ELECTROCARDIOGRAM REPORT: CPT | Performed by: INTERNAL MEDICINE

## 2024-02-05 PROCEDURE — 80202 ASSAY OF VANCOMYCIN: CPT

## 2024-02-05 PROCEDURE — 83605 ASSAY OF LACTIC ACID: CPT

## 2024-02-05 PROCEDURE — 2580000003 HC RX 258: Performed by: INTERNAL MEDICINE

## 2024-02-05 PROCEDURE — 90935 HEMODIALYSIS ONE EVALUATION: CPT

## 2024-02-05 PROCEDURE — 2060000000 HC ICU INTERMEDIATE R&B

## 2024-02-05 PROCEDURE — 6360000002 HC RX W HCPCS: Performed by: INTERNAL MEDICINE

## 2024-02-05 PROCEDURE — 86140 C-REACTIVE PROTEIN: CPT

## 2024-02-05 PROCEDURE — 85025 COMPLETE CBC W/AUTO DIFF WBC: CPT

## 2024-02-05 PROCEDURE — 6370000000 HC RX 637 (ALT 250 FOR IP): Performed by: INTERNAL MEDICINE

## 2024-02-05 PROCEDURE — 2580000003 HC RX 258: Performed by: NURSE PRACTITIONER

## 2024-02-05 PROCEDURE — 5A1D70Z PERFORMANCE OF URINARY FILTRATION, INTERMITTENT, LESS THAN 6 HOURS PER DAY: ICD-10-PCS | Performed by: INTERNAL MEDICINE

## 2024-02-05 PROCEDURE — 99223 1ST HOSP IP/OBS HIGH 75: CPT | Performed by: INTERNAL MEDICINE

## 2024-02-05 PROCEDURE — 94760 N-INVAS EAR/PLS OXIMETRY 1: CPT

## 2024-02-05 PROCEDURE — 36415 COLL VENOUS BLD VENIPUNCTURE: CPT

## 2024-02-05 PROCEDURE — 85652 RBC SED RATE AUTOMATED: CPT

## 2024-02-05 PROCEDURE — 80048 BASIC METABOLIC PNL TOTAL CA: CPT

## 2024-02-05 PROCEDURE — 6360000002 HC RX W HCPCS: Performed by: NURSE PRACTITIONER

## 2024-02-05 PROCEDURE — 87040 BLOOD CULTURE FOR BACTERIA: CPT

## 2024-02-05 RX ORDER — DEXTROSE MONOHYDRATE 100 MG/ML
INJECTION, SOLUTION INTRAVENOUS CONTINUOUS PRN
Status: DISCONTINUED | OUTPATIENT
Start: 2024-02-05 | End: 2024-02-13 | Stop reason: HOSPADM

## 2024-02-05 RX ORDER — GLUCAGON 1 MG/ML
1 KIT INJECTION PRN
Status: DISCONTINUED | OUTPATIENT
Start: 2024-02-05 | End: 2024-02-13 | Stop reason: HOSPADM

## 2024-02-05 RX ORDER — 0.9 % SODIUM CHLORIDE 0.9 %
250 INTRAVENOUS SOLUTION INTRAVENOUS ONCE
Status: COMPLETED | OUTPATIENT
Start: 2024-02-05 | End: 2024-02-05

## 2024-02-05 RX ORDER — MIDODRINE HYDROCHLORIDE 10 MG/1
10 TABLET ORAL PRN
Status: DISCONTINUED | OUTPATIENT
Start: 2024-02-05 | End: 2024-02-13 | Stop reason: HOSPADM

## 2024-02-05 RX ORDER — OXYCODONE HYDROCHLORIDE 5 MG/1
5 TABLET ORAL EVERY 4 HOURS PRN
Status: DISCONTINUED | OUTPATIENT
Start: 2024-02-05 | End: 2024-02-06

## 2024-02-05 RX ORDER — METRONIDAZOLE 500 MG/100ML
500 INJECTION, SOLUTION INTRAVENOUS EVERY 8 HOURS
Status: DISCONTINUED | OUTPATIENT
Start: 2024-02-05 | End: 2024-02-13

## 2024-02-05 RX ORDER — HEPARIN SODIUM 1000 [USP'U]/ML
3600 INJECTION, SOLUTION INTRAVENOUS; SUBCUTANEOUS PRN
Status: DISCONTINUED | OUTPATIENT
Start: 2024-02-05 | End: 2024-02-13 | Stop reason: HOSPADM

## 2024-02-05 RX ORDER — DOPAMINE HYDROCHLORIDE 160 MG/100ML
5 INJECTION, SOLUTION INTRAVENOUS CONTINUOUS
Status: DISCONTINUED | OUTPATIENT
Start: 2024-02-05 | End: 2024-02-05

## 2024-02-05 RX ADMIN — SODIUM CHLORIDE: 9 INJECTION, SOLUTION INTRAVENOUS at 06:17

## 2024-02-05 RX ADMIN — PIPERACILLIN AND TAZOBACTAM 4500 MG: 4; .5 INJECTION, POWDER, LYOPHILIZED, FOR SOLUTION INTRAVENOUS at 11:55

## 2024-02-05 RX ADMIN — SODIUM CHLORIDE, PRESERVATIVE FREE 10 ML: 5 INJECTION INTRAVENOUS at 00:05

## 2024-02-05 RX ADMIN — HEPARIN SODIUM 5000 UNITS: 5000 INJECTION INTRAVENOUS; SUBCUTANEOUS at 00:02

## 2024-02-05 RX ADMIN — ONDANSETRON 4 MG: 2 INJECTION INTRAMUSCULAR; INTRAVENOUS at 02:53

## 2024-02-05 RX ADMIN — CEFAZOLIN 1000 MG: 1 INJECTION, POWDER, FOR SOLUTION INTRAMUSCULAR; INTRAVENOUS at 18:25

## 2024-02-05 RX ADMIN — CINACALCET HYDROCHLORIDE 60 MG: 30 TABLET, FILM COATED ORAL at 09:35

## 2024-02-05 RX ADMIN — DOPAMINE HYDROCHLORIDE 5 MCG/KG/MIN: 160 INJECTION, SOLUTION INTRAVENOUS at 02:45

## 2024-02-05 RX ADMIN — SODIUM CHLORIDE 250 ML: 9 INJECTION, SOLUTION INTRAVENOUS at 02:44

## 2024-02-05 RX ADMIN — OXYCODONE 5 MG: 5 TABLET ORAL at 13:51

## 2024-02-05 RX ADMIN — QUETIAPINE FUMARATE 150 MG: 150 TABLET, EXTENDED RELEASE ORAL at 21:08

## 2024-02-05 RX ADMIN — SODIUM CHLORIDE: 9 INJECTION, SOLUTION INTRAVENOUS at 02:45

## 2024-02-05 RX ADMIN — METRONIDAZOLE 500 MG: 500 INJECTION, SOLUTION INTRAVENOUS at 18:24

## 2024-02-05 RX ADMIN — SEVELAMER CARBONATE 2400 MG: 800 TABLET, FILM COATED ORAL at 13:00

## 2024-02-05 RX ADMIN — VENLAFAXINE HYDROCHLORIDE 75 MG: 75 CAPSULE, EXTENDED RELEASE ORAL at 09:34

## 2024-02-05 RX ADMIN — ASPIRIN 325 MG: 325 TABLET ORAL at 09:34

## 2024-02-05 RX ADMIN — OXYCODONE 5 MG: 5 TABLET ORAL at 22:05

## 2024-02-05 RX ADMIN — OXYCODONE 5 MG: 5 TABLET ORAL at 17:50

## 2024-02-05 RX ADMIN — ONDANSETRON 4 MG: 2 INJECTION INTRAMUSCULAR; INTRAVENOUS at 21:08

## 2024-02-05 RX ADMIN — HEPARIN SODIUM 5000 UNITS: 5000 INJECTION INTRAVENOUS; SUBCUTANEOUS at 21:08

## 2024-02-05 RX ADMIN — SODIUM CHLORIDE, PRESERVATIVE FREE 10 ML: 5 INJECTION INTRAVENOUS at 21:08

## 2024-02-05 RX ADMIN — CEFEPIME 1000 MG: 1 INJECTION, POWDER, FOR SOLUTION INTRAMUSCULAR; INTRAVENOUS at 06:18

## 2024-02-05 RX ADMIN — ACETAMINOPHEN 650 MG: 325 TABLET ORAL at 05:04

## 2024-02-05 RX ADMIN — SEVELAMER CARBONATE 2400 MG: 800 TABLET, FILM COATED ORAL at 09:34

## 2024-02-05 RX ADMIN — HEPARIN SODIUM 5000 UNITS: 5000 INJECTION INTRAVENOUS; SUBCUTANEOUS at 06:18

## 2024-02-05 RX ADMIN — ACETAMINOPHEN 650 MG: 325 TABLET ORAL at 11:30

## 2024-02-05 RX ADMIN — ACETAMINOPHEN 650 MG: 325 TABLET ORAL at 22:08

## 2024-02-05 NOTE — PLAN OF CARE
Problem: Discharge Planning  Goal: Discharge to home or other facility with appropriate resources  2/5/2024 1503 by Bree Devlin, RN  Outcome: Progressing     Problem: Pain  Goal: Verbalizes/displays adequate comfort level or baseline comfort level  2/5/2024 1503 by Bree Devlin, RN  Outcome: Progressing     Problem: Safety - Adult  Goal: Free from fall injury  2/5/2024 1503 by Bree Devlin, RN  Outcome: Progressing      <-- Click to add NO significant Past Surgical History

## 2024-02-05 NOTE — DIALYSIS
Treatment time: 3 hours  Net UF: 2000 ml     Pre weight: 92 kg  Post weight:90 kg  EDW: 90 kg    Access used: R TDC    Access function: Well with BFR 3 ml/min     Medications or blood products given: 400     Regular outpatient schedule: [facility]     Summary of response to treatment: Patient tolerated treatment well and without any complications. Patient remained stable throughout entire treatment and upon the exiting the dialysis suite via transport.     Report given to Bree Devlin RN and copy of dialysis treatment record placed in chart, to be scanned into EMR.

## 2024-02-05 NOTE — H&P
Hospital Medicine History & Physical      Date of Admission: 2/4/2024    Date of Service:  Pt seen/examined on 2/4/2024     [x]Admitted to Inpatient with expected LOS greater than two midnights due to medical therapy.  []Placed in Observation status.    Chief Admission Complaint: Left great toe/foot diabetic infection with possible osteomyelitis    Presenting Admission History:      63 y.o. male who presented to [facility] with Left great toe/foot diabetic infection with possible osteomyelitis.  PMHx significant for end-stage renal disease, hypertension, prior diabetic ulcer status post amputation of left second and third toe, diabetes.  Patient presented to the emergency room for not feeling well for the last few days was having some nausea but and since once, denied any chest pain, has shortness of breath is no more than usual.  Denied any abdomen pain, patient does make small amount of urine, no constipation or diarrhea, he has been having some increased discomfort in the left lower extremity with some swelling erythema left foot discharge from the left great toe.  Patient has been compliant with his dialysis treatment, said he had lost his left second and third toe secondary to infection and required amputation.  Patient still smoke at time but no alcohol use.  In the emergency room Labs showed potassium of 5.2 creatinine 6.5, procalcitonin is 1.04 with lactic acid of 2.0, white blood cells of 4.2, cultures were obtained for blood, viral test negative for flu and COVID.  Chest x-ray showed mild vascular congestion and x-ray left foot showed \"Destruction of the distal phalanx of the great toe which may be related to Osteomyelitis\"    Assessment/Plan:      Current Principal Problem:  Diabetic infection of left foot (HCC)    1.  Diabetic infection of left foot including the left great toe with concern for osteomyelitis, patient received cefepime 2 g IV x 1 and vancomycin in the emergency room which we will

## 2024-02-05 NOTE — ED NOTES
PIV removed by pt. This RN placed another PIV in R hand, wrapped with coband.  
Pt is seen lying naked with jacket placed over pt, when this RN attempted to place pt in gown, states does not want one and is comfortable as is, sheet placed on top of patient.   
Pt keeps taking pulse ox and heart monitor off, pt placed back on.   
Report given to 5W RN Michelle to assume care. All questions answered, SBAR discussed, RN verbalized understanding.  
138

## 2024-02-06 PROBLEM — Z95.0 CARDIAC PACEMAKER IN SITU: Status: ACTIVE | Noted: 2024-02-06

## 2024-02-06 PROBLEM — R78.81 STAPHYLOCOCCUS AUREUS BACTEREMIA: Status: ACTIVE | Noted: 2024-02-06

## 2024-02-06 PROBLEM — B95.61 STAPHYLOCOCCUS AUREUS BACTEREMIA: Status: ACTIVE | Noted: 2024-02-06

## 2024-02-06 PROBLEM — M86.9 OSTEOMYELITIS OF LEFT FOOT (HCC): Status: ACTIVE | Noted: 2024-02-06

## 2024-02-06 PROBLEM — A41.9 SEPTICEMIA (HCC): Status: ACTIVE | Noted: 2024-02-06

## 2024-02-06 PROBLEM — L03.119 CELLULITIS OF FOOT: Status: ACTIVE | Noted: 2024-02-06

## 2024-02-06 LAB
ALBUMIN SERPL-MCNC: 3.2 G/DL (ref 3.4–5)
ANION GAP SERPL CALCULATED.3IONS-SCNC: 19 MMOL/L (ref 3–16)
BUN SERPL-MCNC: 40 MG/DL (ref 7–20)
CALCIUM SERPL-MCNC: 10.1 MG/DL (ref 8.3–10.6)
CHLORIDE SERPL-SCNC: 93 MMOL/L (ref 99–110)
CO2 SERPL-SCNC: 22 MMOL/L (ref 21–32)
CREAT SERPL-MCNC: 5.3 MG/DL (ref 0.8–1.3)
DEPRECATED RDW RBC AUTO: 15.1 % (ref 12.4–15.4)
GFR SERPLBLD CREATININE-BSD FMLA CKD-EPI: 11 ML/MIN/{1.73_M2}
GLUCOSE BLD-MCNC: 102 MG/DL (ref 70–99)
GLUCOSE BLD-MCNC: 97 MG/DL (ref 70–99)
GLUCOSE BLD-MCNC: 99 MG/DL (ref 70–99)
GLUCOSE SERPL-MCNC: 97 MG/DL (ref 70–99)
HCT VFR BLD AUTO: 31.2 % (ref 40.5–52.5)
HGB BLD-MCNC: 10.6 G/DL (ref 13.5–17.5)
LACTATE BLDV-SCNC: 1 MMOL/L (ref 0.4–2)
LACTATE BLDV-SCNC: 1 MMOL/L (ref 0.4–2)
LACTATE BLDV-SCNC: 1.2 MMOL/L (ref 0.4–2)
LACTATE BLDV-SCNC: 1.3 MMOL/L (ref 0.4–2)
MCH RBC QN AUTO: 34.2 PG (ref 26–34)
MCHC RBC AUTO-ENTMCNC: 34.1 G/DL (ref 31–36)
MCV RBC AUTO: 100.3 FL (ref 80–100)
PERFORMED ON: ABNORMAL
PERFORMED ON: NORMAL
PERFORMED ON: NORMAL
PHOSPHATE SERPL-MCNC: 4.6 MG/DL (ref 2.5–4.9)
PLATELET # BLD AUTO: 97 K/UL (ref 135–450)
PMV BLD AUTO: 7.8 FL (ref 5–10.5)
POTASSIUM SERPL-SCNC: 5.3 MMOL/L (ref 3.5–5.1)
RBC # BLD AUTO: 3.11 M/UL (ref 4.2–5.9)
SODIUM SERPL-SCNC: 134 MMOL/L (ref 136–145)
WBC # BLD AUTO: 3.2 K/UL (ref 4–11)

## 2024-02-06 PROCEDURE — 6360000002 HC RX W HCPCS: Performed by: INTERNAL MEDICINE

## 2024-02-06 PROCEDURE — 6370000000 HC RX 637 (ALT 250 FOR IP): Performed by: INTERNAL MEDICINE

## 2024-02-06 PROCEDURE — 85027 COMPLETE CBC AUTOMATED: CPT

## 2024-02-06 PROCEDURE — 6370000000 HC RX 637 (ALT 250 FOR IP): Performed by: REGISTERED NURSE

## 2024-02-06 PROCEDURE — 36415 COLL VENOUS BLD VENIPUNCTURE: CPT

## 2024-02-06 PROCEDURE — 2060000000 HC ICU INTERMEDIATE R&B

## 2024-02-06 PROCEDURE — 6370000000 HC RX 637 (ALT 250 FOR IP): Performed by: HOSPITALIST

## 2024-02-06 PROCEDURE — 83605 ASSAY OF LACTIC ACID: CPT

## 2024-02-06 PROCEDURE — 99233 SBSQ HOSP IP/OBS HIGH 50: CPT | Performed by: INTERNAL MEDICINE

## 2024-02-06 PROCEDURE — 2580000003 HC RX 258: Performed by: INTERNAL MEDICINE

## 2024-02-06 PROCEDURE — 6370000000 HC RX 637 (ALT 250 FOR IP)

## 2024-02-06 PROCEDURE — 80069 RENAL FUNCTION PANEL: CPT

## 2024-02-06 PROCEDURE — 94760 N-INVAS EAR/PLS OXIMETRY 1: CPT

## 2024-02-06 PROCEDURE — 2500000003 HC RX 250 WO HCPCS: Performed by: REGISTERED NURSE

## 2024-02-06 PROCEDURE — 6360000002 HC RX W HCPCS: Performed by: REGISTERED NURSE

## 2024-02-06 RX ORDER — SIMETHICONE 80 MG
80 TABLET,CHEWABLE ORAL EVERY 6 HOURS PRN
Status: DISCONTINUED | OUTPATIENT
Start: 2024-02-06 | End: 2024-02-06

## 2024-02-06 RX ORDER — AMLODIPINE BESYLATE 5 MG/1
5 TABLET ORAL DAILY
Status: DISCONTINUED | OUTPATIENT
Start: 2024-02-06 | End: 2024-02-13 | Stop reason: HOSPADM

## 2024-02-06 RX ORDER — SIMETHICONE 80 MG
80 TABLET,CHEWABLE ORAL EVERY 6 HOURS PRN
Status: DISCONTINUED | OUTPATIENT
Start: 2024-02-06 | End: 2024-02-13 | Stop reason: HOSPADM

## 2024-02-06 RX ORDER — GABAPENTIN 100 MG/1
100 CAPSULE ORAL 3 TIMES DAILY
Status: DISCONTINUED | OUTPATIENT
Start: 2024-02-06 | End: 2024-02-13 | Stop reason: HOSPADM

## 2024-02-06 RX ORDER — OXYCODONE HYDROCHLORIDE 10 MG/1
10 TABLET ORAL EVERY 4 HOURS PRN
Status: DISCONTINUED | OUTPATIENT
Start: 2024-02-06 | End: 2024-02-13 | Stop reason: HOSPADM

## 2024-02-06 RX ORDER — CALCIUM CARBONATE 500 MG/1
500 TABLET, CHEWABLE ORAL 3 TIMES DAILY PRN
Status: DISCONTINUED | OUTPATIENT
Start: 2024-02-06 | End: 2024-02-13 | Stop reason: HOSPADM

## 2024-02-06 RX ORDER — PANTOPRAZOLE SODIUM 40 MG/1
40 TABLET, DELAYED RELEASE ORAL
Status: DISCONTINUED | OUTPATIENT
Start: 2024-02-07 | End: 2024-02-13 | Stop reason: HOSPADM

## 2024-02-06 RX ORDER — PANTOPRAZOLE SODIUM 40 MG/1
40 TABLET, DELAYED RELEASE ORAL ONCE
Status: COMPLETED | OUTPATIENT
Start: 2024-02-06 | End: 2024-02-06

## 2024-02-06 RX ORDER — OXYCODONE HYDROCHLORIDE 5 MG/1
5 TABLET ORAL EVERY 4 HOURS PRN
Status: DISCONTINUED | OUTPATIENT
Start: 2024-02-06 | End: 2024-02-13 | Stop reason: HOSPADM

## 2024-02-06 RX ORDER — HYDRALAZINE HYDROCHLORIDE 20 MG/ML
5 INJECTION INTRAMUSCULAR; INTRAVENOUS EVERY 6 HOURS PRN
Status: DISCONTINUED | OUTPATIENT
Start: 2024-02-06 | End: 2024-02-13 | Stop reason: HOSPADM

## 2024-02-06 RX ADMIN — METRONIDAZOLE 500 MG: 500 INJECTION, SOLUTION INTRAVENOUS at 17:44

## 2024-02-06 RX ADMIN — QUETIAPINE FUMARATE 150 MG: 150 TABLET, EXTENDED RELEASE ORAL at 20:06

## 2024-02-06 RX ADMIN — ANTACID TABLETS 500 MG: 500 TABLET, CHEWABLE ORAL at 02:10

## 2024-02-06 RX ADMIN — GABAPENTIN 100 MG: 100 CAPSULE ORAL at 08:34

## 2024-02-06 RX ADMIN — OXYCODONE HYDROCHLORIDE 10 MG: 10 TABLET ORAL at 10:17

## 2024-02-06 RX ADMIN — GABAPENTIN 100 MG: 100 CAPSULE ORAL at 14:20

## 2024-02-06 RX ADMIN — ONDANSETRON 4 MG: 2 INJECTION INTRAMUSCULAR; INTRAVENOUS at 08:33

## 2024-02-06 RX ADMIN — HEPARIN SODIUM 5000 UNITS: 5000 INJECTION INTRAVENOUS; SUBCUTANEOUS at 14:20

## 2024-02-06 RX ADMIN — OXYCODONE HYDROCHLORIDE 5 MG: 5 TABLET ORAL at 17:26

## 2024-02-06 RX ADMIN — PANTOPRAZOLE SODIUM 40 MG: 40 TABLET, DELAYED RELEASE ORAL at 23:20

## 2024-02-06 RX ADMIN — VENLAFAXINE HYDROCHLORIDE 75 MG: 75 CAPSULE, EXTENDED RELEASE ORAL at 08:34

## 2024-02-06 RX ADMIN — ASPIRIN 325 MG: 325 TABLET ORAL at 08:34

## 2024-02-06 RX ADMIN — SODIUM CHLORIDE, PRESERVATIVE FREE 10 ML: 5 INJECTION INTRAVENOUS at 20:30

## 2024-02-06 RX ADMIN — ANTACID TABLETS 500 MG: 500 TABLET, CHEWABLE ORAL at 17:50

## 2024-02-06 RX ADMIN — OXYCODONE HYDROCHLORIDE 10 MG: 10 TABLET ORAL at 21:30

## 2024-02-06 RX ADMIN — AMLODIPINE BESYLATE 5 MG: 5 TABLET ORAL at 14:28

## 2024-02-06 RX ADMIN — SODIUM CHLORIDE, PRESERVATIVE FREE 10 ML: 5 INJECTION INTRAVENOUS at 08:35

## 2024-02-06 RX ADMIN — CEFAZOLIN 1000 MG: 1 INJECTION, POWDER, FOR SOLUTION INTRAMUSCULAR; INTRAVENOUS at 17:45

## 2024-02-06 RX ADMIN — OXYCODONE 5 MG: 5 TABLET ORAL at 05:31

## 2024-02-06 RX ADMIN — HEPARIN SODIUM 5000 UNITS: 5000 INJECTION INTRAVENOUS; SUBCUTANEOUS at 21:29

## 2024-02-06 RX ADMIN — METRONIDAZOLE 500 MG: 500 INJECTION, SOLUTION INTRAVENOUS at 02:08

## 2024-02-06 RX ADMIN — HEPARIN SODIUM 5000 UNITS: 5000 INJECTION INTRAVENOUS; SUBCUTANEOUS at 05:31

## 2024-02-06 RX ADMIN — SIMETHICONE 80 MG: 80 TABLET, CHEWABLE ORAL at 18:38

## 2024-02-06 RX ADMIN — METRONIDAZOLE 500 MG: 500 INJECTION, SOLUTION INTRAVENOUS at 09:37

## 2024-02-06 RX ADMIN — ONDANSETRON 4 MG: 2 INJECTION INTRAMUSCULAR; INTRAVENOUS at 17:16

## 2024-02-06 RX ADMIN — HYDRALAZINE HYDROCHLORIDE 5 MG: 20 INJECTION, SOLUTION INTRAMUSCULAR; INTRAVENOUS at 20:06

## 2024-02-06 RX ADMIN — ALUMINUM HYDROXIDE, MAGNESIUM HYDROXIDE, AND SIMETHICONE: 1200; 120; 1200 SUSPENSION ORAL at 23:20

## 2024-02-06 RX ADMIN — GABAPENTIN 100 MG: 100 CAPSULE ORAL at 20:06

## 2024-02-07 LAB
ALBUMIN SERPL-MCNC: 3 G/DL (ref 3.4–5)
ANION GAP SERPL CALCULATED.3IONS-SCNC: 47 MMOL/L (ref 3–16)
BACTERIA BLD CULT ORG #2: ABNORMAL
BACTERIA BLD CULT: ABNORMAL
BACTERIA BLD CULT: ABNORMAL
BUN SERPL-MCNC: 54 MG/DL (ref 7–20)
CALCIUM SERPL-MCNC: 10.2 MG/DL (ref 8.3–10.6)
CHLORIDE SERPL-SCNC: 79 MMOL/L (ref 99–110)
CO2 SERPL-SCNC: 23 MMOL/L (ref 21–32)
CREAT SERPL-MCNC: 6 MG/DL (ref 0.8–1.3)
DEPRECATED RDW RBC AUTO: 15 % (ref 12.4–15.4)
GFR SERPLBLD CREATININE-BSD FMLA CKD-EPI: 10 ML/MIN/{1.73_M2}
GLUCOSE BLD-MCNC: 108 MG/DL (ref 70–99)
GLUCOSE BLD-MCNC: 119 MG/DL (ref 70–99)
GLUCOSE BLD-MCNC: 60 MG/DL (ref 70–99)
GLUCOSE BLD-MCNC: 64 MG/DL (ref 70–99)
GLUCOSE BLD-MCNC: 88 MG/DL (ref 70–99)
GLUCOSE BLD-MCNC: 93 MG/DL (ref 70–99)
GLUCOSE SERPL-MCNC: 84 MG/DL (ref 70–99)
HCT VFR BLD AUTO: 29.9 % (ref 40.5–52.5)
HGB BLD-MCNC: 10.1 G/DL (ref 13.5–17.5)
LACTATE BLDV-SCNC: 0.9 MMOL/L (ref 0.4–2)
LACTATE BLDV-SCNC: 1.1 MMOL/L (ref 0.4–2)
MCH RBC QN AUTO: 33.9 PG (ref 26–34)
MCHC RBC AUTO-ENTMCNC: 33.9 G/DL (ref 31–36)
MCV RBC AUTO: 100.3 FL (ref 80–100)
ORGANISM: ABNORMAL
PERFORMED ON: ABNORMAL
PERFORMED ON: NORMAL
PERFORMED ON: NORMAL
PHOSPHATE SERPL-MCNC: 4.1 MG/DL (ref 2.5–4.9)
PLATELET # BLD AUTO: 97 K/UL (ref 135–450)
PMV BLD AUTO: 8.2 FL (ref 5–10.5)
POTASSIUM SERPL-SCNC: 5.6 MMOL/L (ref 3.5–5.1)
RBC # BLD AUTO: 2.98 M/UL (ref 4.2–5.9)
SODIUM SERPL-SCNC: 149 MMOL/L (ref 136–145)
WBC # BLD AUTO: 4 K/UL (ref 4–11)

## 2024-02-07 PROCEDURE — 6370000000 HC RX 637 (ALT 250 FOR IP): Performed by: HOSPITALIST

## 2024-02-07 PROCEDURE — 6360000002 HC RX W HCPCS: Performed by: INTERNAL MEDICINE

## 2024-02-07 PROCEDURE — 94760 N-INVAS EAR/PLS OXIMETRY 1: CPT

## 2024-02-07 PROCEDURE — 2580000003 HC RX 258: Performed by: INTERNAL MEDICINE

## 2024-02-07 PROCEDURE — 80069 RENAL FUNCTION PANEL: CPT

## 2024-02-07 PROCEDURE — 90935 HEMODIALYSIS ONE EVALUATION: CPT

## 2024-02-07 PROCEDURE — 6370000000 HC RX 637 (ALT 250 FOR IP): Performed by: INTERNAL MEDICINE

## 2024-02-07 PROCEDURE — 83605 ASSAY OF LACTIC ACID: CPT

## 2024-02-07 PROCEDURE — 2060000000 HC ICU INTERMEDIATE R&B

## 2024-02-07 PROCEDURE — 99233 SBSQ HOSP IP/OBS HIGH 50: CPT | Performed by: INTERNAL MEDICINE

## 2024-02-07 PROCEDURE — 36415 COLL VENOUS BLD VENIPUNCTURE: CPT

## 2024-02-07 PROCEDURE — 6360000002 HC RX W HCPCS: Performed by: HOSPITALIST

## 2024-02-07 PROCEDURE — 6370000000 HC RX 637 (ALT 250 FOR IP): Performed by: REGISTERED NURSE

## 2024-02-07 PROCEDURE — 85027 COMPLETE CBC AUTOMATED: CPT

## 2024-02-07 PROCEDURE — 6370000000 HC RX 637 (ALT 250 FOR IP)

## 2024-02-07 PROCEDURE — 93306 TTE W/DOPPLER COMPLETE: CPT

## 2024-02-07 RX ADMIN — METRONIDAZOLE 500 MG: 500 INJECTION, SOLUTION INTRAVENOUS at 09:39

## 2024-02-07 RX ADMIN — HEPARIN SODIUM 5000 UNITS: 5000 INJECTION INTRAVENOUS; SUBCUTANEOUS at 14:05

## 2024-02-07 RX ADMIN — METRONIDAZOLE 500 MG: 500 INJECTION, SOLUTION INTRAVENOUS at 01:38

## 2024-02-07 RX ADMIN — OXYCODONE HYDROCHLORIDE 10 MG: 10 TABLET ORAL at 17:49

## 2024-02-07 RX ADMIN — CEFAZOLIN 1000 MG: 1 INJECTION, POWDER, FOR SOLUTION INTRAMUSCULAR; INTRAVENOUS at 17:54

## 2024-02-07 RX ADMIN — OXYCODONE HYDROCHLORIDE 10 MG: 10 TABLET ORAL at 09:41

## 2024-02-07 RX ADMIN — HYDROMORPHONE HYDROCHLORIDE 0.25 MG: 1 INJECTION, SOLUTION INTRAMUSCULAR; INTRAVENOUS; SUBCUTANEOUS at 20:22

## 2024-02-07 RX ADMIN — QUETIAPINE FUMARATE 150 MG: 150 TABLET, EXTENDED RELEASE ORAL at 20:50

## 2024-02-07 RX ADMIN — SEVELAMER CARBONATE 2400 MG: 800 TABLET, FILM COATED ORAL at 14:05

## 2024-02-07 RX ADMIN — GABAPENTIN 100 MG: 100 CAPSULE ORAL at 20:15

## 2024-02-07 RX ADMIN — OXYCODONE HYDROCHLORIDE 10 MG: 10 TABLET ORAL at 01:36

## 2024-02-07 RX ADMIN — VENLAFAXINE HYDROCHLORIDE 75 MG: 75 CAPSULE, EXTENDED RELEASE ORAL at 09:34

## 2024-02-07 RX ADMIN — BUPROPION HYDROCHLORIDE 150 MG: 75 TABLET, FILM COATED ORAL at 22:19

## 2024-02-07 RX ADMIN — SODIUM CHLORIDE, PRESERVATIVE FREE 10 ML: 5 INJECTION INTRAVENOUS at 20:53

## 2024-02-07 RX ADMIN — GABAPENTIN 100 MG: 100 CAPSULE ORAL at 14:05

## 2024-02-07 RX ADMIN — PANTOPRAZOLE SODIUM 40 MG: 40 TABLET, DELAYED RELEASE ORAL at 05:37

## 2024-02-07 RX ADMIN — HEPARIN SODIUM 5000 UNITS: 5000 INJECTION INTRAVENOUS; SUBCUTANEOUS at 20:50

## 2024-02-07 RX ADMIN — AMLODIPINE BESYLATE 5 MG: 5 TABLET ORAL at 09:34

## 2024-02-07 RX ADMIN — METRONIDAZOLE 500 MG: 500 INJECTION, SOLUTION INTRAVENOUS at 18:35

## 2024-02-07 RX ADMIN — HEPARIN SODIUM 5000 UNITS: 5000 INJECTION INTRAVENOUS; SUBCUTANEOUS at 05:37

## 2024-02-07 RX ADMIN — OXYCODONE HYDROCHLORIDE 10 MG: 10 TABLET ORAL at 14:04

## 2024-02-07 RX ADMIN — GABAPENTIN 100 MG: 100 CAPSULE ORAL at 09:34

## 2024-02-07 RX ADMIN — ASPIRIN 325 MG: 325 TABLET ORAL at 09:34

## 2024-02-07 RX ADMIN — ACETAMINOPHEN 650 MG: 325 TABLET ORAL at 20:13

## 2024-02-07 RX ADMIN — OXYCODONE HYDROCHLORIDE 10 MG: 10 TABLET ORAL at 22:18

## 2024-02-07 RX ADMIN — SEVELAMER CARBONATE 2400 MG: 800 TABLET, FILM COATED ORAL at 17:49

## 2024-02-07 RX ADMIN — SEVELAMER CARBONATE 2400 MG: 800 TABLET, FILM COATED ORAL at 09:34

## 2024-02-07 RX ADMIN — CINACALCET HYDROCHLORIDE 60 MG: 30 TABLET, FILM COATED ORAL at 09:34

## 2024-02-07 RX ADMIN — DEXTROSE MONOHYDRATE 125 ML: 100 INJECTION, SOLUTION INTRAVENOUS at 14:45

## 2024-02-07 NOTE — PLAN OF CARE
Problem: Discharge Planning  Goal: Discharge to home or other facility with appropriate resources  2/7/2024 0220 by Valery Kate, RN  Outcome: Progressing     Problem: Pain  Goal: Verbalizes/displays adequate comfort level or baseline comfort level  2/7/2024 0220 by Valery Kate RN  Outcome: Progressing     Problem: Safety - Adult  Goal: Free from fall injury  2/7/2024 0220 by Valery Kate, RN  Outcome: Progressing     Problem: Chronic Conditions and Co-morbidities  Goal: Patient's chronic conditions and co-morbidity symptoms are monitored and maintained or improved  2/7/2024 0220 by Valery Kate, RN  Outcome: Progressing

## 2024-02-08 ENCOUNTER — ANESTHESIA EVENT (OUTPATIENT)
Dept: OPERATING ROOM | Age: 64
DRG: 853 | End: 2024-02-08
Payer: COMMERCIAL

## 2024-02-08 LAB
ALBUMIN SERPL-MCNC: 3.2 G/DL (ref 3.4–5)
ANION GAP SERPL CALCULATED.3IONS-SCNC: 14 MMOL/L (ref 3–16)
BUN SERPL-MCNC: 32 MG/DL (ref 7–20)
CALCIUM SERPL-MCNC: 9.5 MG/DL (ref 8.3–10.6)
CHLORIDE SERPL-SCNC: 94 MMOL/L (ref 99–110)
CO2 SERPL-SCNC: 22 MMOL/L (ref 21–32)
CREAT SERPL-MCNC: 4.7 MG/DL (ref 0.8–1.3)
DEPRECATED RDW RBC AUTO: 15.2 % (ref 12.4–15.4)
GFR SERPLBLD CREATININE-BSD FMLA CKD-EPI: 13 ML/MIN/{1.73_M2}
GLUCOSE BLD-MCNC: 81 MG/DL (ref 70–99)
GLUCOSE BLD-MCNC: 83 MG/DL (ref 70–99)
GLUCOSE BLD-MCNC: 86 MG/DL (ref 70–99)
GLUCOSE BLD-MCNC: 97 MG/DL (ref 70–99)
GLUCOSE BLD-MCNC: 98 MG/DL (ref 70–99)
GLUCOSE SERPL-MCNC: 66 MG/DL (ref 70–99)
HCT VFR BLD AUTO: 27.9 % (ref 40.5–52.5)
HGB BLD-MCNC: 9.7 G/DL (ref 13.5–17.5)
MCH RBC QN AUTO: 34.3 PG (ref 26–34)
MCHC RBC AUTO-ENTMCNC: 34.6 G/DL (ref 31–36)
MCV RBC AUTO: 99 FL (ref 80–100)
PERFORMED ON: NORMAL
PHOSPHATE SERPL-MCNC: 2.7 MG/DL (ref 2.5–4.9)
PLATELET # BLD AUTO: 94 K/UL (ref 135–450)
PMV BLD AUTO: 7.9 FL (ref 5–10.5)
POTASSIUM SERPL-SCNC: 4.3 MMOL/L (ref 3.5–5.1)
RBC # BLD AUTO: 2.82 M/UL (ref 4.2–5.9)
REASON FOR REJECTION: NORMAL
REJECTED TEST: NORMAL
SODIUM SERPL-SCNC: 130 MMOL/L (ref 136–145)
WBC # BLD AUTO: 3.4 K/UL (ref 4–11)

## 2024-02-08 PROCEDURE — 6360000002 HC RX W HCPCS: Performed by: HOSPITALIST

## 2024-02-08 PROCEDURE — 6360000002 HC RX W HCPCS: Performed by: INTERNAL MEDICINE

## 2024-02-08 PROCEDURE — 2060000000 HC ICU INTERMEDIATE R&B

## 2024-02-08 PROCEDURE — 6370000000 HC RX 637 (ALT 250 FOR IP): Performed by: INTERNAL MEDICINE

## 2024-02-08 PROCEDURE — 6370000000 HC RX 637 (ALT 250 FOR IP)

## 2024-02-08 PROCEDURE — 80069 RENAL FUNCTION PANEL: CPT

## 2024-02-08 PROCEDURE — 6370000000 HC RX 637 (ALT 250 FOR IP): Performed by: PODIATRIST

## 2024-02-08 PROCEDURE — 2580000003 HC RX 258: Performed by: INTERNAL MEDICINE

## 2024-02-08 PROCEDURE — 99233 SBSQ HOSP IP/OBS HIGH 50: CPT | Performed by: INTERNAL MEDICINE

## 2024-02-08 PROCEDURE — 6370000000 HC RX 637 (ALT 250 FOR IP): Performed by: HOSPITALIST

## 2024-02-08 PROCEDURE — 6370000000 HC RX 637 (ALT 250 FOR IP): Performed by: REGISTERED NURSE

## 2024-02-08 PROCEDURE — 94760 N-INVAS EAR/PLS OXIMETRY 1: CPT

## 2024-02-08 PROCEDURE — 85027 COMPLETE CBC AUTOMATED: CPT

## 2024-02-08 PROCEDURE — 36415 COLL VENOUS BLD VENIPUNCTURE: CPT

## 2024-02-08 RX ADMIN — SEVELAMER CARBONATE 2400 MG: 800 TABLET, FILM COATED ORAL at 17:30

## 2024-02-08 RX ADMIN — VENLAFAXINE HYDROCHLORIDE 75 MG: 75 CAPSULE, EXTENDED RELEASE ORAL at 08:06

## 2024-02-08 RX ADMIN — DAKIN'S SOLUTION 0.125% (QUARTER STRENGTH): 0.12 SOLUTION at 09:27

## 2024-02-08 RX ADMIN — QUETIAPINE FUMARATE 150 MG: 150 TABLET, EXTENDED RELEASE ORAL at 20:08

## 2024-02-08 RX ADMIN — ASPIRIN 325 MG: 325 TABLET ORAL at 08:06

## 2024-02-08 RX ADMIN — OXYCODONE HYDROCHLORIDE 10 MG: 10 TABLET ORAL at 06:19

## 2024-02-08 RX ADMIN — GABAPENTIN 100 MG: 100 CAPSULE ORAL at 14:30

## 2024-02-08 RX ADMIN — OXYCODONE HYDROCHLORIDE 10 MG: 10 TABLET ORAL at 14:30

## 2024-02-08 RX ADMIN — METRONIDAZOLE 500 MG: 500 INJECTION, SOLUTION INTRAVENOUS at 01:43

## 2024-02-08 RX ADMIN — SODIUM CHLORIDE, PRESERVATIVE FREE 10 ML: 5 INJECTION INTRAVENOUS at 20:09

## 2024-02-08 RX ADMIN — PANTOPRAZOLE SODIUM 40 MG: 40 TABLET, DELAYED RELEASE ORAL at 05:05

## 2024-02-08 RX ADMIN — METRONIDAZOLE 500 MG: 500 INJECTION, SOLUTION INTRAVENOUS at 18:01

## 2024-02-08 RX ADMIN — HEPARIN SODIUM 5000 UNITS: 5000 INJECTION INTRAVENOUS; SUBCUTANEOUS at 05:05

## 2024-02-08 RX ADMIN — GABAPENTIN 100 MG: 100 CAPSULE ORAL at 20:09

## 2024-02-08 RX ADMIN — AMLODIPINE BESYLATE 5 MG: 5 TABLET ORAL at 08:06

## 2024-02-08 RX ADMIN — HYDROMORPHONE HYDROCHLORIDE 0.25 MG: 1 INJECTION, SOLUTION INTRAMUSCULAR; INTRAVENOUS; SUBCUTANEOUS at 08:06

## 2024-02-08 RX ADMIN — CEFAZOLIN 1000 MG: 1 INJECTION, POWDER, FOR SOLUTION INTRAMUSCULAR; INTRAVENOUS at 17:28

## 2024-02-08 RX ADMIN — HEPARIN SODIUM 5000 UNITS: 5000 INJECTION INTRAVENOUS; SUBCUTANEOUS at 14:29

## 2024-02-08 RX ADMIN — HEPARIN SODIUM 5000 UNITS: 5000 INJECTION INTRAVENOUS; SUBCUTANEOUS at 21:33

## 2024-02-08 RX ADMIN — METRONIDAZOLE 500 MG: 500 INJECTION, SOLUTION INTRAVENOUS at 09:34

## 2024-02-08 RX ADMIN — SEVELAMER CARBONATE 2400 MG: 800 TABLET, FILM COATED ORAL at 08:06

## 2024-02-08 RX ADMIN — OXYCODONE HYDROCHLORIDE 10 MG: 10 TABLET ORAL at 10:37

## 2024-02-08 RX ADMIN — OXYCODONE HYDROCHLORIDE 10 MG: 10 TABLET ORAL at 18:22

## 2024-02-08 RX ADMIN — SEVELAMER CARBONATE 2400 MG: 800 TABLET, FILM COATED ORAL at 12:44

## 2024-02-08 RX ADMIN — GABAPENTIN 100 MG: 100 CAPSULE ORAL at 08:06

## 2024-02-08 RX ADMIN — OXYCODONE HYDROCHLORIDE 10 MG: 10 TABLET ORAL at 22:55

## 2024-02-08 NOTE — PLAN OF CARE
Problem: Discharge Planning  Goal: Discharge to home or other facility with appropriate resources  Outcome: Progressing  Flowsheets (Taken 2/7/2024 2022)  Discharge to home or other facility with appropriate resources:   Identify barriers to discharge with patient and caregiver   Arrange for needed discharge resources and transportation as appropriate   Identify discharge learning needs (meds, wound care, etc)   Refer to discharge planning if patient needs post-hospital services based on physician order or complex needs related to functional status, cognitive ability or social support system     Problem: Pain  Goal: Verbalizes/displays adequate comfort level or baseline comfort level  Outcome: Progressing     Problem: Safety - Adult  Goal: Free from fall injury  Outcome: Progressing  Flowsheets (Taken 2/7/2024 2108)  Free From Fall Injury: Instruct family/caregiver on patient safety     Problem: Chronic Conditions and Co-morbidities  Goal: Patient's chronic conditions and co-morbidity symptoms are monitored and maintained or improved  Outcome: Progressing  Flowsheets (Taken 2/7/2024 2022)  Care Plan - Patient's Chronic Conditions and Co-Morbidity Symptoms are Monitored and Maintained or Improved:   Monitor and assess patient's chronic conditions and comorbid symptoms for stability, deterioration, or improvement   Collaborate with multidisciplinary team to address chronic and comorbid conditions and prevent exacerbation or deterioration   Update acute care plan with appropriate goals if chronic or comorbid symptoms are exacerbated and prevent overall improvement and discharge

## 2024-02-09 ENCOUNTER — APPOINTMENT (OUTPATIENT)
Dept: CARDIAC CATH/INVASIVE PROCEDURES | Age: 64
DRG: 853 | End: 2024-02-09
Payer: COMMERCIAL

## 2024-02-09 ENCOUNTER — ANESTHESIA (OUTPATIENT)
Dept: OPERATING ROOM | Age: 64
DRG: 853 | End: 2024-02-09
Payer: COMMERCIAL

## 2024-02-09 LAB
ALBUMIN SERPL-MCNC: 3.3 G/DL (ref 3.4–5)
ANION GAP SERPL CALCULATED.3IONS-SCNC: 14 MMOL/L (ref 3–16)
BACTERIA BLD CULT ORG #2: NORMAL
BACTERIA BLD CULT: NORMAL
BUN SERPL-MCNC: 42 MG/DL (ref 7–20)
CALCIUM SERPL-MCNC: 9.6 MG/DL (ref 8.3–10.6)
CHLORIDE SERPL-SCNC: 93 MMOL/L (ref 99–110)
CO2 SERPL-SCNC: 24 MMOL/L (ref 21–32)
CREAT SERPL-MCNC: 6.8 MG/DL (ref 0.8–1.3)
DEPRECATED RDW RBC AUTO: 15.1 % (ref 12.4–15.4)
GFR SERPLBLD CREATININE-BSD FMLA CKD-EPI: 8 ML/MIN/{1.73_M2}
GLUCOSE BLD-MCNC: 114 MG/DL (ref 70–99)
GLUCOSE BLD-MCNC: 125 MG/DL (ref 70–99)
GLUCOSE BLD-MCNC: 54 MG/DL (ref 70–99)
GLUCOSE BLD-MCNC: 54 MG/DL (ref 70–99)
GLUCOSE BLD-MCNC: 74 MG/DL (ref 70–99)
GLUCOSE BLD-MCNC: 97 MG/DL (ref 70–99)
GLUCOSE SERPL-MCNC: 71 MG/DL (ref 70–99)
HCT VFR BLD AUTO: 28.7 % (ref 40.5–52.5)
HGB BLD-MCNC: 9.9 G/DL (ref 13.5–17.5)
MCH RBC QN AUTO: 34 PG (ref 26–34)
MCHC RBC AUTO-ENTMCNC: 34.5 G/DL (ref 31–36)
MCV RBC AUTO: 98.5 FL (ref 80–100)
PERFORMED ON: ABNORMAL
PERFORMED ON: NORMAL
PERFORMED ON: NORMAL
PHOSPHATE SERPL-MCNC: 3.2 MG/DL (ref 2.5–4.9)
PLATELET # BLD AUTO: 98 K/UL (ref 135–450)
PMV BLD AUTO: 7.6 FL (ref 5–10.5)
POTASSIUM SERPL-SCNC: 4.4 MMOL/L (ref 3.5–5.1)
RBC # BLD AUTO: 2.92 M/UL (ref 4.2–5.9)
SODIUM SERPL-SCNC: 131 MMOL/L (ref 136–145)
WBC # BLD AUTO: 4.1 K/UL (ref 4–11)

## 2024-02-09 PROCEDURE — 85347 COAGULATION TIME ACTIVATED: CPT

## 2024-02-09 PROCEDURE — 047S3ZZ DILATION OF LEFT POSTERIOR TIBIAL ARTERY, PERCUTANEOUS APPROACH: ICD-10-PCS | Performed by: INTERNAL MEDICINE

## 2024-02-09 PROCEDURE — B41G1ZZ FLUOROSCOPY OF LEFT LOWER EXTREMITY ARTERIES USING LOW OSMOLAR CONTRAST: ICD-10-PCS | Performed by: INTERNAL MEDICINE

## 2024-02-09 PROCEDURE — 6370000000 HC RX 637 (ALT 250 FOR IP): Performed by: INTERNAL MEDICINE

## 2024-02-09 PROCEDURE — 6360000002 HC RX W HCPCS

## 2024-02-09 PROCEDURE — 6360000002 HC RX W HCPCS: Performed by: INTERNAL MEDICINE

## 2024-02-09 PROCEDURE — 93926 LOWER EXTREMITY STUDY: CPT

## 2024-02-09 PROCEDURE — B41C1ZZ FLUOROSCOPY OF PELVIC ARTERIES USING LOW OSMOLAR CONTRAST: ICD-10-PCS | Performed by: INTERNAL MEDICINE

## 2024-02-09 PROCEDURE — 6370000000 HC RX 637 (ALT 250 FOR IP)

## 2024-02-09 PROCEDURE — 6370000000 HC RX 637 (ALT 250 FOR IP): Performed by: ANESTHESIOLOGY

## 2024-02-09 PROCEDURE — 85027 COMPLETE CBC AUTOMATED: CPT

## 2024-02-09 PROCEDURE — 6370000000 HC RX 637 (ALT 250 FOR IP): Performed by: HOSPITALIST

## 2024-02-09 PROCEDURE — C1887 CATHETER, GUIDING: HCPCS | Performed by: INTERNAL MEDICINE

## 2024-02-09 PROCEDURE — 2500000003 HC RX 250 WO HCPCS

## 2024-02-09 PROCEDURE — 04CS3ZZ EXTIRPATION OF MATTER FROM LEFT POSTERIOR TIBIAL ARTERY, PERCUTANEOUS APPROACH: ICD-10-PCS | Performed by: INTERNAL MEDICINE

## 2024-02-09 PROCEDURE — 047Q3ZZ DILATION OF LEFT ANTERIOR TIBIAL ARTERY, PERCUTANEOUS APPROACH: ICD-10-PCS | Performed by: INTERNAL MEDICINE

## 2024-02-09 PROCEDURE — 36415 COLL VENOUS BLD VENIPUNCTURE: CPT

## 2024-02-09 PROCEDURE — 2580000003 HC RX 258: Performed by: INTERNAL MEDICINE

## 2024-02-09 PROCEDURE — 37229 PR REVSC OPN/PRQ TIB/PERO W/ATHRC/ANGIOP SM VSL: CPT | Performed by: INTERNAL MEDICINE

## 2024-02-09 PROCEDURE — 2060000000 HC ICU INTERMEDIATE R&B

## 2024-02-09 PROCEDURE — 37229 HC TIB PER TERRITORY ATHER: CPT

## 2024-02-09 PROCEDURE — 75630 X-RAY AORTA LEG ARTERIES: CPT

## 2024-02-09 PROCEDURE — 6370000000 HC RX 637 (ALT 250 FOR IP): Performed by: REGISTERED NURSE

## 2024-02-09 PROCEDURE — 90935 HEMODIALYSIS ONE EVALUATION: CPT

## 2024-02-09 PROCEDURE — C1769 GUIDE WIRE: HCPCS | Performed by: INTERNAL MEDICINE

## 2024-02-09 PROCEDURE — B4101ZZ FLUOROSCOPY OF ABDOMINAL AORTA USING LOW OSMOLAR CONTRAST: ICD-10-PCS | Performed by: INTERNAL MEDICINE

## 2024-02-09 PROCEDURE — 37233 PR REVSC OPN/PRQ TIB/PERO W/ATHRC/ANGIOP UNI EA VSL: CPT | Performed by: INTERNAL MEDICINE

## 2024-02-09 PROCEDURE — C1760 CLOSURE DEV, VASC: HCPCS | Performed by: INTERNAL MEDICINE

## 2024-02-09 PROCEDURE — 99152 MOD SED SAME PHYS/QHP 5/>YRS: CPT

## 2024-02-09 PROCEDURE — C1894 INTRO/SHEATH, NON-LASER: HCPCS | Performed by: INTERNAL MEDICINE

## 2024-02-09 PROCEDURE — 75710 ARTERY X-RAYS ARM/LEG: CPT | Performed by: INTERNAL MEDICINE

## 2024-02-09 PROCEDURE — 37233 HC TIB PER TERR ADDL ATHER: CPT

## 2024-02-09 PROCEDURE — C1724 CATH, TRANS ATHEREC,ROTATION: HCPCS | Performed by: INTERNAL MEDICINE

## 2024-02-09 PROCEDURE — 80069 RENAL FUNCTION PANEL: CPT

## 2024-02-09 PROCEDURE — 2709999900 HC NON-CHARGEABLE SUPPLY: Performed by: INTERNAL MEDICINE

## 2024-02-09 PROCEDURE — C1725 CATH, TRANSLUMIN NON-LASER: HCPCS | Performed by: INTERNAL MEDICINE

## 2024-02-09 PROCEDURE — 75774 ARTERY X-RAY EACH VESSEL: CPT

## 2024-02-09 PROCEDURE — 6360000004 HC RX CONTRAST MEDICATION: Performed by: INTERNAL MEDICINE

## 2024-02-09 PROCEDURE — 04CQ3ZZ EXTIRPATION OF MATTER FROM LEFT ANTERIOR TIBIAL ARTERY, PERCUTANEOUS APPROACH: ICD-10-PCS | Performed by: INTERNAL MEDICINE

## 2024-02-09 PROCEDURE — 99153 MOD SED SAME PHYS/QHP EA: CPT

## 2024-02-09 PROCEDURE — 75625 CONTRAST EXAM ABDOMINL AORTA: CPT | Performed by: INTERNAL MEDICINE

## 2024-02-09 RX ORDER — ACETAMINOPHEN 325 MG/1
650 TABLET ORAL EVERY 4 HOURS PRN
Status: DISCONTINUED | OUTPATIENT
Start: 2024-02-09 | End: 2024-02-13 | Stop reason: HOSPADM

## 2024-02-09 RX ORDER — OXYCODONE HYDROCHLORIDE 5 MG/1
5 TABLET ORAL PRN
Status: COMPLETED | OUTPATIENT
Start: 2024-02-09 | End: 2024-02-09

## 2024-02-09 RX ORDER — SODIUM CHLORIDE 0.9 % (FLUSH) 0.9 %
5-40 SYRINGE (ML) INJECTION EVERY 12 HOURS SCHEDULED
Status: DISCONTINUED | OUTPATIENT
Start: 2024-02-09 | End: 2024-02-12 | Stop reason: HOSPADM

## 2024-02-09 RX ORDER — CARVEDILOL 6.25 MG/1
6.25 TABLET ORAL 2 TIMES DAILY WITH MEALS
Status: DISCONTINUED | OUTPATIENT
Start: 2024-02-10 | End: 2024-02-13 | Stop reason: HOSPADM

## 2024-02-09 RX ORDER — OXYCODONE HYDROCHLORIDE 10 MG/1
10 TABLET ORAL PRN
Status: COMPLETED | OUTPATIENT
Start: 2024-02-09 | End: 2024-02-09

## 2024-02-09 RX ORDER — SODIUM CHLORIDE 0.9 % (FLUSH) 0.9 %
5-40 SYRINGE (ML) INJECTION EVERY 12 HOURS SCHEDULED
Status: DISCONTINUED | OUTPATIENT
Start: 2024-02-09 | End: 2024-02-11

## 2024-02-09 RX ORDER — ONDANSETRON 2 MG/ML
4 INJECTION INTRAMUSCULAR; INTRAVENOUS
Status: DISPENSED | OUTPATIENT
Start: 2024-02-09 | End: 2024-02-10

## 2024-02-09 RX ORDER — SODIUM CHLORIDE 9 MG/ML
INJECTION, SOLUTION INTRAVENOUS PRN
Status: DISCONTINUED | OUTPATIENT
Start: 2024-02-09 | End: 2024-02-12 | Stop reason: HOSPADM

## 2024-02-09 RX ORDER — SODIUM CHLORIDE 0.9 % (FLUSH) 0.9 %
5-40 SYRINGE (ML) INJECTION PRN
Status: DISCONTINUED | OUTPATIENT
Start: 2024-02-09 | End: 2024-02-11

## 2024-02-09 RX ORDER — SODIUM CHLORIDE 9 MG/ML
INJECTION, SOLUTION INTRAVENOUS PRN
Status: DISCONTINUED | OUTPATIENT
Start: 2024-02-09 | End: 2024-02-13 | Stop reason: HOSPADM

## 2024-02-09 RX ORDER — ASPIRIN 81 MG/1
81 TABLET, CHEWABLE ORAL DAILY
Status: DISCONTINUED | OUTPATIENT
Start: 2024-02-10 | End: 2024-02-13 | Stop reason: HOSPADM

## 2024-02-09 RX ORDER — FENTANYL CITRATE 0.05 MG/ML
50 INJECTION, SOLUTION INTRAMUSCULAR; INTRAVENOUS EVERY 5 MIN PRN
Status: DISCONTINUED | OUTPATIENT
Start: 2024-02-09 | End: 2024-02-12 | Stop reason: SDUPTHER

## 2024-02-09 RX ORDER — ACETAMINOPHEN 325 MG/1
650 TABLET ORAL
Status: ACTIVE | OUTPATIENT
Start: 2024-02-09 | End: 2024-02-10

## 2024-02-09 RX ORDER — ATORVASTATIN CALCIUM 80 MG/1
80 TABLET, FILM COATED ORAL NIGHTLY
Status: DISCONTINUED | OUTPATIENT
Start: 2024-02-09 | End: 2024-02-13 | Stop reason: HOSPADM

## 2024-02-09 RX ADMIN — IOPAMIDOL 140 ML: 755 INJECTION, SOLUTION INTRAVENOUS at 15:15

## 2024-02-09 RX ADMIN — CEFAZOLIN 1000 MG: 1 INJECTION, POWDER, FOR SOLUTION INTRAMUSCULAR; INTRAVENOUS at 18:00

## 2024-02-09 RX ADMIN — VENLAFAXINE HYDROCHLORIDE 75 MG: 75 CAPSULE, EXTENDED RELEASE ORAL at 11:54

## 2024-02-09 RX ADMIN — MIDODRINE HYDROCHLORIDE 10 MG: 10 TABLET ORAL at 09:35

## 2024-02-09 RX ADMIN — METRONIDAZOLE 500 MG: 500 INJECTION, SOLUTION INTRAVENOUS at 18:56

## 2024-02-09 RX ADMIN — CINACALCET HYDROCHLORIDE 60 MG: 30 TABLET, FILM COATED ORAL at 11:53

## 2024-02-09 RX ADMIN — Medication 10 ML: at 21:25

## 2024-02-09 RX ADMIN — METRONIDAZOLE 500 MG: 500 INJECTION, SOLUTION INTRAVENOUS at 02:30

## 2024-02-09 RX ADMIN — HEPARIN SODIUM 5000 UNITS: 5000 INJECTION INTRAVENOUS; SUBCUTANEOUS at 05:32

## 2024-02-09 RX ADMIN — OXYCODONE HYDROCHLORIDE 10 MG: 10 TABLET ORAL at 16:57

## 2024-02-09 RX ADMIN — AMLODIPINE BESYLATE 5 MG: 5 TABLET ORAL at 11:54

## 2024-02-09 RX ADMIN — SEVELAMER CARBONATE 2400 MG: 800 TABLET, FILM COATED ORAL at 16:58

## 2024-02-09 RX ADMIN — OXYCODONE HYDROCHLORIDE 10 MG: 10 TABLET ORAL at 11:54

## 2024-02-09 RX ADMIN — DAKIN'S SOLUTION 0.125% (QUARTER STRENGTH): 0.12 SOLUTION at 17:08

## 2024-02-09 RX ADMIN — QUETIAPINE FUMARATE 150 MG: 150 TABLET, EXTENDED RELEASE ORAL at 20:10

## 2024-02-09 RX ADMIN — METRONIDAZOLE 500 MG: 500 INJECTION, SOLUTION INTRAVENOUS at 11:56

## 2024-02-09 RX ADMIN — HEPARIN SODIUM 5000 UNITS: 5000 INJECTION INTRAVENOUS; SUBCUTANEOUS at 21:22

## 2024-02-09 RX ADMIN — SODIUM CHLORIDE, PRESERVATIVE FREE 10 ML: 5 INJECTION INTRAVENOUS at 11:56

## 2024-02-09 RX ADMIN — OXYCODONE HYDROCHLORIDE 10 MG: 10 TABLET ORAL at 07:54

## 2024-02-09 RX ADMIN — PANTOPRAZOLE SODIUM 40 MG: 40 TABLET, DELAYED RELEASE ORAL at 05:32

## 2024-02-09 RX ADMIN — OXYCODONE HYDROCHLORIDE 10 MG: 10 TABLET ORAL at 21:22

## 2024-02-09 RX ADMIN — GABAPENTIN 100 MG: 100 CAPSULE ORAL at 20:10

## 2024-02-09 RX ADMIN — SODIUM CHLORIDE, PRESERVATIVE FREE 10 ML: 5 INJECTION INTRAVENOUS at 20:11

## 2024-02-09 NOTE — PRE SEDATION
Assessment:  Mallampati Class I - (soft palate, fauces, uvula & anterior/posterior tonsillar pillars are visible)    Prior History of Anesthesia Complications:   none    ASA Classification:  Class 3 - A patient with severe systemic disease that limits activity but is not incapacitating    Sedation/ Anesthesia Plan:   intravenous sedation    Medications Planned:   midazolam (Versed) intravenously    Patient is an appropriate candidate for plan of sedation: yes    Electronically signed by Freeman Rodriguez MD on 2/9/2024 at 3:25 PM

## 2024-02-09 NOTE — ANESTHESIA PRE PROCEDURE
06:15 PM    AST 13 02/04/2024 06:15 PM    ALT 11 02/04/2024 06:15 PM     BMP    Lab Results   Component Value Date/Time     02/09/2024 04:35 AM    K 4.4 02/09/2024 04:35 AM    K 6.0 02/05/2024 05:46 AM    CL 93 02/09/2024 04:35 AM    CO2 24 02/09/2024 04:35 AM    BUN 42 02/09/2024 04:35 AM    CREATININE 6.8 02/09/2024 04:35 AM    CALCIUM 9.6 02/09/2024 04:35 AM    GFRAA 12 02/18/2022 12:11 AM    LABGLOM 8 02/09/2024 04:35 AM    GLUCOSE 71 02/09/2024 04:35 AM     POCGlucose  Recent Labs     02/07/24  0431 02/08/24  0739 02/09/24  0435   GLUCOSE 84 66* 71        Coags    Lab Results   Component Value Date/Time    PROTIME 12.3 09/01/2023 03:24 PM    INR 0.92 09/01/2023 03:24 PM    APTT 34.2 01/02/2015 09:30 PM     HCG (If Applicable) No results found for: \"PREGTESTUR\", \"PREGSERUM\", \"HCG\", \"HCGQUANT\"   ABGs No results found for: \"PHART\", \"PO2ART\", \"XYM2XYK\", \"UVU0YOD\", \"BEART\", \"H3IZHGGA\"   Type & Screen (If Applicable)  No results found for: \"LABABO\", \"LABRH\"                         BMI: Wt Readings from Last 3 Encounters:       NPO Status: 8 hours                          Anesthesia Evaluation  Patient summary reviewed   no history of anesthetic complications:   Airway: Mallampati: III  TM distance: >3 FB   Neck ROM: full  Mouth opening: > = 3 FB   Dental:    (+) edentulous      Pulmonary:normal exam    (+)  COPD:    sleep apnea:                                  Cardiovascular:  Exercise tolerance: good (>4 METS)  (+) hypertension:, angina: no interval change, pacemaker: pacemaker, CAD: non-obstructive, CABG/stent (stent): no interval change, dysrhythmias (LAFB, RBBB): atrial fibrillation      ECG reviewed  Rhythm: regular  Rate: normal  Echocardiogram reviewed         Beta Blocker:  Not on Beta Blocker      ROS comment: AF s/p WM (5/19/2021), ablation (5/2020), and DCCV (1/2020), SSS s/p [employer] dual-chamber PPM implantation (12/2013), CAD s/p PCI (2015), pericardial effusion    ast Echocardiogram

## 2024-02-09 NOTE — CONSULTS
Interventional Cardiology Consultation     Cal Sanchez  1960    PCP: Tania Moses MD  Chief Complaint:   Chief Complaint   Patient presents with    Altered Mental Status     AMS since this morning.  Pt c/o abd pain with N&V.  Per EMS, pt temp 100.3.  EMS gave 4 mg Zofran.  Pt's left foot with redness.       Subjective:   History of Present Illness: The patient is 63 y.o. male with a past medical history significant for ESRD, CAD, afib, DM, and PAD with history of wounds. Patient recently presented to the ED 2/4/24 with complaints of n/v and left foot pain with associated swelling and redness. Cardiology consulted for known PAD and mildly reduced EF.       Past Medical History:   Diagnosis Date    Atrial fibrillation (HCC)     CAD (coronary artery disease) 4/3/2014    Chronic kidney disease     Depression     Diabetes mellitus (HCC)     Dialysis patient (HCC)     left upper arm fistula    Glaucoma     Hemodialysis patient (HCC)     Hyperlipidemia     Hypertension     RLL pneumonia 1/23/2018    Sleep apnea     uses CPAP     Past Surgical History:   Procedure Laterality Date    COLONOSCOPY      PACEMAKER INSERTION      PACEMAKER PLACEMENT      TOE AMPUTATION Left 12/1/2023    AMPUTATION SECOND AND THIRD DIGITS LEFT FOOT performed by Horace Jordan DPM at [facility] OR     Family History   Problem Relation Age of Onset    Heart Disease Father     High Blood Pressure Sister      Social History     Tobacco Use    Smoking status: Never    Smokeless tobacco: Never   Substance Use Topics    Alcohol use: Not Currently     Alcohol/week: 2.0 standard drinks of alcohol     Types: 2 Cans of beer per week     Comment: once monthly    Drug use: Yes     Types: Marijuana (Weed), Opiates        No Known Allergies    Current Facility-Administered Medications   Medication Dose Route Frequency Provider Last Rate Last Admin    Sodium Hypochlorite (DAKINS) 0.125 % quarter strength external soln   
      Infectious Diseases Inpatient Consult Note      Reason for Consult:  staph aureus Bacteremia and ESRD, FEVERS Left foot osteomyelitis     Requesting Physician:        Primary Care Physician:  Tania Moses MD    History Obtained From:  Epic and pt    CHIEF COMPLAINT:     Chief Complaint   Patient presents with    Altered Mental Status     AMS since this morning.  Pt c/o abd pain with N&V.  Per EMS, pt temp 100.3.  EMS gave 4 mg Zofran.  Pt's left foot with redness.         HISTORY OF PRESENT ILLNESS:  63 y.o. man with a history of end-stage renal disease on hemodialysis, diabetes, hypertension, diabetic neuropathy, previous toe amputation admitted to hospital secondary to change in mental status high fever not feeling well also found to have redness swelling on the left foot with discharge from the left great toe concern for ongoing infection.  On admission labs indicate procalcitonin 1.04 lactic acid 2.3 blood cultures now positive for Staphylococcus aureus MSSA, Tmax 103 on admission, WBC 9 hemoglobin 11.3 with .  X-ray left foot with destruction of the distal phalanx of the left great toe concern for osteomyelitis.  Given the need for IV antibiotics and ongoing fevers and bacteremia we are consulted for recommendations      Past Medical History:    Past Medical History:   Diagnosis Date    Atrial fibrillation (HCC)     CAD (coronary artery disease) 4/3/2014    Chronic kidney disease     Depression     Diabetes mellitus (HCC)     Dialysis patient (HCC)     left upper arm fistula    Glaucoma     Hemodialysis patient (HCC)     Hyperlipidemia     Hypertension     RLL pneumonia 1/23/2018    Sleep apnea     uses CPAP       Past Surgical History:    Past Surgical History:   Procedure Laterality Date    COLONOSCOPY      PACEMAKER INSERTION      PACEMAKER PLACEMENT      TOE AMPUTATION Left 12/1/2023    AMPUTATION SECOND AND THIRD DIGITS LEFT FOOT performed by Horace Jordan DPM at [facility] 
  Nephrology Consult Note   [email or web address]      Reason for consultation: ESRD on HD MWF; follows with  Nephrology     History of Present Illness:  Cal Sanchez is a 64 yo male with a PMHx of ESRD on HD MWF, CAD, afib, DM, HTN, HLD, depression. Patient presented to  ED on 11/29/2023 with complaints of toe pain of the LLE. He was recently admitted to  12/2023 with similar complaints when he received amputation of the 2nd/3rd toes of the LLE. He also received a balloon angioplasty of the L popliteal, L anterior tibial, and L peroneal arteries d/t critical limb ischemia. States he began experiencing shooting pain in the LLE again which prompted his admission. He was febrile with temp of 103 F. XR of the L foot suggestive of possible OM to the great toe. He was placed on zosyn and vancomycin. Podiatry is consulted. Blood cx's are positive for gram positive cocci.     We have been consulted for ESRD management. Patient dialyzes at [facility] on F via R TDC. Follows with  Nephrology outpatient. Last tx was 1/2/2024. Endorses overall compliance with HD and states he has not missed any treatments. Denies N/V/D. Endorses baseline FERNANDES.       Subjective:      Patient seen and examined. Labs and chart reviewed. Resting in bed.    There were not complications last night.    Patient review of systems: Baseline SOB. Denies N/V/D.    Past Medical History:   Diagnosis Date    Atrial fibrillation (HCC)     CAD (coronary artery disease) 4/3/2014    Chronic kidney disease     Depression     Diabetes mellitus (HCC)     Dialysis patient (HCC)     left upper arm fistula    Glaucoma     Hemodialysis patient (HCC)     Hyperlipidemia     Hypertension     RLL pneumonia 1/23/2018    Sleep apnea     uses CPAP       Past Surgical History:   Procedure Laterality Date    COLONOSCOPY      PACEMAKER INSERTION      PACEMAKER PLACEMENT      TOE AMPUTATION Left 12/1/2023    AMPUTATION SECOND AND THIRD DIGITS LEFT FOOT performed by Nikki 
Clinical Pharmacy Note  Vancomycin Consult    Pharmacy consult received for one-time dose of vancomycin in the Emergency Department per LAY Garcia.    Ht Readings from Last 1 Encounters:   02/04/24 1.778 m (5' 10\")        Wt Readings from Last 1 Encounters:   02/04/24 90.3 kg (199 lb 1.2 oz)         Assessment/Plan:  Vancomycin 1500mg x 1 in ED.  If vancomycin is to continue on admission and pharmacy is to manage dosing, please re-consult with admission orders.         
phalanx of hallux. Discussed with patient that definitive treatment of bone infection is amputation. Discussed with patient however IV antibiotics can be used but do not penetrate the bone well as there is limited vascularity once a bone is sequestered with infection. Potential surgical planning will be decided once MRI is obtained       DISPO: Left diabetic foot infection. Labs and imaging reviewed. Continue IV antibiotics per primary; ID consulted. Partial Wbing with heel touch only in surgical shoe to LLE. Inflammatory markers ordered. MRI left foot ordered; results will help dictate treatment. We will continue to monitor    - The patient will be staffed with Dr. Horace Jordan DPM.    Orestes Watkins DPM  02/05/24  12:58 PM

## 2024-02-09 NOTE — DIALYSIS
Treatment time: 3 hours  Net UF: 2000 ml     Pre weight: 90.5 kg  Post weight:88.5 kg        Access used: R cvc     Access function: Well with  ml/min     Medications or blood products given: heparin , midodrine      Regular outpatient schedule: mwf     Summary of response to treatment: Patient tolerated treatment well and without any complications. Patient remained stable throughout entire treatment and upon the exiting the dialysis suite via transport.     Report given to Vaishali Cortes RN and copy of dialysis treatment record placed in chart, to be scanned into EMR.

## 2024-02-10 ENCOUNTER — TELEPHONE (OUTPATIENT)
Dept: CARDIOLOGY | Age: 64
End: 2024-02-10

## 2024-02-10 PROBLEM — R79.82 CRP ELEVATED: Status: ACTIVE | Noted: 2024-02-10

## 2024-02-10 PROBLEM — I73.9 PVD (PERIPHERAL VASCULAR DISEASE) (HCC): Status: ACTIVE | Noted: 2024-02-10

## 2024-02-10 LAB
ALBUMIN SERPL-MCNC: 2.7 G/DL (ref 3.4–5)
ANION GAP SERPL CALCULATED.3IONS-SCNC: 12 MMOL/L (ref 3–16)
BUN SERPL-MCNC: 23 MG/DL (ref 7–20)
CALCIUM SERPL-MCNC: 8.4 MG/DL (ref 8.3–10.6)
CHLORIDE SERPL-SCNC: 95 MMOL/L (ref 99–110)
CO2 SERPL-SCNC: 26 MMOL/L (ref 21–32)
CREAT SERPL-MCNC: 4.4 MG/DL (ref 0.8–1.3)
DEPRECATED RDW RBC AUTO: 15.1 % (ref 12.4–15.4)
GFR SERPLBLD CREATININE-BSD FMLA CKD-EPI: 14 ML/MIN/{1.73_M2}
GLUCOSE BLD-MCNC: 122 MG/DL (ref 70–99)
GLUCOSE BLD-MCNC: 126 MG/DL (ref 70–99)
GLUCOSE BLD-MCNC: 75 MG/DL (ref 70–99)
GLUCOSE BLD-MCNC: 78 MG/DL (ref 70–99)
GLUCOSE SERPL-MCNC: 67 MG/DL (ref 70–99)
HCT VFR BLD AUTO: 26.7 % (ref 40.5–52.5)
HGB BLD-MCNC: 9.1 G/DL (ref 13.5–17.5)
MCH RBC QN AUTO: 33.6 PG (ref 26–34)
MCHC RBC AUTO-ENTMCNC: 34.2 G/DL (ref 31–36)
MCV RBC AUTO: 98.5 FL (ref 80–100)
PERFORMED ON: ABNORMAL
PERFORMED ON: ABNORMAL
PERFORMED ON: NORMAL
PERFORMED ON: NORMAL
PHOSPHATE SERPL-MCNC: 2.8 MG/DL (ref 2.5–4.9)
PLATELET # BLD AUTO: 101 K/UL (ref 135–450)
PMV BLD AUTO: 8 FL (ref 5–10.5)
POC ACT LR: 276 SEC
POTASSIUM SERPL-SCNC: 3.9 MMOL/L (ref 3.5–5.1)
RBC # BLD AUTO: 2.71 M/UL (ref 4.2–5.9)
SODIUM SERPL-SCNC: 133 MMOL/L (ref 136–145)
WBC # BLD AUTO: 5.1 K/UL (ref 4–11)

## 2024-02-10 PROCEDURE — 6370000000 HC RX 637 (ALT 250 FOR IP): Performed by: HOSPITALIST

## 2024-02-10 PROCEDURE — 80069 RENAL FUNCTION PANEL: CPT

## 2024-02-10 PROCEDURE — 6370000000 HC RX 637 (ALT 250 FOR IP): Performed by: INTERNAL MEDICINE

## 2024-02-10 PROCEDURE — 94760 N-INVAS EAR/PLS OXIMETRY 1: CPT

## 2024-02-10 PROCEDURE — 6370000000 HC RX 637 (ALT 250 FOR IP): Performed by: REGISTERED NURSE

## 2024-02-10 PROCEDURE — 2580000003 HC RX 258: Performed by: INTERNAL MEDICINE

## 2024-02-10 PROCEDURE — 36415 COLL VENOUS BLD VENIPUNCTURE: CPT

## 2024-02-10 PROCEDURE — 99232 SBSQ HOSP IP/OBS MODERATE 35: CPT | Performed by: NURSE PRACTITIONER

## 2024-02-10 PROCEDURE — 99233 SBSQ HOSP IP/OBS HIGH 50: CPT | Performed by: INTERNAL MEDICINE

## 2024-02-10 PROCEDURE — 6360000002 HC RX W HCPCS: Performed by: INTERNAL MEDICINE

## 2024-02-10 PROCEDURE — 2060000000 HC ICU INTERMEDIATE R&B

## 2024-02-10 PROCEDURE — 6370000000 HC RX 637 (ALT 250 FOR IP)

## 2024-02-10 PROCEDURE — 85027 COMPLETE CBC AUTOMATED: CPT

## 2024-02-10 RX ADMIN — METRONIDAZOLE 500 MG: 500 INJECTION, SOLUTION INTRAVENOUS at 01:40

## 2024-02-10 RX ADMIN — VENLAFAXINE HYDROCHLORIDE 75 MG: 75 CAPSULE, EXTENDED RELEASE ORAL at 09:06

## 2024-02-10 RX ADMIN — CARVEDILOL 6.25 MG: 6.25 TABLET, FILM COATED ORAL at 09:06

## 2024-02-10 RX ADMIN — SEVELAMER CARBONATE 2400 MG: 800 TABLET, FILM COATED ORAL at 17:33

## 2024-02-10 RX ADMIN — OXYCODONE HYDROCHLORIDE 10 MG: 10 TABLET ORAL at 11:29

## 2024-02-10 RX ADMIN — HEPARIN SODIUM 5000 UNITS: 5000 INJECTION INTRAVENOUS; SUBCUTANEOUS at 20:27

## 2024-02-10 RX ADMIN — HEPARIN SODIUM 5000 UNITS: 5000 INJECTION INTRAVENOUS; SUBCUTANEOUS at 06:16

## 2024-02-10 RX ADMIN — METRONIDAZOLE 500 MG: 500 INJECTION, SOLUTION INTRAVENOUS at 17:37

## 2024-02-10 RX ADMIN — POLYETHYLENE GLYCOL 3350 17 G: 17 POWDER, FOR SOLUTION ORAL at 17:46

## 2024-02-10 RX ADMIN — Medication 10 ML: at 09:06

## 2024-02-10 RX ADMIN — GABAPENTIN 100 MG: 100 CAPSULE ORAL at 09:06

## 2024-02-10 RX ADMIN — OXYCODONE HYDROCHLORIDE 10 MG: 10 TABLET ORAL at 20:32

## 2024-02-10 RX ADMIN — CARVEDILOL 6.25 MG: 6.25 TABLET, FILM COATED ORAL at 17:33

## 2024-02-10 RX ADMIN — HEPARIN SODIUM 5000 UNITS: 5000 INJECTION INTRAVENOUS; SUBCUTANEOUS at 13:08

## 2024-02-10 RX ADMIN — GABAPENTIN 100 MG: 100 CAPSULE ORAL at 13:08

## 2024-02-10 RX ADMIN — GABAPENTIN 100 MG: 100 CAPSULE ORAL at 20:26

## 2024-02-10 RX ADMIN — SEVELAMER CARBONATE 2400 MG: 800 TABLET, FILM COATED ORAL at 13:08

## 2024-02-10 RX ADMIN — ASPIRIN 81 MG: 81 TABLET, CHEWABLE ORAL at 09:06

## 2024-02-10 RX ADMIN — METRONIDAZOLE 500 MG: 500 INJECTION, SOLUTION INTRAVENOUS at 10:32

## 2024-02-10 RX ADMIN — QUETIAPINE FUMARATE 150 MG: 150 TABLET, EXTENDED RELEASE ORAL at 20:49

## 2024-02-10 RX ADMIN — ATORVASTATIN CALCIUM 80 MG: 80 TABLET, FILM COATED ORAL at 00:16

## 2024-02-10 RX ADMIN — CEFAZOLIN 1000 MG: 1 INJECTION, POWDER, FOR SOLUTION INTRAMUSCULAR; INTRAVENOUS at 17:40

## 2024-02-10 RX ADMIN — BUPROPION HYDROCHLORIDE 150 MG: 75 TABLET, FILM COATED ORAL at 20:49

## 2024-02-10 RX ADMIN — AMLODIPINE BESYLATE 5 MG: 5 TABLET ORAL at 09:06

## 2024-02-10 RX ADMIN — ATORVASTATIN CALCIUM 80 MG: 80 TABLET, FILM COATED ORAL at 20:26

## 2024-02-10 RX ADMIN — OXYCODONE HYDROCHLORIDE 10 MG: 10 TABLET ORAL at 15:35

## 2024-02-10 RX ADMIN — SEVELAMER CARBONATE 2400 MG: 800 TABLET, FILM COATED ORAL at 09:05

## 2024-02-10 RX ADMIN — PANTOPRAZOLE SODIUM 40 MG: 40 TABLET, DELAYED RELEASE ORAL at 06:16

## 2024-02-10 RX ADMIN — DAKIN'S SOLUTION 0.125% (QUARTER STRENGTH): 0.12 SOLUTION at 15:50

## 2024-02-10 RX ADMIN — OXYCODONE HYDROCHLORIDE 10 MG: 10 TABLET ORAL at 07:05

## 2024-02-10 NOTE — PLAN OF CARE
Problem: Discharge Planning  Goal: Discharge to home or other facility with appropriate resources  2/10/2024 1359 by Allegra Albright, RN  Outcome: Progressing     Problem: Pain  Goal: Verbalizes/displays adequate comfort level or baseline comfort level  2/10/2024 1359 by Allegra Albright, RN  Outcome: Progressing     Problem: Safety - Adult  Goal: Free from fall injury  2/10/2024 1359 by Allegra Albright, RN  Outcome: Progressing     Problem: Chronic Conditions and Co-morbidities  Goal: Patient's chronic conditions and co-morbidity symptoms are monitored and maintained or improved  2/10/2024 1359 by Allegra Albright, RN  Outcome: Progressing

## 2024-02-10 NOTE — PLAN OF CARE
Problem: Discharge Planning  Goal: Discharge to home or other facility with appropriate resources  Outcome: Progressing  Flowsheets (Taken 2/10/2024 0133)  Discharge to home or other facility with appropriate resources:   Identify barriers to discharge with patient and caregiver   Arrange for needed discharge resources and transportation as appropriate     Problem: Pain  Goal: Verbalizes/displays adequate comfort level or baseline comfort level  Outcome: Progressing  Flowsheets (Taken 2/10/2024 0133)  Verbalizes/displays adequate comfort level or baseline comfort level:   Encourage patient to monitor pain and request assistance   Assess pain using appropriate pain scale   Administer analgesics based on type and severity of pain and evaluate response   Implement non-pharmacological measures as appropriate and evaluate response     Problem: Safety - Adult  Goal: Free from fall injury  Outcome: Progressing  Flowsheets (Taken 2/10/2024 0133)  Free From Fall Injury: Instruct family/caregiver on patient safety     Problem: Chronic Conditions and Co-morbidities  Goal: Patient's chronic conditions and co-morbidity symptoms are monitored and maintained or improved  Outcome: Progressing  Flowsheets (Taken 2/10/2024 0133)  Care Plan - Patient's Chronic Conditions and Co-Morbidity Symptoms are Monitored and Maintained or Improved:   Monitor and assess patient's chronic conditions and comorbid symptoms for stability, deterioration, or improvement   Collaborate with multidisciplinary team to address chronic and comorbid conditions and prevent exacerbation or deterioration

## 2024-02-10 NOTE — TELEPHONE ENCOUNTER
Inpatient, had revascularization of LLE with Michael.  Signed off, going to OR Mon  Will need FU with Michael scheduled.

## 2024-02-11 LAB
ALBUMIN SERPL-MCNC: 3.2 G/DL (ref 3.4–5)
ANION GAP SERPL CALCULATED.3IONS-SCNC: 11 MMOL/L (ref 3–16)
BUN SERPL-MCNC: 32 MG/DL (ref 7–20)
CALCIUM SERPL-MCNC: 8.4 MG/DL (ref 8.3–10.6)
CHLORIDE SERPL-SCNC: 94 MMOL/L (ref 99–110)
CO2 SERPL-SCNC: 29 MMOL/L (ref 21–32)
CREAT SERPL-MCNC: 5.8 MG/DL (ref 0.8–1.3)
DEPRECATED RDW RBC AUTO: 15.3 % (ref 12.4–15.4)
GFR SERPLBLD CREATININE-BSD FMLA CKD-EPI: 10 ML/MIN/{1.73_M2}
GLUCOSE BLD-MCNC: 109 MG/DL (ref 70–99)
GLUCOSE BLD-MCNC: 113 MG/DL (ref 70–99)
GLUCOSE BLD-MCNC: 92 MG/DL (ref 70–99)
GLUCOSE BLD-MCNC: 96 MG/DL (ref 70–99)
GLUCOSE SERPL-MCNC: 78 MG/DL (ref 70–99)
HCT VFR BLD AUTO: 27.7 % (ref 40.5–52.5)
HGB BLD-MCNC: 9.6 G/DL (ref 13.5–17.5)
MCH RBC QN AUTO: 34 PG (ref 26–34)
MCHC RBC AUTO-ENTMCNC: 34.6 G/DL (ref 31–36)
MCV RBC AUTO: 98.3 FL (ref 80–100)
PERFORMED ON: ABNORMAL
PERFORMED ON: ABNORMAL
PERFORMED ON: NORMAL
PERFORMED ON: NORMAL
PHOSPHATE SERPL-MCNC: 3.9 MG/DL (ref 2.5–4.9)
PLATELET # BLD AUTO: 119 K/UL (ref 135–450)
PMV BLD AUTO: 7.9 FL (ref 5–10.5)
POTASSIUM SERPL-SCNC: 4.2 MMOL/L (ref 3.5–5.1)
RBC # BLD AUTO: 2.81 M/UL (ref 4.2–5.9)
SODIUM SERPL-SCNC: 134 MMOL/L (ref 136–145)
WBC # BLD AUTO: 5.7 K/UL (ref 4–11)

## 2024-02-11 PROCEDURE — 85027 COMPLETE CBC AUTOMATED: CPT

## 2024-02-11 PROCEDURE — 6370000000 HC RX 637 (ALT 250 FOR IP): Performed by: HOSPITALIST

## 2024-02-11 PROCEDURE — 6360000002 HC RX W HCPCS: Performed by: INTERNAL MEDICINE

## 2024-02-11 PROCEDURE — 6370000000 HC RX 637 (ALT 250 FOR IP): Performed by: REGISTERED NURSE

## 2024-02-11 PROCEDURE — 2580000003 HC RX 258: Performed by: INTERNAL MEDICINE

## 2024-02-11 PROCEDURE — 94760 N-INVAS EAR/PLS OXIMETRY 1: CPT

## 2024-02-11 PROCEDURE — 2580000003 HC RX 258: Performed by: ANESTHESIOLOGY

## 2024-02-11 PROCEDURE — 6370000000 HC RX 637 (ALT 250 FOR IP): Performed by: INTERNAL MEDICINE

## 2024-02-11 PROCEDURE — 99232 SBSQ HOSP IP/OBS MODERATE 35: CPT | Performed by: INTERNAL MEDICINE

## 2024-02-11 PROCEDURE — 6370000000 HC RX 637 (ALT 250 FOR IP)

## 2024-02-11 PROCEDURE — 80069 RENAL FUNCTION PANEL: CPT

## 2024-02-11 PROCEDURE — 2060000000 HC ICU INTERMEDIATE R&B

## 2024-02-11 PROCEDURE — 36415 COLL VENOUS BLD VENIPUNCTURE: CPT

## 2024-02-11 RX ADMIN — OXYCODONE HYDROCHLORIDE 10 MG: 10 TABLET ORAL at 01:51

## 2024-02-11 RX ADMIN — ATORVASTATIN CALCIUM 80 MG: 80 TABLET, FILM COATED ORAL at 21:28

## 2024-02-11 RX ADMIN — VENLAFAXINE HYDROCHLORIDE 75 MG: 75 CAPSULE, EXTENDED RELEASE ORAL at 08:47

## 2024-02-11 RX ADMIN — ASPIRIN 81 MG: 81 TABLET, CHEWABLE ORAL at 08:47

## 2024-02-11 RX ADMIN — HEPARIN SODIUM 5000 UNITS: 5000 INJECTION INTRAVENOUS; SUBCUTANEOUS at 06:16

## 2024-02-11 RX ADMIN — Medication 10 ML: at 10:14

## 2024-02-11 RX ADMIN — GABAPENTIN 100 MG: 100 CAPSULE ORAL at 21:28

## 2024-02-11 RX ADMIN — QUETIAPINE FUMARATE 150 MG: 150 TABLET, EXTENDED RELEASE ORAL at 21:34

## 2024-02-11 RX ADMIN — CEFAZOLIN 1000 MG: 1 INJECTION, POWDER, FOR SOLUTION INTRAMUSCULAR; INTRAVENOUS at 18:04

## 2024-02-11 RX ADMIN — PANTOPRAZOLE SODIUM 40 MG: 40 TABLET, DELAYED RELEASE ORAL at 06:17

## 2024-02-11 RX ADMIN — CARVEDILOL 6.25 MG: 6.25 TABLET, FILM COATED ORAL at 17:53

## 2024-02-11 RX ADMIN — AMLODIPINE BESYLATE 5 MG: 5 TABLET ORAL at 08:47

## 2024-02-11 RX ADMIN — GABAPENTIN 100 MG: 100 CAPSULE ORAL at 08:47

## 2024-02-11 RX ADMIN — HEPARIN SODIUM 5000 UNITS: 5000 INJECTION INTRAVENOUS; SUBCUTANEOUS at 21:28

## 2024-02-11 RX ADMIN — CARVEDILOL 6.25 MG: 6.25 TABLET, FILM COATED ORAL at 08:47

## 2024-02-11 RX ADMIN — SEVELAMER CARBONATE 2400 MG: 800 TABLET, FILM COATED ORAL at 17:53

## 2024-02-11 RX ADMIN — OXYCODONE HYDROCHLORIDE 10 MG: 10 TABLET ORAL at 14:37

## 2024-02-11 RX ADMIN — POLYETHYLENE GLYCOL 3350 17 G: 17 POWDER, FOR SOLUTION ORAL at 08:55

## 2024-02-11 RX ADMIN — METRONIDAZOLE 500 MG: 500 INJECTION, SOLUTION INTRAVENOUS at 01:47

## 2024-02-11 RX ADMIN — OXYCODONE HYDROCHLORIDE 10 MG: 10 TABLET ORAL at 09:01

## 2024-02-11 RX ADMIN — METRONIDAZOLE 500 MG: 500 INJECTION, SOLUTION INTRAVENOUS at 19:01

## 2024-02-11 RX ADMIN — METRONIDAZOLE 500 MG: 500 INJECTION, SOLUTION INTRAVENOUS at 10:07

## 2024-02-11 RX ADMIN — Medication 10 ML: at 08:48

## 2024-02-11 RX ADMIN — Medication 10 ML: at 21:29

## 2024-02-11 RX ADMIN — HEPARIN SODIUM 5000 UNITS: 5000 INJECTION INTRAVENOUS; SUBCUTANEOUS at 14:27

## 2024-02-11 RX ADMIN — OXYCODONE HYDROCHLORIDE 5 MG: 5 TABLET ORAL at 21:28

## 2024-02-11 RX ADMIN — SODIUM CHLORIDE, PRESERVATIVE FREE 10 ML: 5 INJECTION INTRAVENOUS at 10:15

## 2024-02-11 RX ADMIN — SEVELAMER CARBONATE 2400 MG: 800 TABLET, FILM COATED ORAL at 14:24

## 2024-02-11 RX ADMIN — GABAPENTIN 100 MG: 100 CAPSULE ORAL at 14:27

## 2024-02-11 NOTE — PLAN OF CARE
Problem: Discharge Planning  Goal: Discharge to home or other facility with appropriate resources  2/10/2024 2329 by Kenia Estrella RN  Outcome: Progressing  Flowsheets (Taken 2/10/2024 2039)  Discharge to home or other facility with appropriate resources: Identify barriers to discharge with patient and caregiver  2/10/2024 1359 by Allegra Albright RN  Outcome: Progressing     Problem: Pain  Goal: Verbalizes/displays adequate comfort level or baseline comfort level  2/10/2024 2329 by Kenia Estrella RN  Outcome: Progressing  2/10/2024 1359 by Allegra Albright RN  Outcome: Progressing     Problem: Safety - Adult  Goal: Free from fall injury  2/10/2024 2329 by Kenia Estrella RN  Outcome: Progressing  2/10/2024 1359 by Allegra Albright RN  Outcome: Progressing     Problem: Chronic Conditions and Co-morbidities  Goal: Patient's chronic conditions and co-morbidity symptoms are monitored and maintained or improved  2/10/2024 2329 by Kenia Estrella RN  Outcome: Progressing  Flowsheets (Taken 2/10/2024 2039)  Care Plan - Patient's Chronic Conditions and Co-Morbidity Symptoms are Monitored and Maintained or Improved: Monitor and assess patient's chronic conditions and comorbid symptoms for stability, deterioration, or improvement  2/10/2024 1359 by Allegra Albright RN  Outcome: Progressing

## 2024-02-11 NOTE — PLAN OF CARE
Problem: Discharge Planning  Goal: Discharge to home or other facility with appropriate resources  2/11/2024 1247 by Bree Devlin RN  Outcome: Progressing     Problem: Pain  Goal: Verbalizes/displays adequate comfort level or baseline comfort level  2/11/2024 1247 by Bree Devlin RN  Outcome: Progressing     Problem: Safety - Adult  Goal: Free from fall injury  2/11/2024 1247 by Bree Devlin RN  Outcome: Progressing     Problem: Chronic Conditions and Co-morbidities  Goal: Patient's chronic conditions and co-morbidity symptoms are monitored and maintained or improved  2/11/2024 1247 by Bree Devlin RN  Outcome: Progressing

## 2024-02-12 ENCOUNTER — ANESTHESIA EVENT (OUTPATIENT)
Dept: OPERATING ROOM | Age: 64
DRG: 853 | End: 2024-02-12
Payer: COMMERCIAL

## 2024-02-12 ENCOUNTER — APPOINTMENT (OUTPATIENT)
Dept: GENERAL RADIOLOGY | Age: 64
DRG: 853 | End: 2024-02-12
Payer: COMMERCIAL

## 2024-02-12 ENCOUNTER — ANESTHESIA (OUTPATIENT)
Dept: OPERATING ROOM | Age: 64
DRG: 853 | End: 2024-02-12
Payer: COMMERCIAL

## 2024-02-12 LAB
ALBUMIN SERPL-MCNC: 3 G/DL (ref 3.4–5)
ANION GAP SERPL CALCULATED.3IONS-SCNC: 13 MMOL/L (ref 3–16)
BUN SERPL-MCNC: 42 MG/DL (ref 7–20)
CALCIUM SERPL-MCNC: 9.1 MG/DL (ref 8.3–10.6)
CHLORIDE SERPL-SCNC: 91 MMOL/L (ref 99–110)
CO2 SERPL-SCNC: 27 MMOL/L (ref 21–32)
CREAT SERPL-MCNC: 7.2 MG/DL (ref 0.8–1.3)
CRP SERPL-MCNC: 40.9 MG/L (ref 0–5.1)
DEPRECATED RDW RBC AUTO: 15.2 % (ref 12.4–15.4)
ERYTHROCYTE [SEDIMENTATION RATE] IN BLOOD BY WESTERGREN METHOD: 26 MM/HR (ref 0–20)
GFR SERPLBLD CREATININE-BSD FMLA CKD-EPI: 8 ML/MIN/{1.73_M2}
GLUCOSE BLD-MCNC: 106 MG/DL (ref 70–99)
GLUCOSE BLD-MCNC: 83 MG/DL (ref 70–99)
GLUCOSE BLD-MCNC: 96 MG/DL (ref 70–99)
GLUCOSE BLD-MCNC: 97 MG/DL (ref 70–99)
GLUCOSE SERPL-MCNC: 61 MG/DL (ref 70–99)
HCT VFR BLD AUTO: 27.5 % (ref 40.5–52.5)
HGB BLD-MCNC: 9.4 G/DL (ref 13.5–17.5)
MCH RBC QN AUTO: 33.5 PG (ref 26–34)
MCHC RBC AUTO-ENTMCNC: 34.3 G/DL (ref 31–36)
MCV RBC AUTO: 97.6 FL (ref 80–100)
PERFORMED ON: ABNORMAL
PERFORMED ON: NORMAL
PHOSPHATE SERPL-MCNC: 4.8 MG/DL (ref 2.5–4.9)
PLATELET # BLD AUTO: 138 K/UL (ref 135–450)
PMV BLD AUTO: 8.4 FL (ref 5–10.5)
POTASSIUM SERPL-SCNC: 4.5 MMOL/L (ref 3.5–5.1)
RBC # BLD AUTO: 2.81 M/UL (ref 4.2–5.9)
SODIUM SERPL-SCNC: 131 MMOL/L (ref 136–145)
WBC # BLD AUTO: 5.3 K/UL (ref 4–11)

## 2024-02-12 PROCEDURE — 6360000002 HC RX W HCPCS

## 2024-02-12 PROCEDURE — 7100000001 HC PACU RECOVERY - ADDTL 15 MIN: Performed by: PODIATRIST

## 2024-02-12 PROCEDURE — 3600000013 HC SURGERY LEVEL 3 ADDTL 15MIN: Performed by: PODIATRIST

## 2024-02-12 PROCEDURE — 6370000000 HC RX 637 (ALT 250 FOR IP): Performed by: INTERNAL MEDICINE

## 2024-02-12 PROCEDURE — 2500000003 HC RX 250 WO HCPCS: Performed by: NURSE ANESTHETIST, CERTIFIED REGISTERED

## 2024-02-12 PROCEDURE — 2580000003 HC RX 258: Performed by: PODIATRIST

## 2024-02-12 PROCEDURE — 6370000000 HC RX 637 (ALT 250 FOR IP)

## 2024-02-12 PROCEDURE — 2580000003 HC RX 258

## 2024-02-12 PROCEDURE — 3600000003 HC SURGERY LEVEL 3 BASE: Performed by: PODIATRIST

## 2024-02-12 PROCEDURE — 3700000001 HC ADD 15 MINUTES (ANESTHESIA): Performed by: PODIATRIST

## 2024-02-12 PROCEDURE — 3700000000 HC ANESTHESIA ATTENDED CARE: Performed by: PODIATRIST

## 2024-02-12 PROCEDURE — 85652 RBC SED RATE AUTOMATED: CPT

## 2024-02-12 PROCEDURE — A4217 STERILE WATER/SALINE, 500 ML: HCPCS | Performed by: PODIATRIST

## 2024-02-12 PROCEDURE — 85027 COMPLETE CBC AUTOMATED: CPT

## 2024-02-12 PROCEDURE — 7100000000 HC PACU RECOVERY - FIRST 15 MIN: Performed by: PODIATRIST

## 2024-02-12 PROCEDURE — 6360000002 HC RX W HCPCS: Performed by: PODIATRIST

## 2024-02-12 PROCEDURE — 88305 TISSUE EXAM BY PATHOLOGIST: CPT

## 2024-02-12 PROCEDURE — 73630 X-RAY EXAM OF FOOT: CPT

## 2024-02-12 PROCEDURE — 6360000002 HC RX W HCPCS: Performed by: NURSE ANESTHETIST, CERTIFIED REGISTERED

## 2024-02-12 PROCEDURE — 99232 SBSQ HOSP IP/OBS MODERATE 35: CPT | Performed by: INTERNAL MEDICINE

## 2024-02-12 PROCEDURE — 88311 DECALCIFY TISSUE: CPT

## 2024-02-12 PROCEDURE — 80069 RENAL FUNCTION PANEL: CPT

## 2024-02-12 PROCEDURE — 86140 C-REACTIVE PROTEIN: CPT

## 2024-02-12 PROCEDURE — 36415 COLL VENOUS BLD VENIPUNCTURE: CPT

## 2024-02-12 PROCEDURE — 1200000000 HC SEMI PRIVATE

## 2024-02-12 PROCEDURE — 2580000003 HC RX 258: Performed by: NURSE ANESTHETIST, CERTIFIED REGISTERED

## 2024-02-12 PROCEDURE — 90935 HEMODIALYSIS ONE EVALUATION: CPT

## 2024-02-12 PROCEDURE — 0Y6Q0Z0 DETACHMENT AT LEFT 1ST TOE, COMPLETE, OPEN APPROACH: ICD-10-PCS | Performed by: PODIATRIST

## 2024-02-12 PROCEDURE — 2709999900 HC NON-CHARGEABLE SUPPLY: Performed by: PODIATRIST

## 2024-02-12 PROCEDURE — 6360000002 HC RX W HCPCS: Performed by: INTERNAL MEDICINE

## 2024-02-12 RX ORDER — SODIUM CHLORIDE 0.9 % (FLUSH) 0.9 %
5-40 SYRINGE (ML) INJECTION PRN
Status: DISCONTINUED | OUTPATIENT
Start: 2024-02-12 | End: 2024-02-12 | Stop reason: HOSPADM

## 2024-02-12 RX ORDER — SODIUM CHLORIDE 9 MG/ML
INJECTION, SOLUTION INTRAVENOUS CONTINUOUS PRN
Status: DISCONTINUED | OUTPATIENT
Start: 2024-02-12 | End: 2024-02-12 | Stop reason: SDUPTHER

## 2024-02-12 RX ORDER — FENTANYL CITRATE 0.05 MG/ML
50 INJECTION, SOLUTION INTRAMUSCULAR; INTRAVENOUS EVERY 5 MIN PRN
Status: DISCONTINUED | OUTPATIENT
Start: 2024-02-12 | End: 2024-02-12 | Stop reason: HOSPADM

## 2024-02-12 RX ORDER — SODIUM CHLORIDE 9 MG/ML
INJECTION, SOLUTION INTRAVENOUS PRN
Status: DISCONTINUED | OUTPATIENT
Start: 2024-02-12 | End: 2024-02-12 | Stop reason: HOSPADM

## 2024-02-12 RX ORDER — ONDANSETRON 2 MG/ML
4 INJECTION INTRAMUSCULAR; INTRAVENOUS
Status: DISCONTINUED | OUTPATIENT
Start: 2024-02-12 | End: 2024-02-12 | Stop reason: HOSPADM

## 2024-02-12 RX ORDER — MAGNESIUM HYDROXIDE 1200 MG/15ML
LIQUID ORAL CONTINUOUS PRN
Status: COMPLETED | OUTPATIENT
Start: 2024-02-12 | End: 2024-02-12

## 2024-02-12 RX ORDER — OXYCODONE HYDROCHLORIDE 5 MG/1
5 TABLET ORAL PRN
Status: DISCONTINUED | OUTPATIENT
Start: 2024-02-12 | End: 2024-02-12 | Stop reason: HOSPADM

## 2024-02-12 RX ORDER — ACETAMINOPHEN 325 MG/1
650 TABLET ORAL
Status: DISCONTINUED | OUTPATIENT
Start: 2024-02-12 | End: 2024-02-12 | Stop reason: HOSPADM

## 2024-02-12 RX ORDER — OXYCODONE HYDROCHLORIDE 10 MG/1
10 TABLET ORAL PRN
Status: DISCONTINUED | OUTPATIENT
Start: 2024-02-12 | End: 2024-02-12 | Stop reason: HOSPADM

## 2024-02-12 RX ORDER — SODIUM CHLORIDE 0.9 % (FLUSH) 0.9 %
5-40 SYRINGE (ML) INJECTION EVERY 12 HOURS SCHEDULED
Status: DISCONTINUED | OUTPATIENT
Start: 2024-02-12 | End: 2024-02-12 | Stop reason: HOSPADM

## 2024-02-12 RX ORDER — LIDOCAINE HYDROCHLORIDE 20 MG/ML
INJECTION, SOLUTION EPIDURAL; INFILTRATION; INTRACAUDAL; PERINEURAL PRN
Status: DISCONTINUED | OUTPATIENT
Start: 2024-02-12 | End: 2024-02-12 | Stop reason: SDUPTHER

## 2024-02-12 RX ORDER — BUPIVACAINE HYDROCHLORIDE 5 MG/ML
INJECTION, SOLUTION EPIDURAL; INTRACAUDAL
Status: COMPLETED | OUTPATIENT
Start: 2024-02-12 | End: 2024-02-12

## 2024-02-12 RX ORDER — ONDANSETRON 2 MG/ML
INJECTION INTRAMUSCULAR; INTRAVENOUS PRN
Status: DISCONTINUED | OUTPATIENT
Start: 2024-02-12 | End: 2024-02-12 | Stop reason: SDUPTHER

## 2024-02-12 RX ORDER — PROPOFOL 10 MG/ML
INJECTION, EMULSION INTRAVENOUS PRN
Status: DISCONTINUED | OUTPATIENT
Start: 2024-02-12 | End: 2024-02-12 | Stop reason: SDUPTHER

## 2024-02-12 RX ADMIN — PROPOFOL 100 MCG/KG/MIN: 10 INJECTION, EMULSION INTRAVENOUS at 07:29

## 2024-02-12 RX ADMIN — CEFAZOLIN 1000 MG: 1 INJECTION, POWDER, FOR SOLUTION INTRAMUSCULAR; INTRAVENOUS at 17:30

## 2024-02-12 RX ADMIN — PROPOFOL 50 MG: 10 INJECTION, EMULSION INTRAVENOUS at 07:28

## 2024-02-12 RX ADMIN — MIDODRINE HYDROCHLORIDE 10 MG: 10 TABLET ORAL at 12:49

## 2024-02-12 RX ADMIN — OXYCODONE HYDROCHLORIDE 10 MG: 10 TABLET ORAL at 14:18

## 2024-02-12 RX ADMIN — ATORVASTATIN CALCIUM 80 MG: 80 TABLET, FILM COATED ORAL at 21:00

## 2024-02-12 RX ADMIN — QUETIAPINE FUMARATE 150 MG: 150 TABLET, EXTENDED RELEASE ORAL at 21:00

## 2024-02-12 RX ADMIN — SODIUM CHLORIDE: 9 INJECTION, SOLUTION INTRAVENOUS at 17:27

## 2024-02-12 RX ADMIN — METRONIDAZOLE 500 MG: 500 INJECTION, SOLUTION INTRAVENOUS at 10:07

## 2024-02-12 RX ADMIN — ASPIRIN 81 MG: 81 TABLET, CHEWABLE ORAL at 17:17

## 2024-02-12 RX ADMIN — Medication 10 ML: at 21:00

## 2024-02-12 RX ADMIN — SODIUM CHLORIDE: 9 INJECTION, SOLUTION INTRAVENOUS at 07:22

## 2024-02-12 RX ADMIN — HEPARIN SODIUM 5000 UNITS: 5000 INJECTION INTRAVENOUS; SUBCUTANEOUS at 21:30

## 2024-02-12 RX ADMIN — CARVEDILOL 6.25 MG: 6.25 TABLET, FILM COATED ORAL at 17:22

## 2024-02-12 RX ADMIN — GABAPENTIN 100 MG: 100 CAPSULE ORAL at 21:00

## 2024-02-12 RX ADMIN — OXYCODONE HYDROCHLORIDE 10 MG: 10 TABLET ORAL at 18:16

## 2024-02-12 RX ADMIN — HYDROMORPHONE HYDROCHLORIDE 0.25 MG: 1 INJECTION, SOLUTION INTRAMUSCULAR; INTRAVENOUS; SUBCUTANEOUS at 21:54

## 2024-02-12 RX ADMIN — POLYETHYLENE GLYCOL 3350 17 G: 17 POWDER, FOR SOLUTION ORAL at 09:59

## 2024-02-12 RX ADMIN — CINACALCET HYDROCHLORIDE 60 MG: 30 TABLET, FILM COATED ORAL at 09:59

## 2024-02-12 RX ADMIN — METRONIDAZOLE 500 MG: 500 INJECTION, SOLUTION INTRAVENOUS at 01:43

## 2024-02-12 RX ADMIN — SEVELAMER CARBONATE 2400 MG: 800 TABLET, FILM COATED ORAL at 17:17

## 2024-02-12 RX ADMIN — OXYCODONE HYDROCHLORIDE 10 MG: 10 TABLET ORAL at 09:59

## 2024-02-12 RX ADMIN — VENLAFAXINE HYDROCHLORIDE 75 MG: 75 CAPSULE, EXTENDED RELEASE ORAL at 17:18

## 2024-02-12 RX ADMIN — METRONIDAZOLE 500 MG: 500 INJECTION, SOLUTION INTRAVENOUS at 17:28

## 2024-02-12 RX ADMIN — SODIUM CHLORIDE: 9 INJECTION, SOLUTION INTRAVENOUS at 10:07

## 2024-02-12 RX ADMIN — Medication 10 ML: at 09:59

## 2024-02-12 RX ADMIN — HYDROMORPHONE HYDROCHLORIDE 0.25 MG: 1 INJECTION, SOLUTION INTRAMUSCULAR; INTRAVENOUS; SUBCUTANEOUS at 17:18

## 2024-02-12 RX ADMIN — GABAPENTIN 100 MG: 100 CAPSULE ORAL at 09:59

## 2024-02-12 RX ADMIN — SEVELAMER CARBONATE 2400 MG: 800 TABLET, FILM COATED ORAL at 09:59

## 2024-02-12 RX ADMIN — LIDOCAINE HYDROCHLORIDE 80 MG: 20 INJECTION, SOLUTION EPIDURAL; INFILTRATION; INTRACAUDAL; PERINEURAL at 07:28

## 2024-02-12 RX ADMIN — ONDANSETRON 4 MG: 2 INJECTION INTRAMUSCULAR; INTRAVENOUS at 07:33

## 2024-02-12 RX ADMIN — AMLODIPINE BESYLATE 5 MG: 5 TABLET ORAL at 17:17

## 2024-02-12 NOTE — ANESTHESIA POSTPROCEDURE EVALUATION
Department of Anesthesiology  Postprocedure Note    Patient: Cal Sanchez  MRN: [id number]  YOB: 1960  Date of evaluation: 2/12/2024    Procedure Summary       Date: 02/12/24 Room / Location: [address]    Anesthesia Start: 0723 Anesthesia Stop: 0811    Procedure: LEFT HALLUX AMPUTATION (Left: Foot) Diagnosis:       Acute osteomyelitis of toe of left foot (HCC)      (Acute osteomyelitis of toe of left foot (HCC) [M86.172])    Surgeons: Horace Jordan DPM Responsible Provider: Radha Lee MD    Anesthesia Type: MAC ASA Status: 3            Anesthesia Type: No value filed.    Braulio Phase I: Braulio Score: 9    Braulio Phase II:      Anesthesia Post Evaluation    Patient location during evaluation: bedside  Patient participation: complete - patient participated  Level of consciousness: awake and alert  Pain score: 3  Airway patency: patent  Nausea & Vomiting: no vomiting  Cardiovascular status: blood pressure returned to baseline  Respiratory status: acceptable  Hydration status: euvolemic  Pain management: adequate    No notable events documented.

## 2024-02-12 NOTE — PLAN OF CARE
Problem: Pain  Goal: Verbalizes/displays adequate comfort level or baseline comfort level  2/12/2024 0122 by Kenia Estrella RN  Outcome: Progressing     Problem: Safety - Adult  Goal: Free from fall injury  2/12/2024 0122 by Kenia Estrella, RN  Outcome: Progressing

## 2024-02-12 NOTE — PLAN OF CARE
Problem: Discharge Planning  Goal: Discharge to home or other facility with appropriate resources  2/12/2024 0122 by Kenia Estrella RN  Outcome: Progressing  2/11/2024 1247 by Bree Devlin RN  Outcome: Progressing     Problem: Pain  Goal: Verbalizes/displays adequate comfort level or baseline comfort level  2/12/2024 0122 by Kenia Estrella RN  Outcome: Progressing  2/11/2024 1247 by Bree Devlin RN  Outcome: Progressing     Problem: Safety - Adult  Goal: Free from fall injury  2/12/2024 0122 by Kenia Estrella RN  Outcome: Progressing  2/11/2024 1247 by Bree Devlin RN  Outcome: Progressing     Problem: Chronic Conditions and Co-morbidities  Goal: Patient's chronic conditions and co-morbidity symptoms are monitored and maintained or improved  2/12/2024 0122 by Kenia Estrella RN  Outcome: Progressing  2/11/2024 1247 by Bree Devlin RN  Outcome: Progressing

## 2024-02-12 NOTE — ANESTHESIA PRE PROCEDURE
[facility] Department of Anesthesiology  Pre-Anesthesia Evaluation/Consultation       Name:  Cal Sanchez  : 1960  Age:  63 y.o.                                           MRN:  [id number]  Date: 2024           Surgeon: Surgeon(s):  Horace Jordan DPM    Procedure: Procedure(s):  LEFT HALLUX AMPUTATION     No Known Allergies  Patient Active Problem List   Diagnosis    Chest pain    Type 2 diabetes mellitus (HCC)    Chronic renal failure    Coronary artery disease involving native coronary artery of native heart without angina pectoris    ESRD on hemodialysis (HCC)    Diabetes type 2, controlled (HCC)    Obesity (BMI 30-39.9)    Depression    Sepsis (HCC)    RLL pneumonia    Warfarin-induced coagulopathy (HCC)    Paroxysmal atrial fibrillation (HCC)    Leg pain    Normocytic anemia    Thrombocytopenia (HCC)    Fluid overload    Hyperkalemia    Toe gangrene (HCC)    Critical limb ischemia of both lower extremities (HCC)    Critical limb ischemia of left lower extremity (HCC)    Primary hypertension    Diabetic infection of left foot (HCC)    Severe sepsis (HCC)    Tachycardia    Tachypnea    Fever    Elevated lactic acid level    Hypotension    Acute metabolic encephalopathy    Osteomyelitis of left foot (HCC)    Staphylococcus aureus bacteremia    Cardiac pacemaker in situ    Cellulitis of foot    Septicemia (HCC)    PVD (peripheral vascular disease) (HCC)    CRP elevated     Past Medical History:   Diagnosis Date    Atrial fibrillation (HCC)     CAD (coronary artery disease) 4/3/2014    Chronic kidney disease     Depression     Diabetes mellitus (HCC)     Dialysis patient (HCC)     left upper arm fistula    Glaucoma     Hemodialysis patient (HCC)     Hyperlipidemia     Hypertension     RLL pneumonia 2018    Sleep apnea     uses CPAP     Past Surgical History:   Procedure Laterality Date    COLONOSCOPY      PACEMAKER INSERTION      PACEMAKER PLACEMENT      TOE AMPUTATION Left 2023

## 2024-02-12 NOTE — BRIEF OP NOTE
Brief Postoperative Note      Patient: Cal Sanchez  YOB: 1960  MRN: [id number]    Date of Procedure: 2/12/2024    Pre-Op Diagnosis Codes:     * Acute osteomyelitis of toe of left foot ([facility]) [M86.172]    Post-Op Diagnosis: Same       Procedure(s):  LEFT HALLUX AMPUTATION    Surgeon(s):  Horace Jordan DPM    Assistant:  Surgical Assistant: Benjamin Morales    Anesthesia: Monitor Anesthesia Care    Estimated Blood Loss (mL): Minimal    Complications: None    Hemostasis: anatomic dissection, pneumatic ankle tourniquet applied and not inflated    Injectables: Pre-Op 10 cc of 2% Polocaine plain and Post-Op 10 cc of 0.5 % Marcaine plain    Materials: 2-0 Vicryl, 4-0 Nylon    Implants: None        Specimens:   ID Type Source Tests Collected by Time Destination   A : A. LEFT big toe Tissue Tissue SURGICAL PATHOLOGY Horace Jordan DPM 2/12/2024 0746        Implants:  * No implants in log *      Drains: * No LDAs found *    Findings: Full thickness ulceration noted to the hallux of the left foot at the time of surgery. Upon amputation of the hallux the first metatarsal head was noted to be white, hard, and shiny consistent with healthy bone. The procedure was felt to be definitive. No further purulence appreciated.    DISPO: S/P left hallux amputation. Recommend patient continue on IV antibiotics per primary team. Procedure was felt to be definitive. Patient is stable for discharge from podiatric stand point pending medical clearance. Patient is to be partial heel weightbearing to left foot.    Electronically signed by Cristian Preston DPM on 2/12/2024 at 8:07 AM

## 2024-02-12 NOTE — OP NOTE
Operative Note      Patient: Cal Sanchez  YOB: 1960  MRN: [id number]     Date of Procedure: 2/12/2024     Pre-Op Diagnosis Codes:     * Acute osteomyelitis of toe of left foot (HCC) [M86.172]     Post-Op Diagnosis: Same       Procedure(s):  LEFT HALLUX AMPUTATION     Surgeon(s):  Horace Jordan DPM     Assistant:  Surgical Assistant: Benjamin Morales  Resident: Cristian Preston DPM PGY-1  Student Lurdes Sawyer MS-4      Anesthesia: Monitor Anesthesia Care     Estimated Blood Loss (mL): Minimal     Complications: None     Hemostasis: anatomic dissection, pneumatic ankle tourniquet applied and not inflated     Injectables: Pre-Op 10 cc of 2% Polocaine plain and Post-Op 10 cc of 0.5 % Marcaine plain     Materials: 2-0 Vicryl, 4-0 Nylon     Implants: None          Specimens:   ID Type Source Tests Collected by Time Destination   A : A. LEFT big toe Tissue Tissue SURGICAL PATHOLOGY Horace Jordan DPM 2/12/2024 0746           Implants:  * No implants in log *      Drains: * No LDAs found *     INDICATIONS FOR PROCEDURE: This patient has signs and symptoms clinically and radiographically consistent with the above mentioned preoperative diagnosis. Having failed conservative treatment, it was determined that the patient would benefit from surgical intervention. All potential risks, benefits, and complications were discussed with the patient prior to the scheduling of surgery. All the patient's questions were answered and no guarantees were given. The patient wished to proceed with surgery, and informed written consent was obtained.     DETAILS OF PROCEDURE: The patient was brought from the pre-operative area and placed on the operating table in the supine position. A pneumatic ankle tourniquet was placed around the patient's well-padded left lower extremity. Following IV sedation, a local anesthetic block was then injected proximal to the incision site consisting of 10 cc of 2% Polocaine. The left lower 
atherectomy and balloon angioplasty of the proximal AT and PT   Aggressive medical therapy for PAD   May proceed with podiatry surgery     Freeman Rodriguez MD [facility]  General, Interventional Cardiology, and Peripheral Vascular Disease   [facility]   (C): [phone]  (O): [phone]

## 2024-02-13 VITALS
OXYGEN SATURATION: 99 % | WEIGHT: 200.4 LBS | TEMPERATURE: 98.2 F | HEART RATE: 85 BPM | BODY MASS INDEX: 28.69 KG/M2 | HEIGHT: 70 IN | DIASTOLIC BLOOD PRESSURE: 87 MMHG | RESPIRATION RATE: 18 BRPM | SYSTOLIC BLOOD PRESSURE: 165 MMHG

## 2024-02-13 LAB
ALBUMIN SERPL-MCNC: 3 G/DL (ref 3.4–5)
ANION GAP SERPL CALCULATED.3IONS-SCNC: 10 MMOL/L (ref 3–16)
BUN SERPL-MCNC: 23 MG/DL (ref 7–20)
CALCIUM SERPL-MCNC: 8.3 MG/DL (ref 8.3–10.6)
CHLORIDE SERPL-SCNC: 98 MMOL/L (ref 99–110)
CO2 SERPL-SCNC: 24 MMOL/L (ref 21–32)
CREAT SERPL-MCNC: 5.3 MG/DL (ref 0.8–1.3)
DEPRECATED RDW RBC AUTO: 15.4 % (ref 12.4–15.4)
GFR SERPLBLD CREATININE-BSD FMLA CKD-EPI: 11 ML/MIN/{1.73_M2}
GLUCOSE BLD-MCNC: 57 MG/DL (ref 70–99)
GLUCOSE BLD-MCNC: 84 MG/DL (ref 70–99)
GLUCOSE BLD-MCNC: 89 MG/DL (ref 70–99)
GLUCOSE SERPL-MCNC: 83 MG/DL (ref 70–99)
HCT VFR BLD AUTO: 26.1 % (ref 40.5–52.5)
HGB BLD-MCNC: 9 G/DL (ref 13.5–17.5)
MCH RBC QN AUTO: 34 PG (ref 26–34)
MCHC RBC AUTO-ENTMCNC: 34.3 G/DL (ref 31–36)
MCV RBC AUTO: 99 FL (ref 80–100)
PERFORMED ON: ABNORMAL
PERFORMED ON: NORMAL
PERFORMED ON: NORMAL
PHOSPHATE SERPL-MCNC: 3.7 MG/DL (ref 2.5–4.9)
PLATELET # BLD AUTO: 154 K/UL (ref 135–450)
PMV BLD AUTO: 7.9 FL (ref 5–10.5)
POTASSIUM SERPL-SCNC: 4.8 MMOL/L (ref 3.5–5.1)
RBC # BLD AUTO: 2.64 M/UL (ref 4.2–5.9)
SODIUM SERPL-SCNC: 132 MMOL/L (ref 136–145)
WBC # BLD AUTO: 6.2 K/UL (ref 4–11)

## 2024-02-13 PROCEDURE — 2580000003 HC RX 258

## 2024-02-13 PROCEDURE — 85027 COMPLETE CBC AUTOMATED: CPT

## 2024-02-13 PROCEDURE — 6370000000 HC RX 637 (ALT 250 FOR IP): Performed by: INTERNAL MEDICINE

## 2024-02-13 PROCEDURE — 6370000000 HC RX 637 (ALT 250 FOR IP)

## 2024-02-13 PROCEDURE — 94760 N-INVAS EAR/PLS OXIMETRY 1: CPT

## 2024-02-13 PROCEDURE — 80069 RENAL FUNCTION PANEL: CPT

## 2024-02-13 PROCEDURE — 36415 COLL VENOUS BLD VENIPUNCTURE: CPT

## 2024-02-13 PROCEDURE — 97530 THERAPEUTIC ACTIVITIES: CPT

## 2024-02-13 PROCEDURE — 6360000002 HC RX W HCPCS

## 2024-02-13 PROCEDURE — 97166 OT EVAL MOD COMPLEX 45 MIN: CPT

## 2024-02-13 PROCEDURE — 97162 PT EVAL MOD COMPLEX 30 MIN: CPT

## 2024-02-13 RX ORDER — OXYCODONE HYDROCHLORIDE AND ACETAMINOPHEN 5; 325 MG/1; MG/1
1 TABLET ORAL EVERY 4 HOURS PRN
Qty: 18 TABLET | Refills: 0 | Status: ON HOLD | OUTPATIENT
Start: 2024-02-13 | End: 2024-02-17

## 2024-02-13 RX ORDER — ATORVASTATIN CALCIUM 80 MG/1
80 TABLET, FILM COATED ORAL NIGHTLY
Qty: 30 TABLET | Refills: 3 | Status: ON HOLD | OUTPATIENT
Start: 2024-02-13

## 2024-02-13 RX ORDER — CARVEDILOL 6.25 MG/1
6.25 TABLET ORAL 2 TIMES DAILY WITH MEALS
Qty: 60 TABLET | Refills: 3 | Status: SHIPPED | OUTPATIENT
Start: 2024-02-13 | End: 2024-02-13

## 2024-02-13 RX ORDER — OXYCODONE HYDROCHLORIDE AND ACETAMINOPHEN 5; 325 MG/1; MG/1
1 TABLET ORAL EVERY 6 HOURS PRN
Qty: 12 TABLET | Refills: 0 | Status: SHIPPED | OUTPATIENT
Start: 2024-02-13 | End: 2024-02-13

## 2024-02-13 RX ORDER — OXYCODONE HYDROCHLORIDE AND ACETAMINOPHEN 5; 325 MG/1; MG/1
1 TABLET ORAL EVERY 4 HOURS PRN
Qty: 18 TABLET | Refills: 0 | Status: SHIPPED | OUTPATIENT
Start: 2024-02-13 | End: 2024-02-13

## 2024-02-13 RX ORDER — ASPIRIN 81 MG/1
81 TABLET, CHEWABLE ORAL DAILY
Qty: 30 TABLET | Refills: 3 | Status: ON HOLD | OUTPATIENT
Start: 2024-02-14

## 2024-02-13 RX ORDER — ATORVASTATIN CALCIUM 80 MG/1
80 TABLET, FILM COATED ORAL NIGHTLY
Qty: 30 TABLET | Refills: 3 | Status: SHIPPED | OUTPATIENT
Start: 2024-02-13 | End: 2024-02-13

## 2024-02-13 RX ORDER — CARVEDILOL 6.25 MG/1
6.25 TABLET ORAL 2 TIMES DAILY WITH MEALS
Qty: 60 TABLET | Refills: 3 | Status: ON HOLD | OUTPATIENT
Start: 2024-02-13

## 2024-02-13 RX ORDER — CARVEDILOL 6.25 MG/1
6.25 TABLET ORAL 2 TIMES DAILY WITH MEALS
Qty: 60 TABLET | Refills: 3 | DISCHARGE
Start: 2024-02-13 | End: 2024-02-13

## 2024-02-13 RX ORDER — ATORVASTATIN CALCIUM 80 MG/1
80 TABLET, FILM COATED ORAL NIGHTLY
Qty: 30 TABLET | Refills: 3 | DISCHARGE
Start: 2024-02-13 | End: 2024-02-13

## 2024-02-13 RX ADMIN — GABAPENTIN 100 MG: 100 CAPSULE ORAL at 14:32

## 2024-02-13 RX ADMIN — Medication 10 ML: at 02:22

## 2024-02-13 RX ADMIN — PANTOPRAZOLE SODIUM 40 MG: 40 TABLET, DELAYED RELEASE ORAL at 05:54

## 2024-02-13 RX ADMIN — CARVEDILOL 6.25 MG: 6.25 TABLET, FILM COATED ORAL at 08:36

## 2024-02-13 RX ADMIN — SEVELAMER CARBONATE 2400 MG: 800 TABLET, FILM COATED ORAL at 14:38

## 2024-02-13 RX ADMIN — OXYCODONE HYDROCHLORIDE 10 MG: 10 TABLET ORAL at 17:01

## 2024-02-13 RX ADMIN — POLYETHYLENE GLYCOL 3350 17 G: 17 POWDER, FOR SOLUTION ORAL at 12:17

## 2024-02-13 RX ADMIN — OXYCODONE HYDROCHLORIDE 10 MG: 10 TABLET ORAL at 11:27

## 2024-02-13 RX ADMIN — OXYCODONE HYDROCHLORIDE 10 MG: 10 TABLET ORAL at 00:05

## 2024-02-13 RX ADMIN — ASPIRIN 81 MG: 81 TABLET, CHEWABLE ORAL at 08:36

## 2024-02-13 RX ADMIN — METRONIDAZOLE 500 MG: 500 INJECTION, SOLUTION INTRAVENOUS at 08:42

## 2024-02-13 RX ADMIN — HYDROMORPHONE HYDROCHLORIDE 0.25 MG: 1 INJECTION, SOLUTION INTRAMUSCULAR; INTRAVENOUS; SUBCUTANEOUS at 08:33

## 2024-02-13 RX ADMIN — GABAPENTIN 100 MG: 100 CAPSULE ORAL at 08:36

## 2024-02-13 RX ADMIN — HEPARIN SODIUM 5000 UNITS: 5000 INJECTION INTRAVENOUS; SUBCUTANEOUS at 14:32

## 2024-02-13 RX ADMIN — AMLODIPINE BESYLATE 5 MG: 5 TABLET ORAL at 08:37

## 2024-02-13 RX ADMIN — HEPARIN SODIUM 5000 UNITS: 5000 INJECTION INTRAVENOUS; SUBCUTANEOUS at 05:54

## 2024-02-13 RX ADMIN — VENLAFAXINE HYDROCHLORIDE 75 MG: 75 CAPSULE, EXTENDED RELEASE ORAL at 08:36

## 2024-02-13 RX ADMIN — OXYCODONE HYDROCHLORIDE 5 MG: 5 TABLET ORAL at 05:54

## 2024-02-13 RX ADMIN — METRONIDAZOLE 500 MG: 500 INJECTION, SOLUTION INTRAVENOUS at 02:23

## 2024-02-13 RX ADMIN — SODIUM CHLORIDE: 9 INJECTION, SOLUTION INTRAVENOUS at 08:39

## 2024-02-13 NOTE — PLAN OF CARE
Problem: Discharge Planning  Goal: Discharge to home or other facility with appropriate resources  Outcome: Progressing     Problem: Pain  Goal: Verbalizes/displays adequate comfort level or baseline comfort level  Outcome: Progressing     Problem: Safety - Adult  Goal: Free from fall injury  Outcome: Progressing     Problem: Chronic Conditions and Co-morbidities  Goal: Patient's chronic conditions and co-morbidity symptoms are monitored and maintained or improved  Outcome: Progressing     Problem: Neurosensory - Adult  Goal: Achieves stable or improved neurological status  Outcome: Progressing     Problem: Respiratory - Adult  Goal: Achieves optimal ventilation and oxygenation  Outcome: Progressing     Problem: Cardiovascular - Adult  Goal: Maintains optimal cardiac output and hemodynamic stability  Outcome: Progressing     Problem: Skin/Tissue Integrity - Adult  Goal: Skin integrity remains intact  Outcome: Progressing     Problem: Musculoskeletal - Adult  Goal: Return mobility to safest level of function  Outcome: Progressing     Problem: Metabolic/Fluid and Electrolytes - Adult  Goal: Electrolytes maintained within normal limits  Outcome: Progressing     Problem: Gastrointestinal - Adult  Goal: Minimal or absence of nausea and vomiting  Outcome: Progressing     Problem: ABCDS Injury Assessment  Goal: Absence of physical injury  Outcome: Progressing     Problem: Nutrition Deficit:  Goal: Optimize nutritional status  Outcome: Progressing  Flowsheets (Taken 2/12/2024 1510 by Tahira León, RD, LD)  Nutrient intake appropriate for improving, restoring, or maintaining nutritional needs: Monitor oral intake, labs, and treatment plans

## 2024-02-13 NOTE — CARE COORDINATION
Case Management Discharge Note          Date / Time of Note: 2/13/2024 2:24 PM                  Patient Name: Cal Sanchez   YOB: 1960  Diagnosis: Hyperkalemia [E87.5]  Septicemia (HCC) [A41.9]  ESRD on hemodialysis (HCC) [N18.6, Z99.2]  Diabetic infection of left foot (HCC) [E11.628, L08.9]  Osteomyelitis of left foot, unspecified type (HCC) [M86.9]   Date / Time: 2/4/2024  5:58 PM    Financial:  Payor: [employer] [place] / Plan: [employer] [place] DUAL / Product Type: *No Product type* /      Pharmacy:    [id number] - [place] - [address] - P [phone] - F [phone]  [address] [place] [address]  Phone: [phone] Fax: [phone]    [facility] [id number] - [place] [address]  - P [phone] - F [phone]  [address]   [facility] [address]  Phone: [phone] Fax: [phone]      Assistance purchasing medications?: Potential Assistance Purchasing Medications: No  Assistance provided by Case Management: None at this time    DISCHARGE Disposition: Home with Home Health Care    Home Care:  Home Care ordered at discharge: Yes  Home Care Agency: [facility]  Phone: [phone]  Fax: [phone]  Orders faxed: Yes    Durable Medical Equipment:  DME Provider: [employer]  Equipment obtained during hospitalization: walker    Transportation:  Transportation PLAN for discharge: EMS transportation   Mode of Transport: Ambulance stretcher - BLS  Name of Transport Company: [employer]  Phone: [phone]  Time of Transport: 6 pm    Transport form completed: Yes    IMM Completed:   Not Indicated    Additional CM Notes:   Discharge order noted.  Patient will need a Lyft home.  [employer] to deliver walker to bedside. Patient refused to go to a Skilled nursing facility as per original therapy recommendations.    The Plan for Transition of Care is related to the following treatment goals of Hyperkalemia [E87.5]  Septicemia (HCC) [A41.9]  ESRD on hemodialysis 
   02/08/24 1035   IMM Letter   IMM Letter given to Patient/Family/Significant other/Guardian/POA/by: reviewed with patient at bedside and left a copy. SLR   IMM Letter date given: 02/08/24   IMM Letter time given: 1034       Respectfully submitted,    Yuliya RIVERA, MUNIR  [facility]   [phone]    Electronically signed by MIGUEL Dumont, JOSEW on 2/8/2024 at 10:36 AM    
DISCHARGE PLANNING:     Per chart review and rounds, patient with L big toe amputation today.  Will need updated PT/OT eval. Per ID patient to have IV atb therapy for 4-6 weeks at discharge with HD.  HD at [facility].  Patient is active with [facility].  DC plan remains home with niece.     #[phone]  Electronically signed by Jennifer Medel RN on 2/12/2024 at 12:51 PM    
DISCHARGE PLANNING:    Per chart review and rounds, patient unable to get MRI d/t pacemaker not being compatible.  Peripheral angiogram to be performed.  Per ID patient to have IV atb therapy for 4-6 weeks at discharge with HD.  HD at [facility].  Patient is active with [facility].  DC plan remains home with niece.      #[phone]  Electronically signed by Jennifer Medel RN on 2/9/2024 at 10:55 AM    
[facility]    DC order noted, all docs needed have been faxed to [facility] for home care services.    Home care to see patient within 24-48 hrs    Gordo Hayes RN, BSN CTN  [facility] [phone]   
[facility]  Patient is active with [facility] for nurse, PT/OT.   Will follow for discharge to home with orders to resume care.    Gordo Hayes RN, BSN CTN  [facility] [phone]      
Freedom of choice list with basic dialogue that supports the patient's individualized plan of care/goals and shares the quality data associated with the providers was provided to: Patient       The Patient and/or Patient Representative Agree with the Discharge Plan? Yes    Jennifer Medel RN  Case Management Department  Ph: [phone]

## 2024-02-13 NOTE — PROGRESS NOTES
[facility]   Daily Progress Note      Admit Date:  2/4/2024    CC: LLE pain    HPI:   Cal Sanchez is a 63 y.o. male with PMH ESRD, CAD, AF s/p Watchman, DM, PAD/L 2/3rd toe amputation.    Adm to W with LLE pain, redness and warmth associated with  abd pain, N/V.  Dr. Rodriguez consulted for CLI of LLE.   Underewent peripheral cath/ LLE revascularization 2/9/2024    Sitting up in chair today.   L foot wrapped with bandage/ace wrap.  Cont with L foot pain. Improves with pain meds.   Denies CP or SOB.     Review of Systems:   General: Denies fever, chills, fatigue, weakness  Skin: Denies skin changes, rash, itching, lesions.  HEENT: Denies headache, dizziness, vision changes, nosebleeds, sore throat, nasal drainage  RESP: Denies cough, sputum, dyspnea, wheeze, snoring  CARD: Denies palpitations,  murmur  GI:Denies nausea, vomiting, heartburn, loss of appetite, change in bowels  : Denies frequency, pain, incontinence, polyuria  VASC: Denies claudication, leg cramps, clots  MUSC/SKEL: Denies pain, stiffness, arthritis  PSYCH: Denies anxiety, depression, stress  NEURO: Denies numbness, tingling, weakness,change in mood or memory  HEME: Denies abn bruising, bleeding, anemia  ENDO: Denies intolerance to heat, cold, excessive thirst or hunger, hx thyroid disease    Objective:   BP (!) 140/79   Pulse 88   Temp 98 °F (36.7 °C) (Oral)   Resp 18   Ht 1.778 m (5' 10\")   Wt 90.6 kg (199 lb 11.8 oz)   SpO2 95%   BMI 28.66 kg/m²         Intake/Output Summary (Last 24 hours) at 2/10/2024 1349  Last data filed at 2/10/2024 1044  Gross per 24 hour   Intake 250 ml   Output --   Net 250 ml     I/O since adm:     WEIGHT:Admit Weight - Scale: 90.3 kg (199 lb 1.2 oz)         Today  Weight - Scale: 90.6 kg (199 lb 11.8 oz)   DRY WEIGHT:  Wt Readings from Last 3 Encounters:   02/10/24 90.6 kg (199 lb 11.8 oz)   01/11/24 89.4 kg (197 lb)   12/08/23 83.9 kg (185 lb)       Physical Exam:  GEN: Appears frial, no acute 
      Infectious Diseases Inpatient Progress Note      CHIEF COMPLAINT:     Sepsis  Staph aureus bacteremia  Surgical disease  Left foot infection  Pacemaker in place  Elevated lactic acid    HISTORY OF PRESENT ILLNESS:  63 y.o. man with a history of end-stage renal disease on hemodialysis, diabetes, hypertension, diabetic neuropathy, previous toe amputation admitted to hospital secondary to change in mental status high fever not feeling well also found to have redness swelling on the left foot with discharge from the left great toe concern for ongoing infection.  On admission labs indicate procalcitonin 1.04 lactic acid 2.3 blood cultures now positive for Staphylococcus aureus MSSA, Tmax 103 on admission, WBC 9 hemoglobin 11.3 with .  X-ray left foot with destruction of the distal phalanx of the left great toe concern for osteomyelitis.  Given the need for IV antibiotics and ongoing fevers and bacteremia we are consulted for recommendations      Interval history: Complains of ongoing left leg pain and swelling redness slowly improving, repeat blood culture in process so far negative echocardiogram -ve and MRI pending Pacemaker check, feels weak and tired    Past Medical History:    Past Medical History:   Diagnosis Date    Atrial fibrillation (HCC)     CAD (coronary artery disease) 4/3/2014    Chronic kidney disease     Depression     Diabetes mellitus (HCC)     Dialysis patient (HCC)     left upper arm fistula    Glaucoma     Hemodialysis patient (HCC)     Hyperlipidemia     Hypertension     RLL pneumonia 1/23/2018    Sleep apnea     uses CPAP       Past Surgical History:    Past Surgical History:   Procedure Laterality Date    COLONOSCOPY      PACEMAKER INSERTION      PACEMAKER PLACEMENT      TOE AMPUTATION Left 12/1/2023    AMPUTATION SECOND AND THIRD DIGITS LEFT FOOT performed by Horace Jordan DPM at [facility] OR       Current Medications:    No outpatient medications have been marked as taking for the 
      Infectious Diseases Inpatient Progress Note      CHIEF COMPLAINT:     Sepsis  Staph aureus bacteremia  Surgical disease  Left foot infection  Pacemaker in place  Elevated lactic acid    HISTORY OF PRESENT ILLNESS:  63 y.o. man with a history of end-stage renal disease on hemodialysis, diabetes, hypertension, diabetic neuropathy, previous toe amputation admitted to hospital secondary to change in mental status high fever not feeling well also found to have redness swelling on the left foot with discharge from the left great toe concern for ongoing infection.  On admission labs indicate procalcitonin 1.04 lactic acid 2.3 blood cultures now positive for Staphylococcus aureus MSSA, Tmax 103 on admission, WBC 9 hemoglobin 11.3 with .  X-ray left foot with destruction of the distal phalanx of the left great toe concern for osteomyelitis.  Given the need for IV antibiotics and ongoing fevers and bacteremia we are consulted for recommendations      Interval history: Complains of ongoing left leg pain and swelling redness slowly improving, repeat blood culture in process so far negative echocardiogram pending    Past Medical History:    Past Medical History:   Diagnosis Date    Atrial fibrillation (HCC)     CAD (coronary artery disease) 4/3/2014    Chronic kidney disease     Depression     Diabetes mellitus (HCC)     Dialysis patient (HCC)     left upper arm fistula    Glaucoma     Hemodialysis patient (HCC)     Hyperlipidemia     Hypertension     RLL pneumonia 1/23/2018    Sleep apnea     uses CPAP       Past Surgical History:    Past Surgical History:   Procedure Laterality Date    COLONOSCOPY      PACEMAKER INSERTION      PACEMAKER PLACEMENT      TOE AMPUTATION Left 12/1/2023    AMPUTATION SECOND AND THIRD DIGITS LEFT FOOT performed by Horace Jordan DPM at [facility] OR       Current Medications:    No outpatient medications have been marked as taking for the 2/4/24 encounter (Hospital Encounter). 
      Infectious Diseases Inpatient Progress Note      CHIEF COMPLAINT:     Sepsis  Staph aureus bacteremia  Surgical disease  Left foot infection  Pacemaker in place  Elevated lactic acid    HISTORY OF PRESENT ILLNESS:  63 y.o. man with a history of end-stage renal disease on hemodialysis, diabetes, hypertension, diabetic neuropathy, previous toe amputation admitted to hospital secondary to change in mental status high fever not feeling well also found to have redness swelling on the left foot with discharge from the left great toe concern for ongoing infection.  On admission labs indicate procalcitonin 1.04 lactic acid 2.3 blood cultures now positive for Staphylococcus aureus MSSA, Tmax 103 on admission, WBC 9 hemoglobin 11.3 with .  X-ray left foot with destruction of the distal phalanx of the left great toe concern for osteomyelitis.  Given the need for IV antibiotics and ongoing fevers and bacteremia we are consulted for recommendations      Interval history: Left foot pain slowly improving redness improving blood culture follow-up negative status post left foot surgery left hallux amputation per podiatry was difficult surgery with wound closure follow blood cultures negative tolerating antibiotic therapy okay will update DELFINO    Past Medical History:    Past Medical History:   Diagnosis Date    Atrial fibrillation (HCC)     CAD (coronary artery disease) 4/3/2014    Chronic kidney disease     Depression     Diabetes mellitus (HCC)     Dialysis patient (HCC)     left upper arm fistula    Glaucoma     Hemodialysis patient (HCC)     Hyperlipidemia     Hypertension     RLL pneumonia 1/23/2018    Sleep apnea     uses CPAP       Past Surgical History:    Past Surgical History:   Procedure Laterality Date    COLONOSCOPY      PACEMAKER INSERTION      PACEMAKER PLACEMENT      TOE AMPUTATION Left 12/1/2023    AMPUTATION SECOND AND THIRD DIGITS LEFT FOOT performed by Horace Jordan DPM at [facility] OR    TOE AMPUTATION 
      Infectious Diseases Inpatient Progress Note      CHIEF COMPLAINT:     Sepsis  Staph aureus bacteremia  Surgical disease  Left foot infection  Pacemaker in place  Elevated lactic acid    HISTORY OF PRESENT ILLNESS:  63 y.o. man with a history of end-stage renal disease on hemodialysis, diabetes, hypertension, diabetic neuropathy, previous toe amputation admitted to hospital secondary to change in mental status high fever not feeling well also found to have redness swelling on the left foot with discharge from the left great toe concern for ongoing infection.  On admission labs indicate procalcitonin 1.04 lactic acid 2.3 blood cultures now positive for Staphylococcus aureus MSSA, Tmax 103 on admission, WBC 9 hemoglobin 11.3 with .  X-ray left foot with destruction of the distal phalanx of the left great toe concern for osteomyelitis.  Given the need for IV antibiotics and ongoing fevers and bacteremia we are consulted for recommendations      Interval history: Left foot pain slowly improving redness improving blood culture follow-up negative tolerating antibiotic therapy okay, plan for surgery noted discussed with the patient.      Past Medical History:    Past Medical History:   Diagnosis Date    Atrial fibrillation (HCC)     CAD (coronary artery disease) 4/3/2014    Chronic kidney disease     Depression     Diabetes mellitus (HCC)     Dialysis patient (HCC)     left upper arm fistula    Glaucoma     Hemodialysis patient (HCC)     Hyperlipidemia     Hypertension     RLL pneumonia 1/23/2018    Sleep apnea     uses CPAP       Past Surgical History:    Past Surgical History:   Procedure Laterality Date    COLONOSCOPY      PACEMAKER INSERTION      PACEMAKER PLACEMENT      TOE AMPUTATION Left 12/1/2023    AMPUTATION SECOND AND THIRD DIGITS LEFT FOOT performed by Horace Jordan DPM at [facility] OR       Current Medications:    No outpatient medications have been marked as taking for the 2/4/24 encounter ([facility] 
    PRE-PROCEDURE      DATE: 2/9/2024 ARRIVAL TO CATH LAB: 12:48 PM    ADMIT SOURCE: Inpatient    ID & ALLERGY BAND: On    CONSENT: Yes    NPO SINCE: Midnight        LABS:  BUN/CREAT:  H/H:  PLT:    PULSES - see flowsheet    IV SITE : Patent in Lt wrist and right forearm.  with fluids infusing at 75 12:48 PM       BLEEDING PROBLEMS: No        LAST DOSE (if applicable):  ASA: 02/08/2024  P2Y12 (Plavix, Effient, Brilinta): NA  Anticoagulants (Coumadin, Xarelto, Eliquis): NA  Other Blood Thinners: NA    
    V2.0    [facility] Progress Note      Name:  Cal Sanchez /Age/Sex: 1960  (63 y.o. male)   MRN & CSN:  [id number] & [id number] Encounter Date/Time: 2024 9:27 AM EST   Location:  [id number] PCP: Tania Moses MD     Attending:Azael Marshall MD       Hospital Day: 8    Assessment and Recommendations   Cal Sanchez is a 63 y.o. male with pmh of ESRD, DM type II, hypertension, PAD who presents with Diabetic infection of left foot (HCC)      Plan:     -Left diabetic foot infection,  osteomyelitis of Lt distal phalanx  -MSSA bacteremia with sepsis due to diabetic foot infection  Was Unable to get MRI as pacemaker is not compatible,  No evidence of endocarditis on echo    S/p left leg angiography--99% ostial anterior tibial/posterior tibial thrombosis status post successful orbital atherectomy and balloon angioplasty of the proximal AT and PT      Surgical planning for 2024 at 7:30 per podiatry  Antibiotics--on IV cefazolin plus Flagyl--anticipate 4 to 6-week course of antibiotics      -ESRD on hemodialysis  -Hyperkalemia due to ESRD  -Hypernatremia          Continue HD management per nephrology  -Essential hypertension--continue Norvasc  -Depression--continue Seroquel, Wellbutrin  -Diabetes mellitus type 2--diet controlled  -CARMINE requiring CPAP use  -PAD with critical limb ischemia s/p left popliteal artery stent 23- S/p left leg angiography--99% ostial anterior tibial/posterior tibial thrombosis status post successful orbital atherectomy and balloon angioplasty of the proximal AT and PT   -CAD-left heart cath -50% LAD,50% diag 2, 100% diag 1,40% RCA,normal EF - Med tx recommended   -Cardiomyopathy/EF 45 to 50%, appears to be new onset systolic heart failure-compensated--follow-up with cardiology  -Paroxysmal atrial fibrillation-not anticoagulated currently  -s/p PPM      Diet ADULT DIET; Regular; Low Sodium (2 gm)   DVT Prophylaxis [] Lovenox, []  Heparin, [] 
    V2.0    [facility] Progress Note      Name:  Cal Sanchez /Age/Sex: 1960  (63 y.o. male)   MRN & CSN:  [id number] & [id number] Encounter Date/Time: 2024 9:27 AM EST   Location:  [id number] PCP: Tania Moses MD     Attending:Azael Marshall MD       Hospital Day: 3    Assessment and Recommendations   Cal Sanchez is a 63 y.o. male with pmh of ESRD, DM type II, hypertension, PAD who presents with Diabetic infection of left foot (HCC)      Plan:     -Left diabetic foot infection, possible  -Possible osteomyelitis of Lt distal phalanx  -MSSA bacteremia with sepsis due to diabetic foot infection  MRI left foot pending  2D echo pending  Continue management per ID and podiatry  Antibiotics--on IV cefazolin plus Flagyl--anticipate 4 to 6-week course of antibiotics    -ESRD on hemodialysis  -Hyperkalemia due to ESRD  -Essential hypertension--continue Norvasc  -Depression--continue Seroquel, Wellbutrin  -Diabetes mellitus type 2--diet controlled  -CARMINE requiring CPAP use  -PAD s/p left popliteal artery stent 23,  -hx Nonobstructive CAD-left heart cath   -s/p PPM      Diet ADULT DIET; Regular; 5 carb choices (75 gm/meal); Low Sodium (2 gm); Low Potassium (Less than 3000 mg/day)   DVT Prophylaxis [] Lovenox, []  Heparin, [] SCDs, [] Ambulation,  [] Eliquis, [] Xarelto  [] Coumadin   Code Status Full Code   Disposition    Surrogate Decision Maker/ POA             Subjective:     The patient feels well today with no complaints.  No acute events reported overnight.          Review of Systems:      Pertinent positives and negatives discussed in HPI    Objective:   No intake or output data in the 24 hours ending 24 0927   Vitals:   Vitals:    24 0536 24 0601 24 0731 24 0745   BP:   (!) 171/101 129/75   Pulse: (!) 109  (!) 115 (!) 109   Resp:  18 18    Temp:   97.7 °F (36.5 °C)    TempSrc:   Oral    SpO2:   100%    Weight:       Height:             Physical 
    V2.0    [facility] Progress Note      Name:  Cal Sanchez /Age/Sex: 1960  (63 y.o. male)   MRN & CSN:  [id number] & [id number] Encounter Date/Time: 2024 9:27 AM EST   Location:  [id number]/5121-01 PCP: Tania Moses MD     Attending:Azael Marhsall MD       Hospital Day: 9    Assessment and Recommendations   Cal Sanchez is a 63 y.o. male with pmh of ESRD, DM type II, hypertension, PAD who presents with Diabetic infection of left foot (HCC)      Plan:     -Left diabetic foot infection,  osteomyelitis of Lt distal phalanx  -MSSA bacteremia with sepsis due to diabetic foot infection  Was Unable to get MRI as pacemaker is not compatible,  No evidence of endocarditis on echo    S/p left leg angiography--99% ostial anterior tibial/posterior tibial thrombosis status post successful orbital atherectomy and balloon angioplasty of the proximal AT and PT      S/p left hallux amputation by podiatry   Antibiotics--on IV cefazolin plus Flagyl--anticipate 4 to 6-week course of antibiotics      -ESRD on hemodialysis  -Hyperkalemia due to ESRD  -Hypernatremia          Continue HD management per nephrology  -Essential hypertension--continue Norvasc  -Depression--continue Seroquel, Wellbutrin  -Diabetes mellitus type 2--diet controlled  -CARMINE requiring CPAP use  -PAD with critical limb ischemia s/p left popliteal artery stent 23- S/p left leg angiography--99% ostial anterior tibial/posterior tibial thrombosis status post successful orbital atherectomy and balloon angioplasty of the proximal AT and PT   -CAD-left heart cath -50% LAD,50% diag 2, 100% diag 1,40% RCA,normal EF - Med tx recommended   -Cardiomyopathy/EF 45 to 50%, appears to be new onset systolic heart failure-compensated--follow-up with cardiology  -Paroxysmal atrial fibrillation-not anticoagulated currently  -s/p PPM      Disposition..  Home pending PT and OT eval's, ID antibiotic recommendations..  Patient is otherwise medically 
    V2.0    [facility] Progress Note      Name:  Cal Sanchez /Age/Sex: 1960  (63 y.o. male)   MRN & CSN:  [id number] & [id number] Encounter Date/Time: 2024 9:27 AM EST   Location:  [id number]/5121-01 PCP: Tania Moses MD     Attending:Azael Marshall MD       Hospital Day: 5    Assessment and Recommendations   Cal Sanchez is a 63 y.o. male with pmh of ESRD, DM type II, hypertension, PAD who presents with Diabetic infection of left foot (HCC)      Plan:     -Left diabetic foot infection, possible  -Possible osteomyelitis of Lt distal phalanx  -MSSA bacteremia with sepsis due to diabetic foot infection  MRI left foot still pending  No evidence of endocarditis on echo  Continue management per ID and podiatry  May need surgical intervention pending results of MRI  Antibiotics--on IV cefazolin plus Flagyl--anticipate 4 to 6-week course of antibiotics    -ESRD on hemodialysis  -Hyperkalemia due to ESRD  -Hypernatremia          Continue HD management per nephrology  -Essential hypertension--continue Norvasc  -Depression--continue Seroquel, Wellbutrin  -Diabetes mellitus type 2--diet controlled  -CARMINE requiring CPAP use  -PAD s/p left popliteal artery stent 23,  -CAD-left heart cath -50% LAD,50% diag 2, 100% diag 1,40% RCA,normal EF - Med tx recommended   -Cardiomyopathy/EF 45 to 50%, appears to be new onset systolic heart failure-compensated--consult cardiology  -Paroxysmal atrial fibrillation-not anticoagulated currently  -s/p PPM      Diet ADULT DIET; Regular; 5 carb choices (75 gm/meal); Low Sodium (2 gm); Low Potassium (Less than 3000 mg/day)   DVT Prophylaxis [] Lovenox, []  Heparin, [] SCDs, [] Ambulation,  [] Eliquis, [] Xarelto  [] Coumadin   Code Status Full Code   Disposition    Surrogate Decision Maker/ POA             Subjective:     Patient reports ongoing pain in the left foot, current pain regimen is helpful        Review of Systems:      Pertinent positives and negatives 
    V2.0    [facility] Progress Note      Name:  Cal Sanchez /Age/Sex: 1960  (63 y.o. male)   MRN & CSN:  [id number] & [id number] Encounter Date/Time: 2024 9:27 AM EST   Location:  [id number]-[id number]/5121-01 PCP: Tania Moses MD     Attending:Azael Marshall MD       Hospital Day: 4    Assessment and Recommendations   Cal Sanchez is a 63 y.o. male with pmh of ESRD, DM type II, hypertension, PAD who presents with Diabetic infection of left foot (HCC)      Plan:     -Left diabetic foot infection, possible  -Possible osteomyelitis of Lt distal phalanx  -MSSA bacteremia with sepsis due to diabetic foot infection  MRI left foot pending  2D echo pending  Continue management per ID and podiatry  May need surgical intervention pending results of MRI  Antibiotics--on IV cefazolin plus Flagyl--anticipate 4 to 6-week course of antibiotics    -ESRD on hemodialysis  -Hyperkalemia due to ESRD  -Hypernatremia          Continue HD management per nephrology  -Essential hypertension--continue Norvasc  -Depression--continue Seroquel, Wellbutrin  -Diabetes mellitus type 2--diet controlled  -CARMINE requiring CPAP use  -PAD s/p left popliteal artery stent 23,  -hx Nonobstructive CAD-left heart cath   -s/p PPM      Diet ADULT DIET; Regular; 5 carb choices (75 gm/meal); Low Sodium (2 gm); Low Potassium (Less than 3000 mg/day)   DVT Prophylaxis [] Lovenox, []  Heparin, [] SCDs, [] Ambulation,  [] Eliquis, [] Xarelto  [] Coumadin   Code Status Full Code   Disposition    Surrogate Decision Maker/ POA             Subjective:     Feels well today and has no complaints.  No acute events reported night.  No fevers or chills.          Review of Systems:      Pertinent positives and negatives discussed in HPI    Objective:     Intake/Output Summary (Last 24 hours) at 2024 1200  Last data filed at 2024 1341  Gross per 24 hour   Intake 0 ml   Output --   Net 0 ml      Vitals:   Vitals:    24 0941 24 1011 
    V2.0    [facility] [place] Progress Note      Name:  Cal Sanchez /Age/Sex: 1960  (63 y.o. male)   MRN & CSN:  [id number] & [id number] Encounter Date/Time: 2024 9:27 AM EST   Location:  [id number]-5247/5121-01 PCP: Tania Moses MD     Attending:Azael Marshall MD       Hospital Day: 6    Assessment and Recommendations   Cal Sanchez is a 63 y.o. male with pmh of ESRD, DM type II, hypertension, PAD who presents with Diabetic infection of left foot (HCC)      Plan:     -Left diabetic foot infection, possible  -Possible osteomyelitis of Lt distal phalanx  -MSSA bacteremia with sepsis due to diabetic foot infection  Unable to get MRI as pacemaker is not compatible, defer other imaging studies to podiatry as needed  No evidence of endocarditis on echo  Follow-up with ID and podiatry  Antibiotics--on IV cefazolin plus Flagyl--anticipate 4 to 6-week course of antibiotics  Lower extremity angiography today by interventional cardiology    -ESRD on hemodialysis  -Hyperkalemia due to ESRD  -Hypernatremia          Continue HD management per nephrology  -Essential hypertension--continue Norvasc  -Depression--continue Seroquel, Wellbutrin  -Diabetes mellitus type 2--diet controlled  -CARMINE requiring CPAP use  -PAD s/p left popliteal artery stent 23-lower extremity angiography today by interventional cardiology  -CAD-left heart cath -50% LAD,50% diag 2, 100% diag 1,40% RCA,normal EF - Med tx recommended   -Cardiomyopathy/EF 45 to 50%, appears to be new onset systolic heart failure-compensated--follow-up with cardiology  -Paroxysmal atrial fibrillation-not anticoagulated currently  -s/p PPM      Diet Diet NPO   DVT Prophylaxis [] Lovenox, []  Heparin, [] SCDs, [] Ambulation,  [] Eliquis, [] Xarelto  [] Coumadin   Code Status Full Code   Disposition    Surrogate Decision Maker/ POA             Subjective:       Patient is having left lower extremity angiography by interventional cardiologist today, no acute 
  Nephrology Progress Note   [email or web address]      Reason for consultation: ESRD on HD MWF; follows with  Nephrology     Subjective:    The patient has been seen and examined. Labs and chart reviewed. Seen on HD with 1 L UF goal and 2 K bath. Complains of pain to L foot -- unfortunately, pt requested HD to end 30 min early for this reason.     There were not complications overnight.    Patient review of systems: Baseline SOB. Denies N/V/D.     Allergies:  No Known Allergies     Scheduled Meds:   gabapentin  100 mg Oral TID    amLODIPine  5 mg Oral Daily    pantoprazole  40 mg Oral QAM AC    ceFAZolin  1,000 mg IntraVENous Q24H    metroNIDAZOLE  500 mg IntraVENous Q8H    aspirin  325 mg Oral Daily    buPROPion  150 mg Oral Q72H    cinacalcet  60 mg Oral Once per day on     QUEtiapine  150 mg Oral Nightly    sevelamer  2,400 mg Oral TID WC    venlafaxine  75 mg Oral Daily with breakfast    sodium chloride flush  5-40 mL IntraVENous 2 times per day    heparin (porcine)  5,000 Units SubCUTAneous 3 times per day    insulin lispro  0-4 Units SubCUTAneous TID WC    insulin lispro  0-4 Units SubCUTAneous Nightly        dextrose      sodium chloride 5 mL/hr at 24 0617       PRN Meds:HYDROmorphone, calcium carbonate, hydrALAZINE, oxyCODONE, oxyCODONE, perflutren lipid microspheres, simethicone, glucose, dextrose bolus **OR** dextrose bolus, glucagon (rDNA), dextrose, midodrine, heparin (porcine), sodium chloride flush, sodium chloride, ondansetron **OR** ondansetron, polyethylene glycol, acetaminophen **OR** acetaminophen    Physical Exam:    TEMPERATURE:  Current - Temp: 98.1 °F (36.7 °C); Max - Temp  Av °F (36.7 °C)  Min: 97.4 °F (36.3 °C)  Max: 98.2 °F (36.8 °C)  RESPIRATIONS RANGE: Resp  Av.9  Min: 16  Max: 23  PULSE RANGE: Pulse  Av.8  Min: 97  Max: 117  BLOOD PRESSURE RANGE:  Systolic (24hrs), Av , Min:138 , Max:176   ; Diastolic (24hrs), Av, Min:84, Max:99    24HR INTAKE/OUTPUT:  
  Nephrology Progress Note   [email or web address]      Reason for consultation: ESRD on HD MWF; follows with  Nephrology     Subjective:    The patient has been seen and examined. Labs and chart reviewed. Seen on HD with 2 L UF and 3 K bath. Plans for peripheral angiogram today. Unable to get MRI d/t PPM not being compatible.     There were not complications overnight.    Patient review of systems: Baseline SOB. Denies N/V/D.     Allergies:  No Known Allergies     Scheduled Meds:   Sodium Hypochlorite   Irrigation Daily    gabapentin  100 mg Oral TID    amLODIPine  5 mg Oral Daily    pantoprazole  40 mg Oral QAM AC    ceFAZolin  1,000 mg IntraVENous Q24H    metroNIDAZOLE  500 mg IntraVENous Q8H    aspirin  325 mg Oral Daily    buPROPion  150 mg Oral Q72H    cinacalcet  60 mg Oral Once per day on     QUEtiapine  150 mg Oral Nightly    sevelamer  2,400 mg Oral TID WC    venlafaxine  75 mg Oral Daily with breakfast    sodium chloride flush  5-40 mL IntraVENous 2 times per day    heparin (porcine)  5,000 Units SubCUTAneous 3 times per day    insulin lispro  0-4 Units SubCUTAneous TID WC    insulin lispro  0-4 Units SubCUTAneous Nightly        dextrose      sodium chloride 5 mL/hr at 24 0617       PRN Meds:HYDROmorphone, calcium carbonate, hydrALAZINE, oxyCODONE, oxyCODONE, perflutren lipid microspheres, simethicone, glucose, dextrose bolus **OR** dextrose bolus, glucagon (rDNA), dextrose, midodrine, heparin (porcine), sodium chloride flush, sodium chloride, ondansetron **OR** ondansetron, polyethylene glycol, acetaminophen **OR** acetaminophen    Physical Exam:    TEMPERATURE:  Current - Temp: 98.1 °F (36.7 °C); Max - Temp  Av.3 °F (36.8 °C)  Min: 97.5 °F (36.4 °C)  Max: 98.7 °F (37.1 °C)  RESPIRATIONS RANGE: Resp  Av.2  Min: 15  Max: 22  PULSE RANGE: Pulse  Av.8  Min: 90  Max: 114  BLOOD PRESSURE RANGE:  Systolic (24hrs), Av , Min:129 , Max:159   ; Diastolic (24hrs), Av, Min:74, 
  Nephrology Progress Note   [facility]      Reason for consultation: ESRD on HD MWF; follows with  Nephrology     Subjective:    The patient has been seen and examined. Labs and chart reviewed. Resting in bed. Had HD yesterday without complication with net 2 L UF. K+ noted to be 5.3 mmol/L this AM -- eating peanut butter, discussed that peanut butter contains K+. MRI of foot pending completion. BP better and now hypertensive.     There were not complications overnight.    Patient review of systems: Baseline SOB. Denies N/V/D.     Allergies:  No Known Allergies     Scheduled Meds:   gabapentin  100 mg Oral TID    ceFAZolin  1,000 mg IntraVENous Q24H    metroNIDAZOLE  500 mg IntraVENous Q8H    aspirin  325 mg Oral Daily    buPROPion  150 mg Oral Q72H    cinacalcet  60 mg Oral Once per day on     QUEtiapine  150 mg Oral Nightly    sevelamer  2,400 mg Oral TID WC    venlafaxine  75 mg Oral Daily with breakfast    sodium chloride flush  5-40 mL IntraVENous 2 times per day    heparin (porcine)  5,000 Units SubCUTAneous 3 times per day    insulin lispro  0-4 Units SubCUTAneous TID WC    insulin lispro  0-4 Units SubCUTAneous Nightly        dextrose      sodium chloride 5 mL/hr at 24 0617       PRN Meds:calcium carbonate, hydrALAZINE, oxyCODONE, oxyCODONE, perflutren lipid microspheres, glucose, dextrose bolus **OR** dextrose bolus, glucagon (rDNA), dextrose, midodrine, heparin (porcine), sodium chloride flush, sodium chloride, ondansetron **OR** ondansetron, polyethylene glycol, acetaminophen **OR** acetaminophen    Physical Exam:    TEMPERATURE:  Current - Temp: 98.4 °F (36.9 °C); Max - Temp  Av.2 °F (36.8 °C)  Min: 97.6 °F (36.4 °C)  Max: 98.7 °F (37.1 °C)  RESPIRATIONS RANGE: Resp  Av.5  Min: 18  Max: 20  PULSE RANGE: Pulse  Av.7  Min: 91  Max: 118  BLOOD PRESSURE RANGE:  Systolic (24hrs), Av , Min:112 , Max:171   ; Diastolic (24hrs), Av, Min:71, Max:102    24HR 
  Nephrology Progress Note   [facility]      Reason for consultation: ESRD on HD MWF; follows with  Nephrology     Subjective:    The patient has been seen and examined. Labs and chart reviewed.   S/p left leg angiography--99% ostial anterior tibial/posterior tibial thrombosis status post successful orbital atherectomy and balloon angioplasty of the proximal AT and PT     There were not complications overnight.    Patient review of systems:. Denies N/V/D.   Plan for OR in AM noted    Allergies:  No Known Allergies     Scheduled Meds:   sodium chloride flush  5-40 mL IntraVENous 2 times per day    carvedilol  6.25 mg Oral BID WC    aspirin  81 mg Oral Daily    atorvastatin  80 mg Oral Nightly    Sodium Hypochlorite   Irrigation Daily    gabapentin  100 mg Oral TID    amLODIPine  5 mg Oral Daily    pantoprazole  40 mg Oral QAM AC    ceFAZolin  1,000 mg IntraVENous Q24H    metroNIDAZOLE  500 mg IntraVENous Q8H    buPROPion  150 mg Oral Q72H    cinacalcet  60 mg Oral Once per day on     QUEtiapine  150 mg Oral Nightly    sevelamer  2,400 mg Oral TID WC    venlafaxine  75 mg Oral Daily with breakfast    heparin (porcine)  5,000 Units SubCUTAneous 3 times per day    insulin lispro  0-4 Units SubCUTAneous TID WC    insulin lispro  0-4 Units SubCUTAneous Nightly        sodium chloride      sodium chloride      dextrose      sodium chloride 5 mL/hr at 24 0617       PRN Meds:sodium chloride, HYDROmorphone, fentanNYL, sodium chloride, acetaminophen, HYDROmorphone, calcium carbonate, hydrALAZINE, oxyCODONE, oxyCODONE, perflutren lipid microspheres, simethicone, glucose, dextrose bolus **OR** dextrose bolus, glucagon (rDNA), dextrose, midodrine, heparin (porcine), sodium chloride flush, sodium chloride, ondansetron **OR** ondansetron, polyethylene glycol, acetaminophen **OR** acetaminophen    Physical Exam:    TEMPERATURE:  Current - Temp: 98.1 °F (36.7 °C); Max - Temp  Av.2 °F (36.8 °C)  Min: 98 °F 
  Nephrology Progress Note   [facility]      Reason for consultation: ESRD on HD MWF; follows with  Nephrology     Subjective:    The patient has been seen and examined. Labs and chart reviewed.   S/p left leg angiography--99% ostial anterior tibial/posterior tibial thrombosis status post successful orbital atherectomy and balloon angioplasty of the proximal AT and PT   Tolerated HD yesterday.   There were not complications overnight.    Patient review of systems: Denies N/V/D.Ordering breakfast.    Allergies:  No Known Allergies     Scheduled Meds:   carvedilol  6.25 mg Oral BID WC    aspirin  81 mg Oral Daily    atorvastatin  80 mg Oral Nightly    Sodium Hypochlorite   Irrigation Daily    gabapentin  100 mg Oral TID    amLODIPine  5 mg Oral Daily    pantoprazole  40 mg Oral QAM AC    ceFAZolin  1,000 mg IntraVENous Q24H    buPROPion  150 mg Oral Q72H    cinacalcet  60 mg Oral Once per day on     QUEtiapine  150 mg Oral Nightly    sevelamer  2,400 mg Oral TID WC    venlafaxine  75 mg Oral Daily with breakfast    heparin (porcine)  5,000 Units SubCUTAneous 3 times per day    insulin lispro  0-4 Units SubCUTAneous TID WC    insulin lispro  0-4 Units SubCUTAneous Nightly        sodium chloride Stopped (24 2209)    dextrose      sodium chloride Stopped (24 0842)       PRN Meds:sodium chloride, acetaminophen, HYDROmorphone, calcium carbonate, hydrALAZINE, oxyCODONE, oxyCODONE, perflutren lipid microspheres, simethicone, glucose, dextrose bolus **OR** dextrose bolus, glucagon (rDNA), dextrose, midodrine, heparin (porcine), sodium chloride flush, sodium chloride, ondansetron **OR** ondansetron, polyethylene glycol, acetaminophen **OR** acetaminophen    Physical Exam:    TEMPERATURE:  Current - Temp: 98.4 °F (36.9 °C); Max - Temp  Av.2 °F (36.8 °C)  Min: 97.8 °F (36.6 °C)  Max: 98.4 °F (36.9 °C)  RESPIRATIONS RANGE: Resp  Avg: 15.4  Min: 14  Max: 16  PULSE RANGE: Pulse  Av.7  Min: 78  
4 Eyes Skin Assessment     NAME:  Cal Sanchez  YOB: 1960  MEDICAL RECORD NUMBER:  [id number]    The patient is being assessed for  Admission    I agree that at least one RN has performed a thorough Head to Toe Skin Assessment on the patient. ALL assessment sites listed below have been assessed.      Areas assessed by both nurses:    Head, Face, Ears, Shoulders, Back, Chest, Arms, Elbows, Hands, Sacrum. Buttock, Coccyx, Ischium, Legs. Feet and Heels, and Under Medical Devices         Does the Patient have a Wound? Yes wound(s) were present on assessment. LDA wound assessment was Initiated and completed by RN       Smith Prevention initiated by RN: No  Wound Care Orders initiated by RN: No    Pressure Injury (Stage 3,4, Unstageable, DTI, NWPT, and Complex wounds) if present, place Wound referral order by RN under : No    New Ostomies, if present place, Ostomy referral order under : No     Nurse 1 eSignature: Electronically signed by Catrachita Montgomery RN on 2/5/24 at 1:14 AM EST    **SHARE this note so that the co-signing nurse can place an eSignature**    Nurse 2 eSignature: Electronically signed by Alana Lee RN on 2/5/24 at 1:17 AM EST   
BS 76. Even though patient didn't want dinner, he agreed to drink lemon lime soda.   
CLINICAL PHARMACY NOTE: MEDS TO BEDS    Total # of Prescriptions Filled: 4   The following medications were delivered to the patient:  Current Discharge Medication List        START taking these medications    Details   aspirin 81 MG chewable tablet Take 1 tablet by mouth daily  Qty: 30 tablet, Refills: 3      atorvastatin (LIPITOR) 80 MG tablet Take 1 tablet by mouth nightly  Qty: 30 tablet, Refills: 3      carvedilol (COREG) 6.25 MG tablet Take 1 tablet by mouth 2 times daily (with meals)  Qty: 60 tablet, Refills: 3      oxyCODONE-acetaminophen (PERCOCET) 5-325 MG per tablet Take 1 tablet by mouth every 4 hours as needed for Pain for up to 3 days. Intended supply: 3 days. Take lowest dose possible to manage pain Max Daily Amount: 6 tablets  Qty: 18 tablet, Refills: 0    Comments: Reduce doses taken as pain becomes manageable  Associated Diagnoses: Osteomyelitis of left foot, unspecified type (HCC)               Additional Documentation:  Medications delivered at bedside  
Clinical Pharmacy Note  Renal Dose Adjustment    Cal Sanchez is receiving Zosyn.  This medication is renally eliminated.  Based on the patient's Estimated Creatinine Clearance: 13 mL/min (A) (based on SCr of 6.8 mg/dL (HH)). and urine output, the dose has been adjusted to Zosyn 3.375 g Q12H extended infusion per protocol.    Pharmacy will continue to monitor and adjust dose as needed for changes in renal function.    Gisel Arvizu [facility], PharmD, 2/5/2024 11:07 AM    
Clinical Pharmacy Note  Vancomycin Consult    Cal Sanchez is a 63 y.o. male ordered vancomycin for bone and joint infection; consult received from Dr. Byrd to manage therapy. Also receiving cefepime.    Allergies:  Patient has no known allergies.     Temp max:  Temp (24hrs), Av.7 °F (38.2 °C), Min:99.3 °F (37.4 °C), Max:103 °F (39.4 °C)      Recent Labs     24  1815   WBC 4.2       Recent Labs     24  1815   BUN 42*   CREATININE 6.5*       No intake or output data in the 24 hours ending 24 2308    Culture Results:  In process    Ht Readings from Last 1 Encounters:   24 1.778 m (5' 10\")        Wt Readings from Last 1 Encounters:   24 90.3 kg (199 lb 1.2 oz)         Estimated Creatinine Clearance: 13 mL/min (A) (based on SCr of 6.5 mg/dL (HH)).    Assessment/Plan:    Loading dose of 1500mg x1 given in ED    Vanc random ordered with morning labs on 24 - will dose based off levels due to low CrCl      Thank you for the consult.     Sandra Min, WandyD      
Comprehensive Nutrition Assessment    Type and Reason for Visit:  Initial, RD Nutrition Re-Screen/LOS    Nutrition Recommendations/Plan:   Carb control 3 carb servings per meal  Offer Amauri with lunch and dinner, wound healing supplement  Will monitor nutritional adequacy, nutrition-related labs, weights, BMs, and clinical progress      Malnutrition Assessment:  Malnutrition Status:  Insufficient data (02/12/24 1611)      Nutrition Assessment:    LOS assessment.  Patient admitted with diabetic infection of left foot.  Status post left hallux amputation today per podiatry.  Currently in dialysis at this time.  Diet advanced back to carb control post procedure.  Other restrictions this admission not reordered include low sodium and low potassium (K 4.5 this am)    Nutrition Related Findings:    Na 131 on 2/12; last BM noted on 2/7 Wound Type: Surgical Incision, Skin Tears       Current Nutrition Intake & Therapies:    Average Meal Intake: 51-75%, %  Average Supplements Intake: Unable to assess  ADULT DIET; Regular; 3 carb choices (45 gm/meal)    Anthropometric Measures:  Height: 177.8 cm (5' 10\")  Ideal Body Weight (IBW): 166 lbs (75 kg)       Current Body Weight: 91.8 kg (202 lb 6.1 oz),   IBW. Weight Source: Bed Scale  Current BMI (kg/m2): 29                          BMI Categories: Overweight (BMI 25.0-29.9)    Estimated Daily Nutrient Needs:  Energy Requirements Based On: Kcal/kg  Weight Used for Energy Requirements: Current  Energy (kcal/day): 1701-9947 (22-25 kcal/91.8 kg)  Weight Used for Protein Requirements: Ideal  Protein (g/day):  (1.3-1.5 g/75.5 kg)  Method Used for Fluid Requirements: 1 ml/kcal  Fluid (ml/day):      Nutrition Diagnosis:   Increased nutrient needs related to increase demand for energy/nutrients as evidenced by wounds    Nutrition Interventions:   Food and/or Nutrient Delivery: Continue Current Diet  Nutrition Education/Counseling:  (monitor need)  Coordination of Nutrition 
Department of Podiatry Progress Note        CHIEF COMPLAINT:  Left foot infection    HISTORY OF PRESENT ILLNESS:                The patient is a 63 y.o. male with significant past medical history as listed below. Podiatry was consulted for left diabetic infection and possible osteo. Patient was recently seen by podiatry upon last admission in December where he underwent partial 2nd and 3rd digit amputations per Dr. Nikki DPM. Patient then was lost to follow up. He states that he started feeling \"sick\" on Saturday. His left foot became progressively warm, red, and swollen which prompted him to present to the ER. Patient endorses nausea and vomiting however specifically denies fever, chills, shortness of breath, chest pain.  Patient has no other pedal complaints at this time.    Patient up to chair, of good spirits and enthusiastic for his last procedure to LEFT great toe tomorrow        Past Medical History:        Diagnosis Date    Atrial fibrillation (HCC)     CAD (coronary artery disease) 4/3/2014    Chronic kidney disease     Depression     Diabetes mellitus (HCC)     Dialysis patient (HCC)     left upper arm fistula    Glaucoma     Hemodialysis patient (HCC)     Hyperlipidemia     Hypertension     RLL pneumonia 1/23/2018    Sleep apnea     uses CPAP       Past Surgical History:        Procedure Laterality Date    COLONOSCOPY      PACEMAKER INSERTION      PACEMAKER PLACEMENT      TOE AMPUTATION Left 12/1/2023    AMPUTATION SECOND AND THIRD DIGITS LEFT FOOT performed by Horace Jordan DPM at [facility] OR       Allergies:   Patient has no known allergies.    Social History:   TOBACCO:   reports that he has never smoked. He has never used smokeless tobacco.  ETOH:   reports that he does not currently use alcohol after a past usage of about 2.0 standard drinks of alcohol per week.  DRUGS:   reports current drug use. Drugs: Marijuana (Weed) and Opiates .      REVIEW OF SYSTEMS:    As above.    PHYSICAL EXAM:  
Department of Podiatry Progress Note        Reason for Consult:  Left diabetic infection and possible osteomyelitis of hallux  Requesting Physician:  Kaden Byrd MD    CHIEF COMPLAINT:  Left foot infection    HISTORY OF PRESENT ILLNESS:                The patient is a 63 y.o. male with significant past medical history as listed below. Podiatry was consulted for left diabetic infection and possible osteo. Patient was recently seen by podiatry upon last admission in December where he underwent partial 2nd and 3rd digit amputations per Dr. Nikki DPM. Patient then was lost to follow up. He states that he started feeling \"sick\" on Saturday. His left foot became progressively warm, red, and swollen which prompted him to present to the ER. Patient endorses nausea and vomiting however specifically denies fever, chills, shortness of breath, chest pain.  Patient has no other pedal complaints at this time.    Past Medical History:        Diagnosis Date    Atrial fibrillation (HCC)     CAD (coronary artery disease) 4/3/2014    Chronic kidney disease     Depression     Diabetes mellitus (HCC)     Dialysis patient (HCC)     left upper arm fistula    Glaucoma     Hemodialysis patient (HCC)     Hyperlipidemia     Hypertension     RLL pneumonia 1/23/2018    Sleep apnea     uses CPAP       Past Surgical History:        Procedure Laterality Date    COLONOSCOPY      PACEMAKER INSERTION      PACEMAKER PLACEMENT      TOE AMPUTATION Left 12/1/2023    AMPUTATION SECOND AND THIRD DIGITS LEFT FOOT performed by Horace Jordan DPM at [facility] OR       Allergies:   Patient has no known allergies.    Social History:   TOBACCO:   reports that he has never smoked. He has never used smokeless tobacco.  ETOH:   reports that he does not currently use alcohol after a past usage of about 2.0 standard drinks of alcohol per week.  DRUGS:   reports current drug use. Drugs: Marijuana (Weed) and Opiates .      REVIEW OF SYSTEMS:    As above.    PHYSICAL 
Department of Podiatry Progress Note        Reason for Consult:  Left foot diabetic infection, osteomyelitis of hallux  Requesting Physician:  Kaden Byrd MD    CHIEF COMPLAINT:  Left foot infection    HISTORY OF PRESENT ILLNESS:                The patient is a 63 y.o. male with significant past medical history as listed below. Podiatry was consulted for left diabetic infection and possible osteo. Patient was recently seen by podiatry upon last admission in December where he underwent partial 2nd and 3rd digit amputations per Dr. Nikki DPM. Patient then was lost to follow up. He states that he started feeling \"sick\" on Saturday. His left foot became progressively warm, red, and swollen which prompted him to present to the ER. Patient endorses nausea and vomiting however specifically denies fever, chills, shortness of breath, chest pain.  Patient has no other pedal complaints at this time.        Past Medical History:        Diagnosis Date    Atrial fibrillation (HCC)     CAD (coronary artery disease) 4/3/2014    Chronic kidney disease     Depression     Diabetes mellitus (HCC)     Dialysis patient (HCC)     left upper arm fistula    Glaucoma     Hemodialysis patient (HCC)     Hyperlipidemia     Hypertension     RLL pneumonia 1/23/2018    Sleep apnea     uses CPAP       Past Surgical History:        Procedure Laterality Date    COLONOSCOPY      PACEMAKER INSERTION      PACEMAKER PLACEMENT      TOE AMPUTATION Left 12/1/2023    AMPUTATION SECOND AND THIRD DIGITS LEFT FOOT performed by Horace Jordan DPM at [facility] OR       Allergies:   Patient has no known allergies.    Social History:   TOBACCO:   reports that he has never smoked. He has never used smokeless tobacco.  ETOH:   reports that he does not currently use alcohol after a past usage of about 2.0 standard drinks of alcohol per week.  DRUGS:   reports current drug use. Drugs: Marijuana (Weed) and Opiates .      REVIEW OF SYSTEMS:    As above.    PHYSICAL 
Diabetic foot ulcer  Known PAD   Will arrange for repeat peripheral angiogram   NPO after breakfast     Freeman Rodriguez MD [facility]   General, Interventional Cardiology, and Peripheral Vascular Disease   [facility]   (O): [phone]  (F): [phone]    
Dr. Jordan has been made aware of unable to get  MRI due to pacemaker being compatible   
Hospitalist   Progress Note    Patient Name: Cal Sanchez  PCP: Tania Moses MD  Date of Admission: 2/4/2024    Chief Complaint on Admission: Altered Mental Status  Chief diagnosis after evaluation: Diabetic infection of left foot including the left great toe with osteomyelitis and associated sepsis with septic shock    Brief Synopsis: Patient is a 63 y.o. man with a medical history that includes hypertension, DMII, CAD, hemodialysis dependent ESRD who was admitted on 2/4/2024 for evaluation and treatment of increased confuson and was found to have a diabetic infection of left foot including the left great toe with osteomyelitis and associated sepsis with septic shock      Pt Seen/Examined and Chart Reviewed.     Subjective: Pt c/o left foot pain    Objective:  Allergies  Patient has no known allergies.    Medications    Scheduled Meds:   [START ON 2/6/2024] piperacillin-tazobactam  3,375 mg IntraVENous Q12H    [Held by provider] amLODIPine  5 mg Oral Daily    aspirin  325 mg Oral Daily    buPROPion  150 mg Oral Q72H    cinacalcet  60 mg Oral Once per day on Mon Wed Fri    QUEtiapine  150 mg Oral Nightly    sevelamer  2,400 mg Oral TID WC    venlafaxine  75 mg Oral Daily with breakfast    sodium chloride flush  5-40 mL IntraVENous 2 times per day    heparin (porcine)  5,000 Units SubCUTAneous 3 times per day    insulin lispro  0-4 Units SubCUTAneous TID WC    insulin lispro  0-4 Units SubCUTAneous Nightly     Infusions:   DOPamine 5 mcg/kg/min (02/05/24 0245)    dextrose      sodium chloride 5 mL/hr at 02/05/24 0617     PRN Meds:  glucose, dextrose bolus **OR** dextrose bolus, glucagon (rDNA), dextrose, midodrine, oxyCODONE, sodium chloride flush, sodium chloride, ondansetron **OR** ondansetron, polyethylene glycol, acetaminophen **OR** acetaminophen    Physical    VITALS:  BP (!) 104/59   Pulse 90   Temp 98.5 °F (36.9 °C) (Oral)   Resp 18   Ht 1.778 m (5' 10\")   Wt 92 kg (202 lb 13.2 oz)   
MRI called and stated pt pacemaker is not compatible with the MRI machine   
Medication Reconciliation    List of medications patient is currently taking is complete.     Source of information: 1. Conversation with patient at bedside                                      2. EPIC records        Gurvinder Ricks [facility]   2/5/2024  8:52 AM    
Patient admitted to PACU # 7 from OR at 807 post LEFT HALLUX AMPUTATION - Left  per   DR WEST.  Attached to PACU monitoring system and report received from anesthesia provider.  Patient was reported to be hemodynamically stable during procedure.  Patient drowsy on admission and denied pain.  
Patient has been reporting 'not feeling well' today and has not eaten anything. Vitals remain stable. Pain in left foot has been better controlled with the addition of gabapentin. Hospitalist has been notified.    The MRI has been delayed because of patient's pacemaker (which was placed at [facility] in 2014.) [facility] MRI department reached out to  but they were unwilling to sign permission for the scan because they said the patient has not been following up with them regarding his pacemaker. [facility] said they could review the paper and possibly give permission so MRI department faxed the paper to Dr Moreira. Currently awaiting response.    
Patient reports episodes of belching most of the day. Gave prn TUMS. Notified attending via Secure Message. Received order for simethicone prn. Will continue to monitor.   
Patient transferred to PCU from ED via stretcher. Patient connected to bedside telemetry and verified with CMU. Patient oriented to room and call light placed within reach.  
Physical Therapy  Facility/Department: [address] PROGRESSIVE CARE  Physical Therapy Initial Assessment    Name: Cal Sanchez  : 1960  MRN: [id number]  Date of Service: 2024    Discharge Recommendations:  Patient would benefit from continued therapy after discharge (3-5x/wk)   PT Equipment Recommendations  Equipment Needed: No  Other: defer to next level of care    Cal Sanchez scored a  on the AM-PAC short mobility form. Current research shows that an AM-PAC score of 17 or less is typically not associated with a discharge to the patient's home setting. Based on the patient's AM-PAC score and their current functional mobility deficits, it is recommended that the patient have 3-5 sessions per week of Physical Therapy at d/c to increase the patient's independence.  Please see assessment section for further patient specific details.    If patient discharges prior to next session this note will serve as a discharge summary.  Please see below for the latest assessment towards goals.        Patient Diagnosis(es): The primary encounter diagnosis was Septicemia ([facility]). Diagnoses of Osteomyelitis of left foot, unspecified type (HCC), ESRD on hemodialysis (HCC), Hyperkalemia, and Acute osteomyelitis of toe of left foot (HCC) were also pertinent to this visit.  Past Medical History:  has a past medical history of Atrial fibrillation (HCC), CAD (coronary artery disease), Chronic kidney disease, Depression, Diabetes mellitus (HCC), Dialysis patient (HCC), Glaucoma, Hemodialysis patient (HCC), Hyperlipidemia, Hypertension, RLL pneumonia, and Sleep apnea.  Past Surgical History:  has a past surgical history that includes Pacemaker insertion; pacemaker placement; Colonoscopy; Toe amputation (Left, 2023); and Toe amputation (Left, 2024).    Assessment   Body Structures, Functions, Activity Limitations Requiring Skilled Therapeutic Intervention: Decreased functional mobility ;Increased pain;Decreased 
Report called to biju on 5 west. All questions answered will monitor. Vitals stable.   
Surgery ready for pt to come down.  Pt assisted into a hospital gown and pt attempted to void.  Pt transported to surgery by 2 surgery staff per hospital bed.    
This RN called [facility] heart this morning to check on the status of pt paperwork regarding his pacemaker for him to be able to get his MRI done, they stated they are still working on it, will continue to monitor   
Treatment time: 2.5 hours  Net UF: 800 ml     Pre weight: 88 kg  Post weight:86.8 kg    Access used: CVC    Access function: good with  ml/min     Medications or blood products given: na     Regular outpatient schedule: MWF     Summary of response to treatment:Pt refused to run entire treatment c/o pain, discussed with MD. Copy of dialysis treatment record placed in chart, to be scanned into EMR.  
Treatment time: 3 hours    Net UF: 2000 ml    Pre weight: 93.8 kg  Post weight: 91.8 kg  EDW: 90 kg    Access used: R Upper chest wall TDC  Access function: . Access runs without malfunction.     Medications or blood products given: None    Regular outpatient schedule: MWF    Summary of response to treatment: Pt. Tolerates treatment well today.     Copy of dialysis treatment record placed in chart, to be scanned into EMR.  
78.3  Min: 71  Max: 98  BLOOD PRESSURE RANGE:  Systolic (24hrs), Av , Min:89 , Max:157   ; Diastolic (24hrs), Av, Min:58, Max:85    24HR INTAKE/OUTPUT:    Intake/Output Summary (Last 24 hours) at 2024 1039  Last data filed at 2024 0802  Gross per 24 hour   Intake 200 ml   Output 0 ml   Net 200 ml         Patient Vitals for the past 96 hrs (Last 3 readings):   Weight   24 0330 91.8 kg (202 lb 6.1 oz)   24 0420 90.7 kg (199 lb 15.3 oz)   02/10/24 0700 90.6 kg (199 lb 11.8 oz)       General: Alert, Awake, NAD  HEENT: Normocephalic, atraumatic  Neck: No Thyromegaly, Trachea is midline  Eyes: EOMI, AVINASH  Chest: Diminished to auscultation, no intercostal retractions  CVS: RRR, no murmur, no rub  Abdomen: soft, non tender  Extremities: no edema to RLE. Trace edema to LLE. LLE wrapped.  Skin: normal texture, normal skin turgor, no rash  Neurological: CN intact, no focal motor neurological deficit  Psych: normal affect; AAO x 3  Access: R [facility]       LAB DATA:    CBC:   Lab Results   Component Value Date/Time    WBC 5.3 2024 04:51 AM    RBC 2.81 2024 04:51 AM    HGB 9.4 2024 04:51 AM    HCT 27.5 2024 04:51 AM    MCV 97.6 2024 04:51 AM    MCH 33.5 2024 04:51 AM    MCHC 34.3 2024 04:51 AM    RDW 15.2 2024 04:51 AM     2024 04:51 AM    MPV 8.4 2024 04:51 AM     BMP:    Lab Results   Component Value Date/Time     2024 04:51 AM    K 4.5 2024 04:51 AM    K 6.0 2024 05:46 AM    CL 91 2024 04:51 AM    CO2 27 2024 04:51 AM    BUN 42 2024 04:51 AM    CREATININE 7.2 2024 04:51 AM    CALCIUM 9.1 2024 04:51 AM    GFRAA 12 2022 12:11 AM    LABGLOM 8 2024 04:51 AM    GLUCOSE 61 2024 04:51 AM     Ionized Calcium:  No results found for: \"IONCA\"  Magnesium:    Lab Results   Component Value Date/Time    MG 1.80 2018 04:58 AM     Phosphorus:    Lab Results   Component Value 
Marijuana (Weed) and Opiates .      REVIEW OF SYSTEMS:    As above.    PHYSICAL EXAM:      Vitals:    /83   Pulse (!) 101   Temp 98.4 °F (36.9 °C) (Oral)   Resp 18   Ht 1.778 m (5' 10\")   Wt 86.4 kg (190 lb 7.6 oz)   SpO2 100%   BMI 27.33 kg/m²     LABS:   Recent Labs     02/07/24 0431 02/08/24  0458   WBC 4.0 3.4*   HGB 10.1* 9.7*   HCT 29.9* 27.9*   PLT 97* 94*     Recent Labs     02/07/24  0431   *   K 5.6*   CL 79*   CO2 23   PHOS 4.1   BUN 54*   CREATININE 6.0*     No results for input(s): \"PROT\", \"INR\", \"APTT\" in the last 72 hours.        VASCULAR: DP and PT pulses are lightly palpable +1/4 B/L. CFT is brisk to the digits of the foot b/l. Skin temperature is warm to cool from proximal to distal with diffuse calor to left forefoot. Mild non-pitting edema noted to left foot. No pain with calf compression b/l.    NEUROLOGIC: Gross and epicritic sensation is diminished b/l. Protective sensation is diminished at all pedal sites b/l.    DERMATOLOGIC:   Right lower extremity:  Is a closed soft tissue envelope with no ulcerations or acute signs of infection    Left lower extremity:  Verbal consent obtained for photos today:      Hyperkeratosis noted to distal-plantar aspect of hallux with underlying partial thickness ulceration. Wound base is noted to be superficial dermal tissue. Wound does not probe to bone, tunnel, or track. No purulence, fluctuance, crepitus or malodor.     Flaccid hemorrhagic bullae noted to distal tuft of hallux. Underlying partial thickness ulceration noted. Wound base is noted to be superficial dermal tissue. Wound does not probe to bone, tunnel, or track. No purulence, fluctuance, crepitus or malodor.     Healed cicatrix noted to distal tuft of 2nd and 3rd digit consistent with healed partial digit amputation.    Mild erythema noted to forefoot.    MUSCULOSKELETAL: Muscle strength is 4/5 for all pedal groups tested. Mild pain upon palpation of left hallux. Ankle joint ROM 
Summary (Last 24 hours) at 2/8/2024 1159  Last data filed at 2/8/2024 0942  Gross per 24 hour   Intake 610 ml   Output --   Net 610 ml         Patient Vitals for the past 96 hrs (Last 3 readings):   Weight   02/08/24 0500 86.4 kg (190 lb 7.6 oz)   02/07/24 1252 86.8 kg (191 lb 5.8 oz)   02/07/24 1015 88 kg (194 lb 0.1 oz)       General: Alert, Awake, NAD  HEENT: Normocephalic, atraumatic  Neck: No Thyromegaly, Trachea is midline  Eyes: EOMI, AVINASH  Chest: Diminished to auscultation, no intercostal retractions  CVS: RRR, no murmur, no rub  Abdomen: soft, non tender  Extremities: no edema to RLE. Trace edema to LLE. LLE wrapped.  Skin: normal texture, normal skin turgor, no rash  Neurological: CN intact, no focal motor neurological deficit  Psych: normal affect; AAO x 3  Access: R [facility]       LAB DATA:    CBC:   Lab Results   Component Value Date/Time    WBC 3.4 02/08/2024 04:58 AM    RBC 2.82 02/08/2024 04:58 AM    HGB 9.7 02/08/2024 04:58 AM    HCT 27.9 02/08/2024 04:58 AM    MCV 99.0 02/08/2024 04:58 AM    MCH 34.3 02/08/2024 04:58 AM    MCHC 34.6 02/08/2024 04:58 AM    RDW 15.2 02/08/2024 04:58 AM    PLT 94 02/08/2024 04:58 AM    MPV 7.9 02/08/2024 04:58 AM     BMP:    Lab Results   Component Value Date/Time     02/08/2024 07:39 AM    K 4.3 02/08/2024 07:39 AM    K 6.0 02/05/2024 05:46 AM    CL 94 02/08/2024 07:39 AM    CO2 22 02/08/2024 07:39 AM    BUN 32 02/08/2024 07:39 AM    CREATININE 4.7 02/08/2024 07:39 AM    CALCIUM 9.5 02/08/2024 07:39 AM    GFRAA 12 02/18/2022 12:11 AM    LABGLOM 13 02/08/2024 07:39 AM    GLUCOSE 66 02/08/2024 07:39 AM     Ionized Calcium:  No results found for: \"IONCA\"  Magnesium:    Lab Results   Component Value Date/Time    MG 1.80 01/23/2018 04:58 AM     Phosphorus:    Lab Results   Component Value Date/Time    PHOS 2.7 02/08/2024 07:39 AM     U/A:    Lab Results   Component Value Date/Time    COLORU Yellow 02/04/2024 06:15 PM    PHUR 8.5 02/04/2024 06:15 PM    WBCUA 6 02/04/2024 
02/18/2022 12:11 AM    LABGLOM 14 02/10/2024 04:20 AM    GLUCOSE 67 02/10/2024 04:20 AM     Ionized Calcium:  No results found for: \"IONCA\"  Magnesium:    Lab Results   Component Value Date/Time    MG 1.80 01/23/2018 04:58 AM     Phosphorus:    Lab Results   Component Value Date/Time    PHOS 2.8 02/10/2024 04:20 AM     U/A:    Lab Results   Component Value Date/Time    COLORU Yellow 02/04/2024 06:15 PM    PHUR 8.5 02/04/2024 06:15 PM    WBCUA 6 02/04/2024 06:15 PM    RBCUA 3 02/04/2024 06:15 PM    BACTERIA None Seen 02/04/2024 06:15 PM    CLARITYU CLOUDY 02/04/2024 06:15 PM    SPECGRAV 1.013 02/04/2024 06:15 PM    LEUKOCYTESUR TRACE 02/04/2024 06:15 PM    UROBILINOGEN 0.2 02/04/2024 06:15 PM    BILIRUBINUR Negative 02/04/2024 06:15 PM    BLOODU Negative 02/04/2024 06:15 PM    GLUCOSEU 100 02/04/2024 06:15 PM         IMPRESSION/RECOMMENDATIONS:      ESRD on HD MWF: follows with  Nephrology outpatient.              - Outpatient HDU: [facility]              - Access: R TDC              - Continue HD MWF    Hyperkalemia   - K+ WNL today   - Continue low K+ diet. No peanut butter.   - Modulate with HD     Staphylococcus aureus MSSA bacteremia               - Blood cx (2/4/2024) 2 out of 2 positive for staph aureus   - On cefazolin, flagyl              - ID following     ?OM of L great toe   - MRI unable to be obtained d/t PPM  -  S/p left leg angiography--99% ostial anterior tibial/posterior tibial thrombosis status post successful orbital atherectomy and balloon angioplasty of the proximal AT and PT               - ID, podiatry, and cardiology following     Hypotension              - -140s   - Amlodipine restarted 2/6/2024   - Monitor     CKD-BMD              - PO4 within goal              - On binder and sensipar     Anemia of ESRD              - Hgb within goal       Cody Ruiz MD      
hallucinations,confusion,agitation.     PHYSICAL EXAM:      Vitals:  t mAX  103   BP (!) 140/79   Pulse 88   Temp 98 °F (36.7 °C) (Oral)   Resp 18   Ht 1.778 m (5' 10\")   Wt 90.6 kg (199 lb 11.8 oz)   SpO2 95%   BMI 28.66 kg/m²     General Appearance: alert,in no acute distress, ++  pallor, no icterus chronically ill-appearing gentleman  Skin: warm and dry, no rash or erythema  Head: normocephalic and atraumatic  Eyes: pupils equal, round, and reactive to light, conjunctivae normal  ENT: tympanic membrane, external ear and ear canal normal bilaterally, nose without deformity, nasal mucosa and turbinates normal without polyps  Neck: supple and non-tender without mass, no thyromegaly  no cervical lymphadenopathy  Pulmonary/Chest: clear to auscultation bilaterally- no wheezes, rales or rhonchi, normal air movement, no respiratory distress  Cardiovascular: normal rate, regular rhythm, normal S1 and S2, no murmurs, rubs, clicks, or gallops, no carotid bruits  Abdomen: soft, non-tender, non-distended, normal bowel sounds, no masses or organomegaly  Extremities: no cyanosis, clubbing or edema  Musculoskeletal: normal range of motion, no joint swelling, deformity or tenderness  Integumentary: No rashes, no abnormal skin lesions, no petechiae  Neurologic: reflexes normal and symmetric, no cranial nerve deficit  Psych:  Orientation, sensorium, mood normal   Lines: IV  PPM+ \\    Left foot ulcer+ and cellulitis        DATA:    CBC:   Lab Results   Component Value Date    WBC 5.1 02/10/2024    HGB 9.1 (L) 02/10/2024    HCT 26.7 (L) 02/10/2024    MCV 98.5 02/10/2024     (L) 02/10/2024     RENAL:   Lab Results   Component Value Date    CREATININE 4.4 (H) 02/10/2024    BUN 23 (H) 02/10/2024     (L) 02/10/2024    K 3.9 02/10/2024    CL 95 (L) 02/10/2024    CO2 26 02/10/2024     SED RATE:   Lab Results   Component Value Date/Time    SEDRATE 40 02/05/2024 01:46 PM     CK: No results found for: \"CKTOTAL\"  CRP: 
   SEDRATE 40 02/05/2024 01:46 PM     CK: No results found for: \"CKTOTAL\"  CRP:   Lab Results   Component Value Date/Time    .0 02/05/2024 01:46 PM     Hepatic Function Panel:   Lab Results   Component Value Date/Time    ALKPHOS 83 02/04/2024 06:15 PM    ALT 11 02/04/2024 06:15 PM    AST 13 02/04/2024 06:15 PM    PROT 6.4 02/04/2024 06:15 PM    BILITOT 0.5 02/04/2024 06:15 PM    BILIDIR <0.2 12/03/2023 05:49 AM    IBILI see below 12/03/2023 05:49 AM    LABALBU 3.2 02/08/2024 07:39 AM     UA:  Lab Results   Component Value Date/Time    COLORU Yellow 02/04/2024 06:15 PM    CLARITYU CLOUDY 02/04/2024 06:15 PM    GLUCOSEU 100 02/04/2024 06:15 PM    BILIRUBINUR Negative 02/04/2024 06:15 PM    KETUA Negative 02/04/2024 06:15 PM    SPECGRAV 1.013 02/04/2024 06:15 PM    BLOODU Negative 02/04/2024 06:15 PM    PHUR 8.5 02/04/2024 06:15 PM    PROTEINU 100 02/04/2024 06:15 PM    UROBILINOGEN 0.2 02/04/2024 06:15 PM    NITRU Negative 02/04/2024 06:15 PM    LEUKOCYTESUR TRACE 02/04/2024 06:15 PM    URINETYPE Voided 02/04/2024 06:15 PM      Urine Microscopic:   Lab Results   Component Value Date/Time    BACTERIA None Seen 02/04/2024 06:15 PM    COMU see below 01/23/2018 02:58 AM    HYALCAST 2 02/04/2024 06:15 PM    WBCUA 6 02/04/2024 06:15 PM    RBCUA 3 02/04/2024 06:15 PM    EPIU 0 02/04/2024 06:15 PM     Urine Reflex to Culture:   Lab Results   Component Value Date/Time    URRFLXCULT Not Indicated 02/04/2024 06:15 PM         MICRO: cultures reviewed and updated by me   Time       Blood Culture 2 [[id number]] (Abnormal) Collected: 02/04/24 1815   Order Status: Completed Specimen: Blood Updated: 02/07/24 0634    Organism Staphylococcus aureus Abnormal     Culture, Blood 2 --    POSITIVE for   No further workup   Isolated two of two sets   Refer to culture [id number] for sensitivity results    Narrative:     ORDER#: [id number]                          ORDERED BY: JENELLE HARRY   SOURCE: Blood                              
RW multiple trials from elevated bed with cues for technique and heel WB and unable to come to full stand; then pt became frustrated and requested to lay back down and speak to MD.    Vision  Vision: Impaired  Vision Exceptions: Wears glasses for reading  Hearing  Hearing: Exceptions to [facility]  Hearing Exceptions: Hard of hearing/hearing concerns    Cognition  Overall Cognitive Status: Exceptions  Following Commands: Follows one step commands with increased time;Follows one step commands with repetition  Attention Span: Difficulty attending to directions  Safety Judgement: Decreased awareness of need for safety;Decreased awareness of need for assistance  Problem Solving: Decreased awareness of errors  Insights: Decreased awareness of deficits  Initiation: Requires cues for all  Sequencing: Requires cues for all  Cognition Comment: easily frustrated d/t pain  Orientation  Orientation Level: Oriented to place;Oriented to person;Oriented to situation                  Education Given To: Patient  Education Provided: Role of Therapy;Plan of Care;Transfer Training  Education Method: Demonstration;Verbal  Barriers to Learning: Cognition  Education Outcome: Verbalized understanding;Demonstrated understanding;Continued education needed    LUE AROM (degrees)  LUE AROM : WFL  Left Hand AROM (degrees)  Left Hand AROM: WF  RUE AROM (degrees)  RUE AROM : WFL  Right Hand AROM (degrees)  Right Hand AROM: WFL         AM-PAC - ADL  AM-PAC Daily Activity - Inpatient   How much help is needed for putting on and taking off regular lower body clothing?: Total  How much help is needed for bathing (which includes washing, rinsing, drying)?: Total  How much help is needed for toileting (which includes using toilet, bedpan, or urinal)?: Total  How much help is needed for putting on and taking off regular upper body clothing?: None  How much help is needed for taking care of personal grooming?: None  How much help for eating meals?: 
normal.  Lungs are normally expanded. Mild fullness in the perihilar regions.  However, no consolidation..  No pleural effusions. Mild spondylosis     Mild vascular congestion.       CBC:   Recent Labs     02/08/24  0458 02/09/24  0435 02/10/24  0420   WBC 3.4* 4.1 5.1   HGB 9.7* 9.9* 9.1*   PLT 94* 98* 101*       BMP:    Recent Labs     02/08/24  0739 02/09/24  0435 02/10/24  0420   * 131* 133*   K 4.3 4.4 3.9   CL 94* 93* 95*   CO2 22 24 26   BUN 32* 42* 23*   CREATININE 4.7* 6.8* 4.4*   GLUCOSE 66* 71 67*       Hepatic:   No results for input(s): \"AST\", \"ALT\", \"ALB\", \"BILITOT\", \"ALKPHOS\" in the last 72 hours.    Lipids:   Lab Results   Component Value Date/Time    CHOL 95 04/02/2014 05:47 AM    HDL 30 04/02/2014 05:47 AM    TRIG 118 04/02/2014 05:47 AM     Hemoglobin A1C:   Lab Results   Component Value Date/Time    LABA1C 4.3 11/29/2023 01:04 PM     TSH: No results found for: \"TSH\"  Troponin: No results found for: \"TROPONINT\"  Lactic Acid:   No results for input(s): \"LACTA\" in the last 72 hours.    BNP: No results for input(s): \"PROBNP\" in the last 72 hours.  UA:  Lab Results   Component Value Date/Time    NITRU Negative 02/04/2024 06:15 PM    COLORU Yellow 02/04/2024 06:15 PM    PHUR 8.5 02/04/2024 06:15 PM    WBCUA 6 02/04/2024 06:15 PM    RBCUA 3 02/04/2024 06:15 PM    BACTERIA None Seen 02/04/2024 06:15 PM    CLARITYU CLOUDY 02/04/2024 06:15 PM    SPECGRAV 1.013 02/04/2024 06:15 PM    LEUKOCYTESUR TRACE 02/04/2024 06:15 PM    UROBILINOGEN 0.2 02/04/2024 06:15 PM    BILIRUBINUR Negative 02/04/2024 06:15 PM    BLOODU Negative 02/04/2024 06:15 PM    GLUCOSEU 100 02/04/2024 06:15 PM    KETUA Negative 02/04/2024 06:15 PM     Urine Cultures: No results found for: \"LABURIN\"  Blood Cultures:   Lab Results   Component Value Date/Time    BC No Growth after 4 days of incubation. 02/05/2024 01:46 PM     Lab Results   Component Value Date/Time    BLOODCULT2 No Growth after 4 days of incubation. 02/05/2024 01:51 PM

## 2024-02-13 NOTE — DISCHARGE INSTR - COC
Dressing/Treatment Ace wrap;Non adherent;Dry dressing 02/09/24 2018   Drainage Amount Small (< 25%) 02/05/24 0828   Drainage Description Sanguinous 02/05/24 0828   Odor None 02/05/24 0828   Temi-wound Assessment Intact 02/05/24 0828   Margins Defined edges 02/05/24 0828   Number of days: 8       Incision 02/12/24 Toe (Comment  which one) Left (Active)   Dressing Status Clean;Intact;Dry 02/12/24 2100   Incision Cleansed Not Cleansed 02/12/24 2100   Dressing/Treatment Xeroform;Gauze dressing/dressing sponge;ABD pad;Roll gauze;Coban/self-adherent bandages;Ace wrap 02/12/24 0821   Closure Sutures 02/12/24 0754   Margins Approximated 02/12/24 0821   Drainage Amount None (dry) 02/12/24 0821   Number of days: 1        Elimination:  Continence:   Bowel:   Bladder: anuric  Urinary Catheter: None   Colostomy/Ileostomy/Ileal Conduit: No       Date of Last BM:     Intake/Output Summary (Last 24 hours) at 2/13/2024 0844  Last data filed at 2/13/2024 0844  Gross per 24 hour   Intake 2690.3 ml   Output 0 ml   Net 2690.3 ml     I/O last 3 completed shifts:  In: 2122 [P.O.:940; I.V.:227.2; IV Piggyback:954.8]  Out: 0     Safety Concerns:     At Risk for Falls    Impairments/Disabilities:      Amputation - Left halux ampuation    Nutrition Therapy:  Current Nutrition Therapy:   - Oral Diet:  Carb Control 3 carbs/meal (1500kcals/day)    Routes of Feeding: Oral  Liquids: Thin Liquids  Daily Fluid Restriction: yes - amount 1500  Last Modified Barium Swallow with Video (Video Swallowing Test): not done    Treatments at the Time of Hospital Discharge:   Respiratory Treatments:   Oxygen Therapy:  is not on home oxygen therapy.  Ventilator:    - No ventilator support    Rehab Therapies: Physical Therapy, Occupational Therapy, and Orthotics/Prosthetics  Weight Bearing Status/Restrictions: Partial weight bearing (30-50%) only on leg left leg  Other Medical Equipment (for information only, NOT a DME order):  walker  Other Treatments: HD,

## 2024-02-13 NOTE — DISCHARGE SUMMARY
V2.0  Discharge Summary    Name:  Cal Sanchez /Age/Sex: 1960 (63 y.o. male)   Admit Date: 2024  Discharge Date: 24    MRN & CSN:  [id number] & [id number] Encounter Date and Time 24 11:10 AM EST    Attending:  Azael Marshall MD Discharging Provider: Azael Marshall MD       Hospital Course:     Brief HPI: Cal Sanchez is a 63 y.o. male who presented to [facility] with Left great toe/foot diabetic infection with possible osteomyelitis.  PMHx significant for end-stage renal disease, hypertension, prior diabetic ulcer status post amputation of left second and third toe, diabetes.  Patient presented to the emergency room for not feeling well for the last few days was having some nausea but and since once, denied any chest pain, has shortness of breath is no more than usual.  Denied any abdomen pain, patient does make small amount of urine, no constipation or diarrhea, he has been having some increased discomfort in the left lower extremity with some swelling erythema left foot discharge from the left great toe.  Patient has been compliant with his dialysis treatment, said he had lost his left second and third toe secondary to infection and required amputation.  Patient still smoke at time but no alcohol use.  In the emergency room Labs showed potassium of 5.2 creatinine 6.5, procalcitonin is 1.04 with lactic acid of 2.0, white blood cells of 4.2, cultures were obtained for blood, viral test negative for flu and COVID.  Chest x-ray showed mild vascular congestion and x-ray left foot showed \"Destruction of the distal phalanx of the great toe which may be related to Osteomyelitis\"   Brief Problem Based Course:     -Left diabetic foot infection,  osteomyelitis of Lt distal phalanx  -MSSA bacteremia with sepsis due to diabetic foot infection  Was Unable to get MRI as pacemaker is not compatible,  No evidence of endocarditis on echo    S/p left leg angiography--99% ostial anterior

## 2024-02-16 ENCOUNTER — APPOINTMENT (OUTPATIENT)
Dept: GENERAL RADIOLOGY | Age: 64
DRG: 640 | End: 2024-02-16
Payer: COMMERCIAL

## 2024-02-16 ENCOUNTER — HOSPITAL ENCOUNTER (INPATIENT)
Age: 64
LOS: 5 days | Discharge: NURSING, REHAB OR LONG-TERM CARE | DRG: 640 | End: 2024-02-21
Attending: STUDENT IN AN ORGANIZED HEALTH CARE EDUCATION/TRAINING PROGRAM | Admitting: INTERNAL MEDICINE
Payer: COMMERCIAL

## 2024-02-16 ENCOUNTER — APPOINTMENT (OUTPATIENT)
Dept: CT IMAGING | Age: 64
DRG: 640 | End: 2024-02-16
Payer: COMMERCIAL

## 2024-02-16 DIAGNOSIS — S09.90XA CLOSED HEAD INJURY, INITIAL ENCOUNTER: ICD-10-CM

## 2024-02-16 DIAGNOSIS — T14.8XXA MULTIPLE SKIN TEARS: ICD-10-CM

## 2024-02-16 DIAGNOSIS — Z71.89 GOALS OF CARE, COUNSELING/DISCUSSION: ICD-10-CM

## 2024-02-16 DIAGNOSIS — F12.90 MARIJUANA SMOKER: ICD-10-CM

## 2024-02-16 DIAGNOSIS — M86.9 OSTEOMYELITIS OF LEFT FOOT, UNSPECIFIED TYPE (HCC): ICD-10-CM

## 2024-02-16 DIAGNOSIS — Z78.9 FULL CODE STATUS: ICD-10-CM

## 2024-02-16 DIAGNOSIS — E16.2 HYPOGLYCEMIA: ICD-10-CM

## 2024-02-16 DIAGNOSIS — R78.81 BACTEREMIA: ICD-10-CM

## 2024-02-16 DIAGNOSIS — E87.5 HYPERKALEMIA: Primary | ICD-10-CM

## 2024-02-16 DIAGNOSIS — R29.6 MULTIPLE FALLS: ICD-10-CM

## 2024-02-16 PROBLEM — E78.5 HYPERLIPIDEMIA: Status: ACTIVE | Noted: 2024-02-16

## 2024-02-16 PROBLEM — L08.9 FOOT INFECTION: Status: ACTIVE | Noted: 2024-02-16

## 2024-02-16 PROBLEM — E66.09 NON MORBID OBESITY DUE TO EXCESS CALORIES: Status: ACTIVE | Noted: 2024-02-16

## 2024-02-16 PROBLEM — R53.1 GENERALIZED WEAKNESS: Status: ACTIVE | Noted: 2024-02-16

## 2024-02-16 LAB
ALBUMIN SERPL-MCNC: 3.6 G/DL (ref 3.4–5)
ALBUMIN/GLOB SERPL: 1.4 {RATIO} (ref 1.1–2.2)
ALP SERPL-CCNC: 78 U/L (ref 40–129)
ALT SERPL-CCNC: <5 U/L (ref 10–40)
ANION GAP SERPL CALCULATED.3IONS-SCNC: 22 MMOL/L (ref 3–16)
AST SERPL-CCNC: 19 U/L (ref 15–37)
BASOPHILS # BLD: 0.1 K/UL (ref 0–0.2)
BASOPHILS NFR BLD: 1.1 %
BILIRUB SERPL-MCNC: 0.4 MG/DL (ref 0–1)
BUN SERPL-MCNC: 42 MG/DL (ref 7–20)
CALCIUM SERPL-MCNC: 9.6 MG/DL (ref 8.3–10.6)
CHLORIDE SERPL-SCNC: 91 MMOL/L (ref 99–110)
CO2 SERPL-SCNC: 18 MMOL/L (ref 21–32)
CREAT SERPL-MCNC: 8.8 MG/DL (ref 0.8–1.3)
CRP SERPL-MCNC: 18.4 MG/L (ref 0–5.1)
DEPRECATED RDW RBC AUTO: 15.7 % (ref 12.4–15.4)
EKG ATRIAL RATE: 81 BPM
EKG DIAGNOSIS: NORMAL
EKG Q-T INTERVAL: 438 MS
EKG QRS DURATION: 168 MS
EKG QTC CALCULATION (BAZETT): 505 MS
EKG R AXIS: -84 DEGREES
EKG T AXIS: 60 DEGREES
EKG VENTRICULAR RATE: 80 BPM
EOSINOPHIL # BLD: 0.1 K/UL (ref 0–0.6)
EOSINOPHIL NFR BLD: 1 %
ERYTHROCYTE [SEDIMENTATION RATE] IN BLOOD BY WESTERGREN METHOD: 34 MM/HR (ref 0–20)
GFR SERPLBLD CREATININE-BSD FMLA CKD-EPI: 6 ML/MIN/{1.73_M2}
GLUCOSE BLD-MCNC: 122 MG/DL (ref 70–99)
GLUCOSE BLD-MCNC: 130 MG/DL (ref 70–99)
GLUCOSE BLD-MCNC: 153 MG/DL (ref 70–99)
GLUCOSE BLD-MCNC: 202 MG/DL (ref 70–99)
GLUCOSE BLD-MCNC: 60 MG/DL (ref 70–99)
GLUCOSE BLD-MCNC: 65 MG/DL (ref 70–99)
GLUCOSE BLD-MCNC: 77 MG/DL (ref 70–99)
GLUCOSE BLD-MCNC: 77 MG/DL (ref 70–99)
GLUCOSE BLD-MCNC: 78 MG/DL (ref 70–99)
GLUCOSE BLD-MCNC: 83 MG/DL (ref 70–99)
GLUCOSE BLD-MCNC: 87 MG/DL (ref 70–99)
GLUCOSE BLD-MCNC: 92 MG/DL (ref 70–99)
GLUCOSE SERPL-MCNC: 62 MG/DL (ref 70–99)
HCT VFR BLD AUTO: 30.5 % (ref 40.5–52.5)
HGB BLD-MCNC: 10.1 G/DL (ref 13.5–17.5)
LIPASE SERPL-CCNC: 14 U/L (ref 13–60)
LYMPHOCYTES # BLD: 0.9 K/UL (ref 1–5.1)
LYMPHOCYTES NFR BLD: 11.8 %
MCH RBC QN AUTO: 33.4 PG (ref 26–34)
MCHC RBC AUTO-ENTMCNC: 33.2 G/DL (ref 31–36)
MCV RBC AUTO: 100.7 FL (ref 80–100)
MONOCYTES # BLD: 0.8 K/UL (ref 0–1.3)
MONOCYTES NFR BLD: 10.2 %
NEUTROPHILS # BLD: 5.7 K/UL (ref 1.7–7.7)
NEUTROPHILS NFR BLD: 75.9 %
PERFORMED ON: ABNORMAL
PERFORMED ON: NORMAL
PLATELET # BLD AUTO: 197 K/UL (ref 135–450)
PMV BLD AUTO: 8.1 FL (ref 5–10.5)
POTASSIUM SERPL-SCNC: 5.3 MMOL/L (ref 3.5–5.1)
POTASSIUM SERPL-SCNC: 5.7 MMOL/L (ref 3.5–5.1)
PROCALCITONIN SERPL IA-MCNC: 3.09 NG/ML (ref 0–0.15)
PROT SERPL-MCNC: 6.2 G/DL (ref 6.4–8.2)
RBC # BLD AUTO: 3.03 M/UL (ref 4.2–5.9)
SODIUM SERPL-SCNC: 131 MMOL/L (ref 136–145)
WBC # BLD AUTO: 7.5 K/UL (ref 4–11)

## 2024-02-16 PROCEDURE — 6360000002 HC RX W HCPCS: Performed by: NURSE PRACTITIONER

## 2024-02-16 PROCEDURE — 2580000003 HC RX 258: Performed by: NURSE PRACTITIONER

## 2024-02-16 PROCEDURE — 6360000002 HC RX W HCPCS: Performed by: INTERNAL MEDICINE

## 2024-02-16 PROCEDURE — 85025 COMPLETE CBC W/AUTO DIFF WBC: CPT

## 2024-02-16 PROCEDURE — 90935 HEMODIALYSIS ONE EVALUATION: CPT

## 2024-02-16 PROCEDURE — 2500000003 HC RX 250 WO HCPCS: Performed by: NURSE PRACTITIONER

## 2024-02-16 PROCEDURE — 6360000004 HC RX CONTRAST MEDICATION: Performed by: STUDENT IN AN ORGANIZED HEALTH CARE EDUCATION/TRAINING PROGRAM

## 2024-02-16 PROCEDURE — 73630 X-RAY EXAM OF FOOT: CPT

## 2024-02-16 PROCEDURE — 5A1D70Z PERFORMANCE OF URINARY FILTRATION, INTERMITTENT, LESS THAN 6 HOURS PER DAY: ICD-10-PCS | Performed by: INTERNAL MEDICINE

## 2024-02-16 PROCEDURE — 87075 CULTR BACTERIA EXCEPT BLOOD: CPT

## 2024-02-16 PROCEDURE — 99222 1ST HOSP IP/OBS MODERATE 55: CPT | Performed by: INTERNAL MEDICINE

## 2024-02-16 PROCEDURE — 84145 PROCALCITONIN (PCT): CPT

## 2024-02-16 PROCEDURE — 74177 CT ABD & PELVIS W/CONTRAST: CPT

## 2024-02-16 PROCEDURE — 2060000000 HC ICU INTERMEDIATE R&B

## 2024-02-16 PROCEDURE — 71045 X-RAY EXAM CHEST 1 VIEW: CPT

## 2024-02-16 PROCEDURE — 87070 CULTURE OTHR SPECIMN AEROBIC: CPT

## 2024-02-16 PROCEDURE — 83690 ASSAY OF LIPASE: CPT

## 2024-02-16 PROCEDURE — 94640 AIRWAY INHALATION TREATMENT: CPT

## 2024-02-16 PROCEDURE — 96374 THER/PROPH/DIAG INJ IV PUSH: CPT

## 2024-02-16 PROCEDURE — 87040 BLOOD CULTURE FOR BACTERIA: CPT

## 2024-02-16 PROCEDURE — 80053 COMPREHEN METABOLIC PANEL: CPT

## 2024-02-16 PROCEDURE — 70450 CT HEAD/BRAIN W/O DYE: CPT

## 2024-02-16 PROCEDURE — 86140 C-REACTIVE PROTEIN: CPT

## 2024-02-16 PROCEDURE — 6370000000 HC RX 637 (ALT 250 FOR IP): Performed by: NURSE PRACTITIONER

## 2024-02-16 PROCEDURE — 85652 RBC SED RATE AUTOMATED: CPT

## 2024-02-16 PROCEDURE — 99285 EMERGENCY DEPT VISIT HI MDM: CPT

## 2024-02-16 PROCEDURE — 6370000000 HC RX 637 (ALT 250 FOR IP): Performed by: INTERNAL MEDICINE

## 2024-02-16 PROCEDURE — 6370000000 HC RX 637 (ALT 250 FOR IP): Performed by: STUDENT IN AN ORGANIZED HEALTH CARE EDUCATION/TRAINING PROGRAM

## 2024-02-16 PROCEDURE — 84132 ASSAY OF SERUM POTASSIUM: CPT

## 2024-02-16 PROCEDURE — 93005 ELECTROCARDIOGRAM TRACING: CPT | Performed by: STUDENT IN AN ORGANIZED HEALTH CARE EDUCATION/TRAINING PROGRAM

## 2024-02-16 PROCEDURE — 93010 ELECTROCARDIOGRAM REPORT: CPT | Performed by: INTERNAL MEDICINE

## 2024-02-16 PROCEDURE — 87205 SMEAR GRAM STAIN: CPT

## 2024-02-16 PROCEDURE — 2580000003 HC RX 258: Performed by: INTERNAL MEDICINE

## 2024-02-16 PROCEDURE — 36415 COLL VENOUS BLD VENIPUNCTURE: CPT

## 2024-02-16 RX ORDER — VENLAFAXINE HYDROCHLORIDE 75 MG/1
75 CAPSULE, EXTENDED RELEASE ORAL
Status: DISCONTINUED | OUTPATIENT
Start: 2024-02-16 | End: 2024-02-21 | Stop reason: HOSPADM

## 2024-02-16 RX ORDER — QUETIAPINE 150 MG/1
150 TABLET, FILM COATED, EXTENDED RELEASE ORAL NIGHTLY
Status: DISCONTINUED | OUTPATIENT
Start: 2024-02-16 | End: 2024-02-21 | Stop reason: HOSPADM

## 2024-02-16 RX ORDER — DEXTROSE MONOHYDRATE 100 MG/ML
INJECTION, SOLUTION INTRAVENOUS CONTINUOUS PRN
Status: DISCONTINUED | OUTPATIENT
Start: 2024-02-16 | End: 2024-02-16

## 2024-02-16 RX ORDER — CARVEDILOL 6.25 MG/1
6.25 TABLET ORAL 2 TIMES DAILY WITH MEALS
Status: DISCONTINUED | OUTPATIENT
Start: 2024-02-16 | End: 2024-02-21 | Stop reason: HOSPADM

## 2024-02-16 RX ORDER — ACETAMINOPHEN 650 MG/1
650 SUPPOSITORY RECTAL EVERY 4 HOURS PRN
Status: DISCONTINUED | OUTPATIENT
Start: 2024-02-16 | End: 2024-02-21 | Stop reason: HOSPADM

## 2024-02-16 RX ORDER — CALCIUM GLUCONATE 20 MG/ML
1000 INJECTION, SOLUTION INTRAVENOUS ONCE
Status: COMPLETED | OUTPATIENT
Start: 2024-02-16 | End: 2024-02-16

## 2024-02-16 RX ORDER — MIDODRINE HYDROCHLORIDE 10 MG/1
10 TABLET ORAL
Status: DISCONTINUED | OUTPATIENT
Start: 2024-02-16 | End: 2024-02-21 | Stop reason: HOSPADM

## 2024-02-16 RX ORDER — HEPARIN SODIUM 1000 [USP'U]/ML
3600 INJECTION, SOLUTION INTRAVENOUS; SUBCUTANEOUS PRN
Status: DISCONTINUED | OUTPATIENT
Start: 2024-02-16 | End: 2024-02-21 | Stop reason: HOSPADM

## 2024-02-16 RX ORDER — HEPARIN SODIUM 5000 [USP'U]/ML
5000 INJECTION, SOLUTION INTRAVENOUS; SUBCUTANEOUS EVERY 8 HOURS SCHEDULED
Status: DISCONTINUED | OUTPATIENT
Start: 2024-02-16 | End: 2024-02-21 | Stop reason: HOSPADM

## 2024-02-16 RX ORDER — BUPROPION HYDROCHLORIDE 75 MG/1
150 TABLET ORAL
Status: DISCONTINUED | OUTPATIENT
Start: 2024-02-18 | End: 2024-02-21 | Stop reason: HOSPADM

## 2024-02-16 RX ORDER — SODIUM CHLORIDE 0.9 % (FLUSH) 0.9 %
10 SYRINGE (ML) INJECTION PRN
Status: DISCONTINUED | OUTPATIENT
Start: 2024-02-16 | End: 2024-02-21 | Stop reason: HOSPADM

## 2024-02-16 RX ORDER — ATORVASTATIN CALCIUM 80 MG/1
80 TABLET, FILM COATED ORAL NIGHTLY
Status: DISCONTINUED | OUTPATIENT
Start: 2024-02-16 | End: 2024-02-21 | Stop reason: HOSPADM

## 2024-02-16 RX ORDER — ASPIRIN 81 MG/1
81 TABLET, CHEWABLE ORAL DAILY
Status: DISCONTINUED | OUTPATIENT
Start: 2024-02-16 | End: 2024-02-21 | Stop reason: HOSPADM

## 2024-02-16 RX ORDER — POLYETHYLENE GLYCOL 3350 17 G/17G
17 POWDER, FOR SOLUTION ORAL DAILY PRN
Status: DISCONTINUED | OUTPATIENT
Start: 2024-02-16 | End: 2024-02-21 | Stop reason: HOSPADM

## 2024-02-16 RX ORDER — CINACALCET 30 MG/1
60 TABLET, FILM COATED ORAL
Status: DISCONTINUED | OUTPATIENT
Start: 2024-02-16 | End: 2024-02-21 | Stop reason: HOSPADM

## 2024-02-16 RX ORDER — ONDANSETRON 2 MG/ML
4 INJECTION INTRAMUSCULAR; INTRAVENOUS EVERY 4 HOURS PRN
Status: DISCONTINUED | OUTPATIENT
Start: 2024-02-16 | End: 2024-02-21 | Stop reason: HOSPADM

## 2024-02-16 RX ORDER — GABAPENTIN 300 MG/1
300 CAPSULE ORAL NIGHTLY
COMMUNITY

## 2024-02-16 RX ORDER — GLUCAGON 1 MG/ML
1 KIT INJECTION PRN
Status: DISCONTINUED | OUTPATIENT
Start: 2024-02-16 | End: 2024-02-16

## 2024-02-16 RX ORDER — OXYCODONE HYDROCHLORIDE AND ACETAMINOPHEN 5; 325 MG/1; MG/1
1 TABLET ORAL EVERY 4 HOURS PRN
Status: DISCONTINUED | OUTPATIENT
Start: 2024-02-16 | End: 2024-02-21 | Stop reason: HOSPADM

## 2024-02-16 RX ORDER — SODIUM CHLORIDE 0.9 % (FLUSH) 0.9 %
10 SYRINGE (ML) INJECTION EVERY 12 HOURS SCHEDULED
Status: DISCONTINUED | OUTPATIENT
Start: 2024-02-16 | End: 2024-02-21 | Stop reason: HOSPADM

## 2024-02-16 RX ORDER — ACETAMINOPHEN 325 MG/1
650 TABLET ORAL EVERY 4 HOURS PRN
Status: DISCONTINUED | OUTPATIENT
Start: 2024-02-16 | End: 2024-02-21 | Stop reason: HOSPADM

## 2024-02-16 RX ORDER — SEVELAMER CARBONATE 800 MG/1
2400 TABLET, FILM COATED ORAL
Status: DISCONTINUED | OUTPATIENT
Start: 2024-02-16 | End: 2024-02-21 | Stop reason: HOSPADM

## 2024-02-16 RX ORDER — SODIUM CHLORIDE 9 MG/ML
INJECTION, SOLUTION INTRAVENOUS PRN
Status: DISCONTINUED | OUTPATIENT
Start: 2024-02-16 | End: 2024-02-21 | Stop reason: HOSPADM

## 2024-02-16 RX ADMIN — QUETIAPINE FUMARATE 150 MG: 150 TABLET, EXTENDED RELEASE ORAL at 21:09

## 2024-02-16 RX ADMIN — SODIUM CHLORIDE 2000 MG: 900 INJECTION INTRAVENOUS at 18:09

## 2024-02-16 RX ADMIN — Medication 10 ML: at 21:10

## 2024-02-16 RX ADMIN — DEXTROSE MONOHYDRATE 250 ML: 100 INJECTION, SOLUTION INTRAVENOUS at 08:57

## 2024-02-16 RX ADMIN — OXYCODONE AND ACETAMINOPHEN 1 TABLET: 5; 325 TABLET ORAL at 12:31

## 2024-02-16 RX ADMIN — HEPARIN SODIUM 5000 UNITS: 5000 INJECTION INTRAVENOUS; SUBCUTANEOUS at 21:09

## 2024-02-16 RX ADMIN — ASPIRIN 81 MG: 81 TABLET, CHEWABLE ORAL at 12:31

## 2024-02-16 RX ADMIN — MIDODRINE HYDROCHLORIDE 10 MG: 10 TABLET ORAL at 12:31

## 2024-02-16 RX ADMIN — SEVELAMER CARBONATE 2400 MG: 800 TABLET, FILM COATED ORAL at 12:31

## 2024-02-16 RX ADMIN — ALBUTEROL SULFATE 10 MG: 2.5 SOLUTION RESPIRATORY (INHALATION) at 06:44

## 2024-02-16 RX ADMIN — VENLAFAXINE HYDROCHLORIDE 75 MG: 75 CAPSULE, EXTENDED RELEASE ORAL at 12:31

## 2024-02-16 RX ADMIN — CARVEDILOL 6.25 MG: 6.25 TABLET, FILM COATED ORAL at 18:05

## 2024-02-16 RX ADMIN — SODIUM BICARBONATE 50 MEQ: 84 INJECTION, SOLUTION INTRAVENOUS at 08:35

## 2024-02-16 RX ADMIN — CINACALCET HYDROCHLORIDE 60 MG: 30 TABLET, FILM COATED ORAL at 12:31

## 2024-02-16 RX ADMIN — POLYETHYLENE GLYCOL 3350 17 G: 17 POWDER, FOR SOLUTION ORAL at 21:33

## 2024-02-16 RX ADMIN — SEVELAMER CARBONATE 2400 MG: 800 TABLET, FILM COATED ORAL at 18:05

## 2024-02-16 RX ADMIN — VANCOMYCIN HYDROCHLORIDE 1500 MG: 1.5 INJECTION, POWDER, LYOPHILIZED, FOR SOLUTION INTRAVENOUS at 09:35

## 2024-02-16 RX ADMIN — SODIUM ZIRCONIUM CYCLOSILICATE 10 G: 10 POWDER, FOR SUSPENSION ORAL at 08:33

## 2024-02-16 RX ADMIN — INSULIN HUMAN 5 UNITS: 100 INJECTION, SOLUTION PARENTERAL at 09:16

## 2024-02-16 RX ADMIN — IOPAMIDOL 75 ML: 755 INJECTION, SOLUTION INTRAVENOUS at 06:20

## 2024-02-16 RX ADMIN — ATORVASTATIN CALCIUM 80 MG: 80 TABLET, FILM COATED ORAL at 21:09

## 2024-02-16 RX ADMIN — CALCIUM GLUCONATE 1000 MG: 20 INJECTION, SOLUTION INTRAVENOUS at 07:59

## 2024-02-16 RX ADMIN — Medication 16 G: at 10:47

## 2024-02-16 RX ADMIN — OXYCODONE AND ACETAMINOPHEN 1 TABLET: 5; 325 TABLET ORAL at 18:05

## 2024-02-16 RX ADMIN — SODIUM CHLORIDE 25 ML: 9 INJECTION, SOLUTION INTRAVENOUS at 18:09

## 2024-02-16 ASSESSMENT — PAIN DESCRIPTION - PAIN TYPE
TYPE: SURGICAL PAIN
TYPE: SURGICAL PAIN

## 2024-02-16 ASSESSMENT — PAIN - FUNCTIONAL ASSESSMENT
PAIN_FUNCTIONAL_ASSESSMENT: PREVENTS OR INTERFERES SOME ACTIVE ACTIVITIES AND ADLS
PAIN_FUNCTIONAL_ASSESSMENT: PREVENTS OR INTERFERES SOME ACTIVE ACTIVITIES AND ADLS

## 2024-02-16 ASSESSMENT — LIFESTYLE VARIABLES
HOW MANY STANDARD DRINKS CONTAINING ALCOHOL DO YOU HAVE ON A TYPICAL DAY: PATIENT DOES NOT DRINK
HOW OFTEN DO YOU HAVE A DRINK CONTAINING ALCOHOL: NEVER
HOW OFTEN DO YOU HAVE A DRINK CONTAINING ALCOHOL: NEVER

## 2024-02-16 ASSESSMENT — PAIN SCALES - GENERAL
PAINLEVEL_OUTOF10: 5
PAINLEVEL_OUTOF10: 6
PAINLEVEL_OUTOF10: 7
PAINLEVEL_OUTOF10: 8

## 2024-02-16 ASSESSMENT — PAIN SCALES - WONG BAKER: WONGBAKER_NUMERICALRESPONSE: 0

## 2024-02-16 ASSESSMENT — PAIN DESCRIPTION - ORIENTATION
ORIENTATION: LEFT
ORIENTATION: LEFT

## 2024-02-16 ASSESSMENT — PAIN DESCRIPTION - DESCRIPTORS
DESCRIPTORS: BURNING
DESCRIPTORS: BURNING

## 2024-02-16 ASSESSMENT — PAIN DESCRIPTION - DIRECTION: RADIATING_TOWARDS: LEG

## 2024-02-16 ASSESSMENT — PAIN DESCRIPTION - FREQUENCY
FREQUENCY: CONTINUOUS
FREQUENCY: CONTINUOUS

## 2024-02-16 ASSESSMENT — PAIN DESCRIPTION - LOCATION
LOCATION: FOOT
LOCATION: FOOT

## 2024-02-16 ASSESSMENT — PAIN DESCRIPTION - ONSET
ONSET: ON-GOING
ONSET: ON-GOING

## 2024-02-16 NOTE — H&P
Hypertension     RLL pneumonia 1/23/2018    Sleep apnea     uses CPAP       Past Surgical History:        Procedure Laterality Date    COLONOSCOPY      PACEMAKER INSERTION      PACEMAKER PLACEMENT      TOE AMPUTATION Left 12/1/2023    AMPUTATION SECOND AND THIRD DIGITS LEFT FOOT performed by Horace Jordan DPM at [facility] OR    TOE AMPUTATION Left 2/12/2024    LEFT HALLUX AMPUTATION performed by Horace Jordan DPM at [facility] OR       Allergies:  Patient has no known allergies.    Medications Prior to Admission:    Prior to Admission medications    Medication Sig Start Date End Date Taking? Authorizing Provider   gabapentin (NEURONTIN) 300 MG capsule Take 1 capsule by mouth at bedtime.   Yes Khris Zavala MD   sodium chloride 0.9 % SOLN 100 mL with ceFAZolin 2 g SOLR 2,000 mg Infuse 2,000 mg intravenously three times a week With HD Mon, Wed, Fri  3/6/24 Yes Khris Zavala MD   aspirin 81 MG chewable tablet Take 1 tablet by mouth daily 2/14/24   Azael Marshall MD   atorvastatin (LIPITOR) 80 MG tablet Take 1 tablet by mouth nightly 2/13/24   Azael Marshall MD   carvedilol (COREG) 6.25 MG tablet Take 1 tablet by mouth 2 times daily (with meals) 2/13/24   Azael Marshall MD   oxyCODONE-acetaminophen (PERCOCET) 5-325 MG per tablet Take 1 tablet by mouth every 4 hours as needed for Pain for up to 3 days. Intended supply: 3 days. Take lowest dose possible to manage pain Max Daily Amount: 6 tablets 2/13/24 2/16/24  Azael Marshall MD   buPROPion (WELLBUTRIN) 75 MG tablet Take 2 tablets by mouth every 72 hours 8/31/23   Khris Zavala MD   midodrine (PROAMATINE) 10 MG tablet Take 1 tablet by mouth See Admin Instructions Before and during dialysis three times weekly Mon, Wed, Fri 8/31/23   Khris Zavala MD   QUEtiapine (SEROQUEL XR) 150 MG TB24 extended release tablet Take 1 tablet by mouth at bedtime at bedtime. 8/31/23   Khris Zavala MD   venlafaxine (EFFEXOR XR) 75 MG extended

## 2024-02-16 NOTE — ED PROVIDER NOTES
[facility] EMERGENCY DEPARTMENT  EMERGENCY DEPARTMENT ENCOUNTER        Pt Name: Cal Sanchez  MRN: [id number]  Birthdate 1960  Date of evaluation: 2/16/2024  Provider: Andreas Montgomery MD  PCP: Tania Moses MD  Note Started: 6:53 AM EST 2/16/24    CHIEF COMPLAINT       Chief Complaint   Patient presents with    Fall     Patient comes to ER via EMS for fall at home. Patient denies LOC or hitting head. Patient denies head/neck/back pain. Patient had 3 toes amputated on left foot last week.       HISTORY OF PRESENT ILLNESS: 1 or more Elements     History from : Patient    Limitations to history : None    Cal Sanchez is a 63 y.o. male who presents with multiple falls, generally weak, unsure why he is falling.  Recent admission for MSSA bacteremia, osteomyelitis of L hallux, now s/p amputation.      Nursing Notes were all reviewed and agreed with or any disagreements were addressed in the HPI.    REVIEW OF SYSTEMS :      Positives and Pertinent negatives as per HPI.  ROS otherwise unremarkable.    SURGICAL HISTORY     Past Surgical History:   Procedure Laterality Date    COLONOSCOPY      PACEMAKER INSERTION      PACEMAKER PLACEMENT      TOE AMPUTATION Left 12/1/2023    AMPUTATION SECOND AND THIRD DIGITS LEFT FOOT performed by Horace Jordan DPM at [facility] OR    TOE AMPUTATION Left 2/12/2024    LEFT HALLUX AMPUTATION performed by Horace Jordan DPM at [facility] OR       CURRENTMEDICATIONS       Previous Medications    ASPIRIN 81 MG CHEWABLE TABLET    Take 1 tablet by mouth daily    ATORVASTATIN (LIPITOR) 80 MG TABLET    Take 1 tablet by mouth nightly    BUPROPION (WELLBUTRIN) 75 MG TABLET    Take 2 tablets by mouth every 72 hours    CARVEDILOL (COREG) 6.25 MG TABLET    Take 1 tablet by mouth 2 times daily (with meals)    CINACALCET (SENSIPAR) 60 MG TABLET    Take 1 tablet by mouth three times a week Takes on dialysis days at bedtime Mon, Wed, Fri    GABAPENTIN (NEURONTIN) 300 MG 
/69   Pulse 69   Temp 98 °F (36.7 °C) (Oral)   Resp 16   SpO2 100%    Constitutional:  Well developed, well nourished, no acute distress, poor hygiene, unkempt, smells heavily of marijuana  HENT:  atraumatic, no trismus TMs are unremarkable.  No hemotympanums  Neck: supple, no JVD, no posterior neck tenderness  Respiratory: Respirations even and unlabored.  No cough noted.  On room air.  Breath sounds clear and diminished in the bases   Cardiovascular: Regular rhythm and rate, S1-S2  GI:  Soft, nontender, nondistended without rebound or guarding normal bowel sounds  Musculoskeletal:  No edema, left great toe amputation with sutures intact.  No purulence noted.  Mild erythema skin areas of scant bright red bleeding.  Black area to the fourth digit on the left see picture below  Vascular: Feet are warm to the touch  Integument: Poor skin turgor, multiple skin tears to his arm which were newly dressed before seeing him  Neurologic:  Alert & oriented x 4 but poor historian, no slurred speech  Psych: Pleasant affect, no hallucinations        RADIOLOGY  (interpreted by the radiologist)  CT ABDOMEN PELVIS W IV CONTRAST Additional Contrast? None   Preliminary Result   1. Moderate-to-large volume of stool in the colon.  Please correlate with   clinical symptoms of constipation.   2. Nodular liver contour with splenomegaly.  Please correlate with clinical   history of cirrhosis and portal hypertension.   3. Chronic renal cortical atrophy bilaterally with numerous cysts.   4. Stable left adrenal myelolipoma.         CT HEAD WO CONTRAST   Preliminary Result   No acute intracranial abnormality.      Left scalp hematoma.  No underlying fracture.      Stable chronic changes.         XR CHEST PORTABLE   Final Result   1. No acute pulmonary abnormality.   2. Stable mild enlargement of the cardiac silhouette.         XR FOOT LEFT (MIN 3 VIEWS)   Final Result   1. Small focus of gas along the anterior margin of the 1st

## 2024-02-16 NOTE — ED NOTES
Patient skin tears on bilateral hands and arms wrapped with kerlex, petroleum gauze and secured with tape per Dr. Montgomery verbal orders.

## 2024-02-17 LAB
ALBUMIN SERPL-MCNC: 3.1 G/DL (ref 3.4–5)
ANION GAP SERPL CALCULATED.3IONS-SCNC: 11 MMOL/L (ref 3–16)
BASOPHILS # BLD: 0.1 K/UL (ref 0–0.2)
BASOPHILS NFR BLD: 1.5 %
BUN SERPL-MCNC: 25 MG/DL (ref 7–20)
CALCIUM SERPL-MCNC: 8.2 MG/DL (ref 8.3–10.6)
CHLORIDE SERPL-SCNC: 93 MMOL/L (ref 99–110)
CO2 SERPL-SCNC: 27 MMOL/L (ref 21–32)
CREAT SERPL-MCNC: 6.3 MG/DL (ref 0.8–1.3)
DEPRECATED RDW RBC AUTO: 15.4 % (ref 12.4–15.4)
EOSINOPHIL # BLD: 0.1 K/UL (ref 0–0.6)
EOSINOPHIL NFR BLD: 1.1 %
GFR SERPLBLD CREATININE-BSD FMLA CKD-EPI: 9 ML/MIN/{1.73_M2}
GLUCOSE SERPL-MCNC: 74 MG/DL (ref 70–99)
HCT VFR BLD AUTO: 26 % (ref 40.5–52.5)
HGB BLD-MCNC: 8.9 G/DL (ref 13.5–17.5)
LYMPHOCYTES # BLD: 1 K/UL (ref 1–5.1)
LYMPHOCYTES NFR BLD: 20.2 %
MCH RBC QN AUTO: 34 PG (ref 26–34)
MCHC RBC AUTO-ENTMCNC: 34.3 G/DL (ref 31–36)
MCV RBC AUTO: 99.2 FL (ref 80–100)
MONOCYTES # BLD: 0.8 K/UL (ref 0–1.3)
MONOCYTES NFR BLD: 16.4 %
NEUTROPHILS # BLD: 3.1 K/UL (ref 1.7–7.7)
NEUTROPHILS NFR BLD: 60.8 %
PHOSPHATE SERPL-MCNC: 4 MG/DL (ref 2.5–4.9)
PLATELET # BLD AUTO: 160 K/UL (ref 135–450)
PMV BLD AUTO: 8.4 FL (ref 5–10.5)
POTASSIUM SERPL-SCNC: 4.3 MMOL/L (ref 3.5–5.1)
RBC # BLD AUTO: 2.62 M/UL (ref 4.2–5.9)
SODIUM SERPL-SCNC: 131 MMOL/L (ref 136–145)
WBC # BLD AUTO: 5.1 K/UL (ref 4–11)

## 2024-02-17 PROCEDURE — 6360000002 HC RX W HCPCS: Performed by: INTERNAL MEDICINE

## 2024-02-17 PROCEDURE — 36415 COLL VENOUS BLD VENIPUNCTURE: CPT

## 2024-02-17 PROCEDURE — 6370000000 HC RX 637 (ALT 250 FOR IP): Performed by: INTERNAL MEDICINE

## 2024-02-17 PROCEDURE — 2060000000 HC ICU INTERMEDIATE R&B

## 2024-02-17 PROCEDURE — 2580000003 HC RX 258: Performed by: INTERNAL MEDICINE

## 2024-02-17 PROCEDURE — 0HBMXZZ EXCISION OF RIGHT FOOT SKIN, EXTERNAL APPROACH: ICD-10-PCS | Performed by: INTERNAL MEDICINE

## 2024-02-17 PROCEDURE — 85025 COMPLETE CBC W/AUTO DIFF WBC: CPT

## 2024-02-17 PROCEDURE — 97166 OT EVAL MOD COMPLEX 45 MIN: CPT

## 2024-02-17 PROCEDURE — 97162 PT EVAL MOD COMPLEX 30 MIN: CPT

## 2024-02-17 PROCEDURE — 97530 THERAPEUTIC ACTIVITIES: CPT

## 2024-02-17 PROCEDURE — 80069 RENAL FUNCTION PANEL: CPT

## 2024-02-17 PROCEDURE — 6370000000 HC RX 637 (ALT 250 FOR IP)

## 2024-02-17 RX ORDER — OXYCODONE HYDROCHLORIDE AND ACETAMINOPHEN 5; 325 MG/1; MG/1
1 TABLET ORAL EVERY 4 HOURS PRN
Qty: 18 TABLET | Refills: 0 | Status: SHIPPED | OUTPATIENT
Start: 2024-02-17 | End: 2024-02-20

## 2024-02-17 RX ORDER — OXYCODONE HYDROCHLORIDE 5 MG/1
5 TABLET ORAL ONCE
Status: COMPLETED | OUTPATIENT
Start: 2024-02-17 | End: 2024-02-17

## 2024-02-17 RX ADMIN — ATORVASTATIN CALCIUM 80 MG: 80 TABLET, FILM COATED ORAL at 20:54

## 2024-02-17 RX ADMIN — POLYETHYLENE GLYCOL 3350 17 G: 17 POWDER, FOR SOLUTION ORAL at 14:02

## 2024-02-17 RX ADMIN — SEVELAMER CARBONATE 2400 MG: 800 TABLET, FILM COATED ORAL at 07:52

## 2024-02-17 RX ADMIN — Medication 10 ML: at 07:54

## 2024-02-17 RX ADMIN — MUPIROCIN: 20 OINTMENT TOPICAL at 14:04

## 2024-02-17 RX ADMIN — OXYCODONE AND ACETAMINOPHEN 1 TABLET: 5; 325 TABLET ORAL at 13:58

## 2024-02-17 RX ADMIN — OXYCODONE AND ACETAMINOPHEN 1 TABLET: 5; 325 TABLET ORAL at 17:37

## 2024-02-17 RX ADMIN — HEPARIN SODIUM 5000 UNITS: 5000 INJECTION INTRAVENOUS; SUBCUTANEOUS at 20:54

## 2024-02-17 RX ADMIN — CARVEDILOL 6.25 MG: 6.25 TABLET, FILM COATED ORAL at 07:51

## 2024-02-17 RX ADMIN — OXYCODONE 5 MG: 5 TABLET ORAL at 10:37

## 2024-02-17 RX ADMIN — OXYCODONE AND ACETAMINOPHEN 1 TABLET: 5; 325 TABLET ORAL at 03:57

## 2024-02-17 RX ADMIN — ASPIRIN 81 MG: 81 TABLET, CHEWABLE ORAL at 07:51

## 2024-02-17 RX ADMIN — Medication 10 ML: at 20:56

## 2024-02-17 RX ADMIN — OXYCODONE AND ACETAMINOPHEN 1 TABLET: 5; 325 TABLET ORAL at 07:50

## 2024-02-17 RX ADMIN — OXYCODONE AND ACETAMINOPHEN 1 TABLET: 5; 325 TABLET ORAL at 23:33

## 2024-02-17 RX ADMIN — QUETIAPINE FUMARATE 150 MG: 150 TABLET, EXTENDED RELEASE ORAL at 20:54

## 2024-02-17 RX ADMIN — HEPARIN SODIUM 5000 UNITS: 5000 INJECTION INTRAVENOUS; SUBCUTANEOUS at 13:58

## 2024-02-17 RX ADMIN — VENLAFAXINE HYDROCHLORIDE 75 MG: 75 CAPSULE, EXTENDED RELEASE ORAL at 07:51

## 2024-02-17 RX ADMIN — SEVELAMER CARBONATE 2400 MG: 800 TABLET, FILM COATED ORAL at 17:37

## 2024-02-17 RX ADMIN — CARVEDILOL 6.25 MG: 6.25 TABLET, FILM COATED ORAL at 17:24

## 2024-02-17 RX ADMIN — SEVELAMER CARBONATE 2400 MG: 800 TABLET, FILM COATED ORAL at 12:13

## 2024-02-17 RX ADMIN — MUPIROCIN: 20 OINTMENT TOPICAL at 20:54

## 2024-02-17 RX ADMIN — HEPARIN SODIUM 5000 UNITS: 5000 INJECTION INTRAVENOUS; SUBCUTANEOUS at 06:08

## 2024-02-17 ASSESSMENT — PAIN DESCRIPTION - DESCRIPTORS
DESCRIPTORS: ACHING;DISCOMFORT
DESCRIPTORS: ACHING

## 2024-02-17 ASSESSMENT — PAIN DESCRIPTION - ORIENTATION
ORIENTATION: LEFT
ORIENTATION: LEFT

## 2024-02-17 ASSESSMENT — PAIN SCALES - GENERAL
PAINLEVEL_OUTOF10: 8
PAINLEVEL_OUTOF10: 9
PAINLEVEL_OUTOF10: 8
PAINLEVEL_OUTOF10: 6

## 2024-02-17 ASSESSMENT — PAIN DESCRIPTION - PAIN TYPE: TYPE: SURGICAL PAIN

## 2024-02-17 ASSESSMENT — PAIN DESCRIPTION - LOCATION
LOCATION: FOOT
LOCATION: FOOT

## 2024-02-17 ASSESSMENT — PAIN DESCRIPTION - FREQUENCY: FREQUENCY: CONTINUOUS

## 2024-02-17 ASSESSMENT — PAIN - FUNCTIONAL ASSESSMENT: PAIN_FUNCTIONAL_ASSESSMENT: PREVENTS OR INTERFERES SOME ACTIVE ACTIVITIES AND ADLS

## 2024-02-17 ASSESSMENT — PAIN DESCRIPTION - ONSET: ONSET: ON-GOING

## 2024-02-17 NOTE — DISCHARGE INSTR - COC
IV Cefazolin x  2 gm with HD sessions x 3 times a week x stop date x  3/6/24  CBC with diff, ESR, CRP weekly  Fax results to   First dose given in Hospital   No ID follow up     Dr.Ravindhar Thor ASHLEY  Infectious Disease  [facility] Physician  Phone: [phone]   Fax : [phone]       Physician Certification: I certify the above information and transfer of Cal Sanchez  is necessary for the continuing treatment of the diagnosis listed and that he requires Home Care for less 30 days.     Update Admission H&P: No change in H&P    PHYSICIAN SIGNATURE:  Electronically signed by Cedrick Gonzales MD on 2/17/24 at 11:25 AM EST

## 2024-02-17 NOTE — DISCHARGE INSTRUCTIONS
Podiatry Wound Care Discharge Instructions  Please perform every day dressing changes to bilateral lower extremity as follows  Left lower extremity  -Apply Betadine paint to the incision site   -Next apply gauze to the top of the left foot, ankle, and leg  -Next loosely wrap the left lower extremity with Kerlix starting from just in front of the toes and ending just above the ankle  -Next gently wrap the left foot with 4 inch Ace bandage starting from just in front of the toes and ending just above the ankle  Right lower extremity  -Apply a small amount of mupirocin ointment to the right fifth digit wound  -Next apply Band-Aid over right fifth digit wound    Patient is partial heel weightbearing Left lower extremity and weightbearing as tolerated to the right lower extremity  Please follow-up with Dr. Horace Jordan DPM for postoperative care

## 2024-02-18 LAB
ALBUMIN SERPL-MCNC: 3.1 G/DL (ref 3.4–5)
ANION GAP SERPL CALCULATED.3IONS-SCNC: 13 MMOL/L (ref 3–16)
BASOPHILS # BLD: 0.1 K/UL (ref 0–0.2)
BASOPHILS NFR BLD: 2.2 %
BUN SERPL-MCNC: 31 MG/DL (ref 7–20)
CALCIUM SERPL-MCNC: 8.7 MG/DL (ref 8.3–10.6)
CHLORIDE SERPL-SCNC: 89 MMOL/L (ref 99–110)
CO2 SERPL-SCNC: 26 MMOL/L (ref 21–32)
CREAT SERPL-MCNC: 7.8 MG/DL (ref 0.8–1.3)
DEPRECATED RDW RBC AUTO: 14.9 % (ref 12.4–15.4)
EOSINOPHIL # BLD: 0.1 K/UL (ref 0–0.6)
EOSINOPHIL NFR BLD: 1.3 %
GFR SERPLBLD CREATININE-BSD FMLA CKD-EPI: 7 ML/MIN/{1.73_M2}
GLUCOSE SERPL-MCNC: 89 MG/DL (ref 70–99)
HCT VFR BLD AUTO: 27.1 % (ref 40.5–52.5)
HGB BLD-MCNC: 9.5 G/DL (ref 13.5–17.5)
LYMPHOCYTES # BLD: 0.9 K/UL (ref 1–5.1)
LYMPHOCYTES NFR BLD: 17.1 %
MCH RBC QN AUTO: 34.2 PG (ref 26–34)
MCHC RBC AUTO-ENTMCNC: 34.9 G/DL (ref 31–36)
MCV RBC AUTO: 97.9 FL (ref 80–100)
MONOCYTES # BLD: 0.8 K/UL (ref 0–1.3)
MONOCYTES NFR BLD: 15.7 %
NEUTROPHILS # BLD: 3.4 K/UL (ref 1.7–7.7)
NEUTROPHILS NFR BLD: 63.7 %
PHOSPHATE SERPL-MCNC: 4.4 MG/DL (ref 2.5–4.9)
PLATELET # BLD AUTO: 170 K/UL (ref 135–450)
PMV BLD AUTO: 7.9 FL (ref 5–10.5)
POTASSIUM SERPL-SCNC: 4.6 MMOL/L (ref 3.5–5.1)
RBC # BLD AUTO: 2.77 M/UL (ref 4.2–5.9)
SODIUM SERPL-SCNC: 128 MMOL/L (ref 136–145)
WBC # BLD AUTO: 5.3 K/UL (ref 4–11)

## 2024-02-18 PROCEDURE — 6360000002 HC RX W HCPCS: Performed by: INTERNAL MEDICINE

## 2024-02-18 PROCEDURE — 2580000003 HC RX 258: Performed by: INTERNAL MEDICINE

## 2024-02-18 PROCEDURE — 85025 COMPLETE CBC W/AUTO DIFF WBC: CPT

## 2024-02-18 PROCEDURE — 2060000000 HC ICU INTERMEDIATE R&B

## 2024-02-18 PROCEDURE — 6370000000 HC RX 637 (ALT 250 FOR IP): Performed by: INTERNAL MEDICINE

## 2024-02-18 PROCEDURE — 36415 COLL VENOUS BLD VENIPUNCTURE: CPT

## 2024-02-18 PROCEDURE — 80069 RENAL FUNCTION PANEL: CPT

## 2024-02-18 RX ORDER — CALCIUM CARBONATE 500 MG/1
1000 TABLET, CHEWABLE ORAL 3 TIMES DAILY PRN
Status: DISCONTINUED | OUTPATIENT
Start: 2024-02-18 | End: 2024-02-21 | Stop reason: HOSPADM

## 2024-02-18 RX ADMIN — BUPROPION HYDROCHLORIDE 150 MG: 75 TABLET, FILM COATED ORAL at 08:38

## 2024-02-18 RX ADMIN — CARVEDILOL 6.25 MG: 6.25 TABLET, FILM COATED ORAL at 17:53

## 2024-02-18 RX ADMIN — Medication 10 ML: at 19:58

## 2024-02-18 RX ADMIN — HEPARIN SODIUM 5000 UNITS: 5000 INJECTION INTRAVENOUS; SUBCUTANEOUS at 21:33

## 2024-02-18 RX ADMIN — SEVELAMER CARBONATE 2400 MG: 800 TABLET, FILM COATED ORAL at 12:21

## 2024-02-18 RX ADMIN — OXYCODONE AND ACETAMINOPHEN 1 TABLET: 5; 325 TABLET ORAL at 17:54

## 2024-02-18 RX ADMIN — MUPIROCIN: 20 OINTMENT TOPICAL at 19:58

## 2024-02-18 RX ADMIN — ANTACID TABLETS 1000 MG: 500 TABLET, CHEWABLE ORAL at 14:45

## 2024-02-18 RX ADMIN — MUPIROCIN: 20 OINTMENT TOPICAL at 08:35

## 2024-02-18 RX ADMIN — OXYCODONE AND ACETAMINOPHEN 1 TABLET: 5; 325 TABLET ORAL at 23:26

## 2024-02-18 RX ADMIN — POLYETHYLENE GLYCOL 3350 17 G: 17 POWDER, FOR SOLUTION ORAL at 08:42

## 2024-02-18 RX ADMIN — SEVELAMER CARBONATE 2400 MG: 800 TABLET, FILM COATED ORAL at 17:53

## 2024-02-18 RX ADMIN — Medication 10 ML: at 08:36

## 2024-02-18 RX ADMIN — QUETIAPINE FUMARATE 150 MG: 150 TABLET, EXTENDED RELEASE ORAL at 19:58

## 2024-02-18 RX ADMIN — SEVELAMER CARBONATE 2400 MG: 800 TABLET, FILM COATED ORAL at 08:35

## 2024-02-18 RX ADMIN — HEPARIN SODIUM 5000 UNITS: 5000 INJECTION INTRAVENOUS; SUBCUTANEOUS at 05:44

## 2024-02-18 RX ADMIN — HEPARIN SODIUM 5000 UNITS: 5000 INJECTION INTRAVENOUS; SUBCUTANEOUS at 12:23

## 2024-02-18 RX ADMIN — VENLAFAXINE HYDROCHLORIDE 75 MG: 75 CAPSULE, EXTENDED RELEASE ORAL at 08:34

## 2024-02-18 RX ADMIN — ONDANSETRON 4 MG: 2 INJECTION INTRAMUSCULAR; INTRAVENOUS at 13:56

## 2024-02-18 RX ADMIN — ASPIRIN 81 MG: 81 TABLET, CHEWABLE ORAL at 08:35

## 2024-02-18 RX ADMIN — ATORVASTATIN CALCIUM 80 MG: 80 TABLET, FILM COATED ORAL at 19:58

## 2024-02-18 RX ADMIN — CARVEDILOL 6.25 MG: 6.25 TABLET, FILM COATED ORAL at 08:35

## 2024-02-18 ASSESSMENT — PAIN DESCRIPTION - DESCRIPTORS: DESCRIPTORS: ACHING

## 2024-02-18 ASSESSMENT — PAIN DESCRIPTION - LOCATION: LOCATION: FOOT

## 2024-02-18 ASSESSMENT — PAIN DESCRIPTION - ORIENTATION: ORIENTATION: LEFT

## 2024-02-18 ASSESSMENT — PAIN SCALES - GENERAL
PAINLEVEL_OUTOF10: 7
PAINLEVEL_OUTOF10: 6

## 2024-02-18 NOTE — CONSULTS
Clinical Pharmacy Note  Consult    Cal Sanchez is a 63 y.o. male ordered cefazolin for Bone and Joint Infection ; consult received from Dr. Gonzales to manage therapy for dialysis    Allergies:  Patient has no known allergies.     Temp max:  Temp (24hrs), Av °F (36.7 °C), Min:97.9 °F (36.6 °C), Max:98.2 °F (36.8 °C)      Recent Labs     24  0510   WBC 7.5       Recent Labs     24  0510   BUN 42*   CREATININE 8.8*       Culture Results:  pending    Ht Readings from Last 1 Encounters:   24 1.778 m (5' 10\")        Wt Readings from Last 1 Encounters:   24 95.3 kg (210 lb 1.6 oz)         Estimated Creatinine Clearance: 10 mL/min (A) (based on SCr of 8.8 mg/dL (HH)).    Assessment/Plan:  Cefazolin 2000mg MWF after dialysis    Thank you for the consult.    Yared Valdovinos [facility], 2024 4:18 PM    
Clinical Pharmacy Note  Vancomycin Consult    Pharmacy consult received for one-time dose of vancomycin in the Emergency Department per Dayna Arroyo CNP.    Ht Readings from Last 1 Encounters:   02/05/24 1.778 m (5' 10\")        Wt Readings from Last 1 Encounters:   02/13/24 90.9 kg (200 lb 6.4 oz)         Assessment/Plan:  Vancomycin 1500 mg x 1 in ED.  If vancomycin is to continue on admission and pharmacy is to manage dosing, please re-consult with admission orders.      Gurvinder Ricks [facility]  2/16/2024 6:43 AM    
is midline  Eyes: EOMI, AVINASH  Chest: Diminished to auscultation, no intercostal retractions  CVS: RRR, no murmur, no rub  Abdomen: soft, non tender  Extremities: 1+ edema, no cyanosis.  Skin: normal texture, normal skin turgor, no rash  Psych: normal affect; AAO x 3  Access: [facility]      LAB DATA:    CBC:   Lab Results   Component Value Date/Time    WBC 7.5 02/16/2024 05:10 AM    RBC 3.03 02/16/2024 05:10 AM    HGB 10.1 02/16/2024 05:10 AM    HCT 30.5 02/16/2024 05:10 AM    .7 02/16/2024 05:10 AM    MCH 33.4 02/16/2024 05:10 AM    MCHC 33.2 02/16/2024 05:10 AM    RDW 15.7 02/16/2024 05:10 AM     02/16/2024 05:10 AM    MPV 8.1 02/16/2024 05:10 AM     BMP:    Lab Results   Component Value Date/Time     02/16/2024 05:10 AM    K 5.7 02/16/2024 05:10 AM    CL 91 02/16/2024 05:10 AM    CO2 18 02/16/2024 05:10 AM    BUN 42 02/16/2024 05:10 AM    CREATININE 8.8 02/16/2024 05:10 AM    CALCIUM 9.6 02/16/2024 05:10 AM    GFRAA 12 02/18/2022 12:11 AM    LABGLOM 6 02/16/2024 05:10 AM    GLUCOSE 62 02/16/2024 05:10 AM     Ionized Calcium:  No results found for: \"IONCA\"  Magnesium:    Lab Results   Component Value Date/Time    MG 1.80 01/23/2018 04:58 AM     Phosphorus:    Lab Results   Component Value Date/Time    PHOS 3.7 02/13/2024 04:37 AM     U/A:    Lab Results   Component Value Date/Time    COLORU Yellow 02/04/2024 06:15 PM    PHUR 8.5 02/04/2024 06:15 PM    WBCUA 6 02/04/2024 06:15 PM    RBCUA 3 02/04/2024 06:15 PM    BACTERIA None Seen 02/04/2024 06:15 PM    CLARITYU CLOUDY 02/04/2024 06:15 PM    SPECGRAV 1.013 02/04/2024 06:15 PM    LEUKOCYTESUR TRACE 02/04/2024 06:15 PM    UROBILINOGEN 0.2 02/04/2024 06:15 PM    BILIRUBINUR Negative 02/04/2024 06:15 PM    BLOODU Negative 02/04/2024 06:15 PM    GLUCOSEU 100 02/04/2024 06:15 PM         IMPRESSION/RECOMMENDATIONS:      ESRD on HD MWF: follows with  Nephrology outpatient.              - Outpatient HD[facility]: [facility]              - Access: R [facility]   - HD 
production  CARDIOVASCULAR: negative for chest pain, palpitations, exertional chest pressure/discomfort, syncope, edema  GASTROINTESTINAL: negative for nausea, vomiting, diarrhea, blood in stool, abdominal pain, constipation  GENITOURINARY: negative for frequency, dysuria, urinary incontinence, decreased urine volume, and hematuria  HEMATOLOGIC/LYMPHATIC: negative for easy bruising, bleeding and lymphadenopathy  ALLERGIC/IMMUNOLOGIC: negative for recurrent infections, angioedema, anaphylaxis and drug reactions  ENDOCRINE: negative for weight changes and diabetic symptoms including polyuria, polydipsia and polyphagia  MUSCULOSKELETAL: refer to HPI  NEUROLOGICAL: negative for headaches, slurred speech, unilateral weakness  PSYCHIATRIC/BEHAVIORAL: negative for hallucinations, behavioral problems, agitation, confusion.    Physical Examination:  Vitals: Patient Vitals for the past 24 hrs:   BP Temp Temp src Pulse Resp SpO2 Height Weight   02/18/24 0733 136/75 97.3 °F (36.3 °C) Oral 72 16 100 % -- --   02/18/24 0438 (!) 152/84 98.3 °F (36.8 °C) Oral 79 16 99 % -- 92.2 kg (203 lb 4.2 oz)   02/18/24 0003 -- -- -- -- 16 -- -- --   02/17/24 2330 (!) 147/88 98.1 °F (36.7 °C) Oral 69 17 99 % -- --   02/17/24 1848 (!) 123/93 98.2 °F (36.8 °C) Oral -- 16 100 % -- --   02/17/24 1513 111/61 98.3 °F (36.8 °C) Oral 77 15 99 % -- --   02/17/24 1336 -- -- -- -- -- -- 1.778 m (5' 10\") --     Const: Alert. WDWN. No distress  Eyes: Conjunctiva noninjected, no icterus noted; pupils equal, round, and reactive to light.   HENT: Atraumatic, normocephalic; Oral mucosa moist  Neck: Trachea midline, neck supple. No thyromegaly noted.  CV: Regular rate and rhythm, no murmur rub or gallop noted  Resp: Lungs clear to auscultation bilaterally, no rales wheezes or rhonchi, no retractions. Respirations unlabored.   GI: Soft, nontender, nondistended. Normal bowel sounds. No palpable masses.   Skin: Left foot incision dressed, c/d/I.  No rashes or 
examined at bedside this morning  -VSS, no leukocytosis noted (WBC 5.1)  -ESR 34 and CRP 18.4  -Images reviewed, impression noted above  -Wound culture not obtained at this time 2/2 no active drainage  -Patient provided verbal consent to perform sharp excisional debridement at today's encounter. Using a sterile #15 blade, the blister noted to the fifth toe of the right foot was placed.  No blood loss appreciated.  Patient tolerated procedure well  -No antibiotics warranted from a podiatric standpoint 2/2 no acute signs of infection  -Mupirocin ordered to patient's room, to be used at next dressing change  -Right foot dressed with Betadine and a Band-Aid  -Left foot dressed with Betadine paint to the hallux incision, dry gauze, Kerlix, Ace  -weightbearing as tolerated in a surgical shoe on the left foot.  -Lengthy discussion with patient regarding the importance of extremity elevation and edema control, patient voiced understanding       DISPO: S/P left hallux amputation (2/12/2024).  All labs and imaging reviewed, impression as noted above.  No antibiotics necessary from podiatric standpoint.  Nursing communications placed for daily dressing changes to bilateral lower extremities.  Patient is stable for discharge from podiatric standpoint.  Podiatry will continue to follow patient while in house.  Patient is to follow-up with Dr. Horace Jordan in the outpatient setting at wound care.    The patient assessment and plan was discussed with Dr. Maggie Dai DPM.    Cristian Preston DPM   Podiatric Resident PGY1  [employer]  Pager number [phone]  2/17/2024, 10:35 AM    Patient was seen and evaluated at bedside.  Agree with residents assessment and treatment plan.  Maggie Dai DPM    
injury, initial encounter  S09.90XA       6. Bacteremia  R78.81       7. Marijuana smoker  F12.90       8. Goals of care, counseling/discussion  Z71.89       9. Full code status  Z78.9          Admitted with falls and generalized weakness  ESRD on hemodialysis  Recent Staph aureus bacteremia MSSA  Receiving IV antibiotic as outpatient  Complicated left foot infection  Peripheral vascular disease  Recent left hallux amputation for infection  Pacemaker in place  Smoking  ESR elevation  CRP elevation  Hyperkalemia    Unfortunately he is now admitted to the hospital secondary to fall unable to care at home will likely need placement to completed  antibiotics and wound care    Labs, Microbiology, Radiology and pertinent results from current hospitalization and care every where were reviewed by me as a part of the consultation.    PLAN :  1.  DC IV vancomycin  2. IV cefazolin 2 g with hemodialysis sessions 3 times a week x stop date  3/6/24   3. Trend ESR CRP  4. Continue local care  5. Correct hyperkalemia  6. Wound cx in process  7. DELFINO done        Discussed with patient/Family and Nursing     Medical Decision Making:  The following items were considered in medical decision making:  Discussion of patient care with other providers  Reviewed clinical lab tests  Reviewed radiology tests  Reviewed other diagnostic tests/interventions  Independent review of radiologic images  Independent review of  Microbiology cultures and other micro tests reviewed     Risk of Complications/Morbidity: High      Illness(es)/ Infection present that pose threat to bodily function.   There is potential for severe exacerbation of infection/side effects of treatment.  Therapy requires intensive monitoring for antimicrobial agent toxicity.     Thanks for allowing me to participate in your patient's care please call me with any questions or concerns.    Dr. Kimberly Mcrae MD  Infectious Disease  [facility] Physician  Phone: [phone]   Fax

## 2024-02-19 PROBLEM — Z78.9 RESIDES IN SKILLED NURSING FACILITY: Status: ACTIVE | Noted: 2024-02-19

## 2024-02-19 LAB
ALBUMIN SERPL-MCNC: 2.9 G/DL (ref 3.4–5)
ANION GAP SERPL CALCULATED.3IONS-SCNC: 12 MMOL/L (ref 3–16)
BASOPHILS # BLD: 0.1 K/UL (ref 0–0.2)
BASOPHILS NFR BLD: 1.5 %
BUN SERPL-MCNC: 41 MG/DL (ref 7–20)
CALCIUM SERPL-MCNC: 8.7 MG/DL (ref 8.3–10.6)
CHLORIDE SERPL-SCNC: 92 MMOL/L (ref 99–110)
CO2 SERPL-SCNC: 24 MMOL/L (ref 21–32)
CREAT SERPL-MCNC: 9 MG/DL (ref 0.8–1.3)
DEPRECATED RDW RBC AUTO: 14.6 % (ref 12.4–15.4)
EOSINOPHIL # BLD: 0.1 K/UL (ref 0–0.6)
EOSINOPHIL NFR BLD: 1.3 %
GFR SERPLBLD CREATININE-BSD FMLA CKD-EPI: 6 ML/MIN/{1.73_M2}
GLUCOSE BLD-MCNC: 154 MG/DL (ref 70–99)
GLUCOSE SERPL-MCNC: 63 MG/DL (ref 70–99)
HCT VFR BLD AUTO: 24.9 % (ref 40.5–52.5)
HGB BLD-MCNC: 8.5 G/DL (ref 13.5–17.5)
LYMPHOCYTES # BLD: 1.2 K/UL (ref 1–5.1)
LYMPHOCYTES NFR BLD: 19.8 %
MCH RBC QN AUTO: 33.6 PG (ref 26–34)
MCHC RBC AUTO-ENTMCNC: 34.2 G/DL (ref 31–36)
MCV RBC AUTO: 98.2 FL (ref 80–100)
MONOCYTES # BLD: 0.8 K/UL (ref 0–1.3)
MONOCYTES NFR BLD: 14.3 %
NEUTROPHILS # BLD: 3.7 K/UL (ref 1.7–7.7)
NEUTROPHILS NFR BLD: 63.1 %
PERFORMED ON: ABNORMAL
PHOSPHATE SERPL-MCNC: 5.2 MG/DL (ref 2.5–4.9)
PLATELET # BLD AUTO: 150 K/UL (ref 135–450)
PMV BLD AUTO: 8.3 FL (ref 5–10.5)
POTASSIUM SERPL-SCNC: 5.4 MMOL/L (ref 3.5–5.1)
RBC # BLD AUTO: 2.53 M/UL (ref 4.2–5.9)
SODIUM SERPL-SCNC: 128 MMOL/L (ref 136–145)
WBC # BLD AUTO: 5.8 K/UL (ref 4–11)

## 2024-02-19 PROCEDURE — 6360000002 HC RX W HCPCS: Performed by: INTERNAL MEDICINE

## 2024-02-19 PROCEDURE — 36415 COLL VENOUS BLD VENIPUNCTURE: CPT

## 2024-02-19 PROCEDURE — 2060000000 HC ICU INTERMEDIATE R&B

## 2024-02-19 PROCEDURE — 6370000000 HC RX 637 (ALT 250 FOR IP): Performed by: INTERNAL MEDICINE

## 2024-02-19 PROCEDURE — 90935 HEMODIALYSIS ONE EVALUATION: CPT

## 2024-02-19 PROCEDURE — 9990000010 HC NO CHARGE VISIT

## 2024-02-19 PROCEDURE — 2580000003 HC RX 258: Performed by: INTERNAL MEDICINE

## 2024-02-19 PROCEDURE — 80069 RENAL FUNCTION PANEL: CPT

## 2024-02-19 PROCEDURE — 85025 COMPLETE CBC W/AUTO DIFF WBC: CPT

## 2024-02-19 RX ORDER — DEXTROSE MONOHYDRATE 100 MG/ML
INJECTION, SOLUTION INTRAVENOUS CONTINUOUS PRN
Status: DISCONTINUED | OUTPATIENT
Start: 2024-02-19 | End: 2024-02-21 | Stop reason: HOSPADM

## 2024-02-19 RX ORDER — GLUCAGON 1 MG/ML
1 KIT INJECTION PRN
Status: DISCONTINUED | OUTPATIENT
Start: 2024-02-19 | End: 2024-02-21 | Stop reason: HOSPADM

## 2024-02-19 RX ADMIN — MUPIROCIN: 20 OINTMENT TOPICAL at 08:35

## 2024-02-19 RX ADMIN — POLYETHYLENE GLYCOL 3350 17 G: 17 POWDER, FOR SOLUTION ORAL at 08:35

## 2024-02-19 RX ADMIN — HEPARIN SODIUM 3600 UNITS: 1000 INJECTION INTRAVENOUS; SUBCUTANEOUS at 13:17

## 2024-02-19 RX ADMIN — OXYCODONE AND ACETAMINOPHEN 1 TABLET: 5; 325 TABLET ORAL at 19:11

## 2024-02-19 RX ADMIN — CINACALCET HYDROCHLORIDE 60 MG: 30 TABLET, FILM COATED ORAL at 08:33

## 2024-02-19 RX ADMIN — ASPIRIN 81 MG: 81 TABLET, CHEWABLE ORAL at 08:33

## 2024-02-19 RX ADMIN — Medication 10 ML: at 21:10

## 2024-02-19 RX ADMIN — HEPARIN SODIUM 5000 UNITS: 5000 INJECTION INTRAVENOUS; SUBCUTANEOUS at 21:34

## 2024-02-19 RX ADMIN — QUETIAPINE FUMARATE 150 MG: 150 TABLET, EXTENDED RELEASE ORAL at 21:10

## 2024-02-19 RX ADMIN — CARVEDILOL 6.25 MG: 6.25 TABLET, FILM COATED ORAL at 18:26

## 2024-02-19 RX ADMIN — Medication 10 ML: at 08:35

## 2024-02-19 RX ADMIN — OXYCODONE AND ACETAMINOPHEN 1 TABLET: 5; 325 TABLET ORAL at 23:58

## 2024-02-19 RX ADMIN — VENLAFAXINE HYDROCHLORIDE 75 MG: 75 CAPSULE, EXTENDED RELEASE ORAL at 08:33

## 2024-02-19 RX ADMIN — EPOETIN ALFA-EPBX 10000 UNITS: 10000 INJECTION, SOLUTION INTRAVENOUS; SUBCUTANEOUS at 13:16

## 2024-02-19 RX ADMIN — MUPIROCIN: 20 OINTMENT TOPICAL at 21:34

## 2024-02-19 RX ADMIN — CARVEDILOL 6.25 MG: 6.25 TABLET, FILM COATED ORAL at 08:33

## 2024-02-19 RX ADMIN — SEVELAMER CARBONATE 2400 MG: 800 TABLET, FILM COATED ORAL at 08:33

## 2024-02-19 RX ADMIN — SODIUM CHLORIDE 2000 MG: 900 INJECTION INTRAVENOUS at 18:30

## 2024-02-19 RX ADMIN — HEPARIN SODIUM 5000 UNITS: 5000 INJECTION INTRAVENOUS; SUBCUTANEOUS at 14:34

## 2024-02-19 RX ADMIN — OXYCODONE AND ACETAMINOPHEN 1 TABLET: 5; 325 TABLET ORAL at 15:05

## 2024-02-19 RX ADMIN — SEVELAMER CARBONATE 2400 MG: 800 TABLET, FILM COATED ORAL at 14:30

## 2024-02-19 RX ADMIN — ATORVASTATIN CALCIUM 80 MG: 80 TABLET, FILM COATED ORAL at 21:10

## 2024-02-19 RX ADMIN — HEPARIN SODIUM 5000 UNITS: 5000 INJECTION INTRAVENOUS; SUBCUTANEOUS at 05:33

## 2024-02-19 RX ADMIN — OXYCODONE AND ACETAMINOPHEN 1 TABLET: 5; 325 TABLET ORAL at 08:33

## 2024-02-19 ASSESSMENT — PAIN DESCRIPTION - LOCATION
LOCATION: FOOT
LOCATION: LEG

## 2024-02-19 ASSESSMENT — PAIN DESCRIPTION - ORIENTATION
ORIENTATION: LEFT

## 2024-02-19 ASSESSMENT — PAIN DESCRIPTION - DESCRIPTORS
DESCRIPTORS: BURNING
DESCRIPTORS: BURNING
DESCRIPTORS: ACHING;SHARP;SORE
DESCRIPTORS: BURNING

## 2024-02-19 ASSESSMENT — PAIN - FUNCTIONAL ASSESSMENT
PAIN_FUNCTIONAL_ASSESSMENT: PREVENTS OR INTERFERES SOME ACTIVE ACTIVITIES AND ADLS

## 2024-02-19 ASSESSMENT — PAIN SCALES - GENERAL
PAINLEVEL_OUTOF10: 8
PAINLEVEL_OUTOF10: 0
PAINLEVEL_OUTOF10: 7
PAINLEVEL_OUTOF10: 8
PAINLEVEL_OUTOF10: 8

## 2024-02-19 NOTE — ACP (ADVANCE CARE PLANNING)
Advance Care Planning     Advance Care Planning Activator (Inpatient)  Conversation Note      Date of ACP Conversation: 2/19/2024     Conversation Conducted with: Patient with Decision Making Capacity    ACP Activator: Iliana Torres RN    Health Care Decision Maker:     Current Designated Health Care Decision Maker:     Primary Decision Maker: LauraJunie - Niece/Nephew - [phone]    Care Preferences    Ventilation:  \"If you were in your present state of health and suddenly became very ill and were unable to breathe on your own, what would your preference be about the use of a ventilator (breathing machine) if it were available to you?\"      Would the patient desire the use of ventilator (breathing machine)?: yes    \"If your health worsens and it becomes clear that your chance of recovery is unlikely, what would your preference be about the use of a ventilator (breathing machine) if it were available to you?\"     Would the patient desire the use of ventilator (breathing machine)?: Unsure      Resuscitation  \"CPR works best to restart the heart when there is a sudden event, like a heart attack, in someone who is otherwise healthy. Unfortunately, CPR does not typically restart the heart for people who have serious health conditions or who are very sick.\"    \"In the event your heart stopped as a result of an underlying serious health condition, would you want attempts to be made to restart your heart (answer \"yes\" for attempt to resuscitate) or would you prefer a natural death (answer \"no\" for do not attempt to resuscitate)?\" yes       [] Yes   [x] No   Educated Patient / Decision Maker regarding differences between Advance Directives and portable DNR orders.    Length of ACP Conversation in minutes: 5 minutes     Conversation Outcomes:  ACP discussion completed    Follow-up plan:    [] Schedule follow-up conversation to continue planning  [] Referred individual to Provider for additional questions/concerns

## 2024-02-20 LAB
ALBUMIN SERPL-MCNC: 3.2 G/DL (ref 3.4–5)
ANION GAP SERPL CALCULATED.3IONS-SCNC: 12 MMOL/L (ref 3–16)
BACTERIA BLD CULT ORG #2: NORMAL
BACTERIA BLD CULT: NORMAL
BASOPHILS # BLD: 0.1 K/UL (ref 0–0.2)
BASOPHILS NFR BLD: 2.7 %
BUN SERPL-MCNC: 22 MG/DL (ref 7–20)
CALCIUM SERPL-MCNC: 9 MG/DL (ref 8.3–10.6)
CHLORIDE SERPL-SCNC: 94 MMOL/L (ref 99–110)
CO2 SERPL-SCNC: 24 MMOL/L (ref 21–32)
CREAT SERPL-MCNC: 5.9 MG/DL (ref 0.8–1.3)
DEPRECATED RDW RBC AUTO: 14.9 % (ref 12.4–15.4)
EOSINOPHIL # BLD: 0.1 K/UL (ref 0–0.6)
EOSINOPHIL NFR BLD: 1 %
GFR SERPLBLD CREATININE-BSD FMLA CKD-EPI: 10 ML/MIN/{1.73_M2}
GLUCOSE BLD-MCNC: 116 MG/DL (ref 70–99)
GLUCOSE BLD-MCNC: 79 MG/DL (ref 70–99)
GLUCOSE BLD-MCNC: 93 MG/DL (ref 70–99)
GLUCOSE BLD-MCNC: 93 MG/DL (ref 70–99)
GLUCOSE SERPL-MCNC: 71 MG/DL (ref 70–99)
HCT VFR BLD AUTO: 29.1 % (ref 40.5–52.5)
HGB BLD-MCNC: 9.8 G/DL (ref 13.5–17.5)
LYMPHOCYTES # BLD: 1 K/UL (ref 1–5.1)
LYMPHOCYTES NFR BLD: 19.5 %
MCH RBC QN AUTO: 33.5 PG (ref 26–34)
MCHC RBC AUTO-ENTMCNC: 33.8 G/DL (ref 31–36)
MCV RBC AUTO: 98.9 FL (ref 80–100)
MONOCYTES # BLD: 0.7 K/UL (ref 0–1.3)
MONOCYTES NFR BLD: 14.7 %
NEUTROPHILS # BLD: 3.1 K/UL (ref 1.7–7.7)
NEUTROPHILS NFR BLD: 62.1 %
PERFORMED ON: ABNORMAL
PERFORMED ON: NORMAL
PHOSPHATE SERPL-MCNC: 4.1 MG/DL (ref 2.5–4.9)
PLATELET # BLD AUTO: 182 K/UL (ref 135–450)
PMV BLD AUTO: 8.4 FL (ref 5–10.5)
POTASSIUM SERPL-SCNC: 4.5 MMOL/L (ref 3.5–5.1)
RBC # BLD AUTO: 2.94 M/UL (ref 4.2–5.9)
SODIUM SERPL-SCNC: 130 MMOL/L (ref 136–145)
WBC # BLD AUTO: 5.1 K/UL (ref 4–11)

## 2024-02-20 PROCEDURE — 36415 COLL VENOUS BLD VENIPUNCTURE: CPT

## 2024-02-20 PROCEDURE — 97530 THERAPEUTIC ACTIVITIES: CPT

## 2024-02-20 PROCEDURE — 6370000000 HC RX 637 (ALT 250 FOR IP): Performed by: INTERNAL MEDICINE

## 2024-02-20 PROCEDURE — 6360000002 HC RX W HCPCS: Performed by: INTERNAL MEDICINE

## 2024-02-20 PROCEDURE — 97110 THERAPEUTIC EXERCISES: CPT

## 2024-02-20 PROCEDURE — 80069 RENAL FUNCTION PANEL: CPT

## 2024-02-20 PROCEDURE — 2580000003 HC RX 258: Performed by: INTERNAL MEDICINE

## 2024-02-20 PROCEDURE — 85025 COMPLETE CBC W/AUTO DIFF WBC: CPT

## 2024-02-20 PROCEDURE — 2060000000 HC ICU INTERMEDIATE R&B

## 2024-02-20 PROCEDURE — 97535 SELF CARE MNGMENT TRAINING: CPT

## 2024-02-20 RX ADMIN — MUPIROCIN: 20 OINTMENT TOPICAL at 13:08

## 2024-02-20 RX ADMIN — MUPIROCIN: 20 OINTMENT TOPICAL at 20:08

## 2024-02-20 RX ADMIN — HEPARIN SODIUM 5000 UNITS: 5000 INJECTION INTRAVENOUS; SUBCUTANEOUS at 21:28

## 2024-02-20 RX ADMIN — HEPARIN SODIUM 5000 UNITS: 5000 INJECTION INTRAVENOUS; SUBCUTANEOUS at 05:58

## 2024-02-20 RX ADMIN — SEVELAMER CARBONATE 2400 MG: 800 TABLET, FILM COATED ORAL at 17:57

## 2024-02-20 RX ADMIN — ATORVASTATIN CALCIUM 80 MG: 80 TABLET, FILM COATED ORAL at 20:08

## 2024-02-20 RX ADMIN — CARVEDILOL 6.25 MG: 6.25 TABLET, FILM COATED ORAL at 08:43

## 2024-02-20 RX ADMIN — SEVELAMER CARBONATE 2400 MG: 800 TABLET, FILM COATED ORAL at 13:06

## 2024-02-20 RX ADMIN — QUETIAPINE FUMARATE 150 MG: 150 TABLET, EXTENDED RELEASE ORAL at 20:08

## 2024-02-20 RX ADMIN — HEPARIN SODIUM 5000 UNITS: 5000 INJECTION INTRAVENOUS; SUBCUTANEOUS at 14:30

## 2024-02-20 RX ADMIN — CARVEDILOL 6.25 MG: 6.25 TABLET, FILM COATED ORAL at 17:57

## 2024-02-20 RX ADMIN — VENLAFAXINE HYDROCHLORIDE 75 MG: 75 CAPSULE, EXTENDED RELEASE ORAL at 08:44

## 2024-02-20 RX ADMIN — ASPIRIN 81 MG: 81 TABLET, CHEWABLE ORAL at 08:43

## 2024-02-20 RX ADMIN — Medication 10 ML: at 13:08

## 2024-02-20 RX ADMIN — OXYCODONE AND ACETAMINOPHEN 1 TABLET: 5; 325 TABLET ORAL at 14:31

## 2024-02-20 RX ADMIN — OXYCODONE AND ACETAMINOPHEN 1 TABLET: 5; 325 TABLET ORAL at 20:08

## 2024-02-20 RX ADMIN — OXYCODONE AND ACETAMINOPHEN 1 TABLET: 5; 325 TABLET ORAL at 08:44

## 2024-02-20 RX ADMIN — Medication 10 ML: at 20:09

## 2024-02-20 ASSESSMENT — PAIN DESCRIPTION - DESCRIPTORS
DESCRIPTORS: BURNING
DESCRIPTORS: BURNING
DESCRIPTORS: ACHING;DISCOMFORT;BURNING

## 2024-02-20 ASSESSMENT — PAIN DESCRIPTION - LOCATION
LOCATION: LEG
LOCATION: LEG
LOCATION: FOOT

## 2024-02-20 ASSESSMENT — PAIN SCALES - GENERAL
PAINLEVEL_OUTOF10: 4
PAINLEVEL_OUTOF10: 8
PAINLEVEL_OUTOF10: 8
PAINLEVEL_OUTOF10: 4
PAINLEVEL_OUTOF10: 0
PAINLEVEL_OUTOF10: 8
PAINLEVEL_OUTOF10: 3

## 2024-02-20 ASSESSMENT — PAIN - FUNCTIONAL ASSESSMENT
PAIN_FUNCTIONAL_ASSESSMENT: PREVENTS OR INTERFERES SOME ACTIVE ACTIVITIES AND ADLS

## 2024-02-20 ASSESSMENT — PAIN DESCRIPTION - PAIN TYPE: TYPE: SURGICAL PAIN

## 2024-02-20 ASSESSMENT — PAIN DESCRIPTION - ORIENTATION
ORIENTATION: LEFT

## 2024-02-20 ASSESSMENT — PAIN DESCRIPTION - DIRECTION: RADIATING_TOWARDS: UP LEG

## 2024-02-20 ASSESSMENT — PAIN DESCRIPTION - ONSET: ONSET: ON-GOING

## 2024-02-20 ASSESSMENT — PAIN DESCRIPTION - FREQUENCY: FREQUENCY: CONTINUOUS

## 2024-02-21 VITALS
HEIGHT: 70 IN | TEMPERATURE: 98.1 F | BODY MASS INDEX: 28.41 KG/M2 | WEIGHT: 198.41 LBS | OXYGEN SATURATION: 94 % | HEART RATE: 75 BPM | RESPIRATION RATE: 18 BRPM | DIASTOLIC BLOOD PRESSURE: 83 MMHG | SYSTOLIC BLOOD PRESSURE: 153 MMHG

## 2024-02-21 LAB
ALBUMIN SERPL-MCNC: 2.9 G/DL (ref 3.4–5)
ANION GAP SERPL CALCULATED.3IONS-SCNC: 15 MMOL/L (ref 3–16)
BACTERIA SPEC AEROBE CULT: NORMAL
BACTERIA SPEC ANAEROBE CULT: NORMAL
BASOPHILS # BLD: 0.1 K/UL (ref 0–0.2)
BASOPHILS NFR BLD: 2.9 %
BUN SERPL-MCNC: 30 MG/DL (ref 7–20)
CALCIUM SERPL-MCNC: 9 MG/DL (ref 8.3–10.6)
CHLORIDE SERPL-SCNC: 92 MMOL/L (ref 99–110)
CO2 SERPL-SCNC: 24 MMOL/L (ref 21–32)
CREAT SERPL-MCNC: 7.2 MG/DL (ref 0.8–1.3)
DEPRECATED RDW RBC AUTO: 14.9 % (ref 12.4–15.4)
EOSINOPHIL # BLD: 0 K/UL (ref 0–0.6)
EOSINOPHIL NFR BLD: 0.9 %
GFR SERPLBLD CREATININE-BSD FMLA CKD-EPI: 8 ML/MIN/{1.73_M2}
GLUCOSE BLD-MCNC: 67 MG/DL (ref 70–99)
GLUCOSE SERPL-MCNC: 72 MG/DL (ref 70–99)
GRAM STN SPEC: NORMAL
HCT VFR BLD AUTO: 27 % (ref 40.5–52.5)
HGB BLD-MCNC: 9 G/DL (ref 13.5–17.5)
LYMPHOCYTES # BLD: 0.9 K/UL (ref 1–5.1)
LYMPHOCYTES NFR BLD: 24.8 %
MCH RBC QN AUTO: 32.9 PG (ref 26–34)
MCHC RBC AUTO-ENTMCNC: 33.3 G/DL (ref 31–36)
MCV RBC AUTO: 98.9 FL (ref 80–100)
MONOCYTES # BLD: 0.7 K/UL (ref 0–1.3)
MONOCYTES NFR BLD: 18.3 %
NEUTROPHILS # BLD: 2 K/UL (ref 1.7–7.7)
NEUTROPHILS NFR BLD: 53.1 %
PERFORMED ON: ABNORMAL
PHOSPHATE SERPL-MCNC: 4.6 MG/DL (ref 2.5–4.9)
PLATELET # BLD AUTO: 153 K/UL (ref 135–450)
PMV BLD AUTO: 8.1 FL (ref 5–10.5)
POTASSIUM SERPL-SCNC: 4.6 MMOL/L (ref 3.5–5.1)
RBC # BLD AUTO: 2.73 M/UL (ref 4.2–5.9)
SODIUM SERPL-SCNC: 131 MMOL/L (ref 136–145)
WBC # BLD AUTO: 3.8 K/UL (ref 4–11)

## 2024-02-21 PROCEDURE — 80069 RENAL FUNCTION PANEL: CPT

## 2024-02-21 PROCEDURE — 36415 COLL VENOUS BLD VENIPUNCTURE: CPT

## 2024-02-21 PROCEDURE — 6360000002 HC RX W HCPCS: Performed by: INTERNAL MEDICINE

## 2024-02-21 PROCEDURE — 9990000010 HC NO CHARGE VISIT

## 2024-02-21 PROCEDURE — 2580000003 HC RX 258: Performed by: INTERNAL MEDICINE

## 2024-02-21 PROCEDURE — 85025 COMPLETE CBC W/AUTO DIFF WBC: CPT

## 2024-02-21 PROCEDURE — 90935 HEMODIALYSIS ONE EVALUATION: CPT

## 2024-02-21 PROCEDURE — 6370000000 HC RX 637 (ALT 250 FOR IP): Performed by: INTERNAL MEDICINE

## 2024-02-21 RX ADMIN — HEPARIN SODIUM 5000 UNITS: 5000 INJECTION INTRAVENOUS; SUBCUTANEOUS at 14:58

## 2024-02-21 RX ADMIN — Medication 10 ML: at 12:19

## 2024-02-21 RX ADMIN — BUPROPION HYDROCHLORIDE 150 MG: 75 TABLET, FILM COATED ORAL at 12:14

## 2024-02-21 RX ADMIN — OXYCODONE AND ACETAMINOPHEN 1 TABLET: 5; 325 TABLET ORAL at 12:12

## 2024-02-21 RX ADMIN — SEVELAMER CARBONATE 2400 MG: 800 TABLET, FILM COATED ORAL at 12:26

## 2024-02-21 RX ADMIN — HEPARIN SODIUM 5000 UNITS: 5000 INJECTION INTRAVENOUS; SUBCUTANEOUS at 06:42

## 2024-02-21 RX ADMIN — CINACALCET HYDROCHLORIDE 60 MG: 30 TABLET, FILM COATED ORAL at 12:14

## 2024-02-21 RX ADMIN — MUPIROCIN: 20 OINTMENT TOPICAL at 12:21

## 2024-02-21 RX ADMIN — VENLAFAXINE HYDROCHLORIDE 75 MG: 75 CAPSULE, EXTENDED RELEASE ORAL at 12:21

## 2024-02-21 RX ADMIN — CARVEDILOL 6.25 MG: 6.25 TABLET, FILM COATED ORAL at 12:14

## 2024-02-21 RX ADMIN — EPOETIN ALFA-EPBX 10000 UNITS: 10000 INJECTION, SOLUTION INTRAVENOUS; SUBCUTANEOUS at 09:31

## 2024-02-21 RX ADMIN — ASPIRIN 81 MG: 81 TABLET, CHEWABLE ORAL at 12:14

## 2024-02-21 ASSESSMENT — PAIN DESCRIPTION - ORIENTATION: ORIENTATION: LEFT

## 2024-02-21 ASSESSMENT — PAIN DESCRIPTION - ONSET: ONSET: ON-GOING

## 2024-02-21 ASSESSMENT — PAIN DESCRIPTION - DESCRIPTORS: DESCRIPTORS: DISCOMFORT;THROBBING;SHOOTING

## 2024-02-21 ASSESSMENT — PAIN DESCRIPTION - LOCATION: LOCATION: FOOT

## 2024-02-21 ASSESSMENT — PAIN SCALES - GENERAL: PAINLEVEL_OUTOF10: 8

## 2024-02-21 ASSESSMENT — PAIN DESCRIPTION - FREQUENCY: FREQUENCY: CONTINUOUS

## 2024-02-21 ASSESSMENT — PAIN DESCRIPTION - PAIN TYPE: TYPE: CHRONIC PAIN

## 2024-02-21 ASSESSMENT — PAIN - FUNCTIONAL ASSESSMENT: PAIN_FUNCTIONAL_ASSESSMENT: PREVENTS OR INTERFERES WITH MANY ACTIVE NOT PASSIVE ACTIVITIES

## 2024-02-21 NOTE — PLAN OF CARE
Problem: Safety - Adult  Goal: Free from fall injury  2/20/2024 0215 by Salas Lowry RN  Outcome: Progressing     Problem: Discharge Planning  Goal: Discharge to home or other facility with appropriate resources  2/20/2024 0215 by Salas Lowry RN  Outcome: Progressing     Problem: Pain  Goal: Verbalizes/displays adequate comfort level or baseline comfort level  2/20/2024 0215 by Salas Lowry RN  Outcome: Progressing     Problem: ABCDS Injury Assessment  Goal: Absence of physical injury  2/20/2024 0215 by Salas Lowry RN  Outcome: Progressing     Problem: Cardiovascular - Adult  Goal: Maintains optimal cardiac output and hemodynamic stability  2/20/2024 0215 by Salas Lowry RN  Outcome: Progressing     Problem: Cardiovascular - Adult  Goal: Absence of cardiac dysrhythmias or at baseline  2/20/2024 0215 by Salas Lowry RN  Outcome: Progressing     Problem: Skin/Tissue Integrity - Adult  Goal: Skin integrity remains intact  2/20/2024 0215 by Salas Lowry RN  Outcome: Progressing  Flowsheets (Taken 2/19/2024 1532 by Fany oCrona, RN)  Skin Integrity Remains Intact: Monitor for areas of redness and/or skin breakdown     
  Problem: Safety - Adult  Goal: Free from fall injury  2/20/2024 1554 by Fany Corona RN  Outcome: Progressing       Problem: Discharge Planning  Goal: Discharge to home or other facility with appropriate resources  2/20/2024 1554 by Fany Corona RN  Outcome: Progressing  Flowsheets (Taken 2/20/2024 0751)  Discharge to home or other facility with appropriate resources: Identify barriers to discharge with patient and caregiver       Problem: Pain  Goal: Verbalizes/displays adequate comfort level or baseline comfort level  2/20/2024 1554 by Fany Corona RN  Outcome: Progressing       Problem: ABCDS Injury Assessment  Goal: Absence of physical injury  2/20/2024 1554 by Fany Corona RN  Outcome: Progressing     Problem: Cardiovascular - Adult  Goal: Maintains optimal cardiac output and hemodynamic stability  2/20/2024 1554 by Fany Corona RN  Outcome: Progressing    Goal: Absence of cardiac dysrhythmias or at baseline  2/20/2024 1554 by Fany Corona RN  Outcome: Progressing       Problem: Skin/Tissue Integrity - Adult  Goal: Skin integrity remains intact  2/20/2024 1554 by Fany Corona RN  Outcome: Progressing  Flowsheets (Taken 2/20/2024 0751)  Skin Integrity Remains Intact: Monitor for areas of redness and/or skin breakdown    Goal: Incisions, wounds, or drain sites healing without S/S of infection  2/20/2024 1554 by Fany Corona RN  Outcome: Progressing    Goal: Oral mucous membranes remain intact  2/20/2024 1554 by Fany Corona RN  Outcome: Progressing       Problem: Metabolic/Fluid and Electrolytes - Adult  Goal: Electrolytes maintained within normal limits  2/20/2024 1554 by Fany Corona RN  Outcome: Progressing  Flowsheets (Taken 2/20/2024 0751)  Electrolytes maintained within normal limits: Monitor labs and assess patient for signs and symptoms of electrolyte imbalances    Goal: Hemodynamic stability and optimal renal function maintained  2/20/2024 1554 by Fany Corona RN  Outcome: 
  Problem: Safety - Adult  Goal: Free from fall injury  2/21/2024 0244 by Alana Lee, RN  Outcome: Progressing  Flowsheets (Taken 2/21/2024 0244)  Free From Fall Injury: Instruct family/caregiver on patient safety     Problem: Discharge Planning  Goal: Discharge to home or other facility with appropriate resources  2/21/2024 0244 by Alana Lee, RN  Outcome: Progressing  Flowsheets (Taken 2/21/2024 0244)  Discharge to home or other facility with appropriate resources:   Identify barriers to discharge with patient and caregiver   Arrange for needed discharge resources and transportation as appropriate   Identify discharge learning needs (meds, wound care, etc)   Refer to discharge planning if patient needs post-hospital services based on physician order or complex needs related to functional status, cognitive ability or social support system     Problem: Pain  Goal: Verbalizes/displays adequate comfort level or baseline comfort level  2/21/2024 0244 by Alana Lee RN  Outcome: Progressing  Flowsheets (Taken 2/21/2024 0244)  Verbalizes/displays adequate comfort level or baseline comfort level:   Encourage patient to monitor pain and request assistance   Assess pain using appropriate pain scale   Administer analgesics based on type and severity of pain and evaluate response   Implement non-pharmacological measures as appropriate and evaluate response     Problem: ABCDS Injury Assessment  Goal: Absence of physical injury  2/21/2024 0244 by Alana Lee RN  Outcome: Progressing  Flowsheets (Taken 2/21/2024 0244)  Absence of Physical Injury: Implement safety measures based on patient assessment     Problem: Cardiovascular - Adult  Goal: Maintains optimal cardiac output and hemodynamic stability  2/21/2024 0244 by Alana Lee, RN  Outcome: Progressing  Flowsheets (Taken 2/21/2024 0244)  Maintains optimal cardiac output and hemodynamic stability:   Monitor blood pressure and heart 
  Problem: Safety - Adult  Goal: Free from fall injury  2/21/2024 1041 by Mayra Kate RN  Outcome: Progressing     Problem: Discharge Planning  Goal: Discharge to home or other facility with appropriate resources  2/21/2024 1041 by Mayra Kate RN  Outcome: Progressing     Problem: Pain  Goal: Verbalizes/displays adequate comfort level or baseline comfort level  2/21/2024 1041 by Mayra Kate RN  Outcome: Progressing     Problem: ABCDS Injury Assessment  Goal: Absence of physical injury  2/21/2024 1041 by Mayra Kate RN  Outcome: Progressing     Problem: Cardiovascular - Adult  Goal: Maintains optimal cardiac output and hemodynamic stability  2/21/2024 1041 by Mayra Kate RN  Outcome: Progressing     Problem: Cardiovascular - Adult  Goal: Absence of cardiac dysrhythmias or at baseline  2/21/2024 1041 by Mayra Kate RN  Outcome: Progressing     Problem: Skin/Tissue Integrity - Adult  Goal: Skin integrity remains intact  2/21/2024 1041 by Mayra Kate RN  Outcome: Progressing     Problem: Skin/Tissue Integrity - Adult  Goal: Incisions, wounds, or drain sites healing without S/S of infection  2/21/2024 1041 by Mayra Kate RN  Outcome: Progressing     Problem: Skin/Tissue Integrity - Adult  Goal: Oral mucous membranes remain intact  2/21/2024 1041 by Mayra Kate RN  Outcome: Progressing     Problem: Metabolic/Fluid and Electrolytes - Adult  Goal: Glucose maintained within prescribed range  2/21/2024 1041 by Marya Kate RN  Outcome: Progressing     Problem: Skin/Tissue Integrity  Goal: Absence of new skin breakdown  2/21/2024 1041 by Mayra Kate RN  Outcome: Progressing     Problem: Chronic Conditions and Co-morbidities  Goal: Patient's chronic conditions and co-morbidity symptoms are monitored and maintained or improved  2/21/2024 1041 by Mayra Kate RN  Outcome: Progressing     
  Problem: Safety - Adult  Goal: Free from fall injury  Outcome: Progressing     Problem: Discharge Planning  Goal: Discharge to home or other facility with appropriate resources  Outcome: Progressing     Problem: Pain  Goal: Verbalizes/displays adequate comfort level or baseline comfort level  Outcome: Progressing     Problem: ABCDS Injury Assessment  Goal: Absence of physical injury  Outcome: Progressing     Problem: Cardiovascular - Adult  Goal: Maintains optimal cardiac output and hemodynamic stability  Outcome: Progressing     Problem: Skin/Tissue Integrity - Adult  Goal: Skin integrity remains intact  Outcome: Progressing     Problem: Metabolic/Fluid and Electrolytes - Adult  Goal: Electrolytes maintained within normal limits  Outcome: Progressing     Problem: Metabolic/Fluid and Electrolytes - Adult  Goal: Glucose maintained within prescribed range  Outcome: Progressing     
  Problem: Safety - Adult  Goal: Free from fall injury  Outcome: Progressing     Problem: Discharge Planning  Goal: Discharge to home or other facility with appropriate resources  Outcome: Progressing     Problem: Pain  Goal: Verbalizes/displays adequate comfort level or baseline comfort level  Outcome: Progressing     Problem: ABCDS Injury Assessment  Goal: Absence of physical injury  Outcome: Progressing     Problem: Cardiovascular - Adult  Goal: Maintains optimal cardiac output and hemodynamic stability  Outcome: Progressing  Goal: Absence of cardiac dysrhythmias or at baseline  Outcome: Progressing     Problem: Skin/Tissue Integrity - Adult  Goal: Skin integrity remains intact  Outcome: Progressing  Goal: Incisions, wounds, or drain sites healing without S/S of infection  Outcome: Progressing  Goal: Oral mucous membranes remain intact  Outcome: Progressing     Problem: Metabolic/Fluid and Electrolytes - Adult  Goal: Electrolytes maintained within normal limits  Outcome: Progressing  Goal: Hemodynamic stability and optimal renal function maintained  Outcome: Progressing  Goal: Glucose maintained within prescribed range  Outcome: Progressing     Problem: Skin/Tissue Integrity  Goal: Absence of new skin breakdown  Description: 1.  Monitor for areas of redness and/or skin breakdown  2.  Assess vascular access sites hourly  3.  Every 4-6 hours minimum:  Change oxygen saturation probe site  4.  Every 4-6 hours:  If on nasal continuous positive airway pressure, respiratory therapy assess nares and determine need for appliance change or resting period.  Outcome: Progressing     
Fall risk assessment completed every shift. All precautions in place. Pt has call light within reach at all times. Room clear of clutter. Pt aware to call for assistance when getting up.   Pain/discomfort being managed with PRN analgesics per MD orders. Pt able to express presence and absence of pain and rate pain appropriately using numerical scale.    Intake/Output Summary (Last 24 hours) at 2/17/2024 0736  Last data filed at 2/16/2024 1923  Gross per 24 hour   Intake 240 ml   Output --   Net 240 ml     Vitals:    02/17/24 0718   BP: 120/62   Pulse: 86   Resp:    Temp: 98.2 °F (36.8 °C)   SpO2: 95%     Patient assessed for fall risk; fall precautions initiated. Patient and family instructed about safety devices. Environment kept free of clutter and adequate lighting provided.  Bed locked and in lowest position.  Call light within reach. Will continue to monitor.    
Fall risk assessment completed every shift. All precautions in place. Pt has call light within reach at all times. Room clear of clutter. Pt aware to call for assistance when getting up.   Pain/discomfort being managed with PRN analgesics per MD orders. Pt able to express presence and absence of pain and rate pain appropriately using numerical scale.    Intake/Output Summary (Last 24 hours) at 2/18/2024 0718  Last data filed at 2/18/2024 0211  Gross per 24 hour   Intake 600 ml   Output --   Net 600 ml     Vitals:    02/18/24 0438   BP: (!) 152/84   Pulse: 79   Resp: 16   Temp: 98.3 °F (36.8 °C)   SpO2: 99%     Patient assessed for fall risk; fall precautions initiated. Patient and family instructed about safety devices. Environment kept free of clutter and adequate lighting provided.  Bed locked and in lowest position.  Call light within reach. Will continue to monitor.    
integrity remains intact  2/21/2024 1517 by Mayra Kate RN  Outcome: Adequate for Discharge     Problem: Skin/Tissue Integrity - Adult  Goal: Skin integrity remains intact  2/21/2024 1517 by Mayra Kate RN  Outcome: Adequate for Discharge     Problem: Skin/Tissue Integrity - Adult  Goal: Incisions, wounds, or drain sites healing without S/S of infection  2/21/2024 1517 by Mayra Kate RN  Outcome: Adequate for Discharge     Problem: Skin/Tissue Integrity - Adult  Goal: Incisions, wounds, or drain sites healing without S/S of infection  2/21/2024 1517 by Mayra Kate RN  Outcome: Adequate for Discharge     Problem: Skin/Tissue Integrity - Adult  Goal: Oral mucous membranes remain intact  2/21/2024 1517 by Mayra Kate RN  Outcome: Adequate for Discharge     Problem: Skin/Tissue Integrity - Adult  Goal: Oral mucous membranes remain intact  2/21/2024 1517 by Mayra Kate RN  Outcome: Adequate for Discharge     Problem: Metabolic/Fluid and Electrolytes - Adult  Goal: Electrolytes maintained within normal limits  2/21/2024 1517 by Mayra Kate RN  Outcome: Adequate for Discharge     Problem: Metabolic/Fluid and Electrolytes - Adult  Goal: Electrolytes maintained within normal limits  2/21/2024 1517 by Mayra Kate RN  Outcome: Adequate for Discharge     Problem: Metabolic/Fluid and Electrolytes - Adult  Goal: Hemodynamic stability and optimal renal function maintained  2/21/2024 1517 by Mayra Kate RN  Outcome: Adequate for Discharge     Problem: Metabolic/Fluid and Electrolytes - Adult  Goal: Hemodynamic stability and optimal renal function maintained  2/21/2024 1517 by Mayra Kate RN  Outcome: Adequate for Discharge     Problem: Metabolic/Fluid and Electrolytes - Adult  Goal: Glucose maintained within prescribed range  2/21/2024 1517 by Mayra Kate RN  Outcome: Adequate for Discharge     Problem: Metabolic/Fluid and Electrolytes - Adult  Goal: Glucose maintained within

## 2024-02-21 NOTE — CARE COORDINATION
Case Management Discharge Note          Date / Time of Note: 2/21/2024 1:31 PM                  Patient Name: Cal Sanchez   YOB: 1960  Diagnosis: Bacteremia [R78.81]  Hyperkalemia [E87.5]  Hypoglycemia [E16.2]  Goals of care, counseling/discussion [Z71.89]  Marijuana smoker [F12.90]  Multiple skin tears [T14.8XXA]  Multiple falls [R29.6]  Full code status [Z78.9]  Closed head injury, initial encounter [S09.90XA]   Date / Time: 2/16/2024  4:36 AM    Financial:  Payor: PAULINE [place]ZA [place] / Plan: [employer] [place] DUAL / Product Type: *No Product type* /      Pharmacy:    [id number] - [place], [place] - [address] [place] [address] - P [phone] - F [phone]  [address] [place] [address]  [facility] [address]  Phone: [phone] Fax: [phone]    [facility] [id number] - [place] - [address]  - P [phone] - F [phone]  [address] [address]  Phone: [phone] Fax: [phone]      Assistance purchasing medications?: Potential Assistance Purchasing Medications: No  Assistance provided by Case Management: None at this time    DISCHARGE Disposition: Skilled Nursing Facility (SNF)    Nursing Facility:   Discharging to Facility/ Agency   Name: [facility]  Address:  [facility] [place] [address]   Phone:  [phone]  Fax:  [phone]    LOC at discharge: Skilled Nursing  DELFINO Completed: Yes               Notification completed in HENS/PAS?:  Yes : CM has completed HENS online through secure website for SNF admission at [facility].   Document ID #: [id number]    Transportation:  Transportation PLAN for discharge: EMS transportation   Mode of Transport: Ambulance stretcher - BLS  Reason for medical transport: Bed confined: Meets the following criteria 1) unable to get out of bed without assistance or ambulate, 2) unable to safely sit up in a wheelchair, 3) unable to maintain erect seating position in a chair for time needed for 
   02/17/24 1433   Readmission Assessment   Number of Days since last admission? 1-7 days   Previous Disposition Home with Home Health   Who is being Interviewed Patient   What was the patient's/caregiver's perception as to why they think they needed to return back to the hospital? Other (Comment)  (patient says \"I fell.\")   Did you visit your Primary Care Physician after you left the hospital, before you returned this time? No   Why weren't you able to visit your PCP? Did not have an appointment   Did you see a specialist, such as Cardiac, Pulmonary, Orthopedic Physician, etc. after you left the hospital? No   Who advised the patient to return to the hospital? Self-referral   Does the patient report anything that got in the way of taking their medications? No   In our efforts to provide the best possible care to you and others like you, can you think of anything that we could have done to help you after you left the hospital the first time, so that you might not have needed to return so soon? Arrange for more help when leaving the hospital  (pt agrees to rehab)       
DISCHARGE PLANNING:    [facility] able to accept patient at discharge.  Will start precert and attempt to arrange HD transport.  Once precert obtained will need HENS and transport.  Will notify CM when precert obtained.    #[phone]  Electronically signed by Jennifer Medel RN on 2/19/2024 at 1:54 PM    
DISCHARGE PLANNING:    Spoke to admissions with [facility], patient precert still pending at this time.  Will notify CM when precert obtained.  Patient will need HENS and transport.  Patient currently at dialysis.      #[phone]  Electronically signed by Jennifer Medel RN on 2/21/2024 at 10:17 AM    
[facility]    Patient had SOC with [facility] nurse on 2/15.  No appropriate for HC, multiple falls since home on 2/13/24, missed HD on 2/14.  Per [facility] nurse, patient alyssa Zaman is concerned she cannot take care of him, wants him to go to SNF.  CM notified.     Gordo Hayes RN, BSN CTN  [facility] [phone]   
[facility] is reviewing.   Referral pending to [facility] as well.  Once accepting facility is found, will need a pre cert.    Electronically signed by Iliana Torres RN Case Management on 2/18/2024 at 9:41 AM    
Management to discuss the discharge plan with any other family members/significant others, and if so, who? No  Plans to Return to Present Housing: Unknown at present  Other Identified Issues/Barriers to RETURNING to current housing: limited family support, non compliance  Potential Assistance needed at discharge: Skilled Nursing Facility            Potential DME:    Patient expects to discharge to: Skilled nursing facility  Plan for transportation at discharge: Other (see comment) (BLS)    Financial    Payor: [employer]ENTER [employer] [place] / Plan: [employer]ENTER [employer] [place] DUAL / Product Type: *No Product type* /     Does insurance require precert for SNF: Yes    Potential assistance Purchasing Medications: No  Meds-to-Beds request:        [id number] - [place], [place] - [address] [place] [address] - P [phone] - F [phone]  [address] [address] [facility] [address]  Phone: [phone] Fax: [phone]    [facility] [id number] - [place], [place] - [address]  - P [phone] - F [phone]  [address]  [facility] [address]  Phone: [phone] Fax: [phone]      Notes:    Factors facilitating achievement of predicted outcomes: Pleasant    Barriers to discharge: Limited family support and history of falls    Additional Case Management Notes: Patient agrees he needs to go to rehab. Discussed ARU vs SNF. Patient doesn't feel he can tolerate 3hrs/day in ARU and wants SNF. SNF list provided. Patient would like referrals to [facility] as these facilities are close to his dialysis. Referrals made.     The Plan for Transition of Care is related to the following treatment goals of Bacteremia [R78.81]  Hyperkalemia [E87.5]  Hypoglycemia [E16.2]  Goals of care, counseling/discussion [Z71.89]  Marijuana smoker [F12.90]  Multiple skin tears [T14.8XXA]  Multiple falls [R29.6]  Full code status [Z78.9]  Closed head injury, initial encounter [S09.90XA]    IF APPLICABLE: The Patient and/or patient representative Cal and his family were

## 2024-02-21 NOTE — DIALYSIS
Treatment time: 3 hours  Net UF: 1600 ml     Pre weight: 91.6 kg  Post weight:90.0 kg      Access used: R cvc     Access function: well with  ml/min     Medications or blood products given: heparin and retacrit      Regular outpatient schedule: mwf     Summary of response to treatment: Patient tolerated treatment well and without any complications. Patient remained stable throughout entire treatment and upon the exiting the dialysis suite via transport.     Report given to Mayra LOWERY and copy of dialysis treatment record placed in chart, to be scanned into EMR.

## 2024-02-21 NOTE — PROGRESS NOTES
Nephrology Progress Note   [facility]      Reason for consultation: ESRD on HD MWF; follows with  Nephrology     History of Present Illness: Cal Sanchez is a 62 yo male with a PMHx of ESRD on HD MWF, CAD, afib, DM, HTN, HLD, depression. Patient presented to  ED on 2/16/2024 with complaints of falling at home. Imaging negative for injury. Bleeding skin tears noted. He was recently admitted to  2/4 to 2/13/2024. At this time he received LLE angiography s/p orbital atherectomy and balloon angioplasty of the proximal AT and PT along with left hallux amputation by podiatry 2/12. States foot is still painful. States he is unsure if there has been drainage from recent surgical site.     We have been consulted for ESRD management. Patient dialyzes at [facility] on F via R TDC. Follows with  Nephrology outpatient. Last tx was 2/12/2024. Missed treatment on Wednesday -- states there was a disagreement of some sort at home and he was in a bad mood. Denies N/V/D. Endorses baseline FERNANDES. K+ is 5.7 mmol/L, CO2 18, BUN 42. He received 10 g of Lokelma, insulin and D10, and calcium gluconate in the ED.    Subjective:      Patient seen and examined.   Labs and chart reviewed.    There were not complications last night.    Patient review of systems: No FERNANDES/ SOB.     Scheduled Meds:   mupirocin   Topical BID    epoetin chata-epbx  10,000 Units IntraVENous Once per day on Mon Wed Fri    venlafaxine  75 mg Oral Daily with breakfast    sevelamer  2,400 mg Oral TID WC    QUEtiapine  150 mg Oral Nightly    midodrine  10 mg Oral Q MWF    cinacalcet  60 mg Oral Once per day on Mon Wed Fri    carvedilol  6.25 mg Oral BID WC    buPROPion  150 mg Oral Q72H    atorvastatin  80 mg Oral Nightly    aspirin  81 mg Oral Daily    sodium chloride flush  10 mL IntraVENous 2 times per day    heparin (porcine)  5,000 Units SubCUTAneous 3 times per day    midodrine  10 mg Oral Q MWF    ceFAZolin  2,000 mg IntraVENous Once per day on Mon 
  Nephrology Progress Note   [email or web address]      Reason for consultation: ESRD on HD MWF; follows with  Nephrology     Subjective:    The patient has been seen and examined. Labs and chart reviewed. Resting in chair. HD yesterday with net UF of 3 L. Pre-cert is pending to [facility].    There were not complications overnight.    Patient review of systems: Baseline SOB. Denies N/V/D.       Allergies:  No Known Allergies     Scheduled Meds:   mupirocin   Topical BID    epoetin chata-epbx  10,000 Units IntraVENous Once per day on     venlafaxine  75 mg Oral Daily with breakfast    sevelamer  2,400 mg Oral TID WC    QUEtiapine  150 mg Oral Nightly    midodrine  10 mg Oral Q MWF    cinacalcet  60 mg Oral Once per day on     carvedilol  6.25 mg Oral BID WC    buPROPion  150 mg Oral Q72H    atorvastatin  80 mg Oral Nightly    aspirin  81 mg Oral Daily    sodium chloride flush  10 mL IntraVENous 2 times per day    heparin (porcine)  5,000 Units SubCUTAneous 3 times per day    midodrine  10 mg Oral Q MWF    ceFAZolin  2,000 mg IntraVENous Once per day on         dextrose      sodium chloride Stopped (24 1848)       PRN Meds:glucose, dextrose bolus **OR** dextrose bolus, glucagon (rDNA), dextrose, calcium carbonate, oxyCODONE-acetaminophen, sodium chloride flush, sodium chloride, ondansetron, polyethylene glycol, acetaminophen **OR** acetaminophen, heparin (porcine)    Physical Exam:    TEMPERATURE:  Current - Temp: 98.1 °F (36.7 °C); Max - Temp  Av.2 °F (36.8 °C)  Min: 98 °F (36.7 °C)  Max: 98.6 °F (37 °C)  RESPIRATIONS RANGE: Resp  Av.5  Min: 16  Max: 18  PULSE RANGE: Pulse  Av.1  Min: 69  Max: 82  BLOOD PRESSURE RANGE:  Systolic (24hrs), Av , Min:124 , Max:149   ; Diastolic (24hrs), Av, Min:62, Max:89    24HR INTAKE/OUTPUT:    Intake/Output Summary (Last 24 hours) at 2024 1015  Last data filed at 2024 0600  Gross per 24 hour   Intake 240 ml 
  Nephrology Progress Note   [email or web address]      Reason for consultation: ESRD on HD MWF; follows with  Nephrology     Subjective:    The patient has been seen and examined. Labs and chart reviewed. Seen on HD with 1.6 L net UF and 3 K bath. Pre-cert is pending to [facility].    There were not complications overnight.    Patient review of systems: Baseline SOB. Denies N/V/D.       Allergies:  No Known Allergies     Scheduled Meds:   mupirocin   Topical BID    epoetin chata-epbx  10,000 Units IntraVENous Once per day on     venlafaxine  75 mg Oral Daily with breakfast    sevelamer  2,400 mg Oral TID WC    QUEtiapine  150 mg Oral Nightly    midodrine  10 mg Oral Q MWF    cinacalcet  60 mg Oral Once per day on     carvedilol  6.25 mg Oral BID WC    buPROPion  150 mg Oral Q72H    atorvastatin  80 mg Oral Nightly    aspirin  81 mg Oral Daily    sodium chloride flush  10 mL IntraVENous 2 times per day    heparin (porcine)  5,000 Units SubCUTAneous 3 times per day    midodrine  10 mg Oral Q MWF    ceFAZolin  2,000 mg IntraVENous Once per day on         dextrose      sodium chloride Stopped (24 1848)       PRN Meds:glucose, dextrose bolus **OR** dextrose bolus, glucagon (rDNA), dextrose, calcium carbonate, oxyCODONE-acetaminophen, sodium chloride flush, sodium chloride, ondansetron, polyethylene glycol, acetaminophen **OR** acetaminophen, heparin (porcine)    Physical Exam:    TEMPERATURE:  Current - Temp: 98.2 °F (36.8 °C); Max - Temp  Av.9 °F (36.6 °C)  Min: 97.2 °F (36.2 °C)  Max: 98.2 °F (36.8 °C)  RESPIRATIONS RANGE: Resp  Av.6  Min: 16  Max: 20  PULSE RANGE: Pulse  Av.5  Min: 70  Max: 87  BLOOD PRESSURE RANGE:  Systolic (24hrs), Av , Min:104 , Max:133   ; Diastolic (24hrs), Av, Min:59, Max:78    24HR INTAKE/OUTPUT:    Intake/Output Summary (Last 24 hours) at 2024 1022  Last data filed at 2024 1345  Gross per 24 hour   Intake 360 ml   Output -- 
  Nephrology Progress Note   [email or web address]      Reason for consultation: ESRD on HD MWF; follows with  Nephrology     History of Present Illness: Cal Sanchez is a 64 yo male with a PMHx of ESRD on HD MWF, CAD, afib, DM, HTN, HLD, depression. Patient presented to  ED on 2/16/2024 with complaints of falling at home. Imaging negative for injury. Bleeding skin tears noted. He was recently admitted to  2/4 to 2/13/2024. At this time he received LLE angiography s/p orbital atherectomy and balloon angioplasty of the proximal AT and PT along with left hallux amputation by podiatry 2/12. States foot is still painful. States he is unsure if there has been drainage from recent surgical site.     We have been consulted for ESRD management. Patient dialyzes at [facility] on F via R TDC. Follows with  Nephrology outpatient. Last tx was 2/12/2024. Missed treatment on Wednesday -- states there was a disagreement of some sort at home and he was in a bad mood. Denies N/V/D. Endorses baseline FERNANDES. K+ is 5.7 mmol/L, CO2 18, BUN 42. He received 10 g of Lokelma, insulin and D10, and calcium gluconate in the ED.    Subjective:      Patient seen and examined. Labs and chart reviewed. Resting in recliner    There were not complications last night.    Patient review of systems: Baseline SOB. L foot pain. Denies N/V/D.    Scheduled Meds:   mupirocin   Topical BID    [START ON 2/19/2024] epoetin chata-epbx  10,000 Units IntraVENous Once per day on Mon Wed Fri    venlafaxine  75 mg Oral Daily with breakfast    sevelamer  2,400 mg Oral TID WC    QUEtiapine  150 mg Oral Nightly    midodrine  10 mg Oral Q MWF    cinacalcet  60 mg Oral Once per day on Mon Wed Fri    carvedilol  6.25 mg Oral BID WC    buPROPion  150 mg Oral Q72H    atorvastatin  80 mg Oral Nightly    aspirin  81 mg Oral Daily    sodium chloride flush  10 mL IntraVENous 2 times per day    heparin (porcine)  5,000 Units SubCUTAneous 3 times per day    midodrine  10 mg 
  Nephrology Progress Note   [facility]      Reason for consultation: ESRD on HD MWF; follows with  Nephrology     History of Present Illness: Cal Sanchez is a 62 yo male with a PMHx of ESRD on HD MWF, CAD, afib, DM, HTN, HLD, depression. Patient presented to  ED on 2/16/2024 with complaints of falling at home. Imaging negative for injury. Bleeding skin tears noted. He was recently admitted to  2/4 to 2/13/2024. At this time he received LLE angiography s/p orbital atherectomy and balloon angioplasty of the proximal AT and PT along with left hallux amputation by podiatry 2/12. States foot is still painful. States he is unsure if there has been drainage from recent surgical site.     We have been consulted for ESRD management. Patient dialyzes at [facility] on F via R TDC. Follows with  Nephrology outpatient. Last tx was 2/12/2024. Missed treatment on Wednesday -- states there was a disagreement of some sort at home and he was in a bad mood. Denies N/V/D. Endorses baseline FERNANDES. K+ is 5.7 mmol/L, CO2 18, BUN 42. He received 10 g of Lokelma, insulin and D10, and calcium gluconate in the ED.    Subjective:      Patient seen and examined. Post HD   Labs and chart reviewed.    There were not complications last night.    Patient review of systems: No FERNANDES/ SOB.     Scheduled Meds:   mupirocin   Topical BID    epoetin chata-epbx  10,000 Units IntraVENous Once per day on Mon Wed Fri    venlafaxine  75 mg Oral Daily with breakfast    sevelamer  2,400 mg Oral TID WC    QUEtiapine  150 mg Oral Nightly    midodrine  10 mg Oral Q MWF    cinacalcet  60 mg Oral Once per day on Mon Wed Fri    carvedilol  6.25 mg Oral BID WC    buPROPion  150 mg Oral Q72H    atorvastatin  80 mg Oral Nightly    aspirin  81 mg Oral Daily    sodium chloride flush  10 mL IntraVENous 2 times per day    heparin (porcine)  5,000 Units SubCUTAneous 3 times per day    midodrine  10 mg Oral Q MWF    ceFAZolin  2,000 mg IntraVENous Once per day 
  Physician Progress Note      PATIENT:               NATHALIA HOFF  CSN #:                  [id number]  :                       1960  ADMIT DATE:       2024 4:36 AM  DISCH DATE:  RESPONDING  PROVIDER #:        SAMEERA OH          QUERY TEXT:    Patient admitted with superficial ulceration, right fifth toe, Per Procedure   note dated 24 documentation of excisional debridement. To accurately   reflect the procedure performed please document the deepest depth of tissue   removed as down to and including:    The medical record reflects the following:  Risk Factors: superficial ulceration, right fifth toe  DM  Clinical Indicators: superficial ulceration, right fifth toe \"perform sharp   excisional debridement at today's encounter. Using a sterile #15 blade, the   blister noted to the fifth toe of the right foot was placed.  No blood loss   appreciated.\"  Treatment: Excisional debridement  Options provided:  -- Excisional debridement of skin  -- Excisional debridement of subcutaneous tissue  -- Excisional debridement of fascia  -- Excisional debridement of muscle  -- Other - I will add my own diagnosis  -- Disagree - Not applicable / Not valid  -- Disagree - Clinically unable to determine / Unknown  -- Refer to Clinical Documentation Reviewer    PROVIDER RESPONSE TEXT:    Excisional debridement of skin was performed of R fifth toe during procedure   on 24.    Query created by: Tamika Lee on 2024 11:00 AM      Electronically signed by:  SAMEERA OH 2024 12:00 PM          
4 Eyes Skin Assessment     NAME:  Cal Sanchez  YOB: 1960  MEDICAL RECORD NUMBER:  [id number]    The patient is being assessed for  Admission    I agree that at least one RN has performed a thorough Head to Toe Skin Assessment on the patient. ALL assessment sites listed below have been assessed.      Areas assessed by both nurses:    Head, Face, Ears, Shoulders, Back, Chest, Arms, Elbows, Hands, Sacrum. Buttock, Coccyx, Ischium, Legs. Feet and Heels, and Under Medical Devices         Does the Patient have a Wound? Yes wound(s) were present on assessment. LDA wound assessment was Initiated and completed by RN       Smith Prevention initiated by RN: Yes  Wound Care Orders initiated by RN: Yes    Pressure Injury (Stage 3,4, Unstageable, DTI, NWPT, and Complex wounds) if present, place Wound referral order by RN under : No    New Ostomies, if present place, Ostomy referral order under : No     Nurse 1 eSignature: Electronically signed by Eleanor Olmstead RN on 2/16/24 at 3:31 PM EST    **SHARE this note so that the co-signing nurse can place an eSignature**    Nurse 2 eSignature: Electronically signed by Charlene Pandya RN on 2/16/24 at 6:48 PM EST   
Attempted to see patient during rounds this afternoon. Off unit. Will add him to list for tomorrow.     Yared Domingo DPM  [facility]  [phone]    
Comprehensive Nutrition Assessment    Type and Reason for Visit:  Wound    Nutrition Recommendations/Plan:   Continue Current diet   Start Nepro with Carbsteady once a day      Malnutrition Assessment:  Malnutrition Status:  No malnutrition (02/17/24 1415)      Nutrition Assessment:    Pt presented upon admission with fall and hyperklaemia. Pt was recently d/c 2/13 s/p left hallux, 2nd and 3rd toe amputation. Pt missed outpatient dialysis appt on Wed, and then suffered a fall. IP HD 2/16 Pre weight: 95.3 kg  Post weight:91.9 kg  EDW: 90 kg per nephrology note. Current diet - Regular; No Added Salt (3-4 gm); Low Potassium (Less than 3000 mg/day); Low Phosphorus (Less than 1000 mg); Less than 60 gm. RD spoke with pt at bedside. Reports feeling a little depressed about everything going on, but is doing his best to be positive. Denies NVD, endorses constipation. Discussed hydration, but may be result from medications. Last BM unknown. Pt describes variable appeitite and is agreeable to trying a supplement for wound healing. Labs reviewed Na 131, BUN 25, Creatinine 6.3, eGFR 9. WIll continue to monitor.    Nutrition Related Findings:    Na 131, BUN 25, Creatinine 6.3, eGFR 9. Wound Type: Multiple       Current Nutrition Intake & Therapies:    Average Meal Intake: 26-50%, % (Variable intake depending on how he feels.)  Average Supplements Intake: None Ordered  ADULT DIET; Regular; No Added Salt (3-4 gm); Low Potassium (Less than 3000 mg/day); Low Phosphorus (Less than 1000 mg); Less than 60 gm    Anthropometric Measures:  Height: 177.8 cm (5' 10\")  Ideal Body Weight (IBW): 166 lbs (75 kg)       Current Body Weight: 92.1 kg (203 lb 0.7 oz), 122.3 % IBW. Weight Source: Bed Scale  Current BMI (kg/m2): 29.1                          BMI Categories: Overweight (BMI 25.0-29.9)    Estimated Daily Nutrient Needs:  Energy Requirements Based On: Kcal/kg  Weight Used for Energy Requirements: Current  Energy (kcal/day): 
EDSBAR report has been reviewed.      Electronically signed by Eleanor Olmstead RN on 2/16/2024 at 8:52 AM     
Hospitalist   Progress Note    Patient Name: Cal Sanchez  PCP: Tania Moses MD  Date of Admission: 2/16/2024    Chief Complaint on Admission: Increased fatigue, falls at home  Chief diagnosis after evaluation: ESRD on hemodialysis (missed HD), hyperkalemia    Brief Synopsis: Patient is a 63 y.o. man with a medical history that includes hypertension, hyperlipidemia, diabetes mellitus type II, coronary artery disease, atrial fibrillation, non-morbid obesity and hemodialysis dependent ESRD. He was recently discharged on 02/13/2024 after having his left Hallux, 2nd and 3rd toe amputation by Podiatry 2/12/2024 by Dr. Jordan secondary to diabetic foot infection and possible osteomyelitis with MSSA bacteremia and sepsis.. He was discharged on IV Cefazolin 2 g with hemodialysis-stop date 3/6/2024 who was admitted on 2/16/2024 for evaluation and treatment of ESRD on hemodialysis (missed HD), hyperkalemia    Pt Seen/Examined and Chart Reviewed.     Subjective: Pt has no new complaints when seen today    Objective:  Allergies  Patient has no known allergies.    Medications    Scheduled Meds:   mupirocin   Topical BID    [START ON 2/19/2024] epoetin chata-epbx  10,000 Units IntraVENous Once per day on Mon Wed Fri    venlafaxine  75 mg Oral Daily with breakfast    sevelamer  2,400 mg Oral TID WC    QUEtiapine  150 mg Oral Nightly    midodrine  10 mg Oral Q MWF    cinacalcet  60 mg Oral Once per day on Mon Wed Fri    carvedilol  6.25 mg Oral BID WC    buPROPion  150 mg Oral Q72H    atorvastatin  80 mg Oral Nightly    aspirin  81 mg Oral Daily    sodium chloride flush  10 mL IntraVENous 2 times per day    heparin (porcine)  5,000 Units SubCUTAneous 3 times per day    midodrine  10 mg Oral Q MWF    ceFAZolin  2,000 mg IntraVENous Once per day on Mon Wed Fri     Infusions:   sodium chloride Stopped (02/16/24 9928)     PRN Meds:  oxyCODONE-acetaminophen, sodium chloride flush, sodium chloride, ondansetron, 
Hospitalist   Progress Note    Patient Name: Cal Sanchez  PCP: Tania Moses MD  Date of Admission: 2/16/2024    Chief Complaint on Admission: Increased fatigue, falls at home  Chief diagnosis after evaluation: ESRD on hemodialysis (missed HD), hyperkalemia    Brief Synopsis: Patient is a 63 y.o. man with a medical history that includes hypertension, hyperlipidemia, diabetes mellitus type II, coronary artery disease, atrial fibrillation, non-morbid obesity and hemodialysis dependent ESRD. He was recently discharged on 02/13/2024 after having his left Hallux, 2nd and 3rd toe amputation by Podiatry 2/12/2024 by Dr. Jordan secondary to diabetic foot infection and possible osteomyelitis with MSSA bacteremia and sepsis.. He was discharged on IV Cefazolin 2 g with hemodialysis-stop date 3/6/2024 who was admitted on 2/16/2024 for evaluation and treatment of ESRD on hemodialysis (missed HD), hyperkalemia    Pt Seen/Examined and Chart Reviewed.     Subjective: Pt has no new issues today. Continues to await SNF placement. Otherwise homeless    Objective:  Allergies  Patient has no known allergies.    Medications    Scheduled Meds:   mupirocin   Topical BID    epoetin chata-epbx  10,000 Units IntraVENous Once per day on Mon Wed Fri    venlafaxine  75 mg Oral Daily with breakfast    sevelamer  2,400 mg Oral TID WC    QUEtiapine  150 mg Oral Nightly    midodrine  10 mg Oral Q MWF    cinacalcet  60 mg Oral Once per day on Mon Wed Fri    carvedilol  6.25 mg Oral BID WC    buPROPion  150 mg Oral Q72H    atorvastatin  80 mg Oral Nightly    aspirin  81 mg Oral Daily    sodium chloride flush  10 mL IntraVENous 2 times per day    heparin (porcine)  5,000 Units SubCUTAneous 3 times per day    midodrine  10 mg Oral Q MWF    ceFAZolin  2,000 mg IntraVENous Once per day on Mon Wed Fri     Infusions:   dextrose      sodium chloride Stopped (02/16/24 0732)     PRN Meds:  glucose, dextrose bolus **OR** dextrose bolus, glucagon 
Hospitalist   Progress Note    Patient Name: Cal Sanchez  PCP: Tania Moses MD  Date of Admission: 2/16/2024    Chief Complaint on Admission: Increased fatigue, falls at home  Chief diagnosis after evaluation: ESRD on hemodialysis (missed HD), hyperkalemia    Brief Synopsis: Patient is a 63 y.o. man with a medical history that includes hypertension, hyperlipidemia, diabetes mellitus type II, coronary artery disease, atrial fibrillation, non-morbid obesity and hemodialysis dependent ESRD. He was recently discharged on 02/13/2024 after having his left Hallux, 2nd and 3rd toe amputation by Podiatry 2/12/2024 by Dr. Jordan secondary to diabetic foot infection and possible osteomyelitis with MSSA bacteremia and sepsis.. He was discharged on IV Cefazolin 2 g with hemodialysis-stop date 3/6/2024 who was admitted on 2/16/2024 for evaluation and treatment of ESRD on hemodialysis (missed HD), hyperkalemia    Pt Seen/Examined and Chart Reviewed.     Subjective: Pt has no new issues today. Continues to await SNF placement. Otherwise homeless    Objective:  Allergies  Patient has no known allergies.    Medications    Scheduled Meds:   mupirocin   Topical BID    epoetin chata-epbx  10,000 Units IntraVENous Once per day on Mon Wed Fri    venlafaxine  75 mg Oral Daily with breakfast    sevelamer  2,400 mg Oral TID WC    QUEtiapine  150 mg Oral Nightly    midodrine  10 mg Oral Q MWF    cinacalcet  60 mg Oral Once per day on Mon Wed Fri    carvedilol  6.25 mg Oral BID WC    buPROPion  150 mg Oral Q72H    atorvastatin  80 mg Oral Nightly    aspirin  81 mg Oral Daily    sodium chloride flush  10 mL IntraVENous 2 times per day    heparin (porcine)  5,000 Units SubCUTAneous 3 times per day    midodrine  10 mg Oral Q MWF    ceFAZolin  2,000 mg IntraVENous Once per day on Mon Wed Fri     Infusions:   dextrose      sodium chloride Stopped (02/16/24 5098)     PRN Meds:  glucose, dextrose bolus **OR** dextrose bolus, glucagon 
Hospitalist   Progress Note    Patient Name: Cal Sanchez  PCP: Tania Moses MD  Date of Admission: 2/16/2024    Chief Complaint on Admission: Increased fatigue, falls at home  Chief diagnosis after evaluation: ESRD on hemodialysis (missed HD), hyperkalemia    Brief Synopsis: Patient is a 63 y.o. man with a medical history that includes hypertension, hyperlipidemia, diabetes mellitus type II, coronary artery disease, atrial fibrillation, non-morbid obesity and hemodialysis dependent ESRD. He was recently discharged on 02/13/2024 after having his left Hallux, 2nd and 3rd toe amputation by Podiatry 2/12/2024 by Dr. Jordan secondary to diabetic foot infection and possible osteomyelitis with MSSA bacteremia and sepsis.. He was discharged on IV Cefazolin 2 g with hemodialysis-stop date 3/6/2024 who was admitted on 2/16/2024 for evaluation and treatment of ESRD on hemodialysis (missed HD), hyperkalemia    Pt Seen/Examined and Chart Reviewed.     Subjective: Pt has no new issues today. Continues to await SNF placement. Otherwise homeless    Objective:  Allergies  Patient has no known allergies.    Medications    Scheduled Meds:   mupirocin   Topical BID    epoetin chata-epbx  10,000 Units IntraVENous Once per day on Mon Wed Fri    venlafaxine  75 mg Oral Daily with breakfast    sevelamer  2,400 mg Oral TID WC    QUEtiapine  150 mg Oral Nightly    midodrine  10 mg Oral Q MWF    cinacalcet  60 mg Oral Once per day on Mon Wed Fri    carvedilol  6.25 mg Oral BID WC    buPROPion  150 mg Oral Q72H    atorvastatin  80 mg Oral Nightly    aspirin  81 mg Oral Daily    sodium chloride flush  10 mL IntraVENous 2 times per day    heparin (porcine)  5,000 Units SubCUTAneous 3 times per day    midodrine  10 mg Oral Q MWF    ceFAZolin  2,000 mg IntraVENous Once per day on Mon Wed Fri     Infusions:   dextrose      sodium chloride Stopped (02/16/24 9075)     PRN Meds:  glucose, dextrose bolus **OR** dextrose bolus, glucagon 
Hospitalist   Progress Note    Patient Name: Cal Sanchez  PCP: Tania Moses MD  Date of Admission: 2/16/2024    Chief Complaint on Admission: Increased fatigue, falls at home  Chief diagnosis after evaluation: ESRD on hemodialysis (missed HD), hyperkalemia    Brief Synopsis: Patient is a 63 y.o. man with a medical history that includes hypertension, hyperlipidemia, diabetes mellitus type II, coronary artery disease, atrial fibrillation, non-morbid obesity and hemodialysis dependent ESRD. He was recently discharged on 02/13/2024 after having his left Hallux, 2nd and 3rd toe amputation by Podiatry 2/12/2024 by Dr. Jordan secondary to diabetic foot infection and possible osteomyelitis with MSSA bacteremia and sepsis.. He was discharged on IV Cefazolin 2 g with hemodialysis-stop date 3/6/2024 who was admitted on 2/16/2024 for evaluation and treatment of ESRD on hemodialysis (missed HD), hyperkalemia    Pt Seen/Examined and Chart Reviewed.     Subjective: Pt is feeling better and has no new complaints. PT/OT recommending ARU    Objective:  Allergies  Patient has no known allergies.    Medications    Scheduled Meds:   mupirocin   Topical BID    [START ON 2/19/2024] epoetin chata-epbx  10,000 Units IntraVENous Once per day on Mon Wed Fri    venlafaxine  75 mg Oral Daily with breakfast    sevelamer  2,400 mg Oral TID     QUEtiapine  150 mg Oral Nightly    midodrine  10 mg Oral Q MWF    cinacalcet  60 mg Oral Once per day on Mon Wed Fri    carvedilol  6.25 mg Oral BID WC    [START ON 2/18/2024] buPROPion  150 mg Oral Q72H    atorvastatin  80 mg Oral Nightly    aspirin  81 mg Oral Daily    sodium chloride flush  10 mL IntraVENous 2 times per day    heparin (porcine)  5,000 Units SubCUTAneous 3 times per day    midodrine  10 mg Oral Q MWF    ceFAZolin  2,000 mg IntraVENous Once per day on Mon Wed Fri     Infusions:   sodium chloride Stopped (02/16/24 4468)     PRN Meds:  oxyCODONE-acetaminophen, sodium 
[facility]  Hypoglycemia Event and Prevention Plan      NAME: Cal Sanchez  MEDICAL RECORD NUMBER:  [id number]  AGE: 63 y.o.   GENDER: male  : 1960  EPISODE DATE:  2024     Data     Recent Labs     24  1021 24  1045 24  1103 24  1133 24  1146 24  1217   POCGLU 202* 60* 122* 87 83 130*        Latest Reference Range & Units 24 05:08   Glucose, Random 70 - 99 mg/dL 63 (L)   (L): Data is abnormally low    HgBA1c:    Lab Results   Component Value Date/Time    LABA1C 4.3 2023 01:04 PM       BUN/Creatinine:    Lab Results   Component Value Date/Time    BUN 41 2024 05:08 AM    CREATININE 9.0 2024 05:08 AM       Medications  Scheduled Medications:   mupirocin   Topical BID    epoetin chata-epbx  10,000 Units IntraVENous Once per day on     venlafaxine  75 mg Oral Daily with breakfast    sevelamer  2,400 mg Oral TID WC    QUEtiapine  150 mg Oral Nightly    midodrine  10 mg Oral Q MWF    cinacalcet  60 mg Oral Once per day on     carvedilol  6.25 mg Oral BID WC    buPROPion  150 mg Oral Q72H    atorvastatin  80 mg Oral Nightly    aspirin  81 mg Oral Daily    sodium chloride flush  10 mL IntraVENous 2 times per day    heparin (porcine)  5,000 Units SubCUTAneous 3 times per day    midodrine  10 mg Oral Q MWF    ceFAZolin  2,000 mg IntraVENous Once per day on        Diet  Current diet/supplement order: ADULT DIET; Regular; No Added Salt (3-4 gm); Low Potassium (Less than 3000 mg/day); Low Phosphorus (Less than 1000 mg); Less than 60 gm  ADULT ORAL NUTRITION SUPPLEMENT; Dinner; Renal Oral Supplement     Recorded PO: PO Meals Eaten (%): 76 - 100% last meal in flowsheets      Action      Treatment (tremaine all that apply): Per staff nurse pt currently eating- will recheck glucose      Physician Notified of event: Yes, Cedrick Gonzales MD    Responce     Achieved POCT Blood Glucose greater than 70 mg/dl: Yes, over 
Medication Reconciliation    List of medications patient is currently taking is complete.     Source of information: 1. Conversation with patient at bedside                                      2. EPIC records         Notes regarding home medications:   1. Patient was discharged 2/13/24 on cefazolin 2 g three times weekly with dialysis until 3/6/24. He has not started this due to not going to dialysis on Wednesday.       Gisel Arvizu [facility], PharmD, 2/16/2024 11:02 AM      
Occupational Therapy  Facility/Department: [address] PROGRESSIVE CARE  Occupational Therapy Initial Assessment    Name: Cal Sanchez  : 1960  MRN: [id number]  Date of Service: 2024    Discharge Recommendations:  5-7 sessions per week, Patient would benefit from continued therapy after discharge  OT Equipment Recommendations  Other: TBD by d/c site    Cal Sanchez scored a 18/24 on the AM-PAC ADL Inpatient form. Current research shows that an AM-PAC score of 17 or less is typically not associated with a discharge to the patient's home setting. Based on the patient's AM-PAC score and their current ADL deficits, it is recommended that the patient have 5-7 sessions per week of Occupational Therapy at d/c to increase the patient's independence.  At this time, this patient demonstrates complex nursing, medical, and rehabilitative needs, and would benefit from intensive rehabilitation services upon discharge from the Inpatient setting.  This patient demonstrates the ability to participate in and benefit from an intensive therapy program with a coordinated interdisciplinary team approach to foster frequent, structured, and documented communication among disciplines, who will work together to establish, prioritize, and achieve treatment goals. Please see assessment section for further patient specific details.    If patient discharges prior to next session this note will serve as a discharge summary.  Please see below for the latest assessment towards goals.       Patient Diagnosis(es): The primary encounter diagnosis was Hyperkalemia. Diagnoses of Multiple falls, Multiple skin tears, Hypoglycemia, Closed head injury, initial encounter, Bacteremia, Marijuana smoker, Goals of care, counseling/discussion, Full code status, and Osteomyelitis of left foot, unspecified type (HCC) were also pertinent to this visit.  Past Medical History:  has a past medical history of Atrial fibrillation (HCC), CAD (coronary artery 
Occupational Therapy  Facility/Department: [facility] PROGRESSIVE CARE  Occupational Therapy Daily Note  Name: Cal Sanchez  : 1960  MRN: [id number]  Date of Service: 2024    Discharge Recommendations:  Patient would benefit from continued therapy after discharge, 3-5 sessions per week   Cal Sanchez scored a 16/24 on the AM-PAC ADL Inpatient form. Current research shows that an AM-PAC score of 17 or less is typically not associated with a discharge to the patient's home setting. Based on the patient's AM-PAC score and their current ADL deficits, it is recommended that the patient have 3-5 sessions per week of Occupational Therapy at d/c to increase the patient's independence.  Please see assessment section for further patient specific details.    If patient discharges prior to next session this note will serve as a discharge summary.  Please see below for the latest assessment towards goals.         Patient Diagnosis(es): The primary encounter diagnosis was Hyperkalemia. Diagnoses of Multiple falls, Multiple skin tears, Hypoglycemia, Closed head injury, initial encounter, Bacteremia, Marijuana smoker, Goals of care, counseling/discussion, Full code status, and Osteomyelitis of left foot, unspecified type (HCC) were also pertinent to this visit.  Past Medical History:  has a past medical history of Atrial fibrillation (HCC), CAD (coronary artery disease), Chronic kidney disease, Depression, Diabetes mellitus (HCC), Dialysis patient (HCC), Glaucoma, Hemodialysis patient (HCC), Hyperlipidemia, Hypertension, RLL pneumonia, and Sleep apnea.  Past Surgical History:  has a past surgical history that includes Pacemaker insertion; pacemaker placement; Colonoscopy; Toe amputation (Left, 2023); and Toe amputation (Left, 2024).    Treatment Diagnosis: decreased balance/fxl mobility      Assessment   Performance deficits / Impairments: Decreased functional mobility ;Decreased balance;Decreased ADL 
Occupational Therapy  Pt out of room to dialysis. Will follow and attempt as schedule permits. Refer to the last note for status if pt is discharged.  Josefina PETER/JOAQUIN,[id number]    
Patient arrived to PCU from the ER. Pt is awake alert and oriented able to make needs known. Pt smells like marijuana and when asked does admit to smoking marijuana. Pt is aware that he cannot smoke here at the hospital. Pt placed on telemetry monitor and verified with CMU. Pt oriented to room call light and safety measures. Pt instructed not to get out of bed on his own. Verbalized understanding. Bed alarm on.  
Physical Therapy  Attempt Note    Name:Cal Sanchez  :1960  MRN:[id number]  Room: B2L-3837/5124-01    Date of Service: 2024    Patient chart reviewed. PT attempted to see for PT tx at this time. Pt off floor for HD at time of attempt.  Will attempt again as therapy schedule permits.      If patient is discharged prior to the next physical therapy visit; Please see last written PT note for discharge status.               Electronically signed by Paula Kim, PT on 2024 at 10:23 AM    Paula Kim PT, DPT [id number] 24 10:24 AM        
Physical Therapy  Facility/Department: [address] PROGRESSIVE CARE  Physical Therapy Treatment    Name: Cal Sanchez  : 1960  MRN: [id number]  Date of Service: 2024    Discharge Recommendations:  Patient would benefit from continued therapy after discharge, Patient able to tolerate 3hrs of therapy a day (5-7)   PT Equipment Recommendations  Other: will monitor    Cal Sanchez scored a  on the AM-PAC short mobility form. Current research shows that an AM-PAC score of 17 or less is typically not associated with a discharge to the patient's home setting. Based on the patient's AM-PAC score and their current functional mobility deficits, it is recommended that the patient have 3-5 sessions per week of Physical Therapy at d/c to increase the patient's independence.  Please see assessment section for further patient specific details.    If patient discharges prior to next session this note will serve as a discharge summary.  Please see below for the latest assessment towards goals.        Patient Diagnosis(es): The primary encounter diagnosis was Hyperkalemia. Diagnoses of Multiple falls, Multiple skin tears, Hypoglycemia, Closed head injury, initial encounter, Bacteremia, Marijuana smoker, Goals of care, counseling/discussion, Full code status, and Osteomyelitis of left foot, unspecified type (HCC) were also pertinent to this visit.  Past Medical History:  has a past medical history of Atrial fibrillation (HCC), CAD (coronary artery disease), Chronic kidney disease, Depression, Diabetes mellitus (HCC), Dialysis patient (HCC), Glaucoma, Hemodialysis patient (HCC), Hyperlipidemia, Hypertension, RLL pneumonia, and Sleep apnea.  Past Surgical History:  has a past surgical history that includes Pacemaker insertion; pacemaker placement; Colonoscopy; Toe amputation (Left, 2023); and Toe amputation (Left, 2024).    Assessment   Assessment: The pt is a 64 yo male who presented to the ED with oozing 
Physical Therapy--Attempt/Pt unavailable  Cal Sanchez  [id number]  Attempted PT follow up session with pt at this time, however, pt unavailable, pt off floor at HD treatment.  Will follow up another time if/as schedule allows and pt available.  If patient is discharged prior to the next Physical Therapy visit, please see last written PT note for discharge status.  Electronically signed by Iliana Chacon, PT on 2/21/2024 at 9:29 AM        
RN called report to Negrita LOWERY at [facility].  All questions answered at this time.    Discharge instructions, follow up appointments and medications reviewed with patient. Patient verbalized understanding. All questions answered, denies any further questions/concerns.  All belongings/medications sent with patient.    Patient transported via medical transport to facility.     
Treatment time: 3 hours  Net UF: 3400 ml     Pre weight: 95.3 kg  Post weight:91.9 kg  EDW: 90 kg    Access used: CVC    Access function: good with  ml/min     Medications or blood products given: na     Regular outpatient schedule: TTS     Summary of response to treatment: Patient tolerated treatment well and without any complications. Patient remained stable throughout entire treatment. Copy of dialysis treatment record placed in chart, to be scanned into EMR.  
Treatment time: 3.5 hrs    Net UF: 3 liters    Pre weight: 93.4 kg  Post weight: 90 kg  EDW: 90 kg    Access used: RCW TDC  Access function: No issues noted. BFR per orders.     Medications or blood products given: Retacrit    Regular outpatient schedule: MWF    Summary of response to treatment: Pt tolerated treatment. No issues noted. Next HD treatment scheduled Wednesday.     Copy of dialysis treatment record placed in chart, to be scanned into EMR.    Report called to Fany Corona RN. Patient remained stable throughout entire treatment and upon exiting the dialysis suite via transport.    
        dextrose      sodium chloride Stopped (24 184)       PRN Meds:glucose, dextrose bolus **OR** dextrose bolus, glucagon (rDNA), dextrose, calcium carbonate, oxyCODONE-acetaminophen, sodium chloride flush, sodium chloride, ondansetron, polyethylene glycol, acetaminophen **OR** acetaminophen, heparin (porcine)    Physical Exam:    TEMPERATURE:  Current - Temp: 98 °F (36.7 °C); Max - Temp  Av °F (36.7 °C)  Min: 97.3 °F (36.3 °C)  Max: 98.3 °F (36.8 °C)  RESPIRATIONS RANGE: Resp  Av  Min: 15  Max: 18  PULSE RANGE: Pulse  Av.9  Min: 65  Max: 80  BLOOD PRESSURE RANGE:  Systolic (24hrs), Av , Min:97 , Max:155   ; Diastolic (24hrs), Av, Min:59, Max:93    24HR INTAKE/OUTPUT:    Intake/Output Summary (Last 24 hours) at 2024 1127  Last data filed at 2024 0835  Gross per 24 hour   Intake 250 ml   Output --   Net 250 ml         Patient Vitals for the past 96 hrs (Last 3 readings):   Weight   24 1020 93.4 kg (205 lb 14.6 oz)   24 0501 93.4 kg (205 lb 14.6 oz)   24 0438 92.2 kg (203 lb 4.2 oz)         GEN: Awake, Alert, NAD  HEENT: Normocephalic, atraumatic  CHEST: CTA  CVS: RRR, no rubs, 1+ edema  ABD: soft, non tender, non distended  NEURO: No focal neurological deficit  PSYCH: O x 3  Access: [facility]      LAB DATA:    CBC:   Lab Results   Component Value Date/Time    WBC 5.8 2024 05:08 AM    RBC 2.53 2024 05:08 AM    HGB 8.5 2024 05:08 AM    HCT 24.9 2024 05:08 AM    MCV 98.2 2024 05:08 AM    MCH 33.6 2024 05:08 AM    MCHC 34.2 2024 05:08 AM    RDW 14.6 2024 05:08 AM     2024 05:08 AM    MPV 8.3 2024 05:08 AM     BMP:    Lab Results   Component Value Date/Time     2024 05:08 AM    K 5.4 2024 05:08 AM    K 5.7 2024 05:10 AM    CL 92 2024 05:08 AM    CO2 24 2024 05:08 AM    BUN 41 2024 05:08 AM    CREATININE 9.0 2024 05:08 AM    CALCIUM 8.7 
        IMPRESSION/RECOMMENDATIONS:      ESRD on HD MWF: follows with  Nephrology outpatient.              - [facility]: [facility]              - Access: R TDC   - HD today with TW of 90 kg              - Continue HD MWF      Hyperkalemia              - K+ 5.4 mmol/L   - Continue low K diet   - Modulate with HD    Hyponatremia   - Na+ 128 mmol/L   - 1.5 L FR   - Modulate with HD     Hypertension              - Controlled              - Continue current antihypertensive regimen     CKD-MBD              - Phosphorus within goal              - On binder and sensipar     Anemia of ESRD              - LARRY with HD     Will discuss with nephrology attending physician, Dr. Landry.  See attestation for additional recommendations.    Joy Qureshi, CLEMENTE - CNP        
    Ionized Calcium:  No results found for: \"IONCA\"  Magnesium:    Lab Results   Component Value Date/Time    MG 1.80 01/23/2018 04:58 AM     Phosphorus:    Lab Results   Component Value Date/Time    PHOS 4.0 02/17/2024 04:45 AM     U/A:    Lab Results   Component Value Date/Time    COLORU Yellow 02/04/2024 06:15 PM    PHUR 8.5 02/04/2024 06:15 PM    WBCUA 6 02/04/2024 06:15 PM    RBCUA 3 02/04/2024 06:15 PM    BACTERIA None Seen 02/04/2024 06:15 PM    CLARITYU CLOUDY 02/04/2024 06:15 PM    SPECGRAV 1.013 02/04/2024 06:15 PM    LEUKOCYTESUR TRACE 02/04/2024 06:15 PM    UROBILINOGEN 0.2 02/04/2024 06:15 PM    BILIRUBINUR Negative 02/04/2024 06:15 PM    BLOODU Negative 02/04/2024 06:15 PM    GLUCOSEU 100 02/04/2024 06:15 PM         IMPRESSION/RECOMMENDATIONS:      ESRD on HD MWF: follows with  Nephrology outpatient.              - [facility]: [facility]              - Access: R TDC              - Tolerated 4 L removal on HD yesterday              - Continue HD MWF      2. Hyperkalemia              - Resolved with HD    3. Hypertension              - Controlled              - Continue current antihypertensive regimen     4. CKD-MBD              - Phosphorus at goal              - On binder and sensipar     5. Anemia of ESRD              - LARRY with HD    Mars López MD        
assistance (HOB elevated; use of bedrail; increased time to complete)  Sit to Supine: Unable to assess (pt seated in recliner at end of session)  Scooting: Stand by assistance  Bed Mobility Comments: not observed during this session; above per OT jesenia completed 30 minutes prior to this session    Transfers  Sit to Stand: Moderate Assistance (from recliner > walker)  Stand to Sit: Minimal Assistance  Comment: max vc's for hand placement; the pt is unable to maintain NWB L foot during transfers despite vc's    Ambulation  Comments: the pt would not be able to walk with the walker and maintain his NWB L foot; may need to look into the pt using a w/c     Balance  Posture: Fair  Sitting - Static: Good  Sitting - Dynamic: Good  Standing - Static: Fair  Standing - Dynamic: Fair;-  Comments: static standing to the walker x 2 trials: both attempts at standing were for less than 45 seconds and requiring at least min A and max vc's to maintain his NWB L foot           AM-PAC - Mobility    AM-PAC Basic Mobility - Inpatient   How much help is needed turning from your back to your side while in a flat bed without using bedrails?: A Little  How much help is needed moving from lying on your back to sitting on the side of a flat bed without using bedrails?: A Little  How much help is needed moving to and from a bed to a chair?: A Lot  How much help is needed standing up from a chair using your arms?: A Lot  How much help is needed walking in hospital room?: Total  How much help is needed climbing 3-5 steps with a railing?: Total  AM-PAC Inpatient Mobility Raw Score : 12  AM-PAC Inpatient T-Scale Score : 35.33  Mobility Inpatient CMS 0-100% Score: 68.66  Mobility Inpatient CMS G-Code Modifier : CL         Goals  Short Term Goals  Time Frame for Short Term Goals: upon d/c  Short Term Goal 1: Transfers sit <> stand with min A to a walker.  Short Term Goal 2: Pivot transfer bed <> chair with min/mod A of 1.  Short Term Goal 3: Static

## 2024-02-22 ENCOUNTER — TELEPHONE (OUTPATIENT)
Dept: INFECTIOUS DISEASES | Age: 64
End: 2024-02-22

## 2024-02-22 DIAGNOSIS — B95.61 STAPHYLOCOCCUS AUREUS BACTEREMIA: Primary | ICD-10-CM

## 2024-02-22 DIAGNOSIS — R78.81 STAPHYLOCOCCUS AUREUS BACTEREMIA: Primary | ICD-10-CM

## 2024-02-22 NOTE — TELEPHONE ENCOUNTER
Floor RN verified OPAT orders. Floor RN verbalized understanding for facility to collect and fax lab results to this office.

## 2024-02-27 LAB
BASOPHILS ABSOLUTE: 0.1 /ΜL
BASOPHILS RELATIVE PERCENT: 3 %
C-REACTIVE PROTEIN: 6.6
EOSINOPHILS ABSOLUTE: 0.1 /ΜL
EOSINOPHILS RELATIVE PERCENT: 2.6 %
HCT VFR BLD CALC: 32.5 % (ref 41–53)
HEMOGLOBIN: 10.8 G/DL (ref 13.5–17.5)
LYMPHOCYTES ABSOLUTE: 0.9 /ΜL
LYMPHOCYTES RELATIVE PERCENT: 25.9 %
MCH RBC QN AUTO: 33.3 PG
MCHC RBC AUTO-ENTMCNC: 33.1 G/DL
MCV RBC AUTO: 100.5 FL
MONOCYTES ABSOLUTE: 0.4 /ΜL
MONOCYTES RELATIVE PERCENT: 12.5 %
NEUTROPHILS ABSOLUTE: 2 /ΜL
NEUTROPHILS RELATIVE PERCENT: 56 %
PDW BLD-RTO: 14.9 %
PLATELET # BLD: 157 K/ΜL
PMV BLD AUTO: 7.9 FL
RBC # BLD: 3.23 10^6/ΜL
SEDIMENTATION RATE, ERYTHROCYTE: 26
WBC # BLD: 3.5 10^3/ML

## 2024-02-28 DIAGNOSIS — B95.61 STAPHYLOCOCCUS AUREUS BACTEREMIA: ICD-10-CM

## 2024-02-28 DIAGNOSIS — R78.81 STAPHYLOCOCCUS AUREUS BACTEREMIA: ICD-10-CM

## 2024-03-07 ENCOUNTER — TELEPHONE (OUTPATIENT)
Dept: INFECTIOUS DISEASES | Age: 64
End: 2024-03-07

## 2024-03-17 ENCOUNTER — HOSPITAL ENCOUNTER (INPATIENT)
Age: 64
LOS: 4 days | Discharge: NURSING, REHAB OR LONG-TERM CARE | DRG: 189 | End: 2024-03-21
Attending: HOSPITALIST | Admitting: HOSPITALIST
Payer: COMMERCIAL

## 2024-03-17 ENCOUNTER — APPOINTMENT (OUTPATIENT)
Dept: GENERAL RADIOLOGY | Age: 64
DRG: 189 | End: 2024-03-17
Payer: COMMERCIAL

## 2024-03-17 DIAGNOSIS — D69.6 THROMBOCYTOPENIA (HCC): ICD-10-CM

## 2024-03-17 DIAGNOSIS — J18.9 HCAP (HEALTHCARE-ASSOCIATED PNEUMONIA): ICD-10-CM

## 2024-03-17 DIAGNOSIS — Z99.2 END-STAGE RENAL DISEASE ON HEMODIALYSIS (HCC): ICD-10-CM

## 2024-03-17 DIAGNOSIS — R79.89 ELEVATED BRAIN NATRIURETIC PEPTIDE (BNP) LEVEL: ICD-10-CM

## 2024-03-17 DIAGNOSIS — J96.01 ACUTE RESPIRATORY FAILURE WITH HYPOXIA (HCC): Primary | ICD-10-CM

## 2024-03-17 DIAGNOSIS — N18.6 END-STAGE RENAL DISEASE ON HEMODIALYSIS (HCC): ICD-10-CM

## 2024-03-17 DIAGNOSIS — R79.89 ELEVATED TROPONIN: ICD-10-CM

## 2024-03-17 LAB
ALBUMIN SERPL-MCNC: 3.7 G/DL (ref 3.4–5)
ALBUMIN/GLOB SERPL: 1.2 {RATIO} (ref 1.1–2.2)
ALP SERPL-CCNC: 83 U/L (ref 40–129)
ALT SERPL-CCNC: <5 U/L (ref 10–40)
ANION GAP SERPL CALCULATED.3IONS-SCNC: 15 MMOL/L (ref 3–16)
AST SERPL-CCNC: 15 U/L (ref 15–37)
BASE EXCESS BLDV CALC-SCNC: 1.1 MMOL/L
BASOPHILS # BLD: 0.1 K/UL (ref 0–0.2)
BASOPHILS NFR BLD: 1.2 %
BILIRUB SERPL-MCNC: 0.5 MG/DL (ref 0–1)
BUN SERPL-MCNC: 41 MG/DL (ref 7–20)
CALCIUM SERPL-MCNC: 10.7 MG/DL (ref 8.3–10.6)
CHLORIDE SERPL-SCNC: 93 MMOL/L (ref 99–110)
CO2 BLDV-SCNC: 27 MMOL/L
CO2 SERPL-SCNC: 29 MMOL/L (ref 21–32)
COHGB MFR BLDV: 2.4 %
CREAT SERPL-MCNC: 5.8 MG/DL (ref 0.8–1.3)
DEPRECATED RDW RBC AUTO: 13.4 % (ref 12.4–15.4)
EOSINOPHIL # BLD: 0.1 K/UL (ref 0–0.6)
EOSINOPHIL NFR BLD: 1.1 %
GFR SERPLBLD CREATININE-BSD FMLA CKD-EPI: 10 ML/MIN/{1.73_M2}
GLUCOSE SERPL-MCNC: 155 MG/DL (ref 70–99)
HCO3 BLDV-SCNC: 26 MMOL/L (ref 23–29)
HCT VFR BLD AUTO: 39.8 % (ref 40.5–52.5)
HGB BLD-MCNC: 13.4 G/DL (ref 13.5–17.5)
INR PPP: 0.99 (ref 0.84–1.16)
LACTATE BLDV-SCNC: 1.5 MMOL/L (ref 0.4–1.9)
LYMPHOCYTES # BLD: 0.8 K/UL (ref 1–5.1)
LYMPHOCYTES NFR BLD: 14.6 %
MCH RBC QN AUTO: 32.2 PG (ref 26–34)
MCHC RBC AUTO-ENTMCNC: 33.7 G/DL (ref 31–36)
MCV RBC AUTO: 95.3 FL (ref 80–100)
METHGB MFR BLDV: 0.6 %
MONOCYTES # BLD: 0.6 K/UL (ref 0–1.3)
MONOCYTES NFR BLD: 10.5 %
NEUTROPHILS # BLD: 4.1 K/UL (ref 1.7–7.7)
NEUTROPHILS NFR BLD: 72.6 %
NT-PROBNP SERPL-MCNC: ABNORMAL PG/ML (ref 0–124)
O2 THERAPY: NORMAL
PCO2 BLDV: 40.3 MMHG (ref 40–50)
PH BLDV: 7.41 [PH] (ref 7.35–7.45)
PLATELET # BLD AUTO: 133 K/UL (ref 135–450)
PMV BLD AUTO: 8.6 FL (ref 5–10.5)
PO2 BLDV: 32 MMHG
POTASSIUM SERPL-SCNC: 4.6 MMOL/L (ref 3.5–5.1)
PROT SERPL-MCNC: 6.8 G/DL (ref 6.4–8.2)
PROTHROMBIN TIME: 13.1 SEC (ref 11.5–14.8)
RBC # BLD AUTO: 4.18 M/UL (ref 4.2–5.9)
SAO2 % BLDV: 60 %
SODIUM SERPL-SCNC: 137 MMOL/L (ref 136–145)
TROPONIN, HIGH SENSITIVITY: 374 NG/L (ref 0–22)
TROPONIN, HIGH SENSITIVITY: 410 NG/L (ref 0–22)
WBC # BLD AUTO: 5.7 K/UL (ref 4–11)

## 2024-03-17 PROCEDURE — 6370000000 HC RX 637 (ALT 250 FOR IP): Performed by: NURSE PRACTITIONER

## 2024-03-17 PROCEDURE — 2060000000 HC ICU INTERMEDIATE R&B

## 2024-03-17 PROCEDURE — 80053 COMPREHEN METABOLIC PANEL: CPT

## 2024-03-17 PROCEDURE — 2580000003 HC RX 258: Performed by: NURSE PRACTITIONER

## 2024-03-17 PROCEDURE — 93005 ELECTROCARDIOGRAM TRACING: CPT | Performed by: HOSPITALIST

## 2024-03-17 PROCEDURE — 84484 ASSAY OF TROPONIN QUANT: CPT

## 2024-03-17 PROCEDURE — 5A1D70Z PERFORMANCE OF URINARY FILTRATION, INTERMITTENT, LESS THAN 6 HOURS PER DAY: ICD-10-PCS | Performed by: STUDENT IN AN ORGANIZED HEALTH CARE EDUCATION/TRAINING PROGRAM

## 2024-03-17 PROCEDURE — 99285 EMERGENCY DEPT VISIT HI MDM: CPT

## 2024-03-17 PROCEDURE — 85610 PROTHROMBIN TIME: CPT

## 2024-03-17 PROCEDURE — 83880 ASSAY OF NATRIURETIC PEPTIDE: CPT

## 2024-03-17 PROCEDURE — 96365 THER/PROPH/DIAG IV INF INIT: CPT

## 2024-03-17 PROCEDURE — 83605 ASSAY OF LACTIC ACID: CPT

## 2024-03-17 PROCEDURE — 82803 BLOOD GASES ANY COMBINATION: CPT

## 2024-03-17 PROCEDURE — 85025 COMPLETE CBC W/AUTO DIFF WBC: CPT

## 2024-03-17 PROCEDURE — 6360000002 HC RX W HCPCS: Performed by: NURSE PRACTITIONER

## 2024-03-17 PROCEDURE — 71045 X-RAY EXAM CHEST 1 VIEW: CPT

## 2024-03-17 RX ORDER — OXYCODONE HYDROCHLORIDE 10 MG/1
10 TABLET ORAL ONCE
Status: COMPLETED | OUTPATIENT
Start: 2024-03-17 | End: 2024-03-17

## 2024-03-17 RX ORDER — ENOXAPARIN SODIUM 100 MG/ML
30 INJECTION SUBCUTANEOUS DAILY
Status: CANCELLED | OUTPATIENT
Start: 2024-03-18

## 2024-03-17 RX ADMIN — CEFEPIME 1000 MG: 1 INJECTION, POWDER, FOR SOLUTION INTRAMUSCULAR; INTRAVENOUS at 23:06

## 2024-03-17 RX ADMIN — OXYCODONE HYDROCHLORIDE 10 MG: 10 TABLET ORAL at 22:39

## 2024-03-17 ASSESSMENT — LIFESTYLE VARIABLES
HOW OFTEN DO YOU HAVE A DRINK CONTAINING ALCOHOL: NEVER
HOW MANY STANDARD DRINKS CONTAINING ALCOHOL DO YOU HAVE ON A TYPICAL DAY: PATIENT DOES NOT DRINK

## 2024-03-17 ASSESSMENT — PAIN DESCRIPTION - LOCATION
LOCATION: FOOT
LOCATION: FOOT

## 2024-03-17 ASSESSMENT — PAIN DESCRIPTION - ORIENTATION
ORIENTATION: LEFT
ORIENTATION: LEFT

## 2024-03-17 ASSESSMENT — PAIN SCALES - GENERAL
PAINLEVEL_OUTOF10: 7
PAINLEVEL_OUTOF10: 8

## 2024-03-18 ENCOUNTER — APPOINTMENT (OUTPATIENT)
Dept: GENERAL RADIOLOGY | Age: 64
DRG: 189 | End: 2024-03-18
Payer: COMMERCIAL

## 2024-03-18 PROBLEM — I16.1 HYPERTENSIVE EMERGENCY: Status: ACTIVE | Noted: 2024-03-18

## 2024-03-18 LAB
ANTI-XA UNFRAC HEPARIN: <0.1 IU/ML (ref 0.3–0.7)
APTT BLD: 29.9 SEC (ref 22.7–35.9)
DEPRECATED RDW RBC AUTO: 13.5 % (ref 12.4–15.4)
EKG DIAGNOSIS: NORMAL
EKG Q-T INTERVAL: 394 MS
EKG QRS DURATION: 140 MS
EKG QTC CALCULATION (BAZETT): 495 MS
EKG R AXIS: -81 DEGREES
EKG T AXIS: 47 DEGREES
EKG VENTRICULAR RATE: 95 BPM
HCT VFR BLD AUTO: 38.9 % (ref 40.5–52.5)
HGB BLD-MCNC: 13.2 G/DL (ref 13.5–17.5)
MCH RBC QN AUTO: 32.5 PG (ref 26–34)
MCHC RBC AUTO-ENTMCNC: 33.9 G/DL (ref 31–36)
MCV RBC AUTO: 95.8 FL (ref 80–100)
PLATELET # BLD AUTO: 133 K/UL (ref 135–450)
PMV BLD AUTO: 8.3 FL (ref 5–10.5)
RBC # BLD AUTO: 4.06 M/UL (ref 4.2–5.9)
WBC # BLD AUTO: 6.2 K/UL (ref 4–11)

## 2024-03-18 PROCEDURE — 71045 X-RAY EXAM CHEST 1 VIEW: CPT

## 2024-03-18 PROCEDURE — 6360000002 HC RX W HCPCS: Performed by: HOSPITALIST

## 2024-03-18 PROCEDURE — 6370000000 HC RX 637 (ALT 250 FOR IP): Performed by: HOSPITALIST

## 2024-03-18 PROCEDURE — 36415 COLL VENOUS BLD VENIPUNCTURE: CPT

## 2024-03-18 PROCEDURE — 2580000003 HC RX 258: Performed by: HOSPITALIST

## 2024-03-18 PROCEDURE — 90935 HEMODIALYSIS ONE EVALUATION: CPT

## 2024-03-18 PROCEDURE — 99223 1ST HOSP IP/OBS HIGH 75: CPT | Performed by: INTERNAL MEDICINE

## 2024-03-18 PROCEDURE — 93010 ELECTROCARDIOGRAM REPORT: CPT | Performed by: INTERNAL MEDICINE

## 2024-03-18 PROCEDURE — 6370000000 HC RX 637 (ALT 250 FOR IP)

## 2024-03-18 PROCEDURE — 85520 HEPARIN ASSAY: CPT

## 2024-03-18 PROCEDURE — 85730 THROMBOPLASTIN TIME PARTIAL: CPT

## 2024-03-18 PROCEDURE — 6370000000 HC RX 637 (ALT 250 FOR IP): Performed by: INTERNAL MEDICINE

## 2024-03-18 PROCEDURE — 6360000002 HC RX W HCPCS: Performed by: INTERNAL MEDICINE

## 2024-03-18 PROCEDURE — 6360000002 HC RX W HCPCS: Performed by: NURSE PRACTITIONER

## 2024-03-18 PROCEDURE — 6370000000 HC RX 637 (ALT 250 FOR IP): Performed by: NURSE PRACTITIONER

## 2024-03-18 PROCEDURE — 2060000000 HC ICU INTERMEDIATE R&B

## 2024-03-18 PROCEDURE — 2580000003 HC RX 258: Performed by: NURSE PRACTITIONER

## 2024-03-18 PROCEDURE — 85027 COMPLETE CBC AUTOMATED: CPT

## 2024-03-18 RX ORDER — BUPROPION HYDROCHLORIDE 75 MG/1
150 TABLET ORAL
Status: DISCONTINUED | OUTPATIENT
Start: 2024-03-19 | End: 2024-03-21 | Stop reason: HOSPADM

## 2024-03-18 RX ORDER — ASPIRIN 81 MG/1
81 TABLET, CHEWABLE ORAL DAILY
Status: DISCONTINUED | OUTPATIENT
Start: 2024-03-18 | End: 2024-03-21 | Stop reason: HOSPADM

## 2024-03-18 RX ORDER — HEPARIN SODIUM 1000 [USP'U]/ML
4000 INJECTION, SOLUTION INTRAVENOUS; SUBCUTANEOUS ONCE
Status: COMPLETED | OUTPATIENT
Start: 2024-03-18 | End: 2024-03-18

## 2024-03-18 RX ORDER — SODIUM CHLORIDE 9 MG/ML
INJECTION, SOLUTION INTRAVENOUS PRN
Status: DISCONTINUED | OUTPATIENT
Start: 2024-03-18 | End: 2024-03-21 | Stop reason: HOSPADM

## 2024-03-18 RX ORDER — OXYCODONE HYDROCHLORIDE AND ACETAMINOPHEN 5; 325 MG/1; MG/1
1 TABLET ORAL ONCE
Status: COMPLETED | OUTPATIENT
Start: 2024-03-18 | End: 2024-03-18

## 2024-03-18 RX ORDER — HEPARIN SODIUM 1000 [USP'U]/ML
30 INJECTION, SOLUTION INTRAVENOUS; SUBCUTANEOUS PRN
Status: DISCONTINUED | OUTPATIENT
Start: 2024-03-18 | End: 2024-03-18

## 2024-03-18 RX ORDER — ERGOCALCIFEROL 1.25 MG/1
50000 CAPSULE ORAL
COMMUNITY

## 2024-03-18 RX ORDER — ACETAMINOPHEN 325 MG/1
650 TABLET ORAL EVERY 6 HOURS PRN
Status: DISCONTINUED | OUTPATIENT
Start: 2024-03-18 | End: 2024-03-21 | Stop reason: HOSPADM

## 2024-03-18 RX ORDER — LEVOTHYROXINE SODIUM 0.03 MG/1
25 TABLET ORAL DAILY
COMMUNITY

## 2024-03-18 RX ORDER — GABAPENTIN 300 MG/1
300 CAPSULE ORAL NIGHTLY
Status: DISCONTINUED | OUTPATIENT
Start: 2024-03-18 | End: 2024-03-21 | Stop reason: HOSPADM

## 2024-03-18 RX ORDER — HYDRALAZINE HYDROCHLORIDE 20 MG/ML
10 INJECTION INTRAMUSCULAR; INTRAVENOUS EVERY 4 HOURS PRN
Status: DISCONTINUED | OUTPATIENT
Start: 2024-03-18 | End: 2024-03-21 | Stop reason: HOSPADM

## 2024-03-18 RX ORDER — OXYCODONE HYDROCHLORIDE AND ACETAMINOPHEN 5; 325 MG/1; MG/1
1 TABLET ORAL EVERY 4 HOURS PRN
COMMUNITY

## 2024-03-18 RX ORDER — ATORVASTATIN CALCIUM 40 MG/1
80 TABLET, FILM COATED ORAL NIGHTLY
Status: DISCONTINUED | OUTPATIENT
Start: 2024-03-18 | End: 2024-03-21 | Stop reason: HOSPADM

## 2024-03-18 RX ORDER — CARVEDILOL 6.25 MG/1
6.25 TABLET ORAL 2 TIMES DAILY WITH MEALS
Status: DISCONTINUED | OUTPATIENT
Start: 2024-03-18 | End: 2024-03-21 | Stop reason: HOSPADM

## 2024-03-18 RX ORDER — ONDANSETRON 2 MG/ML
4 INJECTION INTRAMUSCULAR; INTRAVENOUS EVERY 6 HOURS PRN
Status: DISCONTINUED | OUTPATIENT
Start: 2024-03-18 | End: 2024-03-21 | Stop reason: HOSPADM

## 2024-03-18 RX ORDER — LANOLIN ALCOHOL/MO/W.PET/CERES
3 CREAM (GRAM) TOPICAL NIGHTLY
Status: DISCONTINUED | OUTPATIENT
Start: 2024-03-18 | End: 2024-03-21 | Stop reason: HOSPADM

## 2024-03-18 RX ORDER — HEPARIN SODIUM 10000 [USP'U]/100ML
0-4000 INJECTION, SOLUTION INTRAVENOUS CONTINUOUS
Status: DISCONTINUED | OUTPATIENT
Start: 2024-03-18 | End: 2024-03-20

## 2024-03-18 RX ORDER — POLYETHYLENE GLYCOL 3350 17 G/17G
17 POWDER, FOR SOLUTION ORAL DAILY PRN
Status: DISCONTINUED | OUTPATIENT
Start: 2024-03-18 | End: 2024-03-19

## 2024-03-18 RX ORDER — HEPARIN SODIUM 1000 [USP'U]/ML
60 INJECTION, SOLUTION INTRAVENOUS; SUBCUTANEOUS PRN
Status: DISCONTINUED | OUTPATIENT
Start: 2024-03-18 | End: 2024-03-18

## 2024-03-18 RX ORDER — SODIUM CHLORIDE 0.9 % (FLUSH) 0.9 %
5-40 SYRINGE (ML) INJECTION EVERY 12 HOURS SCHEDULED
Status: DISCONTINUED | OUTPATIENT
Start: 2024-03-18 | End: 2024-03-21 | Stop reason: HOSPADM

## 2024-03-18 RX ORDER — ACETAMINOPHEN 650 MG/1
650 SUPPOSITORY RECTAL EVERY 6 HOURS PRN
Status: DISCONTINUED | OUTPATIENT
Start: 2024-03-18 | End: 2024-03-21 | Stop reason: HOSPADM

## 2024-03-18 RX ORDER — VENLAFAXINE HYDROCHLORIDE 75 MG/1
75 CAPSULE, EXTENDED RELEASE ORAL
Status: DISCONTINUED | OUTPATIENT
Start: 2024-03-19 | End: 2024-03-21 | Stop reason: HOSPADM

## 2024-03-18 RX ORDER — QUETIAPINE 150 MG/1
150 TABLET, FILM COATED, EXTENDED RELEASE ORAL NIGHTLY
Status: DISCONTINUED | OUTPATIENT
Start: 2024-03-18 | End: 2024-03-21 | Stop reason: HOSPADM

## 2024-03-18 RX ORDER — ONDANSETRON 4 MG/1
4 TABLET, ORALLY DISINTEGRATING ORAL EVERY 8 HOURS PRN
Status: DISCONTINUED | OUTPATIENT
Start: 2024-03-18 | End: 2024-03-21 | Stop reason: HOSPADM

## 2024-03-18 RX ORDER — SODIUM CHLORIDE 0.9 % (FLUSH) 0.9 %
5-40 SYRINGE (ML) INJECTION PRN
Status: DISCONTINUED | OUTPATIENT
Start: 2024-03-18 | End: 2024-03-21 | Stop reason: HOSPADM

## 2024-03-18 RX ADMIN — ASPIRIN 81 MG: 81 TABLET, CHEWABLE ORAL at 18:18

## 2024-03-18 RX ADMIN — GABAPENTIN 300 MG: 300 CAPSULE ORAL at 21:15

## 2024-03-18 RX ADMIN — NITROGLYCERIN 1 INCH: 20 OINTMENT TOPICAL at 23:18

## 2024-03-18 RX ADMIN — NITROGLYCERIN 1 INCH: 20 OINTMENT TOPICAL at 18:19

## 2024-03-18 RX ADMIN — OXYCODONE AND ACETAMINOPHEN 1 TABLET: 5; 325 TABLET ORAL at 21:15

## 2024-03-18 RX ADMIN — ONDANSETRON 4 MG: 2 INJECTION INTRAMUSCULAR; INTRAVENOUS at 04:42

## 2024-03-18 RX ADMIN — NITROGLYCERIN 1 INCH: 20 OINTMENT TOPICAL at 12:13

## 2024-03-18 RX ADMIN — SODIUM CHLORIDE 1500 MG: 9 INJECTION, SOLUTION INTRAVENOUS at 01:03

## 2024-03-18 RX ADMIN — Medication 3 MG: at 21:15

## 2024-03-18 RX ADMIN — HYDRALAZINE HYDROCHLORIDE 10 MG: 20 INJECTION INTRAMUSCULAR; INTRAVENOUS at 06:45

## 2024-03-18 RX ADMIN — ACETAMINOPHEN 325MG 650 MG: 325 TABLET ORAL at 11:07

## 2024-03-18 RX ADMIN — ONDANSETRON 4 MG: 4 TABLET, ORALLY DISINTEGRATING ORAL at 18:18

## 2024-03-18 RX ADMIN — ONDANSETRON 4 MG: 2 INJECTION INTRAMUSCULAR; INTRAVENOUS at 11:07

## 2024-03-18 RX ADMIN — ATORVASTATIN CALCIUM 80 MG: 40 TABLET, FILM COATED ORAL at 21:15

## 2024-03-18 RX ADMIN — HEPARIN SODIUM 4000 UNITS: 1000 INJECTION INTRAVENOUS; SUBCUTANEOUS at 04:57

## 2024-03-18 RX ADMIN — CARVEDILOL 6.25 MG: 6.25 TABLET, FILM COATED ORAL at 18:18

## 2024-03-18 RX ADMIN — ACETAMINOPHEN 325MG 650 MG: 325 TABLET ORAL at 04:43

## 2024-03-18 RX ADMIN — ACETAMINOPHEN 325MG 650 MG: 325 TABLET ORAL at 18:18

## 2024-03-18 RX ADMIN — QUETIAPINE FUMARATE 150 MG: 150 TABLET, EXTENDED RELEASE ORAL at 21:18

## 2024-03-18 RX ADMIN — HEPARIN SODIUM 4000 UNITS: 1000 INJECTION INTRAVENOUS; SUBCUTANEOUS at 19:49

## 2024-03-18 RX ADMIN — NITROGLYCERIN 1 INCH: 20 OINTMENT TOPICAL at 03:32

## 2024-03-18 RX ADMIN — HEPARIN SODIUM 1000 UNITS/HR: 10000 INJECTION, SOLUTION INTRAVENOUS at 04:59

## 2024-03-18 RX ADMIN — SODIUM CHLORIDE, PRESERVATIVE FREE 10 ML: 5 INJECTION INTRAVENOUS at 07:47

## 2024-03-18 RX ADMIN — HYDRALAZINE HYDROCHLORIDE 10 MG: 20 INJECTION INTRAMUSCULAR; INTRAVENOUS at 18:19

## 2024-03-18 RX ADMIN — HYDRALAZINE HYDROCHLORIDE 10 MG: 20 INJECTION INTRAMUSCULAR; INTRAVENOUS at 01:34

## 2024-03-18 ASSESSMENT — PAIN DESCRIPTION - DESCRIPTORS
DESCRIPTORS: BURNING;SHOOTING
DESCRIPTORS: ACHING

## 2024-03-18 ASSESSMENT — PAIN SCALES - GENERAL
PAINLEVEL_OUTOF10: 8
PAINLEVEL_OUTOF10: 7
PAINLEVEL_OUTOF10: 0
PAINLEVEL_OUTOF10: 8
PAINLEVEL_OUTOF10: 4
PAINLEVEL_OUTOF10: 0

## 2024-03-18 ASSESSMENT — PAIN DESCRIPTION - LOCATION
LOCATION: HEAD;FOOT
LOCATION: HEAD

## 2024-03-18 ASSESSMENT — PAIN DESCRIPTION - ORIENTATION: ORIENTATION: MID

## 2024-03-18 ASSESSMENT — PAIN - FUNCTIONAL ASSESSMENT: PAIN_FUNCTIONAL_ASSESSMENT: ACTIVITIES ARE NOT PREVENTED

## 2024-03-18 NOTE — CARE COORDINATION
Case Management Assessment  Initial Evaluation    Date/Time of Evaluation: 3/18/2024 1:45 PM  Assessment Completed by: AMRIT Saul    If patient is discharged prior to next notation, then this note serves as note for discharge by case management.    Patient Name: Cal Sanchez                   YOB: 1960  Diagnosis: Thrombocytopenia (HCC) [D69.6]  Elevated troponin [R79.89]  Fluid overload [E87.70]  Elevated brain natriuretic peptide (BNP) level [R79.89]  End-stage renal disease on hemodialysis (HCC) [N18.6, Z99.2]  Acute respiratory failure with hypoxia (HCC) [J96.01]  HCAP (healthcare-associated pneumonia) [J18.9]                   Date / Time: 3/17/2024  8:18 PM    Patient Admission Status: Inpatient   Readmission Risk (Low < 19, Mod (19-27), High > 27): Readmission Risk Score: 24.1    Current PCP: Tania Moses MD  PCP verified by CM? Yes    Chart Reviewed: Yes      History Provided by: Patient  Patient Orientation: Alert and Oriented    Patient Cognition: Alert    Hospitalization in the last 30 days (Readmission):  Yes    If yes, Readmission Assessment in CM Navigator will be completed.    Advance Directives:      Code Status: Full Code   Patient's Primary Decision Maker is:  (referral to spiritual care for Advanced Directives.)    Primary Decision Maker: Junie Sanchez - Niece/Nephew - [phone]    Secondary Decision Maker: Wei Connolly - Brother/Sister    Discharge Planning:    Patient lives with:  (currently at SNF) Type of Home: Skilled Nursing Facility  Primary Care Giver: Other (Comment) (At SNF)  Patient Support Systems include: Family Members   Current Financial resources:    Current community resources:    Current services prior to admission: None            Current DME:              Type of Home Care services:  None    ADLS  Prior functional level:    Current functional level:      PT AM-PAC:   /24  OT AM-PAC:   /24    Family can provide assistance at DC:    Would  facilitating achievement of predicted outcomes: Motivated and Cooperative    Barriers to discharge: Cognitive deficit and Medical complications    Additional Case Management Notes: Return to [facility] and continue Rehab, spoke with Sho at [facility]- patient will need Kim precert to return and she will follow patient and get precert when needed.   Patient is Dialysis Patient - [facility] Phone # [phone] Fax # [phone], Spoke with [facility] staff( Pt requested I let them know, he is here), fax runs sheets at discharge.    The Plan for Transition of Care is related to the following treatment goals of Thrombocytopenia (HCC) [D69.6]  Elevated troponin [R79.89]  Fluid overload [E87.70]  Elevated brain natriuretic peptide (BNP) level [R79.89]  End-stage renal disease on hemodialysis (HCC) [N18.6, Z99.2]  Acute respiratory failure with hypoxia (HCC) [J96.01]  HCAP (healthcare-associated pneumonia) [J18.9]    IF APPLICABLE: The Patient and/or patient representative Cal and his family were provided with a choice of provider and agrees with the discharge plan. Freedom of choice list with basic dialogue that supports the patient's individualized plan of care/goals and shares the quality data associated with the providers was provided to: Patient   Patient Representative Name:       The Patient and/or Patient Representative Agree with the Discharge Plan? Yes    AMRIT Saul  Case Management Department  Ph: [phone] Fax: [phone]

## 2024-03-18 NOTE — CONSULTS
[facility]          [address] [place] [address]                              CONSULTATION      PATIENT NAME: NATHALIA HOFF               : 1960  MED REC NO: [id number]                      ROOM: [place] [address]  ACCOUNT NO: [id number]                       ADMIT DATE: 2024  PROVIDER: Junaid Landry MD      REASON FOR CONSULTATION:  Maintenance dialysis.    HISTORY OF PRESENTING ILLNESS:  A 63-year-old male with past medical history significant for diabetes mellitus type 2; hypertension; coronary artery disease; chronic atrial fibrillation; hypercholesterolemia; end-stage renal disease, on hemodialysis Monday, Wednesday, and Friday, follows up with  Nephrology, came to ER complaining of shortness of breath and dyspnea on exertion with cough and sputum.  Denies any fever, chills, or rigors.  At the time of presentation, found to have uncontrolled blood pressure.  Routine labs did not show any acute indication for emergent dialysis.    The patient is being admitted for further workup and management.  Renal consultation has been called for maintenance dialysis.    At the time of consultation, the patient is feeling and breathing comfortably.  Denies any fever, chills, rigors, nausea, or vomiting.  His breathing status has improved.    PAST MEDICAL HISTORY:    1. Chronic atrial fibrillation.  2. Hypertension.  3. Hypercholesterolemia.  4. Coronary artery disease.  5. Depression.  6. End-stage renal disease, on hemodialysis Monday, Wednesday, and Friday, follows up with  Nephrology.  7. Peripheral vascular disease.  8. Anemia of chronic kidney disease.  9. Renal osteodystrophy.    PAST SURGICAL HISTORY:    1. Status post dialysis access placement.  2. Status post left lower extremity angioplasty.  3. Status post left hallux amputation.  4. Status post pacemaker placement.    MEDICATIONS:  Include midodrine, aspirin, Lipitor, carvedilol, Sensipar, Renvela,

## 2024-03-18 NOTE — PROGRESS NOTES
4 Eyes Skin Assessment     NAME:  Cal Sanchez  YOB: 1960  MEDICAL RECORD NUMBER:  [id number]    The patient is being assessed for  Admission    I agree that at least one RN has performed a thorough Head to Toe Skin Assessment on the patient. ALL assessment sites listed below have been assessed.      Areas assessed by both nurses:    Head, Face, Ears, Shoulders, Back, Chest, Arms, Elbows, Hands, Sacrum. Buttock, Coccyx, Ischium, Legs. Feet and Heels, and Under Medical Devices         Does the Patient have a Wound? Yes wound(s) were present on assessment. LDA wound assessment was Initiated and completed by RN       Smith Prevention initiated by RN: Yes  Wound Care Orders initiated by RN: Yes    Pressure Injury (Stage 3,4, Unstageable, DTI, NWPT, and Complex wounds) if present, place Wound referral order by RN under : No    New Ostomies, if present place, Ostomy referral order under : No     Nurse 1 eSignature: Electronically signed by Allegra Albright RN on 3/18/24 at 6:54 PM EDT    **SHARE this note so that the co-signing nurse can place an eSignature**    Nurse 2 eSignature: Electronically signed by Kenia Estrella RN on 3/19/24 at 6:31 AM EDT

## 2024-03-18 NOTE — PROGRESS NOTES
Patient currently at dialysis and will admit to room 5103 after completion.    Electronically signed by Allegra Albright RN on 3/18/2024 at 3:00 PM

## 2024-03-18 NOTE — PROGRESS NOTES
Department of Internal Medicine  Nephrology Progress Note    SUBJECTIVE:  We are following this patient for ERSD. Patient progress reviewed. The patient feels better .       Seen on HD, tolerating well   REVIEW OF SYSTEMS:  Improved SOB;FERNANDES.     Physical Exam:    VITALS:  BP (!) 176/106   Pulse 98   Temp 98.5 °F (36.9 °C) (Oral)   Resp 18   Ht 1.778 m (5' 10\")   Wt 92.9 kg (204 lb 12.9 oz)   SpO2 100%   BMI 29.39 kg/m²   24HR INTAKE/OUTPUT:  No intake or output data in the 24 hours ending 03/18/24 1340    Constitutional:  Resting   Respiratory:  Scattered Rhonchi   Gastrointestinal: No  tenderness.  Normal Bowel Sounds  Cardiovascular:  S1, S2 RRR   Edema:   + edema    DATA:    CBC:  Lab Results   Component Value Date/Time    WBC 6.2 03/18/2024 04:56 AM    RBC 4.06 03/18/2024 04:56 AM    HGB 13.2 03/18/2024 04:56 AM    HCT 38.9 03/18/2024 04:56 AM    MCV 95.8 03/18/2024 04:56 AM    MCH 32.5 03/18/2024 04:56 AM    MCHC 33.9 03/18/2024 04:56 AM    RDW 13.5 03/18/2024 04:56 AM     03/18/2024 04:56 AM    MPV 8.3 03/18/2024 04:56 AM     CMP:  Lab Results   Component Value Date/Time     03/17/2024 09:06 PM    K 4.6 03/17/2024 09:06 PM    CL 93 03/17/2024 09:06 PM    CO2 29 03/17/2024 09:06 PM    BUN 41 03/17/2024 09:06 PM    CREATININE 5.8 03/17/2024 09:06 PM    GFRAA 12 02/18/2022 12:11 AM    AGRATIO 1.2 03/17/2024 09:06 PM    LABGLOM 10 03/17/2024 09:06 PM    GLUCOSE 155 03/17/2024 09:06 PM    PROT 6.8 03/17/2024 09:06 PM    CALCIUM 10.7 03/17/2024 09:06 PM    BILITOT 0.5 03/17/2024 09:06 PM    ALKPHOS 83 03/17/2024 09:06 PM    AST 15 03/17/2024 09:06 PM    ALT <5 03/17/2024 09:06 PM      Hepatic Function Panel:   Lab Results   Component Value Date/Time    ALKPHOS 83 03/17/2024 09:06 PM    ALT <5 03/17/2024 09:06 PM    AST 15 03/17/2024 09:06 PM    PROT 6.8 03/17/2024 09:06 PM    BILITOT 0.5 03/17/2024 09:06 PM    BILIDIR <0.2 12/03/2023 05:49 AM    IBILI see below 12/03/2023 05:49 AM

## 2024-03-18 NOTE — ED NOTES
Pt continues to wear a gown, Pt resting in bed at this time, laying in a supine position with head of bed elevated . Call light remains in reach instructed pt how to use, and encouraged pt to call if needed assistance, no distress noted. RR even and unlabored, skin warm and dry. No needs at this time. Will continue to monitor closely.

## 2024-03-18 NOTE — PROGRESS NOTES
Medication Reconciliation    List of medications patient is currently taking is complete.     Source of information: 1. [facility]                                      2. EPIC records         Notes regarding home medications:   1. Added levothyroxine and vitamin D      Gurvinder Ricks [facility]   3/18/2024  1:42 PM

## 2024-03-18 NOTE — CARE COORDINATION
03/18/24 1339   Readmission Assessment   Number of Days since last admission? 8-30 days   Previous Disposition SNF   Who is being Interviewed Patient   What was the patient's/caregiver's perception as to why they think they needed to return back to the hospital? Other (Comment)  (Different illness)   Did you visit your Primary Care Physician after you left the hospital, before you returned this time? No   Why weren't you able to visit your PCP? Did not have an appointment   Did you see a specialist, such as Cardiac, Pulmonary, Orthopedic Physician, etc. after you left the hospital? No   Who advised the patient to return to the hospital? Skilled Unit   Does the patient report anything that got in the way of taking their medications? No   In our efforts to provide the best possible care to you and others like you, can you think of anything that we could have done to help you after you left the hospital the first time, so that you might not have needed to return so soon? Other (Comment)  (Nothing)

## 2024-03-18 NOTE — PROGRESS NOTES
Patient here from Dialysis via bed. 02 and heart monitor placed. Afib on tele. VS obtained and BP noted to be 208/109.  BP meds administered per md order. Patient oriented to unit and room. Admit assessment complete and history obtained. Orders reviewed with patient and POC discussed. Call light in reach. Bed in lowest position, bed alarm on. Denies other needs. Will continue to monitor.     Electronically signed by Allegra Albright RN on 3/18/2024 at 6:54 PM

## 2024-03-18 NOTE — ED PROVIDER NOTES
[facility] EMERGENCY DEPARTMENT  [address] [facility] [address]  Dept: [phone]  Loc: [phone]  eMERGENCYdEPARTMENT eNCOUnter      Pt Name: Cal Sanchez  MRN: [id number]  Birthdate 1960  Date of evaluation: 3/17/2024  Provider:CLEMENTE Abbott CNP      Independently seen and evaluated by the advanced practice provider  CHIEF COMPLAINT       Chief Complaint   Patient presents with    Shortness of Breath     Pt presents to the ED via EMS from [facility]. Pt states he developed shortness of breath after lunch today and progressively got worse. Pt was 70% on room air per EMS. Pt does not normally wear oxygen. Pt hx of HD MWF. Pt denies missing any recent dialysis appointment.        CRITICAL CARE TIME         HISTORY OF PRESENT ILLNESS  (Location/Symptom, Timing/Onset, Context/Setting, Quality, Duration,Modifying Factors, Severity.)   Cal Sanchez is a 63 y.o. male who presents to the emergency department PMHx: Chronic left toe wound, toe amputation, atherosclerosis native artery, bacteremia, ER SD HD dependent, HTN, paroxysmal atrial fibrillation, pacemaker, type II DM, hypothyroid, PVD, sleep apnea, vitamin D deficiency    Resides at [facility]  Anticoagulation therapy none  H POA: Patient does not have 1 identified  CODE STATUS: Full  Social determinants: Patient apparently has very poor relationship with outside family members and does not have any H POA designated    HPI provided by the patient and ECF    Patient presents to the emergency department via EMS with oxygen therapy in place at 2 L.  He reported shortness of breath to the nursing staff and was found to have a blood pressure of 209/122 and a respiratory rate of 26 with a room air saturation of 80 to 84%.  Patient does not typically require oxygen therapy.    Patient is awake and alert.  Currently denying chest pain, feeling better since he has oxygen therapy on.

## 2024-03-18 NOTE — CONSULTS
Oral Nightly    [START ON 3/19/2024] venlafaxine  75 mg Oral Daily with breakfast       Review of Systems -   Constitutional: Negative for weight gain/loss; malaise, fever  Respiratory: Negative for Asthma;  cough and hemoptysis  Cardiovascular: Negative for palpitations,dizziness   Gastrointestinal: Negative for abd.pain; constipation/diarrhea;    Genitourinary: Negative for stones; hematuria; frequency hesitancy  Integumentt: Negative for rash or pruritis  Hematologic/lymphatic: Negative for blood dyscrasia; leukemia/lymphoma  Musculoskeletal: Negative for Connective tissue disease  Neurological:  Negative for Seizure   Behavioral/Psych:Negative for Bipolar disorder, Schizophrenia; Dementia  Endocrine: negative for thyroid, parathyroid disease    No intake or output data in the 24 hours ending 03/18/24 1240    Physical Examination:    BP (!) 176/106   Pulse 98   Temp 98.5 °F (36.9 °C) (Oral)   Resp 19   Ht 1.778 m (5' 10\")   Wt 92.9 kg (204 lb 12.9 oz)   SpO2 100%   BMI 29.39 kg/m²    HEENT:  Face: Atraumatic, Conjunctiva: Pink; non icteric,  Mucous Memb:  Moist, No thyromegaly or Lymphadenopathy  Respiratory:  Resp Assessment: normal, Resp Auscultation: clear   Cardiovascular:  Auscultation: nl S1 & S2, Palpation:  Nl PMI; No heaves or thrills, JVP:  normal  Abdomen: Soft, non-tender, Normal bowel sounds,  No organomegaly  Extremities: No Cyanosis or Clubbing; Edema none  Neurological: Oriented to time, place, and person, Non-anxious  Psychiatric: Normal mood and affect  Skin: Warm and dry,  No rash seen      Current Facility-Administered Medications: sodium chloride flush 0.9 % injection 5-40 mL, 5-40 mL, IntraVENous, 2 times per day  sodium chloride flush 0.9 % injection 5-40 mL, 5-40 mL, IntraVENous, PRN  0.9 % sodium chloride infusion, , IntraVENous, PRN  ondansetron (ZOFRAN-ODT) disintegrating tablet 4 mg, 4 mg, Oral, Q8H PRN **OR** ondansetron (ZOFRAN) injection 4 mg, 4 mg, IntraVENous, Q6H  pneumonia.    ECHO: 2/2024  Mild LVH EF 45 to 50% with diffuse hypokinesis, indeterminate diastolic function  Mild aortic regurgitation  Ascending aorta 4.5 cm  Normal RV size and function  Pacer ICD wire visualized in the right atrium    Peripheral intervention: 2/2024  99% ostial AT/PT   S/p successful orbital atherectomy and balloon angioplasty of the proximal AT and PT   Aggressive medical therapy for PAD       ASSESSMENT AND PLAN:      Elevated troponins  In the absence of chest pain symptoms and objective evidence of ischemia on EKG, the likely cause is chronic kidney disease with associated skeletal myopathy  Would not be unreasonable at all to do a chemical stress test when active issues are taken care off    He had a heart catheterization performed in 2014  I could not trace the report on it  No recent stress test either        LENA Duarte M.D  3/18/2024

## 2024-03-18 NOTE — PROGRESS NOTES
V2.0    [facility] [place]      Name:  Cal Sanchez /Age/Sex: 1960  (63 y.o. male)   MRN & CSN:  [id number] & [id number] Encounter Date/Time: 3/18/2024 12:22 PM EDT   Location:  [address] [place] [address] PCP: Tania Moses MD     Attending:Dorita Haywood MD       Hospital Day: 2    Assessment and Recommendations   Cal Sanchez is a 63 y.o. male with pmh of ESRD on dialysis, hypertension who presents with Fluid overload      Plan:   Acute hypoxic respiratory failure probably related to fluid overload with history of end-stage renal disease  Patient has mention that he did not miss his dialysis on Friday  Will benefit from dialysis  Nephrology is on board  Patient did better with nitro    Concerns of non-ST elevation MI  Significant uptrending of troponin,  Aspirin, Lipitor, beta-blocker  Heparin drip cardiology consultation  Echo done in February did show low EF with 45 to 50% and mild diffuse hypokinesis    A-fib  Not on anticoagulation at home    Recent history of left diabetic foot infection s/p left hallux amputation on   History of peripheral arterial disease    Thrombocytopenia  Monitor      Hypertensive urgency  Monitor        Diet ADULT DIET; Regular; Low Potassium (Less than 3000 mg/day)   DVT Prophylaxis [] Lovenox, [x]  Heparin, [] SCDs, [] Ambulation,  [] Eliquis, [] Xarelto  [] Coumadin   Code Status Full Code   Disposition From:   Expected Disposition: Unclear  Estimated Date of Discharge: 1 to 2 days  Patient requires continued admission due to cardiac evaluation and fluid overload   Surrogate Decision Maker/ POA       Personally reviewed Lab Studies and Imaging         EKG interpreted personally and results atrial fibrillation    Imaging that was interpreted personally includes x-ray and results pulmonary congestion    Drugs that require monitoring for toxicity include heparin and the method of monitoring was APTT        Subjective:     Chief Complaint: Shortness of

## 2024-03-18 NOTE — H&P
V2.0  History and Physical      Name:  Cal Sanchez /Age/Sex: 1960  (63 y.o. male)   MRN & CSN:  [id number] & [id number] Encounter Date/Time: 3/17/2024 11:48 PM EDT   Location:  [address] [place] [address] PCP: Tania Moses MD       Hospital Day: 1    Assessment and Plan:   Cal Sanchez is a 63 y.o. male with a pmh of end-stage renal disease on dialysis who presents with shortness of breath and  found to have Fluid overload; hypertensive emergency ; elevated troponin.                            Hospital Problems             Last Modified POA    * (Principal) Fluid overload 3/17/2024 Yes    ESRD on hemodialysis (HCC) 3/18/2024 Yes    Hyperkalemia 3/18/2024 Yes    Elevated troponin I level 3/18/2024 Yes    Hypertensive emergency 3/18/2024 Yes       Plan:  Next dialysis is due 3/18/2024.  With dialysis hopefully his symptoms will improve .  Unlikely pneumonia as patient does not look sick.  He has no fever.  Neither does he have a white count.  Discontinue antibiotic therapy in the emergency department for now  Elevated troponin-EKG does not show STEMI.  Doubt NSTEMI.  Will start patient on heparin drip and will consult cardiology  Nitroglycerin paste placed on chest to help control blood pressure.  Resume home blood pressure medications. PRN Hydralazine ordered.  Get an echocardiogram.  Daily weights.  Advanced care planning was discussed with patient for a total of dialysis due to minutes.  Full code, DNR CC, DNR CCA, power of  and living will were discussed.  Patient  elected the patient will be full code  Disposition:   Current Living situation: Extended-care facility  Expected Disposition: Extended-care facility  Estimated D/C: In about 3 days time    Diet No diet orders on file   DVT Prophylaxis [] Lovenox, [x]  Heparin, [] SCDs, [] Ambulation,  [] Eliquis, [] Xarelto, [] Coumadin   Code Status Prior   Surrogate decision with Nijerad Ashley     Personally reviewed Lab Studies and Imaging  Alcohol/week: 2.0 standard drinks of alcohol     Types: 2 Cans of beer per week     Comment: once monthly    Drug use: Yes     Types: Marijuana (Weed), Opiates      Social Determinants of Health     Food Insecurity: Food Insecurity Present (2/16/2024)    Hunger Vital Sign     Worried About Running Out of Food in the Last Year: Sometimes true     Ran Out of Food in the Last Year: Sometimes true   Transportation Needs: No Transportation Needs (2/16/2024)    PRAPARE - Transportation     Lack of Transportation (Medical): No     Lack of Transportation (Non-Medical): No   Recent Concern: Transportation Needs - Unmet Transportation Needs (11/29/2023)    PRAPARE - Transportation     Lack of Transportation (Medical): Yes     Lack of Transportation (Non-Medical): Yes   Housing Stability: High Risk (2/16/2024)    Housing Stability Vital Sign     Unable to Pay for Housing in the Last Year: Yes     Number of Places Lived in the Last Year: 1     Unstable Housing in the Last Year: No       Medications:   Medications:    vancomycin  1,500 mg IntraVENous Once      Infusions:   PRN Meds:      Labs      CBC:   Recent Labs     03/17/24 2106   WBC 5.7   HGB 13.4*   *     BMP:    Recent Labs     03/17/24 2106      K 4.6   CL 93*   CO2 29   BUN 41*   CREATININE 5.8*   GLUCOSE 155*     Hepatic:   Recent Labs     03/17/24 2106   AST 15   ALT <5*   BILITOT 0.5   ALKPHOS 83     Lipids:   Lab Results   Component Value Date/Time    CHOL 95 04/02/2014 05:47 AM    HDL 30 04/02/2014 05:47 AM    TRIG 118 04/02/2014 05:47 AM     Hemoglobin A1C:   Lab Results   Component Value Date/Time    LABA1C 4.3 11/29/2023 01:04 PM     TSH: No results found for: \"TSH\"  Troponin: No results found for: \"TROPONINT\"  Lactic Acid: No results for input(s): \"LACTA\" in the last 72 hours.  BNP:   Recent Labs     03/17/24 2106   PROBNP >70,000*     UA:  Lab Results   Component Value Date/Time    NITRU Negative 02/04/2024 06:15 PM    COLORU Yellow

## 2024-03-18 NOTE — PROGRESS NOTES
Clinical Pharmacy Note  Heparin Dosing Consult    Cal Sanchez is a 63 y.o. male ordered heparin per CAD/STEMI/NSTEMI/UA/AFIB nomogram by Dr. Miles.     Lab Results   Component Value Date/Time    APTT 34.2 01/02/2015 09:30 PM     Lab Results   Component Value Date/Time    HGB 13.4 03/17/2024 09:06 PM    HCT 39.8 03/17/2024 09:06 PM     03/17/2024 09:06 PM    INR 0.99 03/17/2024 09:06 PM       Ht Readings from Last 1 Encounters:   03/17/24 1.778 m (5' 10\")        Wt Readings from Last 1 Encounters:   03/17/24 92.9 kg (204 lb 12.9 oz)        Assessment/Plan:  Initial bolus: 4000 units  Initial infusion rate: 1000 units/hr  Next anti-Xa:: 1100 03/18/24    Pharmacy will continue to monitor adjust heparin based on anti-Xa results using nomogram below:     CAD/STEMI/NSTEMI/UA/AFIB Heparin Nomogram     Initial Bolus: 60 units/kg Max Bolus: 4,000 units       Initial Rate: 12 units/kg/hr Max Initial Rate: 1,000 units/hr     anti-Xa Bolus Titration   < 0.1 Heparin 60 units/kg bolus Increase drip by 4 units/kg/hr   0.1 - 0.29 Heparin 30 units/kg bolus Increase drip by 2 units/kg/hr   0.3 - 0.7 No Bolus No Change   0.71 - 0.8 No Bolus Decrease drip by 1 units/kg/hr   0.81 - 0.99 No Bolus Decrease drip by 2 units/kg/hr   > 1 Hold Heparin for 1 hour Decrease drip by 3 units/kg/hr     Obtain anti-Xa 6 hours after initial bolus and 6 hours after any dose change until two consecutive therapeutic anti-Xa levels are achieved - then daily.     Sandra Min, WandyD

## 2024-03-18 NOTE — CONSULTS
Clinical Pharmacy Note  Vancomycin Consult    Pharmacy consult received for one-time dose of vancomycin in the Emergency Department per EL Mahoney.    Ht Readings from Last 1 Encounters:   03/17/24 1.778 m (5' 10\")        Wt Readings from Last 1 Encounters:   03/17/24 92.9 kg (204 lb 12.9 oz)         Assessment/Plan:  Vancomycin 1500 x 1 in ED.  If vancomycin is to continue on admission and pharmacy is to manage dosing, please re-consult with admission orders.      Sandra Min, WandyD

## 2024-03-18 NOTE — PROGRESS NOTES
Treatment time: 3.5 hours  Net UF: 245148.4 ml     Pre weight: 92.9 kg  Post weight:89.4 kg  EDW: 88 kg     Access used: Rt CVC    Access function: functions with  ml/min     Medications or blood products given: n/a     Regular outpatient schedule: MWF     Summary of response to treatment: Patient tolerated treatment well and without any complications. Patient remained stable throughout entire treatment. A copy of the treatment sheet will be faxed to the floor to be placed in the patient's chart.

## 2024-03-19 ENCOUNTER — APPOINTMENT (OUTPATIENT)
Dept: GENERAL RADIOLOGY | Age: 64
DRG: 189 | End: 2024-03-19
Payer: COMMERCIAL

## 2024-03-19 LAB
ALBUMIN SERPL-MCNC: 3.2 G/DL (ref 3.4–5)
ALP SERPL-CCNC: 69 U/L (ref 40–129)
ALT SERPL-CCNC: <5 U/L (ref 10–40)
ANION GAP SERPL CALCULATED.3IONS-SCNC: 11 MMOL/L (ref 3–16)
ANTI-XA UNFRAC HEPARIN: 0.26 IU/ML (ref 0.3–0.7)
ANTI-XA UNFRAC HEPARIN: 0.29 IU/ML (ref 0.3–0.7)
ANTI-XA UNFRAC HEPARIN: 0.3 IU/ML (ref 0.3–0.7)
AST SERPL-CCNC: 13 U/L (ref 15–37)
BILIRUB DIRECT SERPL-MCNC: <0.2 MG/DL (ref 0–0.3)
BILIRUB INDIRECT SERPL-MCNC: ABNORMAL MG/DL (ref 0–1)
BILIRUB SERPL-MCNC: 0.4 MG/DL (ref 0–1)
BUN SERPL-MCNC: 27 MG/DL (ref 7–20)
CALCIUM SERPL-MCNC: 9.7 MG/DL (ref 8.3–10.6)
CHLORIDE SERPL-SCNC: 95 MMOL/L (ref 99–110)
CO2 SERPL-SCNC: 27 MMOL/L (ref 21–32)
CREAT SERPL-MCNC: 4.6 MG/DL (ref 0.8–1.3)
DEPRECATED RDW RBC AUTO: 13.8 % (ref 12.4–15.4)
GFR SERPLBLD CREATININE-BSD FMLA CKD-EPI: 13 ML/MIN/{1.73_M2}
GLUCOSE BLD-MCNC: 141 MG/DL (ref 70–99)
GLUCOSE BLD-MCNC: 97 MG/DL (ref 70–99)
GLUCOSE SERPL-MCNC: 86 MG/DL (ref 70–99)
HCT VFR BLD AUTO: 32 % (ref 40.5–52.5)
HGB BLD-MCNC: 10.8 G/DL (ref 13.5–17.5)
MCH RBC QN AUTO: 32.4 PG (ref 26–34)
MCHC RBC AUTO-ENTMCNC: 33.8 G/DL (ref 31–36)
MCV RBC AUTO: 96 FL (ref 80–100)
PERFORMED ON: ABNORMAL
PERFORMED ON: NORMAL
PHOSPHATE SERPL-MCNC: 4.3 MG/DL (ref 2.5–4.9)
PLATELET # BLD AUTO: 123 K/UL (ref 135–450)
PMV BLD AUTO: 8.2 FL (ref 5–10.5)
POTASSIUM SERPL-SCNC: 4.3 MMOL/L (ref 3.5–5.1)
PROT SERPL-MCNC: 5.7 G/DL (ref 6.4–8.2)
RBC # BLD AUTO: 3.33 M/UL (ref 4.2–5.9)
SODIUM SERPL-SCNC: 133 MMOL/L (ref 136–145)
TROPONIN, HIGH SENSITIVITY: 359 NG/L (ref 0–22)
WBC # BLD AUTO: 5 K/UL (ref 4–11)

## 2024-03-19 PROCEDURE — 85520 HEPARIN ASSAY: CPT

## 2024-03-19 PROCEDURE — 6370000000 HC RX 637 (ALT 250 FOR IP): Performed by: HOSPITALIST

## 2024-03-19 PROCEDURE — 97530 THERAPEUTIC ACTIVITIES: CPT

## 2024-03-19 PROCEDURE — 6360000002 HC RX W HCPCS: Performed by: HOSPITALIST

## 2024-03-19 PROCEDURE — 85027 COMPLETE CBC AUTOMATED: CPT

## 2024-03-19 PROCEDURE — 80076 HEPATIC FUNCTION PANEL: CPT

## 2024-03-19 PROCEDURE — 6360000002 HC RX W HCPCS: Performed by: INTERNAL MEDICINE

## 2024-03-19 PROCEDURE — 71045 X-RAY EXAM CHEST 1 VIEW: CPT

## 2024-03-19 PROCEDURE — 2060000000 HC ICU INTERMEDIATE R&B

## 2024-03-19 PROCEDURE — 94760 N-INVAS EAR/PLS OXIMETRY 1: CPT

## 2024-03-19 PROCEDURE — 36415 COLL VENOUS BLD VENIPUNCTURE: CPT

## 2024-03-19 PROCEDURE — 99232 SBSQ HOSP IP/OBS MODERATE 35: CPT | Performed by: NURSE PRACTITIONER

## 2024-03-19 PROCEDURE — 6360000002 HC RX W HCPCS: Performed by: STUDENT IN AN ORGANIZED HEALTH CARE EDUCATION/TRAINING PROGRAM

## 2024-03-19 PROCEDURE — 6370000000 HC RX 637 (ALT 250 FOR IP): Performed by: INTERNAL MEDICINE

## 2024-03-19 PROCEDURE — 6370000000 HC RX 637 (ALT 250 FOR IP): Performed by: STUDENT IN AN ORGANIZED HEALTH CARE EDUCATION/TRAINING PROGRAM

## 2024-03-19 PROCEDURE — 2580000003 HC RX 258: Performed by: HOSPITALIST

## 2024-03-19 PROCEDURE — 97116 GAIT TRAINING THERAPY: CPT | Performed by: PHYSICAL THERAPIST

## 2024-03-19 PROCEDURE — 97535 SELF CARE MNGMENT TRAINING: CPT

## 2024-03-19 PROCEDURE — 97530 THERAPEUTIC ACTIVITIES: CPT | Performed by: PHYSICAL THERAPIST

## 2024-03-19 PROCEDURE — 97165 OT EVAL LOW COMPLEX 30 MIN: CPT

## 2024-03-19 PROCEDURE — 97162 PT EVAL MOD COMPLEX 30 MIN: CPT | Performed by: PHYSICAL THERAPIST

## 2024-03-19 PROCEDURE — 84484 ASSAY OF TROPONIN QUANT: CPT

## 2024-03-19 PROCEDURE — 80069 RENAL FUNCTION PANEL: CPT

## 2024-03-19 RX ORDER — HEPARIN SODIUM 1000 [USP'U]/ML
2000 INJECTION, SOLUTION INTRAVENOUS; SUBCUTANEOUS ONCE
Status: COMPLETED | OUTPATIENT
Start: 2024-03-19 | End: 2024-03-19

## 2024-03-19 RX ORDER — SEVELAMER CARBONATE 800 MG/1
2400 TABLET, FILM COATED ORAL
Status: DISCONTINUED | OUTPATIENT
Start: 2024-03-19 | End: 2024-03-21 | Stop reason: HOSPADM

## 2024-03-19 RX ORDER — OXYCODONE HYDROCHLORIDE AND ACETAMINOPHEN 5; 325 MG/1; MG/1
1 TABLET ORAL EVERY 4 HOURS PRN
Status: DISCONTINUED | OUTPATIENT
Start: 2024-03-19 | End: 2024-03-21 | Stop reason: HOSPADM

## 2024-03-19 RX ORDER — CINACALCET 30 MG/1
60 TABLET, FILM COATED ORAL
Status: DISCONTINUED | OUTPATIENT
Start: 2024-03-20 | End: 2024-03-21 | Stop reason: HOSPADM

## 2024-03-19 RX ORDER — REGADENOSON 0.08 MG/ML
0.4 INJECTION, SOLUTION INTRAVENOUS
Status: COMPLETED | OUTPATIENT
Start: 2024-03-19 | End: 2024-03-20

## 2024-03-19 RX ORDER — MIDODRINE HYDROCHLORIDE 10 MG/1
10 TABLET ORAL
Status: DISCONTINUED | OUTPATIENT
Start: 2024-03-20 | End: 2024-03-21 | Stop reason: HOSPADM

## 2024-03-19 RX ORDER — POLYETHYLENE GLYCOL 3350 17 G/17G
17 POWDER, FOR SOLUTION ORAL 2 TIMES DAILY
Status: DISCONTINUED | OUTPATIENT
Start: 2024-03-19 | End: 2024-03-21 | Stop reason: HOSPADM

## 2024-03-19 RX ORDER — LEVOTHYROXINE SODIUM 0.03 MG/1
25 TABLET ORAL DAILY
Status: DISCONTINUED | OUTPATIENT
Start: 2024-03-19 | End: 2024-03-21 | Stop reason: HOSPADM

## 2024-03-19 RX ORDER — SENNA AND DOCUSATE SODIUM 50; 8.6 MG/1; MG/1
2 TABLET, FILM COATED ORAL DAILY PRN
Status: DISCONTINUED | OUTPATIENT
Start: 2024-03-19 | End: 2024-03-21 | Stop reason: HOSPADM

## 2024-03-19 RX ADMIN — ATORVASTATIN CALCIUM 80 MG: 40 TABLET, FILM COATED ORAL at 20:07

## 2024-03-19 RX ADMIN — POLYETHYLENE GLYCOL 3350 17 G: 17 POWDER, FOR SOLUTION ORAL at 23:56

## 2024-03-19 RX ADMIN — OXYCODONE AND ACETAMINOPHEN 1 TABLET: 5; 325 TABLET ORAL at 10:17

## 2024-03-19 RX ADMIN — SEVELAMER CARBONATE 2400 MG: 800 TABLET, FILM COATED ORAL at 12:12

## 2024-03-19 RX ADMIN — SENNOSIDES AND DOCUSATE SODIUM 2 TABLET: 8.6; 5 TABLET ORAL at 17:10

## 2024-03-19 RX ADMIN — NITROGLYCERIN 1 INCH: 20 OINTMENT TOPICAL at 05:24

## 2024-03-19 RX ADMIN — SEVELAMER CARBONATE 2400 MG: 800 TABLET, FILM COATED ORAL at 17:10

## 2024-03-19 RX ADMIN — BUPROPION HYDROCHLORIDE 150 MG: 75 TABLET, FILM COATED ORAL at 09:11

## 2024-03-19 RX ADMIN — Medication 3 MG: at 20:08

## 2024-03-19 RX ADMIN — HEPARIN SODIUM 2000 UNITS: 1000 INJECTION INTRAVENOUS; SUBCUTANEOUS at 10:27

## 2024-03-19 RX ADMIN — HEPARIN SODIUM 1750 UNITS/HR: 10000 INJECTION, SOLUTION INTRAVENOUS at 18:31

## 2024-03-19 RX ADMIN — NITROGLYCERIN 1 INCH: 20 OINTMENT TOPICAL at 17:10

## 2024-03-19 RX ADMIN — ASPIRIN 81 MG: 81 TABLET, CHEWABLE ORAL at 09:11

## 2024-03-19 RX ADMIN — LEVOTHYROXINE SODIUM 25 MCG: 0.03 TABLET ORAL at 12:12

## 2024-03-19 RX ADMIN — QUETIAPINE FUMARATE 150 MG: 150 TABLET, EXTENDED RELEASE ORAL at 20:06

## 2024-03-19 RX ADMIN — POLYETHYLENE GLYCOL 3350 17 G: 17 POWDER, FOR SOLUTION ORAL at 12:19

## 2024-03-19 RX ADMIN — SODIUM CHLORIDE, PRESERVATIVE FREE 10 ML: 5 INJECTION INTRAVENOUS at 09:12

## 2024-03-19 RX ADMIN — GABAPENTIN 300 MG: 300 CAPSULE ORAL at 20:12

## 2024-03-19 RX ADMIN — CARVEDILOL 6.25 MG: 6.25 TABLET, FILM COATED ORAL at 09:11

## 2024-03-19 RX ADMIN — NITROGLYCERIN 1 INCH: 20 OINTMENT TOPICAL at 12:12

## 2024-03-19 RX ADMIN — CARVEDILOL 6.25 MG: 6.25 TABLET, FILM COATED ORAL at 17:10

## 2024-03-19 RX ADMIN — HEPARIN SODIUM 2000 UNITS: 1000 INJECTION INTRAVENOUS; SUBCUTANEOUS at 18:18

## 2024-03-19 RX ADMIN — ACETAMINOPHEN 325MG 650 MG: 325 TABLET ORAL at 05:30

## 2024-03-19 RX ADMIN — SODIUM CHLORIDE, PRESERVATIVE FREE 10 ML: 5 INJECTION INTRAVENOUS at 20:09

## 2024-03-19 RX ADMIN — OXYCODONE AND ACETAMINOPHEN 1 TABLET: 5; 325 TABLET ORAL at 19:27

## 2024-03-19 RX ADMIN — NITROGLYCERIN 1 INCH: 20 OINTMENT TOPICAL at 23:56

## 2024-03-19 RX ADMIN — VENLAFAXINE HYDROCHLORIDE 75 MG: 75 CAPSULE, EXTENDED RELEASE ORAL at 09:11

## 2024-03-19 ASSESSMENT — PAIN DESCRIPTION - DESCRIPTORS
DESCRIPTORS: ACHING
DESCRIPTORS: THROBBING;SHARP

## 2024-03-19 ASSESSMENT — PAIN SCALES - WONG BAKER: WONGBAKER_NUMERICALRESPONSE: NO HURT

## 2024-03-19 ASSESSMENT — PAIN SCALES - GENERAL
PAINLEVEL_OUTOF10: 5
PAINLEVEL_OUTOF10: 3
PAINLEVEL_OUTOF10: 0
PAINLEVEL_OUTOF10: 7
PAINLEVEL_OUTOF10: 0
PAINLEVEL_OUTOF10: 7

## 2024-03-19 ASSESSMENT — PAIN DESCRIPTION - ORIENTATION
ORIENTATION: LEFT
ORIENTATION: LEFT

## 2024-03-19 ASSESSMENT — PAIN DESCRIPTION - LOCATION
LOCATION: FOOT
LOCATION: FOOT

## 2024-03-19 ASSESSMENT — PAIN DESCRIPTION - FREQUENCY: FREQUENCY: CONTINUOUS

## 2024-03-19 ASSESSMENT — PAIN DESCRIPTION - ONSET: ONSET: ON-GOING

## 2024-03-19 ASSESSMENT — PAIN DESCRIPTION - PAIN TYPE: TYPE: ACUTE PAIN

## 2024-03-19 NOTE — PROGRESS NOTES
Clinical Pharmacy Note  Heparin Dosing       Lab Results   Component Value Date/Time    ANTIXAUHEP 0.29 03/19/2024 04:44 PM      Lab Results   Component Value Date/Time    HGB 10.8 03/19/2024 04:45 AM    HCT 32.0 03/19/2024 04:45 AM     03/19/2024 04:45 AM    INR 0.99 03/17/2024 09:06 PM       Current Infusion Rate: 1560 units/hr    Plan:  Bolus: 2000 units  Rate: increase to 1750 units/hr  Next anti-Xa level: 0030 03/20/24    Pharmacy will continue to monitor and adjust based on anti-Xa results.

## 2024-03-19 NOTE — PROGRESS NOTES
Physical Therapy  Facility/Department: [address] PROGRESSIVE CARE  Physical Therapy Initial Assessment    Name: Cal Sanchez  : 1960  MRN: [id number]  Date of Service: 3/19/2024    Discharge Recommendations:  Subacute/Skilled Nursing Facility   PT Equipment Recommendations  Equipment Needed: No      Cal Sanchez scored a 16/24 on the AM-PAC short mobility form. Current research shows that an AM-PAC score of 17 or less is typically not associated with a discharge to the patient's home setting. Based on the patient's AM-PAC score and their current functional mobility deficits, it is recommended that the patient have 3-5 sessions per week of Physical Therapy at d/c to increase the patient's independence.  Please see assessment section for further patient specific details.    If patient discharges prior to next session this note will serve as a discharge summary.  Please see below for the latest assessment towards goals.      Patient Diagnosis(es): The primary encounter diagnosis was Acute respiratory failure with hypoxia (HCC). Diagnoses of Thrombocytopenia (HCC), End-stage renal disease on hemodialysis (HCC), Elevated troponin, Elevated brain natriuretic peptide (BNP) level, and HCAP (healthcare-associated pneumonia) were also pertinent to this visit.  Past Medical History:  has a past medical history of Atrial fibrillation (HCC), CAD (coronary artery disease), Chronic kidney disease, Depression, Diabetes mellitus (HCC), Dialysis patient (HCC), Glaucoma, Hemodialysis patient (HCC), Hyperlipidemia, Hypertension, RLL pneumonia, and Sleep apnea.  Past Surgical History:  has a past surgical history that includes Pacemaker insertion; pacemaker placement; Colonoscopy; Toe amputation (Left, 2023); and Toe amputation (Left, 2024).    Assessment   Body Structures, Functions, Activity Limitations Requiring Skilled Therapeutic Intervention: Decreased functional mobility   Assessment: Pt is a 62 yo male who

## 2024-03-19 NOTE — ACP (ADVANCE CARE PLANNING)
Advance Care Planning     Advance Care Planning Activator (Inpatient)  Conversation Note      Date of ACP Conversation: 3/18/2024     Conversation Conducted with: Patient with Decision Making Capacity    ACP Activator: Junie Howard [facility]        Health Care Decision Maker:     Current Designated Health Care Decision Maker:     Primary Decision Maker: Junie Sanchez - Niece/Nephew - [phone]    Secondary Decision Maker: Wei Connolly - Brother/Sister    Today we Referral to spiritual care- for Niece to be HC POA, patient has step brother.    Care Preferences    Ventilation:  \"If you were in your present state of health and suddenly became very ill and were unable to breathe on your own, what would your preference be about the use of a ventilator (breathing machine) if it were available to you?\"      Would the patient desire the use of ventilator (breathing machine)?: yes    \"If your health worsens and it becomes clear that your chance of recovery is unlikely, what would your preference be about the use of a ventilator (breathing machine) if it were available to you?\"     Would the patient desire the use of ventilator (breathing machine)?: Yes      Resuscitation  \"CPR works best to restart the heart when there is a sudden event, like a heart attack, in someone who is otherwise healthy. Unfortunately, CPR does not typically restart the heart for people who have serious health conditions or who are very sick.\"    \"In the event your heart stopped as a result of an underlying serious health condition, would you want attempts to be made to restart your heart (answer \"yes\" for attempt to resuscitate) or would you prefer a natural death (answer \"no\" for do not attempt to resuscitate)?\" yes       [x] Yes   [] No   Educated Patient / Decision Maker regarding differences between Advance Directives and portable DNR orders.    Length of ACP Conversation in minutes:      Conversation Outcomes:  ACP discussion 
Advance Care Planning     Advance Care Planning Inpatient Note  [facility] Department    Today's Date: 3/19/2024  Unit: [facility] 5W PROGRESSIVE CARE    Received request from HealthCare Provider.  Upon review of chart and communication with care team, patient's decision making abilities are not in question.. Patient was/were present in the room during visit.    Goals of ACP Conversation:  Discuss advance care planning documents    Health Care Decision Makers:     No healthcare decision makers have been documented.  Click here to complete HealthCare Decision Makers including selection of the Healthcare Decision Maker Relationship (ie \"Primary\")  Summary:  No Decision Maker named by patient at this time    Advance Care Planning Documents (Patient Wishes):  None     Assessment:  Pt said he would like to talk with his brother before completing the AD docs.     Interventions:  Provided education on documents for clarity and greater understanding  Discussed and provided education on state decision maker hierarchy  Encouraged ongoing ACP conversation with future decision makers and loved ones  Reviewed but did not complete ACP document    Care Preferences Communicated:   No    Outcomes/Plan:  ACP Discussion: Postponed  Pt to follow-up    Electronically signed by Chaplain Judie on 3/19/2024 at 10:35 AM           
Alert/Awake/Cooperative

## 2024-03-19 NOTE — PROGRESS NOTES
Clinical Pharmacy Note  Heparin Dosing       Lab Results   Component Value Date/Time    ANTIXAUHEP 0.26 03/19/2024 08:31 AM      Lab Results   Component Value Date/Time    HGB 10.8 03/19/2024 04:45 AM    HCT 32.0 03/19/2024 04:45 AM     03/19/2024 04:45 AM    INR 0.99 03/17/2024 09:06 PM       Current Infusion Rate: 1370 units/hr    Plan:  Bolus: 2000 units  Rate: increase to 1560 units/hr  Next anti-Xa level: 1700 03/19/24    Pharmacy will continue to monitor and adjust based on anti-Xa results.    Wandy OrourkeD  3/19/2024 10:22 AM

## 2024-03-19 NOTE — PROGRESS NOTES
Department of Internal Medicine  Nephrology Progress Note    SUBJECTIVE:  We are following this patient for ERSD. Patient progress reviewed. The patient feels better .       S/p HD yesterday . Doing well.   REVIEW OF SYSTEMS:  Improved SOB;FERNANDES.     Physical Exam:    VITALS:  BP (!) 145/74   Pulse 85   Temp 97.4 °F (36.3 °C) (Oral)   Resp 17   Ht 1.778 m (5' 10\")   Wt 85.5 kg (188 lb 7.9 oz)   SpO2 99%   BMI 27.05 kg/m²   24HR INTAKE/OUTPUT:    Intake/Output Summary (Last 24 hours) at 3/19/2024 1316  Last data filed at 3/19/2024 0911  Gross per 24 hour   Intake 164 ml   Output --   Net 164 ml       Constitutional:  Resting   Respiratory:  Scattered Rhonchi   Gastrointestinal: No  tenderness.  Normal Bowel Sounds  Cardiovascular:  S1, S2 RRR   Edema:   + edema    DATA:    CBC:  Lab Results   Component Value Date/Time    WBC 5.0 03/19/2024 04:45 AM    RBC 3.33 03/19/2024 04:45 AM    HGB 10.8 03/19/2024 04:45 AM    HCT 32.0 03/19/2024 04:45 AM    MCV 96.0 03/19/2024 04:45 AM    MCH 32.4 03/19/2024 04:45 AM    MCHC 33.8 03/19/2024 04:45 AM    RDW 13.8 03/19/2024 04:45 AM     03/19/2024 04:45 AM    MPV 8.2 03/19/2024 04:45 AM     CMP:  Lab Results   Component Value Date/Time     03/19/2024 04:45 AM    K 4.3 03/19/2024 04:45 AM    K 4.6 03/17/2024 09:06 PM    CL 95 03/19/2024 04:45 AM    CO2 27 03/19/2024 04:45 AM    BUN 27 03/19/2024 04:45 AM    CREATININE 4.6 03/19/2024 04:45 AM    GFRAA 12 02/18/2022 12:11 AM    AGRATIO 1.2 03/17/2024 09:06 PM    LABGLOM 13 03/19/2024 04:45 AM    GLUCOSE 86 03/19/2024 04:45 AM    PROT 5.7 03/19/2024 04:45 AM    CALCIUM 9.7 03/19/2024 04:45 AM    BILITOT 0.4 03/19/2024 04:45 AM    ALKPHOS 69 03/19/2024 04:45 AM    AST 13 03/19/2024 04:45 AM    ALT <5 03/19/2024 04:45 AM      Hepatic Function Panel:   Lab Results   Component Value Date/Time    ALKPHOS 69 03/19/2024 04:45 AM    ALT <5 03/19/2024 04:45 AM    AST 13 03/19/2024 04:45 AM    PROT 5.7 03/19/2024 04:45 AM

## 2024-03-19 NOTE — CARE COORDINATION
Discharge Planning:  [phone]  Sho will initiate pre cert for this pt.  RADHA contacted [facility] [phone] in an effort to confirm pts dialysis slot/time.  No answer.   PLAN: Return to [facility] pending pre cert. HD at [facility].   MIGUEL Ann  [phone]  Electronically signed by MIGUEL Cherry on 3/19/2024 at 3:46 PM

## 2024-03-19 NOTE — PLAN OF CARE
Problem: Discharge Planning  Goal: Discharge to home or other facility with appropriate resources  Outcome: Progressing  Flowsheets (Taken 3/18/2024 2057)  Discharge to home or other facility with appropriate resources: Identify barriers to discharge with patient and caregiver     Problem: Pain  Goal: Verbalizes/displays adequate comfort level or baseline comfort level  Outcome: Progressing     Problem: Safety - Adult  Goal: Free from fall injury  Outcome: Progressing     Problem: ABCDS Injury Assessment  Goal: Absence of physical injury  Outcome: Progressing     Problem: Skin/Tissue Integrity  Goal: Absence of new skin breakdown  Description: 1.  Monitor for areas of redness and/or skin breakdown  2.  Assess vascular access sites hourly  3.  Every 4-6 hours minimum:  Change oxygen saturation probe site  4.  Every 4-6 hours:  If on nasal continuous positive airway pressure, respiratory therapy assess nares and determine need for appliance change or resting period.  Outcome: Progressing

## 2024-03-19 NOTE — PROGRESS NOTES
Clinical Pharmacy Note  Heparin Dosing       Lab Results   Component Value Date/Time    ANTIXAUHEP <0.10 03/18/2024 06:21 PM      Lab Results   Component Value Date/Time    HGB 13.2 03/18/2024 04:56 AM    HCT 38.9 03/18/2024 04:56 AM     03/18/2024 04:56 AM    INR 0.99 03/17/2024 09:06 PM       Current Infusion Rate: 1000 units/hr    Plan:  Bolus: 4000 units  Rate: 1370 units/hr  Next anti-Xa level: 0200    Pharmacy will continue to monitor and adjust based on anti-Xa results.

## 2024-03-19 NOTE — PROGRESS NOTES
300 mg Oral Nightly    QUEtiapine  150 mg Oral Nightly    venlafaxine  75 mg Oral Daily with breakfast      Infusions:    sodium chloride      heparin (PORCINE) Infusion 1,370 Units/hr (03/18/24 1954)     PRN Meds: oxyCODONE-acetaminophen, 1 tablet, Q4H PRN  sodium chloride flush, 5-40 mL, PRN  sodium chloride, , PRN  ondansetron, 4 mg, Q8H PRN   Or  ondansetron, 4 mg, Q6H PRN  polyethylene glycol, 17 g, Daily PRN  acetaminophen, 650 mg, Q6H PRN   Or  acetaminophen, 650 mg, Q6H PRN  hydrALAZINE, 10 mg, Q4H PRN        Labs and Imaging   XR CHEST PORTABLE    Result Date: 3/18/2024  EXAMINATION: ONE XRAY VIEW OF THE CHEST 3/18/2024 11:16 am COMPARISON: 03/17/2024 HISTORY: ORDERING SYSTEM PROVIDED HISTORY: CHF TECHNOLOGIST PROVIDED HISTORY: Reason for exam:->CHF Reason for Exam: chf FINDINGS: There is some increasing pulmonary vascular congestion in keeping with CHF. The heart appears unremarkable.  Pacer leads are in good position.  Serjio catheter in place, unchanged.     Increasing pulmonary vascular congestion in keeping with CHF.     XR CHEST PORTABLE    Result Date: 3/17/2024  EXAMINATION: ONE XRAY VIEW OF THE CHEST 3/17/2024 9:21 pm COMPARISON: 02/16/2024 HISTORY: ORDERING SYSTEM PROVIDED HISTORY: sob, hypoxia TECHNOLOGIST PROVIDED HISTORY: Reason for exam:->sob, hypoxia Reason for Exam: Shortness of Breath Initial encounter FINDINGS: React pacer stable in positioning.  Right central venous catheter stable in positioning.  Vascular stent in the left upper chest/axilla.  Increasing interstitial opacities bilaterally with more focal airspace opacity in the right infrahilar region.  No obvious pleural effusion or pneumothorax. Stable cardiac silhouette.  The osseous structures are stable.     Increasing interstitial opacities bilaterally with more focal airspace opacity in the right infrahilar region. Findings may represent pulmonary edema versus atypical pneumonia.       CBC:   Recent Labs     03/17/24  9974  03/18/24 0456 03/19/24 0445   WBC 5.7 6.2 5.0   HGB 13.4* 13.2* 10.8*   * 133* 123*       BMP:    Recent Labs     03/17/24 2106 03/19/24 0445    133*   K 4.6 4.3   CL 93* 95*   CO2 29 27   BUN 41* 27*   CREATININE 5.8* 4.6*   GLUCOSE 155* 86       Hepatic:   Recent Labs     03/17/24 2106 03/19/24 0445   AST 15 13*   ALT <5* <5*   BILITOT 0.5 0.4   ALKPHOS 83 69       Lipids:   Lab Results   Component Value Date/Time    CHOL 95 04/02/2014 05:47 AM    HDL 30 04/02/2014 05:47 AM    TRIG 118 04/02/2014 05:47 AM     Hemoglobin A1C:   Lab Results   Component Value Date/Time    LABA1C 4.3 11/29/2023 01:04 PM     TSH: No results found for: \"TSH\"  Troponin: No results found for: \"TROPONINT\"  Lactic Acid: No results for input(s): \"LACTA\" in the last 72 hours.  BNP:   Recent Labs     03/17/24 2106   PROBNP >70,000*       UA:  Lab Results   Component Value Date/Time    NITRU Negative 02/04/2024 06:15 PM    COLORU Yellow 02/04/2024 06:15 PM    PHUR 8.5 02/04/2024 06:15 PM    WBCUA 6 02/04/2024 06:15 PM    RBCUA 3 02/04/2024 06:15 PM    BACTERIA None Seen 02/04/2024 06:15 PM    CLARITYU CLOUDY 02/04/2024 06:15 PM    SPECGRAV 1.013 02/04/2024 06:15 PM    LEUKOCYTESUR TRACE 02/04/2024 06:15 PM    UROBILINOGEN 0.2 02/04/2024 06:15 PM    BILIRUBINUR Negative 02/04/2024 06:15 PM    BLOODU Negative 02/04/2024 06:15 PM    GLUCOSEU 100 02/04/2024 06:15 PM    KETUA Negative 02/04/2024 06:15 PM     Urine Cultures: No results found for: \"LABURIN\"  Blood Cultures:   Lab Results   Component Value Date/Time    BC No Growth after 4 days of incubation. 02/16/2024 06:42 AM     Lab Results   Component Value Date/Time    BLOODCULT2 No Growth after 4 days of incubation. 02/16/2024 08:33 AM     Organism:   Lab Results   Component Value Date/Time    ORG Staph aureus DNA Detected 02/04/2024 06:15 PM    ORG Staphylococcus aureus 02/04/2024 06:15 PM    ORG Staphylococcus aureus 02/04/2024 06:15 PM         Electronically signed by  Present (15 x15 bpm)

## 2024-03-19 NOTE — PRE-PROCEDURE INSTRUCTIONS
In preparation for stress test in the AM:    Avoid all caffeine starting at 2000 this evening (includes all reg/decaf coffee, soda, tea, chocolate, ect),  No solid foods after 0500 on 3/20/2024,  Patient may have water as desired/ordered up until time of test.     Thank you.

## 2024-03-19 NOTE — CARE COORDINATION
Discharge Planning:  RADHA spoke with pts bedside RN who will obtain a consult for PT/OT.  PLAN: Pt is from [facility] with HD at [facility].  SNF transports pt to and from HD.  Pt will need a Pre cert to return.  PT/OT consults requested.   MIGUEL Ann  [phone]  Electronically signed by MIGUEL Cherry on 3/19/2024 at 9:59 AM

## 2024-03-19 NOTE — PROGRESS NOTES
Pt has been complaining of headache and pain in his foot with amputated toes.  Pt rating pain 9/10.  Per report from day shift RN, pt did receive tylenol for pain but it did not help with his pain. Secure message sent to Annie OWUSU regarding pt c/o pain and tylenol was given but was not helpful with relieving pain.  Order received for 1 x dose of percocet given per order for pain.  Will continue to monitor.  Call light in reach and bed alarm on.

## 2024-03-19 NOTE — PROGRESS NOTES
Received orders from podiatry for left great toe surgical site care to apply xeroform to incision and cover with dry dressing every other day.  Per podiatry, patient does not need to be seen inpatient and can follow up outpatient for suture removal.    Electronically signed by Allegra Albright RN on 3/19/2024 at 3:37 PM

## 2024-03-19 NOTE — PROGRESS NOTES
Clinical Pharmacy Note  Heparin Dosing       Lab Results   Component Value Date/Time    ANTIXAUHEP 0.30 03/19/2024 02:09 AM      Lab Results   Component Value Date/Time    HGB 13.2 03/18/2024 04:56 AM    HCT 38.9 03/18/2024 04:56 AM     03/18/2024 04:56 AM    INR 0.99 03/17/2024 09:06 PM       Current Infusion Rate: 1370 units/hr    Plan:  Bolus: 0 units  Rate: 1370 units/hr  Next anti-Xa level: 0900 03/19/24    Pharmacy will continue to monitor and adjust based on anti-Xa results.    Sandra Min, PharmD

## 2024-03-19 NOTE — CONSULTS
Patient well known to Podiatry Service for recent toe amputation, lost to follow up. He is to have Xeroform dressing applied to surgical site every other day. Sutures may remain. He may follow up in [facility]    Horace Jordan DPM  [facility]  [phone]

## 2024-03-19 NOTE — PROGRESS NOTES
with shortness of breath and found to have Fluid overload, hypertensive emergency, and elevated troponin  Family / Caregiver Present: No     Social/Functional History  Social/Functional History  Additional Comments: Pt admitted 2/4-2/13/2024 and discharged home. Returned to hospital and admitted 2/16-2/21 and discharged to [facility] for skilled therapy. Prior to Feb admit, pt lived at home with niece and was independent with ADLs. Per chart review, niece unable to care for patient at home.       Objective     Observation/Palpation  Observation: peripheral neuropathy B LEs  Safety Devices  Type of Devices: Left in bed;Call light within reach;Nurse notified;Gait belt;Bed alarm in place     Toilet Transfers  Toilet - Technique: Ambulating  Equipment Used: Standard toilet  Toilet Transfer: Contact guard assistance  Toilet Transfers Comments: with grab bar support     ADL  LE Dressing: Minimal assistance (assist to fidelina socks and surgical shoe prior to ambulation)  Toileting:  (managed hygiene after voiding on toilet)  Functional Mobility Skilled Clinical Factors: Pt ambulating chair > bathroom > bed with RW and CGA/min A. Decreased safety awareness  Additional Comments: Anticipate pt will require at least min A for ADLs based on balance and endurance observed.     Activity Tolerance  Activity Tolerance: Patient tolerated evaluation without incident;Patient limited by endurance    Bed mobility  Supine to Sit:  (pt in chair at start of session)  Sit to Supine: Stand by assistance    Transfers  Sit to stand: Contact guard assistance;Minimal assistance  Stand to sit: Minimal assistance;Contact guard assistance  Transfer Comments: cuing for hand placement/ safety with walker    Vision  Vision: Within Functional Limits  Hearing  Hearing: Within functional limits  Hearing Exceptions: Hard of hearing/hearing concerns    Cognition  Overall Cognitive Status: Exceptions  Following Commands: Follows one step commands  consistently  Attention Span: Attends with cues to redirect  Memory: Decreased recall of recent events  Safety Judgement: Decreased awareness of need for safety;Decreased awareness of need for assistance  Insights: Decreased awareness of deficits  Cognition Comment: Forgetful, flat affect, easily irritable and vegue with answers at times  Orientation  Orientation Level: Oriented to person;Oriented to place;Oriented to time (partial situation)                  Education Given To: Patient  Education Provided: Role of Therapy;Plan of Care;Transfer Training  Education Method: Verbal;Demonstration  Barriers to Learning: None  Education Outcome: Demonstrated understanding;Verbalized understanding    LUE AROM (degrees)  LUE AROM : WFL  Left Hand AROM (degrees)  Left Hand AROM: WFL  RUE AROM (degrees)  RUE AROM : WFL  Right Hand AROM (degrees)  Right Hand AROM: WFL      AM-PAC - ADL  AM-PAC Daily Activity - Inpatient   How much help is needed for putting on and taking off regular lower body clothing?: A Lot  How much help is needed for bathing (which includes washing, rinsing, drying)?: A Lot  How much help is needed for toileting (which includes using toilet, bedpan, or urinal)?: A Lot  How much help is needed for putting on and taking off regular upper body clothing?: None  How much help is needed for taking care of personal grooming?: A Little  How much help for eating meals?: None  AM-PAC Inpatient Daily Activity Raw Score: 17  AM-PAC Inpatient ADL T-Scale Score : 37.26  ADL Inpatient CMS 0-100% Score: 50.11  ADL Inpatient CMS G-Code Modifier : CK    Goals  Short Term Goals  Time Frame for Short Term Goals: Prior to DC:  Short Term Goal 1: pt will complete ADL transfer/mobility with supervision  Short Term Goal 2: Pt will complete LB Dressing with supervision  Short Term Goal 3: Pt will complete toileting with supervision  Short Term Goal 4: Pt will tolerate standing > 5 min for functional task with supervision  Patient

## 2024-03-19 NOTE — PROGRESS NOTES
[facility]   Daily Progress Note      Admit Date:  3/17/2024    CC: SOB    HPI:   Cal Sanchez is a 63 y.o. male with PMH CKD/ESRD on HD, AF s/p Watchman, PAD s/p revascularization 2/2024, CAD, DM, HLP, HTN, CARMINE/CPAP    Adm to [facility] with progressively worsening SOB over a few days  Associated with chest pain- midsternal, felt like \"a punch.\" Non radiating.   He did not miss any HD tx. He feels he was following fluid restriction and taking all his meds.   Started on Hep gtt.   Had HD yesterday.     Today SOB improved, CP resolved.    Review of Systems:   General: Denies fever, chills, fatigue, weakness  Skin: Denies skin changes, rash, itching, lesions.  HEENT: Denies headache, dizziness, vision changes, nosebleeds, sore throat, nasal drainage  RESP: Denies cough, sputum, dyspnea, wheeze, snoring  CARD: Denies palpitations,  murmur  GI:Denies nausea, vomiting, heartburn, loss of appetite, change in bowels  : Denies frequency, pain, incontinence, polyuria  VASC: Denies claudication, leg cramps, clots  MUSC/SKEL: Denies pain, stiffness, arthritis  PSYCH: Denies anxiety, depression, stress  NEURO: Denies numbness, tingling, weakness,change in mood or memory  HEME: Denies abn bruising, bleeding, anemia  ENDO: Denies intolerance to heat, cold, excessive thirst or hunger, hx thyroid disease    Objective:   BP (!) 153/82   Pulse 100   Temp 97.7 °F (36.5 °C) (Oral)   Resp 16   Ht 1.778 m (5' 10\")   Wt 85.5 kg (188 lb 7.9 oz)   SpO2 95%   BMI 27.05 kg/m²         Intake/Output Summary (Last 24 hours) at 3/19/2024 1123  Last data filed at 3/19/2024 0911  Gross per 24 hour   Intake 164 ml   Output --   Net 164 ml     I/O since adm:     WEIGHT:Admit Weight - Scale: 92.9 kg (204 lb 12.9 oz)         Today  Weight - Scale: 85.5 kg (188 lb 7.9 oz)   DRY WEIGHT:  Wt Readings from Last 3 Encounters:   03/19/24 85.5 kg (188 lb 7.9 oz)   02/21/24 90 kg (198 lb 6.6 oz)   02/13/24 90.9 kg (200 lb 6.4 oz)  CAD  Cont hep gtt X48 hours  NM stress today    3.) Chronic HFmEF:   LVEF 45-50%.  Cont coreg  Not on ARNI, AA or SGLT2i D/T ESRD/hemodialysis  2gm Na diet, 64 fl oz restriction    4.) CLI / PAD LLE:  S/p revascularization LLE 2/2024  Cont GDMT- aspirin, lipitor  No plavix  Podiatry for wound tx     5.) Persistent AF:   Rate controlled   CHADSVASC 4, S/P Watchman  Cont aspirin    Electronically signed by CLEMENTE Landaverde - CNP on 3/19/2024 at 11:23 AM

## 2024-03-20 LAB
ALBUMIN SERPL-MCNC: 3.5 G/DL (ref 3.4–5)
ANION GAP SERPL CALCULATED.3IONS-SCNC: 13 MMOL/L (ref 3–16)
ANTI-XA UNFRAC HEPARIN: 0.25 IU/ML (ref 0.3–0.7)
ANTI-XA UNFRAC HEPARIN: 0.57 IU/ML (ref 0.3–0.7)
BUN SERPL-MCNC: 50 MG/DL (ref 7–20)
CALCIUM SERPL-MCNC: 10.1 MG/DL (ref 8.3–10.6)
CHLORIDE SERPL-SCNC: 94 MMOL/L (ref 99–110)
CO2 SERPL-SCNC: 27 MMOL/L (ref 21–32)
CREAT SERPL-MCNC: 5.9 MG/DL (ref 0.8–1.3)
DEPRECATED RDW RBC AUTO: 13.3 % (ref 12.4–15.4)
GFR SERPLBLD CREATININE-BSD FMLA CKD-EPI: 10 ML/MIN/{1.73_M2}
GLUCOSE SERPL-MCNC: 122 MG/DL (ref 70–99)
HCT VFR BLD AUTO: 29.7 % (ref 40.5–52.5)
HGB BLD-MCNC: 10.2 G/DL (ref 13.5–17.5)
MCH RBC QN AUTO: 32.5 PG (ref 26–34)
MCHC RBC AUTO-ENTMCNC: 34.2 G/DL (ref 31–36)
MCV RBC AUTO: 95 FL (ref 80–100)
PHOSPHATE SERPL-MCNC: 5.2 MG/DL (ref 2.5–4.9)
PLATELET # BLD AUTO: 101 K/UL (ref 135–450)
PMV BLD AUTO: 7.8 FL (ref 5–10.5)
POTASSIUM SERPL-SCNC: 4.2 MMOL/L (ref 3.5–5.1)
RBC # BLD AUTO: 3.13 M/UL (ref 4.2–5.9)
SODIUM SERPL-SCNC: 134 MMOL/L (ref 136–145)
WBC # BLD AUTO: 4 K/UL (ref 4–11)

## 2024-03-20 PROCEDURE — 3430000000 HC RX DIAGNOSTIC RADIOPHARMACEUTICAL: Performed by: NURSE PRACTITIONER

## 2024-03-20 PROCEDURE — 6370000000 HC RX 637 (ALT 250 FOR IP): Performed by: STUDENT IN AN ORGANIZED HEALTH CARE EDUCATION/TRAINING PROGRAM

## 2024-03-20 PROCEDURE — 80069 RENAL FUNCTION PANEL: CPT

## 2024-03-20 PROCEDURE — 85520 HEPARIN ASSAY: CPT

## 2024-03-20 PROCEDURE — 6360000002 HC RX W HCPCS: Performed by: INTERNAL MEDICINE

## 2024-03-20 PROCEDURE — 85027 COMPLETE CBC AUTOMATED: CPT

## 2024-03-20 PROCEDURE — 97116 GAIT TRAINING THERAPY: CPT

## 2024-03-20 PROCEDURE — 97530 THERAPEUTIC ACTIVITIES: CPT

## 2024-03-20 PROCEDURE — 6370000000 HC RX 637 (ALT 250 FOR IP): Performed by: INTERNAL MEDICINE

## 2024-03-20 PROCEDURE — 36415 COLL VENOUS BLD VENIPUNCTURE: CPT

## 2024-03-20 PROCEDURE — 90935 HEMODIALYSIS ONE EVALUATION: CPT

## 2024-03-20 PROCEDURE — 2060000000 HC ICU INTERMEDIATE R&B

## 2024-03-20 PROCEDURE — 94760 N-INVAS EAR/PLS OXIMETRY 1: CPT

## 2024-03-20 PROCEDURE — 97535 SELF CARE MNGMENT TRAINING: CPT

## 2024-03-20 PROCEDURE — 2580000003 HC RX 258: Performed by: HOSPITALIST

## 2024-03-20 PROCEDURE — 78452 HT MUSCLE IMAGE SPECT MULT: CPT

## 2024-03-20 PROCEDURE — 6360000002 HC RX W HCPCS: Performed by: NURSE PRACTITIONER

## 2024-03-20 PROCEDURE — A9502 TC99M TETROFOSMIN: HCPCS | Performed by: NURSE PRACTITIONER

## 2024-03-20 PROCEDURE — 93017 CV STRESS TEST TRACING ONLY: CPT

## 2024-03-20 PROCEDURE — 6370000000 HC RX 637 (ALT 250 FOR IP): Performed by: HOSPITALIST

## 2024-03-20 PROCEDURE — 99232 SBSQ HOSP IP/OBS MODERATE 35: CPT | Performed by: NURSE PRACTITIONER

## 2024-03-20 PROCEDURE — 6360000002 HC RX W HCPCS: Performed by: STUDENT IN AN ORGANIZED HEALTH CARE EDUCATION/TRAINING PROGRAM

## 2024-03-20 RX ORDER — HEPARIN SODIUM 1000 [USP'U]/ML
2000 INJECTION, SOLUTION INTRAVENOUS; SUBCUTANEOUS ONCE
Status: COMPLETED | OUTPATIENT
Start: 2024-03-20 | End: 2024-03-20

## 2024-03-20 RX ADMIN — SEVELAMER CARBONATE 2400 MG: 800 TABLET, FILM COATED ORAL at 09:39

## 2024-03-20 RX ADMIN — REGADENOSON 0.4 MG: 0.08 INJECTION, SOLUTION INTRAVENOUS at 08:28

## 2024-03-20 RX ADMIN — LEVOTHYROXINE SODIUM 25 MCG: 0.03 TABLET ORAL at 06:30

## 2024-03-20 RX ADMIN — GABAPENTIN 300 MG: 300 CAPSULE ORAL at 22:05

## 2024-03-20 RX ADMIN — CINACALCET HYDROCHLORIDE 60 MG: 30 TABLET, FILM COATED ORAL at 22:03

## 2024-03-20 RX ADMIN — TETROFOSMIN 10 MILLICURIE: 1.38 INJECTION, POWDER, LYOPHILIZED, FOR SOLUTION INTRAVENOUS at 07:05

## 2024-03-20 RX ADMIN — QUETIAPINE FUMARATE 150 MG: 150 TABLET, EXTENDED RELEASE ORAL at 22:04

## 2024-03-20 RX ADMIN — ATORVASTATIN CALCIUM 80 MG: 40 TABLET, FILM COATED ORAL at 22:03

## 2024-03-20 RX ADMIN — Medication 3 MG: at 22:04

## 2024-03-20 RX ADMIN — OXYCODONE AND ACETAMINOPHEN 1 TABLET: 5; 325 TABLET ORAL at 11:16

## 2024-03-20 RX ADMIN — NITROGLYCERIN 1 INCH: 20 OINTMENT TOPICAL at 06:30

## 2024-03-20 RX ADMIN — ASPIRIN 81 MG: 81 TABLET, CHEWABLE ORAL at 10:17

## 2024-03-20 RX ADMIN — HEPARIN SODIUM 2000 UNITS: 1000 INJECTION INTRAVENOUS; SUBCUTANEOUS at 01:37

## 2024-03-20 RX ADMIN — SEVELAMER CARBONATE 2400 MG: 800 TABLET, FILM COATED ORAL at 17:11

## 2024-03-20 RX ADMIN — SODIUM CHLORIDE, PRESERVATIVE FREE 10 ML: 5 INJECTION INTRAVENOUS at 22:10

## 2024-03-20 RX ADMIN — CARVEDILOL 6.25 MG: 6.25 TABLET, FILM COATED ORAL at 17:11

## 2024-03-20 RX ADMIN — POLYETHYLENE GLYCOL 3350 17 G: 17 POWDER, FOR SOLUTION ORAL at 10:17

## 2024-03-20 RX ADMIN — OXYCODONE AND ACETAMINOPHEN 1 TABLET: 5; 325 TABLET ORAL at 18:57

## 2024-03-20 RX ADMIN — HEPARIN SODIUM 1940 UNITS/HR: 10000 INJECTION, SOLUTION INTRAVENOUS at 10:00

## 2024-03-20 RX ADMIN — POLYETHYLENE GLYCOL 3350 17 G: 17 POWDER, FOR SOLUTION ORAL at 22:05

## 2024-03-20 RX ADMIN — TETROFOSMIN 30 MILLICURIE: 1.38 INJECTION, POWDER, LYOPHILIZED, FOR SOLUTION INTRAVENOUS at 08:46

## 2024-03-20 RX ADMIN — VENLAFAXINE HYDROCHLORIDE 75 MG: 75 CAPSULE, EXTENDED RELEASE ORAL at 10:17

## 2024-03-20 ASSESSMENT — PAIN - FUNCTIONAL ASSESSMENT: PAIN_FUNCTIONAL_ASSESSMENT: PREVENTS OR INTERFERES SOME ACTIVE ACTIVITIES AND ADLS

## 2024-03-20 ASSESSMENT — PAIN DESCRIPTION - LOCATION
LOCATION: FOOT
LOCATION: FOOT
LOCATION: FOOT;FINGER (COMMENT WHICH ONE)
LOCATION: FOOT

## 2024-03-20 ASSESSMENT — PAIN DESCRIPTION - DESCRIPTORS
DESCRIPTORS: BURNING
DESCRIPTORS: ACHING

## 2024-03-20 ASSESSMENT — PAIN DESCRIPTION - ORIENTATION: ORIENTATION: LEFT

## 2024-03-20 ASSESSMENT — PAIN SCALES - GENERAL
PAINLEVEL_OUTOF10: 7
PAINLEVEL_OUTOF10: 7
PAINLEVEL_OUTOF10: 8
PAINLEVEL_OUTOF10: 4

## 2024-03-20 ASSESSMENT — PAIN SCALES - WONG BAKER: WONGBAKER_NUMERICALRESPONSE: NO HURT

## 2024-03-20 NOTE — PROGRESS NOTES
Treatment time: 3.5 hours  Net UF: 3000 ml     Pre weight: 86.8 kg  Post weight:83.8 kg    Access used: CVC    Access function: good with  ml/min     Medications or blood products given: na     Regular outpatient schedule: MWF     Summary of response to treatment: Patient tolerated treatment well and without any complications. Patient remained stable throughout entire treatment. Copy of dialysis treatment record placed in chart, to be scanned into EMR.

## 2024-03-20 NOTE — CARE COORDINATION
Spoke with Pepito schreiber. Precert is pending for facility. HD at [facility].   Electronically signed by AMRIT Swift on 3/20/2024 at 1:42 PM  [phone]

## 2024-03-20 NOTE — PROGRESS NOTES
Department of Internal Medicine  Nephrology Progress Note    SUBJECTIVE:  We are following this patient for ERSD. Patient progress reviewed. The patient feels better .     Events noted  , had a fall last night ?   S/p LHC .   REVIEW OF SYSTEMS:  No  SOB;FERNANDES.   C.o finger swelling .     Physical Exam:    VITALS:  BP (!) 137/59   Pulse 97   Temp 98.5 °F (36.9 °C) (Oral)   Resp 18   Ht 1.778 m (5' 10\")   Wt 86.8 kg (191 lb 5.8 oz)   SpO2 97%   BMI 27.46 kg/m²   24HR INTAKE/OUTPUT:    Intake/Output Summary (Last 24 hours) at 3/20/2024 1259  Last data filed at 3/19/2024 1447  Gross per 24 hour   Intake 240 ml   Output --   Net 240 ml         Constitutional:  Resting   Respiratory:  Scattered Rhonchi   Gastrointestinal: No  tenderness.  Normal Bowel Sounds  Cardiovascular:  S1, S2 RRR   Edema:   + edema  Index finger hematoma .    DATA:    CBC:  Lab Results   Component Value Date/Time    WBC 4.0 03/20/2024 10:13 AM    RBC 3.13 03/20/2024 10:13 AM    HGB 10.2 03/20/2024 10:13 AM    HCT 29.7 03/20/2024 10:13 AM    MCV 95.0 03/20/2024 10:13 AM    MCH 32.5 03/20/2024 10:13 AM    MCHC 34.2 03/20/2024 10:13 AM    RDW 13.3 03/20/2024 10:13 AM     03/20/2024 10:13 AM    MPV 7.8 03/20/2024 10:13 AM     CMP:  Lab Results   Component Value Date/Time     03/20/2024 10:13 AM    K 4.2 03/20/2024 10:13 AM    K 4.6 03/17/2024 09:06 PM    CL 94 03/20/2024 10:13 AM    CO2 27 03/20/2024 10:13 AM    BUN 50 03/20/2024 10:13 AM    CREATININE 5.9 03/20/2024 10:13 AM    GFRAA 12 02/18/2022 12:11 AM    AGRATIO 1.2 03/17/2024 09:06 PM    LABGLOM 10 03/20/2024 10:13 AM    GLUCOSE 122 03/20/2024 10:13 AM    PROT 5.7 03/19/2024 04:45 AM    CALCIUM 10.1 03/20/2024 10:13 AM    BILITOT 0.4 03/19/2024 04:45 AM    ALKPHOS 69 03/19/2024 04:45 AM    AST 13 03/19/2024 04:45 AM    ALT <5 03/19/2024 04:45 AM      Hepatic Function Panel:   Lab Results   Component Value Date/Time    ALKPHOS 69 03/19/2024 04:45 AM    ALT <5 03/19/2024 04:45 AM

## 2024-03-20 NOTE — DISCHARGE SUMMARY
upper chest/axilla.  Increasing interstitial opacities bilaterally with more focal airspace opacity in the right infrahilar region.  No obvious pleural effusion or pneumothorax. Stable cardiac silhouette.  The osseous structures are stable.     Increasing interstitial opacities bilaterally with more focal airspace opacity in the right infrahilar region. Findings may represent pulmonary edema versus atypical pneumonia.       CBC:   Recent Labs     03/18/24  0456 03/19/24  0445 03/20/24  1013   WBC 6.2 5.0 4.0   HGB 13.2* 10.8* 10.2*   * 123* 101*     BMP:    Recent Labs     03/17/24  2106 03/19/24  0445 03/20/24  1013    133* 134*   K 4.6 4.3 4.2   CL 93* 95* 94*   CO2 29 27 27   BUN 41* 27* 50*   CREATININE 5.8* 4.6* 5.9*   GLUCOSE 155* 86 122*     Hepatic:   Recent Labs     03/17/24 2106 03/19/24  0445   AST 15 13*   ALT <5* <5*   BILITOT 0.5 0.4   ALKPHOS 83 69     Lipids:   Lab Results   Component Value Date/Time    CHOL 95 04/02/2014 05:47 AM    HDL 30 04/02/2014 05:47 AM    TRIG 118 04/02/2014 05:47 AM     Hemoglobin A1C:   Lab Results   Component Value Date/Time    LABA1C 4.3 11/29/2023 01:04 PM     TSH: No results found for: \"TSH\"  Troponin: No results found for: \"TROPONINT\"  Lactic Acid: No results for input(s): \"LACTA\" in the last 72 hours.  BNP:   Recent Labs     03/17/24 2106   PROBNP >70,000*     UA:  Lab Results   Component Value Date/Time    NITRU Negative 02/04/2024 06:15 PM    COLORU Yellow 02/04/2024 06:15 PM    PHUR 8.5 02/04/2024 06:15 PM    WBCUA 6 02/04/2024 06:15 PM    RBCUA 3 02/04/2024 06:15 PM    BACTERIA None Seen 02/04/2024 06:15 PM    CLARITYU CLOUDY 02/04/2024 06:15 PM    SPECGRAV 1.013 02/04/2024 06:15 PM    LEUKOCYTESUR TRACE 02/04/2024 06:15 PM    UROBILINOGEN 0.2 02/04/2024 06:15 PM    BILIRUBINUR Negative 02/04/2024 06:15 PM    BLOODU Negative 02/04/2024 06:15 PM    GLUCOSEU 100 02/04/2024 06:15 PM    KETUA Negative 02/04/2024 06:15 PM     Urine Cultures: No results

## 2024-03-20 NOTE — PROGRESS NOTES
consistently  Attention Span: Attends with cues to redirect  Memory: Decreased recall of recent events  Safety Judgement: Decreased awareness of need for safety;Decreased awareness of need for assistance  Insights: Decreased awareness of deficits  Cognition Comment: Forgetful, flat affect, easily irritable and vegue with answers at times  Orientation  Overall Orientation Status: Within Functional Limits  Orientation Level: Oriented to person;Oriented to place;Oriented to time (partial situation)          Education Given To: Patient  Education Provided: Role of Therapy;Plan of Care;Transfer Training;ADL Adaptive Strategies  Education Method: Verbal;Demonstration  Barriers to Learning: None  Education Outcome: Demonstrated understanding;Verbalized understanding;Continued education needed                        AM-PAC - ADL  AM-PAC Daily Activity - Inpatient   How much help is needed for putting on and taking off regular lower body clothing?: A Lot  How much help is needed for bathing (which includes washing, rinsing, drying)?: A Lot  How much help is needed for toileting (which includes using toilet, bedpan, or urinal)?: A Lot  How much help is needed for putting on and taking off regular upper body clothing?: None  How much help is needed for taking care of personal grooming?: A Little  How much help for eating meals?: None  AM-PAC Inpatient Daily Activity Raw Score: 17  AM-PAC Inpatient ADL T-Scale Score : 37.26  ADL Inpatient CMS 0-100% Score: 50.11  ADL Inpatient CMS G-Code Modifier : CK      Goals  Short Term Goals  Time Frame for Short Term Goals: Prior to DC. Status: goals ongoing  Short Term Goal 1: pt will complete ADL transfer/mobility with supervision  Short Term Goal 2: Pt will complete LB Dressing with supervision  Short Term Goal 3: Pt will complete toileting with supervision  Short Term Goal 4: Pt will tolerate standing > 5 min for functional task with supervision  Patient Goals   Patient goals : to get  stronger and eventually return home     Therapy Time     Individual Co-treatment   Time In 1420     Time Out 1500     Minutes 40       Josefina Lew PETER/L,515    OTR was consulted with this patients treatment/intervention plan.

## 2024-03-20 NOTE — DISCHARGE INSTR - COC
Continuity of Care Form    Patient Name: Cal Sanchez   :  1960  MRN:  [id number]    Admit date:  3/17/2024  Discharge date:  3/21/24    Code Status Order: Full Code   Advance Directives:     Admitting Physician:  Ferny Miles MD  PCP: Tania Moses MD    Discharging Nurse:   Discharging Hospital Unit/Room#: E1P-4080/5103-01  Discharging Unit Phone Number: [phone]    Emergency Contact:   Extended Emergency Contact Information  Primary Emergency Contact: Junie Sanchez  Mobile Phone: [phone]  Relation: Niece/Nephew  Secondary Emergency Contact: Wei Connolly  Relation: Brother/Sister    Past Surgical History:  Past Surgical History:   Procedure Laterality Date    COLONOSCOPY      PACEMAKER INSERTION      PACEMAKER PLACEMENT      TOE AMPUTATION Left 2023    AMPUTATION SECOND AND THIRD DIGITS LEFT FOOT performed by Horace Jordan DPM at [facility] OR    TOE AMPUTATION Left 2024    LEFT HALLUX AMPUTATION performed by Horace Jordan DPM at [facility] OR       Immunization History:   Immunization History   Administered Date(s) Administered    COVID-19, MODERNA BLUE border, Primary or Immunocompromised, (age 12y+), IM, 100 mcg/0.5mL 03/15/2021    COVID-19, MODERNA Bivalent, (age 12y+), IM, 50 mcg/0.5 mL 2022    Influenza Vaccine, unspecified formulation 2017    Influenza Virus Vaccine 10/09/2018       Active Problems:  Patient Active Problem List   Diagnosis Code    Chest pain R07.9    Type 2 diabetes mellitus ([facility]) E11.9    Chronic renal failure N18.9    Coronary artery disease involving native coronary artery of native heart without angina pectoris I25.10    ESRD on hemodialysis ([facility]) N18.6, Z99.2    Diabetes type 2, controlled ([facility]) E11.9    Obesity (BMI 30-39.9) E66.9    Depression F32.A    Sepsis ([facility]) A41.9    RLL pneumonia J18.9    Warfarin-induced coagulopathy ([facility]) D68.32, T45.515A    Paroxysmal atrial fibrillation ([facility]) I48.0    Leg pain M79.606    Normocytic anemia  D64.9    Thrombocytopenia (HCC) D69.6    Fluid overload E87.70    Hyperkalemia E87.5    Toe gangrene ([facility]) I96    Critical limb ischemia of both lower extremities ([facility]) I70.223    Critical limb ischemia of left lower extremity ([facility]) I70.222    Primary hypertension I10    Diabetic infection of left foot ([facility]) E11.628, L08.9    Severe sepsis ([facility]) A41.9, R65.20    Tachycardia R00.0    Tachypnea R06.82    Fever R50.9    Elevated troponin I level R79.89    Hypotension I95.9    Acute metabolic encephalopathy G93.41    Osteomyelitis of left foot ([facility]) M86.9    Staphylococcus aureus bacteremia R78.81, B95.61    Cardiac pacemaker in situ Z95.0    Cellulitis of foot L03.119    Septicemia ([facility]) A41.9    PVD (peripheral vascular disease) ([facility]) I73.9    CRP elevated R79.82    Non morbid obesity due to excess calories E66.09    Hyperlipidemia E78.5    Generalized weakness R53.1    Hypoglycemia E16.2    Marijuana smoker F12.90    Multiple falls R29.6    Foot infection L08.9    Bacteremia R78.81    Awaiting skilled nursing facility placement Z78.9    Hypertensive emergency I16.1       Isolation/Infection:   Isolation            No Isolation          Patient Infection Status       Infection Onset Added Last Indicated Last Indicated By Review Planned Expiration Resolved Resolved By    None active    Resolved    COVID-19 22 COVID-19, Rapid   22 Infection                        Nurse Assessment:  Last Vital Signs: BP (!) 137/59   Pulse 97   Temp 98.5 °F (36.9 °C) (Oral)   Resp 18   Ht 1.778 m (5' 10\")   Wt 86.8 kg (191 lb 5.8 oz)   SpO2 97%   BMI 27.46 kg/m²     Last documented pain score (0-10 scale): Pain Level: 4  Last Weight:   Wt Readings from Last 1 Encounters:   24 86.8 kg (191 lb 5.8 oz)     Mental Status:  oriented and alert    IV Access:  - Dialysis Catheter  - site  Left Forearm Fistula , insertion date:      Nursing Mobility/ADLs:  Walking   Assisted  Transfer

## 2024-03-20 NOTE — PROGRESS NOTES
Patient returned to room from HD at this time VSS as charted in flow sheets call light in hand bed in low position at this time patient denies any needs or wants

## 2024-03-20 NOTE — PROGRESS NOTES
Clinical Pharmacy Note  Heparin Dosing       Lab Results   Component Value Date/Time    ANTIXAUHEP 0.25 03/20/2024 12:50 AM      Lab Results   Component Value Date/Time    HGB 10.8 03/19/2024 04:45 AM    HCT 32.0 03/19/2024 04:45 AM     03/19/2024 04:45 AM    INR 0.99 03/17/2024 09:06 PM       Current Infusion Rate: 1750 units/hr    Plan:  Bolus: 2000 units  Rate: 1940 units/hr  Next anti-Xa level: 0800 03/20/24    Pharmacy will continue to monitor and adjust based on anti-Xa results.     Sandra Min, PharmD

## 2024-03-20 NOTE — PROGRESS NOTES
%    Assessment/Plan:    1.) SOB:   Likely D/T Hypervolemia  Improved after HD    2.) Elevated Troponin:   May be falsely elevated D/T ESRD and HF  However, C/O chest pain and hx non obstructive CAD  Underwent NM stress 3/19- normal without evidence of ischemia.  hep gtt X48 hours- completed    3.) Chronic HFmEF:   LVEF 45-50%.  Cont coreg  Not on ARNI, AA or SGLT2i D/T ESRD/hemodialysis  2gm Na diet, 64 fl oz restriction    4.) CLI / PAD LLE:  S/p revascularization LLE 2/2024  Cont GDMT- aspirin, lipitor  No plavix  Podiatry for wound tx     5.) Persistent AF:   Rate controlled   CHADSVASC 4, S/P Watchman  Cont aspirin    Stable from cardiovascular standpoint. Cardiology will sign off.  Follow up:      Electronically signed by CLEMENTE Landaverde CNP on 3/20/2024 at 10:53 AM

## 2024-03-20 NOTE — PROGRESS NOTES
Patient stated that he hit his hand last night while getting out of bed. Patient left pointer finger hard dark, raised, harden, blister. Patient stated that the blister was painful. Attending was notified.

## 2024-03-20 NOTE — CARE COORDINATION
Call placed to [facility] admissions precert has not been obtained. Sho with admission is going to check to see if patient can return under long term care. Awaiting return call from Sho.   Electronically signed by AMRIT Swift on 3/20/2024 at 4:43 PM

## 2024-03-20 NOTE — PROGRESS NOTES
Physical Therapy  Facility/Department: [facility] 5W PROGRESSIVE CARE  Physical Therapy Daily Treatment Note    Name: Cal Sanchez  : 1960  MRN: [id number]  Date of Service: 3/20/2024    Discharge Recommendations:  Subacute/Skilled Nursing Facility (3-5 x wk skilled PT)      Patient Diagnosis(es): The primary encounter diagnosis was Acute respiratory failure with hypoxia (HCC). Diagnoses of Thrombocytopenia (HCC), End-stage renal disease on hemodialysis (HCC), Elevated troponin, Elevated brain natriuretic peptide (BNP) level, and HCAP (healthcare-associated pneumonia) were also pertinent to this visit.  Past Medical History:  has a past medical history of Atrial fibrillation (HCC), CAD (coronary artery disease), Chronic kidney disease, Depression, Diabetes mellitus (HCC), Dialysis patient (HCC), Glaucoma, Hemodialysis patient (HCC), Hyperlipidemia, Hypertension, RLL pneumonia, and Sleep apnea.  Past Surgical History:  has a past surgical history that includes Pacemaker insertion; pacemaker placement; Colonoscopy; Toe amputation (Left, 2023); and Toe amputation (Left, 2024).    Assessment   Body Structures, Functions, Activity Limitations Requiring Skilled Therapeutic Intervention: Decreased functional mobility ;Decreased strength;Decreased safe awareness;Decreased endurance  Assessment: Pt is a 62 yo male who was adm to hosp from his SNF with fluid overload and hypertensive; pt is below his potential for functional mobility tasks and is a high fall risk, needing Min A for transfers and very short 10 ft distance ambulation wt RW this date.  Pt cont to be unsafe to discharge home and will need continued therapy to assist pt in attaining his max potential.  Recommend return to SNF with skilled PT 3-5 x wk as able, once medically stable, will follow while in acute setting.  Cal Sanchez scored a 16/24 on the AM-PAC short mobility form. Current research shows that an AM-PAC score of 17 or less is  Verbal;Demonstration  Barriers to Learning: Cognition;None  Education Outcome: Verbalized understanding;Continued education needed    Therapy Time   Individual Concurrent Group Co-treatment   Time In 1125         Time Out 1155         Minutes 30          1 gt, 1 act charges   Iliana Chacon, PT Electronically signed by Iliana Chacon, JANY on 3/20/2024 at 12:00 PM

## 2024-03-20 NOTE — PROGRESS NOTES
PRN  sennosides-docusate sodium, 2 tablet, Daily PRN  sodium chloride flush, 5-40 mL, PRN  sodium chloride, , PRN  ondansetron, 4 mg, Q8H PRN   Or  ondansetron, 4 mg, Q6H PRN  acetaminophen, 650 mg, Q6H PRN   Or  acetaminophen, 650 mg, Q6H PRN  hydrALAZINE, 10 mg, Q4H PRN      Labs and Imaging   XR CHEST PORTABLE    Result Date: 3/18/2024  EXAMINATION: ONE XRAY VIEW OF THE CHEST 3/18/2024 11:16 am COMPARISON: 03/17/2024 HISTORY: ORDERING SYSTEM PROVIDED HISTORY: CHF TECHNOLOGIST PROVIDED HISTORY: Reason for exam:->CHF Reason for Exam: chf FINDINGS: There is some increasing pulmonary vascular congestion in keeping with CHF. The heart appears unremarkable.  Pacer leads are in good position.  Serjio catheter in place, unchanged.     Increasing pulmonary vascular congestion in keeping with CHF.     XR CHEST PORTABLE    Result Date: 3/17/2024  EXAMINATION: ONE XRAY VIEW OF THE CHEST 3/17/2024 9:21 pm COMPARISON: 02/16/2024 HISTORY: ORDERING SYSTEM PROVIDED HISTORY: sob, hypoxia TECHNOLOGIST PROVIDED HISTORY: Reason for exam:->sob, hypoxia Reason for Exam: Shortness of Breath Initial encounter FINDINGS: React pacer stable in positioning.  Right central venous catheter stable in positioning.  Vascular stent in the left upper chest/axilla.  Increasing interstitial opacities bilaterally with more focal airspace opacity in the right infrahilar region.  No obvious pleural effusion or pneumothorax. Stable cardiac silhouette.  The osseous structures are stable.     Increasing interstitial opacities bilaterally with more focal airspace opacity in the right infrahilar region. Findings may represent pulmonary edema versus atypical pneumonia.       CBC:   Recent Labs     03/19/24  0445 03/20/24  1013   WBC 5.0 4.0   HGB 10.8* 10.2*   * 101*     BMP:    Recent Labs     03/19/24  0445 03/20/24  1013   * 134*   K 4.3 4.2   CL 95* 94*   CO2 27 27   BUN 27* 50*   CREATININE 4.6* 5.9*   GLUCOSE 86 122*     Hepatic:   Recent

## 2024-03-21 VITALS
BODY MASS INDEX: 26.45 KG/M2 | HEIGHT: 70 IN | SYSTOLIC BLOOD PRESSURE: 165 MMHG | RESPIRATION RATE: 20 BRPM | HEART RATE: 89 BPM | WEIGHT: 184.75 LBS | OXYGEN SATURATION: 96 % | DIASTOLIC BLOOD PRESSURE: 78 MMHG | TEMPERATURE: 98.4 F

## 2024-03-21 PROCEDURE — 94760 N-INVAS EAR/PLS OXIMETRY 1: CPT

## 2024-03-21 PROCEDURE — 6370000000 HC RX 637 (ALT 250 FOR IP): Performed by: STUDENT IN AN ORGANIZED HEALTH CARE EDUCATION/TRAINING PROGRAM

## 2024-03-21 PROCEDURE — 6370000000 HC RX 637 (ALT 250 FOR IP): Performed by: INTERNAL MEDICINE

## 2024-03-21 PROCEDURE — 2580000003 HC RX 258: Performed by: HOSPITALIST

## 2024-03-21 RX ADMIN — SEVELAMER CARBONATE 2400 MG: 800 TABLET, FILM COATED ORAL at 13:08

## 2024-03-21 RX ADMIN — SEVELAMER CARBONATE 2400 MG: 800 TABLET, FILM COATED ORAL at 09:05

## 2024-03-21 RX ADMIN — OXYCODONE AND ACETAMINOPHEN 1 TABLET: 5; 325 TABLET ORAL at 09:06

## 2024-03-21 RX ADMIN — VENLAFAXINE HYDROCHLORIDE 75 MG: 75 CAPSULE, EXTENDED RELEASE ORAL at 09:06

## 2024-03-21 RX ADMIN — SODIUM CHLORIDE, PRESERVATIVE FREE 10 ML: 5 INJECTION INTRAVENOUS at 09:07

## 2024-03-21 RX ADMIN — CARVEDILOL 6.25 MG: 6.25 TABLET, FILM COATED ORAL at 09:06

## 2024-03-21 RX ADMIN — LEVOTHYROXINE SODIUM 25 MCG: 0.03 TABLET ORAL at 07:00

## 2024-03-21 RX ADMIN — ASPIRIN 81 MG: 81 TABLET, CHEWABLE ORAL at 09:06

## 2024-03-21 RX ADMIN — POLYETHYLENE GLYCOL 3350 17 G: 17 POWDER, FOR SOLUTION ORAL at 09:05

## 2024-03-21 ASSESSMENT — PAIN DESCRIPTION - DESCRIPTORS: DESCRIPTORS: ACHING

## 2024-03-21 ASSESSMENT — PAIN - FUNCTIONAL ASSESSMENT: PAIN_FUNCTIONAL_ASSESSMENT: PREVENTS OR INTERFERES SOME ACTIVE ACTIVITIES AND ADLS

## 2024-03-21 ASSESSMENT — PAIN DESCRIPTION - ORIENTATION: ORIENTATION: LEFT

## 2024-03-21 ASSESSMENT — PAIN SCALES - GENERAL
PAINLEVEL_OUTOF10: 2
PAINLEVEL_OUTOF10: 8

## 2024-03-21 ASSESSMENT — PAIN SCALES - WONG BAKER: WONGBAKER_NUMERICALRESPONSE: NO HURT

## 2024-03-21 ASSESSMENT — PAIN DESCRIPTION - LOCATION: LOCATION: FOOT

## 2024-03-21 NOTE — CONSULTS
[facility]          [address] [place] [address]                              CONSULTATION      PATIENT NAME: NATHALIA HOFF               : 1960  MED REC NO: [id number]                      ROOM: [place] [address]  ACCOUNT NO: [id number]                       ADMIT DATE: 2024  PROVIDER: Junaid Landry MD      SUBJECTIVE:  We are following the patient for end-stage renal disease.  Chart reviewed.  Events noted.  The patient is doing remarkably well.    REVIEW OF SYSTEMS:  No chest pain, shortness of breath, or dyspnea on exertion.  Finger pain improved.    OBJECTIVE:  VITAL SIGNS:  Stable.  CHEST:  Clear to auscultation.  CVS:  S1, S2 audible.  Regular rate and rhythm.  ABDOMEN:  Benign.  EXTREMITIES:  Trace edema.  Index finger hematoma stable.    LABORATORY DATA:  Reviewed.    ASSESSMENT AND PLAN:    1. Coronary artery disease, status post cardiac catheterization.  No intervention needed.  2. End-stage renal disease, on hemodialysis Monday, Wednesday, Friday.  3. Hypertension, controlled.  4. Anemia of chronic kidney disease.  Controlled.  Possible discharge later today.  Advised to follow up with primary nephrologist.  Advised compliance with dialysis treatment.          JUNAID LANDRY MD      D:  2024 13:42:55     T:  2024 16:25:16     MITCH/KIRBY  Job #:  [id number]     Doc#:  [id number]

## 2024-03-21 NOTE — CARE COORDINATION
Discharge Planning:  RADHA spoke with Sho at [facility]. [phone]    Sho stated she has not yet heard re: the pre cert but is able to accept this pt under his Medicaid.   Transport requested through [employer] for 1300.  MIGUEL Ann \[facility]\""  [phone]  Electronically signed by MIGUEL Cherry on 3/21/2024 at 9:31 AM

## 2024-03-21 NOTE — CARE COORDINATION
Case Management Discharge Note          Date / Time of Note: 3/21/2024 9:34 AM                  Patient Name: Cal Sanchez   YOB: 1960  Diagnosis: Thrombocytopenia (HCC) [D69.6]  Elevated troponin [R79.89]  Fluid overload [E87.70]  Elevated brain natriuretic peptide (BNP) level [R79.89]  End-stage renal disease on hemodialysis (HCC) [N18.6, Z99.2]  Acute respiratory failure with hypoxia (HCC) [J96.01]  HCAP (healthcare-associated pneumonia) [J18.9]   Date / Time: 3/17/2024  8:18 PM    Financial:  Payor: PAULINE GARZA [place] / Plan: [employer] [place] DUAL / Product Type: *No Product type* /      Pharmacy:    [id number] - [place], [place] - [address] [address] - P [phone] - F [phone]  [address] [address] [facility] [address]  Phone: [phone] Fax: [phone]    [facility] [id number] - [place], [place] - [address]  - P [phone] - F [phone]  [address]   [facility] [address]  Phone: [phone] Fax: [phone]      Assistance purchasing medications?:    Assistance provided by Case Management: None at this time    DISCHARGE Disposition: Skilled Nursing Facility (SNF)    Nursing Facility:   Discharging to Facility/ Agency   [facility]  Address:  [facility] [place] [address]   Phone:  [phone]  Fax:  [phone]      LOC at discharge: Skilled Nursing  DELFINO Completed: Yes             NURSING REPORT NUMBER: [id number]                  Notification completed in HENS/PAS?:  Not Applicable        Dialysis:  Dialysis patient: Yes    Dialysis Center:  [facility]  Phone: [phone](p); [phone](f)  SW spoke with Amelia at [facility].  Pt to resume HD MWF at 0700.    Transportation:  Transportation PLAN for discharge: EMS transportation   Mode of Transport: Ambulance stretcher - BLS  Reason for medical transport: Other: Fall risk, weakness, intermittent confusion, CAD, ESRD  Name of Transport Company: [facility]   Phone: [phone]  Time of Transport: 1300    Transport form completed: Yes    IMM Completed:   Yes, Case management has presented and reviewed IMM letter #2.       IMM Letter date given:: 03/21/24   .   Patient and/or family/POA verbalized understanding of their medicare rights and appeal process if needed. Patient and/or family/POA has signed, initialed and placed the date and time on IMM letter #2 on the the appropriate lines. Copy of letter offered and they are aware that the original copy of IMM letter #2 is available prior to discharge from the paper chart on the unit.  Electronic documentation has been entered into epic for IMM letter #2 and original paper copy has been added to the paper chart at the nurses station.     Additional CM Notes: SW notified Sho at [facility]. Sho confirmed having transport arranged for pts HD on MWF.  Bedside RN notified of d/c time and pts Junie shah notified of d/c.    The Plan for Transition of Care is related to the following treatment goals of Thrombocytopenia (HCC) [D69.6]  Elevated troponin [R79.89]  Fluid overload [E87.70]  Elevated brain natriuretic peptide (BNP) level [R79.89]  End-stage renal disease on hemodialysis (HCC) [N18.6, Z99.2]  Acute respiratory failure with hypoxia (HCC) [J96.01]  HCAP (healthcare-associated pneumonia) [J18.9]    The Patient and/or patient representative Cal and his family were provided with a choice of provider and agrees with the discharge plan Yes    Freedom of choice list was provided with basic dialogue that supports the patient's individualized plan of care/goals and shares the quality data associated with the providers. Yes    MIGUEL Cherry  [facility]   Case Management Department  Ph: [phone]  Fax: [phone]  Electronically signed by MIGUEL Cherry on 3/21/2024 at 9:44 AM

## 2024-03-21 NOTE — DISCHARGE INSTR - DIET

## 2024-03-25 NOTE — PROGRESS NOTES
Physician Progress Note      PATIENT:               NATHALIA HOFF  CSN #:                  [id number]  :                       1960  ADMIT DATE:       3/17/2024 8:18 PM  DISCH DATE:        3/21/2024 1:31 PM  RESPONDING  PROVIDER #:        Tae Gonzalez MD          QUERY TEXT:    Pt admitted with acute resp failure. Pt noted to have pulmonary edema . If   possible, please document in the progress notes and discharge summary if you   are evaluating and/or treating any of the following:    The medical record reflects the following:  Risk Factors: ESRD on Dialysis, CAD, CHF  Clinical Indicators: pulmonary edema on imaging and elevated proBNP? and   Cardiology notes ?Chronic HFmEF:  LVEF 45-50%.?  Treatment: Cardio consult, imaging, dialysis    Thank you,  Tova Ramirez RN BSN  Clinical   [email or web address]  Options provided:  -- Noncardiogenic acute pulmonary edema due to fluid overload  -- Acute pulmonary edema due to acute on chronic systolic and diastolic heart   failure  -- Acute pulmonary edema due to acute on chronic systolic heart failure  -- Acute pulmonary edema due to acute on chronic diastolic heart failure  -- Other - I will add my own diagnosis  -- Disagree - Not applicable / Not valid  -- Disagree - Clinically unable to determine / Unknown  -- Refer to Clinical Documentation Reviewer    PROVIDER RESPONSE TEXT:    This patient has noncardiogenic acute pulmonary edema due to fluid overload.    Query created by: Tova Ramirez on 3/25/2024 9:15 AM      Electronically signed by:  Tae Gonzalez MD 3/25/2024 1:25 PM

## 2024-07-11 ENCOUNTER — APPOINTMENT (OUTPATIENT)
Dept: GENERAL RADIOLOGY | Age: 64
End: 2024-07-11
Payer: COMMERCIAL

## 2024-07-11 ENCOUNTER — APPOINTMENT (OUTPATIENT)
Dept: CT IMAGING | Age: 64
End: 2024-07-11
Payer: COMMERCIAL

## 2024-07-11 ENCOUNTER — HOSPITAL ENCOUNTER (INPATIENT)
Age: 64
LOS: 13 days | Discharge: HOSPICE | End: 2024-07-25
Attending: EMERGENCY MEDICINE | Admitting: HOSPITALIST
Payer: COMMERCIAL

## 2024-07-11 DIAGNOSIS — R65.20 SEVERE SEPSIS (HCC): Primary | ICD-10-CM

## 2024-07-11 DIAGNOSIS — I42.9 CARDIOMYOPATHY, UNSPECIFIED TYPE (HCC): ICD-10-CM

## 2024-07-11 DIAGNOSIS — Z99.2 ESRD ON HEMODIALYSIS (HCC): ICD-10-CM

## 2024-07-11 DIAGNOSIS — R41.82 ALTERED MENTAL STATUS, UNSPECIFIED ALTERED MENTAL STATUS TYPE: ICD-10-CM

## 2024-07-11 DIAGNOSIS — N18.6 ESRD ON HEMODIALYSIS (HCC): ICD-10-CM

## 2024-07-11 DIAGNOSIS — R79.89 ELEVATED TROPONIN: ICD-10-CM

## 2024-07-11 DIAGNOSIS — F19.90 IV DRUG USER: ICD-10-CM

## 2024-07-11 DIAGNOSIS — A41.9 SEVERE SEPSIS (HCC): Primary | ICD-10-CM

## 2024-07-11 DIAGNOSIS — I48.91 ATRIAL FIBRILLATION WITH RAPID VENTRICULAR RESPONSE (HCC): ICD-10-CM

## 2024-07-11 DIAGNOSIS — J90 BILATERAL PLEURAL EFFUSION: ICD-10-CM

## 2024-07-11 LAB
ALBUMIN SERPL-MCNC: 3.9 G/DL (ref 3.4–5)
ALBUMIN/GLOB SERPL: 1.4 {RATIO} (ref 1.1–2.2)
ALP SERPL-CCNC: 92 U/L (ref 40–129)
ALT SERPL-CCNC: 63 U/L (ref 10–40)
AMMONIA PLAS-SCNC: 34 UMOL/L (ref 16–60)
ANION GAP SERPL CALCULATED.3IONS-SCNC: 24 MMOL/L (ref 3–16)
AST SERPL-CCNC: 81 U/L (ref 15–37)
BASE EXCESS BLDV CALC-SCNC: -1 MMOL/L
BASOPHILS # BLD: 0.1 K/UL (ref 0–0.2)
BASOPHILS NFR BLD: 1 %
BILIRUB SERPL-MCNC: 1 MG/DL (ref 0–1)
BUN SERPL-MCNC: 33 MG/DL (ref 7–20)
CALCIUM SERPL-MCNC: 10.4 MG/DL (ref 8.3–10.6)
CHLORIDE SERPL-SCNC: 95 MMOL/L (ref 99–110)
CO2 BLDV-SCNC: 24 MMOL/L
CO2 SERPL-SCNC: 19 MMOL/L (ref 21–32)
COHGB MFR BLDV: 2.3 %
CREAT SERPL-MCNC: 5.9 MG/DL (ref 0.8–1.3)
DEPRECATED RDW RBC AUTO: 17.8 % (ref 12.4–15.4)
EOSINOPHIL # BLD: 0 K/UL (ref 0–0.6)
EOSINOPHIL NFR BLD: 0.1 %
ETHANOLAMINE SERPL-MCNC: NORMAL MG/DL (ref 0–0.08)
GFR SERPLBLD CREATININE-BSD FMLA CKD-EPI: 10 ML/MIN/{1.73_M2}
GLUCOSE BLD-MCNC: 97 MG/DL (ref 70–99)
GLUCOSE SERPL-MCNC: 85 MG/DL (ref 70–99)
HCO3 BLDV-SCNC: 23 MMOL/L (ref 23–29)
HCT VFR BLD AUTO: 38.5 % (ref 40.5–52.5)
HGB BLD-MCNC: 12.9 G/DL (ref 13.5–17.5)
INR PPP: 1.22 (ref 0.85–1.15)
LACTATE BLDV-SCNC: 2.9 MMOL/L (ref 0.4–2)
LACTATE BLDV-SCNC: 3 MMOL/L (ref 0.4–2)
LYMPHOCYTES # BLD: 0.9 K/UL (ref 1–5.1)
LYMPHOCYTES NFR BLD: 14.3 %
MCH RBC QN AUTO: 32.5 PG (ref 26–34)
MCHC RBC AUTO-ENTMCNC: 33.5 G/DL (ref 31–36)
MCV RBC AUTO: 97.2 FL (ref 80–100)
METHGB MFR BLDV: 0 %
MONOCYTES # BLD: 0.8 K/UL (ref 0–1.3)
MONOCYTES NFR BLD: 12.3 %
NEUTROPHILS # BLD: 4.8 K/UL (ref 1.7–7.7)
NEUTROPHILS NFR BLD: 72.3 %
O2 THERAPY: ABNORMAL
PCO2 BLDV: 36.6 MMHG (ref 40–50)
PERFORMED ON: NORMAL
PH BLDV: 7.41 [PH] (ref 7.35–7.45)
PLATELET # BLD AUTO: 122 K/UL (ref 135–450)
PMV BLD AUTO: 9.2 FL (ref 5–10.5)
PO2 BLDV: 47 MMHG
POTASSIUM SERPL-SCNC: 4.4 MMOL/L (ref 3.5–5.1)
PROT SERPL-MCNC: 6.7 G/DL (ref 6.4–8.2)
PROTHROMBIN TIME: 15.6 SEC (ref 11.9–14.9)
RBC # BLD AUTO: 3.96 M/UL (ref 4.2–5.9)
SAO2 % BLDV: 82 %
SODIUM SERPL-SCNC: 138 MMOL/L (ref 136–145)
TROPONIN, HIGH SENSITIVITY: 151 NG/L (ref 0–22)
TSH SERPL DL<=0.005 MIU/L-ACNC: 3.63 UIU/ML (ref 0.27–4.2)
WBC # BLD AUTO: 6.6 K/UL (ref 4–11)

## 2024-07-11 PROCEDURE — 84484 ASSAY OF TROPONIN QUANT: CPT

## 2024-07-11 PROCEDURE — 85025 COMPLETE CBC W/AUTO DIFF WBC: CPT

## 2024-07-11 PROCEDURE — 80053 COMPREHEN METABOLIC PANEL: CPT

## 2024-07-11 PROCEDURE — 70450 CT HEAD/BRAIN W/O DYE: CPT

## 2024-07-11 PROCEDURE — 87040 BLOOD CULTURE FOR BACTERIA: CPT

## 2024-07-11 PROCEDURE — 96365 THER/PROPH/DIAG IV INF INIT: CPT

## 2024-07-11 PROCEDURE — 71045 X-RAY EXAM CHEST 1 VIEW: CPT

## 2024-07-11 PROCEDURE — 85610 PROTHROMBIN TIME: CPT

## 2024-07-11 PROCEDURE — 74177 CT ABD & PELVIS W/CONTRAST: CPT

## 2024-07-11 PROCEDURE — 82140 ASSAY OF AMMONIA: CPT

## 2024-07-11 PROCEDURE — 6360000002 HC RX W HCPCS: Performed by: EMERGENCY MEDICINE

## 2024-07-11 PROCEDURE — 82803 BLOOD GASES ANY COMBINATION: CPT

## 2024-07-11 PROCEDURE — 84443 ASSAY THYROID STIM HORMONE: CPT

## 2024-07-11 PROCEDURE — 83605 ASSAY OF LACTIC ACID: CPT

## 2024-07-11 PROCEDURE — 82077 ASSAY SPEC XCP UR&BREATH IA: CPT

## 2024-07-11 PROCEDURE — 99285 EMERGENCY DEPT VISIT HI MDM: CPT

## 2024-07-11 PROCEDURE — 6360000004 HC RX CONTRAST MEDICATION: Performed by: EMERGENCY MEDICINE

## 2024-07-11 PROCEDURE — 96375 TX/PRO/DX INJ NEW DRUG ADDON: CPT

## 2024-07-11 PROCEDURE — 2580000003 HC RX 258: Performed by: EMERGENCY MEDICINE

## 2024-07-11 PROCEDURE — 96376 TX/PRO/DX INJ SAME DRUG ADON: CPT

## 2024-07-11 PROCEDURE — 6370000000 HC RX 637 (ALT 250 FOR IP): Performed by: EMERGENCY MEDICINE

## 2024-07-11 PROCEDURE — 93005 ELECTROCARDIOGRAM TRACING: CPT | Performed by: EMERGENCY MEDICINE

## 2024-07-11 RX ORDER — OXYCODONE HYDROCHLORIDE AND ACETAMINOPHEN 5; 325 MG/1; MG/1
1 TABLET ORAL ONCE
Status: COMPLETED | OUTPATIENT
Start: 2024-07-12 | End: 2024-07-11

## 2024-07-11 RX ORDER — LORAZEPAM 2 MG/ML
1 INJECTION INTRAMUSCULAR
Status: COMPLETED | OUTPATIENT
Start: 2024-07-11 | End: 2024-07-11

## 2024-07-11 RX ORDER — 0.9 % SODIUM CHLORIDE 0.9 %
1000 INTRAVENOUS SOLUTION INTRAVENOUS ONCE
Status: COMPLETED | OUTPATIENT
Start: 2024-07-11 | End: 2024-07-12

## 2024-07-11 RX ADMIN — OXYCODONE HYDROCHLORIDE AND ACETAMINOPHEN 1 TABLET: 5; 325 TABLET ORAL at 23:52

## 2024-07-11 RX ADMIN — SODIUM CHLORIDE 1000 ML: 9 INJECTION, SOLUTION INTRAVENOUS at 21:31

## 2024-07-11 RX ADMIN — IOPAMIDOL 75 ML: 755 INJECTION, SOLUTION INTRAVENOUS at 21:48

## 2024-07-11 RX ADMIN — CEFEPIME 2000 MG: 2 INJECTION, POWDER, FOR SOLUTION INTRAVENOUS at 22:39

## 2024-07-11 RX ADMIN — LORAZEPAM 1 MG: 2 INJECTION INTRAMUSCULAR; INTRAVENOUS at 23:50

## 2024-07-11 ASSESSMENT — PAIN - FUNCTIONAL ASSESSMENT: PAIN_FUNCTIONAL_ASSESSMENT: NONE - DENIES PAIN

## 2024-07-11 ASSESSMENT — LIFESTYLE VARIABLES
HOW MANY STANDARD DRINKS CONTAINING ALCOHOL DO YOU HAVE ON A TYPICAL DAY: PATIENT UNABLE TO ANSWER
HOW OFTEN DO YOU HAVE A DRINK CONTAINING ALCOHOL: 4 OR MORE TIMES A WEEK

## 2024-07-12 ENCOUNTER — APPOINTMENT (OUTPATIENT)
Dept: GENERAL RADIOLOGY | Age: 64
End: 2024-07-12
Payer: COMMERCIAL

## 2024-07-12 PROBLEM — I16.0 HYPERTENSIVE URGENCY: Status: ACTIVE | Noted: 2024-07-12

## 2024-07-12 PROBLEM — I50.33 ACUTE ON CHRONIC HEART FAILURE WITH PRESERVED EJECTION FRACTION (HCC): Status: ACTIVE | Noted: 2024-07-12

## 2024-07-12 PROBLEM — R41.0 ACUTE DELIRIUM: Status: ACTIVE | Noted: 2024-07-12

## 2024-07-12 PROBLEM — R57.0 CARDIOGENIC SHOCK (HCC): Status: ACTIVE | Noted: 2024-07-12

## 2024-07-12 PROBLEM — R41.82 ALTERED MENTAL STATUS: Status: ACTIVE | Noted: 2024-07-12

## 2024-07-12 LAB
ANION GAP SERPL CALCULATED.3IONS-SCNC: 24 MMOL/L (ref 3–16)
BASE EXCESS BLDA CALC-SCNC: 2.6 MMOL/L (ref -3–3)
BASOPHILS # BLD: 0.1 K/UL (ref 0–0.2)
BASOPHILS NFR BLD: 0.9 %
BUN SERPL-MCNC: 35 MG/DL (ref 7–20)
CALCIUM SERPL-MCNC: 10.2 MG/DL (ref 8.3–10.6)
CHLORIDE SERPL-SCNC: 95 MMOL/L (ref 99–110)
CK SERPL-CCNC: 309 U/L (ref 39–308)
CO2 BLDA-SCNC: 29.5 MMOL/L
CO2 SERPL-SCNC: 19 MMOL/L (ref 21–32)
COHGB MFR BLDA: 2 % (ref 0–1.5)
CREAT SERPL-MCNC: 6.2 MG/DL (ref 0.8–1.3)
DEPRECATED RDW RBC AUTO: 18.1 % (ref 12.4–15.4)
EKG DIAGNOSIS: NORMAL
EKG DIAGNOSIS: NORMAL
EKG Q-T INTERVAL: 372 MS
EKG Q-T INTERVAL: 400 MS
EKG QRS DURATION: 158 MS
EKG QRS DURATION: 160 MS
EKG QTC CALCULATION (BAZETT): 502 MS
EKG QTC CALCULATION (BAZETT): 528 MS
EKG R AXIS: -83 DEGREES
EKG R AXIS: -84 DEGREES
EKG T AXIS: 26 DEGREES
EKG T AXIS: 62 DEGREES
EKG VENTRICULAR RATE: 121 BPM
EKG VENTRICULAR RATE: 95 BPM
EOSINOPHIL # BLD: 0 K/UL (ref 0–0.6)
EOSINOPHIL NFR BLD: 0 %
GFR SERPLBLD CREATININE-BSD FMLA CKD-EPI: 9 ML/MIN/{1.73_M2}
GLUCOSE BLD-MCNC: 119 MG/DL (ref 70–99)
GLUCOSE BLD-MCNC: 58 MG/DL (ref 70–99)
GLUCOSE BLD-MCNC: 66 MG/DL (ref 70–99)
GLUCOSE BLD-MCNC: 73 MG/DL (ref 70–99)
GLUCOSE BLD-MCNC: 75 MG/DL (ref 70–99)
GLUCOSE BLD-MCNC: 77 MG/DL (ref 70–99)
GLUCOSE BLD-MCNC: 86 MG/DL (ref 70–99)
GLUCOSE SERPL-MCNC: 89 MG/DL (ref 70–99)
HCO3 BLDA-SCNC: 28.1 MMOL/L (ref 21–29)
HCT VFR BLD AUTO: 38.6 % (ref 40.5–52.5)
HGB BLD-MCNC: 13 G/DL (ref 13.5–17.5)
HGB BLDA-MCNC: 11.6 G/DL (ref 13.5–17.5)
LACTATE BLDV-SCNC: 1.3 MMOL/L (ref 0.4–2)
LACTATE BLDV-SCNC: 5.4 MMOL/L (ref 0.4–2)
LACTATE BLDV-SCNC: 5.8 MMOL/L (ref 0.4–2)
LYMPHOCYTES # BLD: 0.8 K/UL (ref 1–5.1)
LYMPHOCYTES NFR BLD: 11.2 %
MAGNESIUM SERPL-MCNC: 2.3 MG/DL (ref 1.8–2.4)
MCH RBC QN AUTO: 32.8 PG (ref 26–34)
MCHC RBC AUTO-ENTMCNC: 33.6 G/DL (ref 31–36)
MCV RBC AUTO: 97.4 FL (ref 80–100)
METHGB MFR BLDA: 0.5 %
MONOCYTES # BLD: 0.8 K/UL (ref 0–1.3)
MONOCYTES NFR BLD: 11.1 %
NEUTROPHILS # BLD: 5.4 K/UL (ref 1.7–7.7)
NEUTROPHILS NFR BLD: 76.8 %
O2 THERAPY: ABNORMAL
PCO2 BLDA: 46.1 MMHG (ref 35–45)
PERFORMED ON: ABNORMAL
PERFORMED ON: NORMAL
PH BLDA: 7.39 [PH] (ref 7.35–7.45)
PHOSPHATE SERPL-MCNC: 4.9 MG/DL (ref 2.5–4.9)
PLATELET # BLD AUTO: 114 K/UL (ref 135–450)
PMV BLD AUTO: 9.5 FL (ref 5–10.5)
PO2 BLDA: 43.8 MMHG (ref 75–108)
POTASSIUM SERPL-SCNC: 4.2 MMOL/L (ref 3.5–5.1)
PROCALCITONIN SERPL IA-MCNC: 0.23 NG/ML (ref 0–0.15)
RBC # BLD AUTO: 3.97 M/UL (ref 4.2–5.9)
SAO2 % BLDA: 76.3 %
SODIUM SERPL-SCNC: 138 MMOL/L (ref 136–145)
TROPONIN, HIGH SENSITIVITY: 172 NG/L (ref 0–22)
VANCOMYCIN SERPL-MCNC: 8 UG/ML
WBC # BLD AUTO: 7 K/UL (ref 4–11)

## 2024-07-12 PROCEDURE — 93010 ELECTROCARDIOGRAM REPORT: CPT | Performed by: INTERNAL MEDICINE

## 2024-07-12 PROCEDURE — 6370000000 HC RX 637 (ALT 250 FOR IP): Performed by: INTERNAL MEDICINE

## 2024-07-12 PROCEDURE — 6360000002 HC RX W HCPCS

## 2024-07-12 PROCEDURE — 31500 INSERT EMERGENCY AIRWAY: CPT | Performed by: INTERNAL MEDICINE

## 2024-07-12 PROCEDURE — 84145 PROCALCITONIN (PCT): CPT

## 2024-07-12 PROCEDURE — 93005 ELECTROCARDIOGRAM TRACING: CPT | Performed by: HOSPITALIST

## 2024-07-12 PROCEDURE — 36556 INSERT NON-TUNNEL CV CATH: CPT | Performed by: INTERNAL MEDICINE

## 2024-07-12 PROCEDURE — 6360000002 HC RX W HCPCS: Performed by: INTERNAL MEDICINE

## 2024-07-12 PROCEDURE — 2580000003 HC RX 258: Performed by: INTERNAL MEDICINE

## 2024-07-12 PROCEDURE — 3E033XZ INTRODUCTION OF VASOPRESSOR INTO PERIPHERAL VEIN, PERCUTANEOUS APPROACH: ICD-10-PCS | Performed by: HOSPITALIST

## 2024-07-12 PROCEDURE — 2500000003 HC RX 250 WO HCPCS: Performed by: INTERNAL MEDICINE

## 2024-07-12 PROCEDURE — 90935 HEMODIALYSIS ONE EVALUATION: CPT

## 2024-07-12 PROCEDURE — G0480 DRUG TEST DEF 1-7 CLASSES: HCPCS

## 2024-07-12 PROCEDURE — 2500000003 HC RX 250 WO HCPCS: Performed by: HOSPITALIST

## 2024-07-12 PROCEDURE — 71045 X-RAY EXAM CHEST 1 VIEW: CPT

## 2024-07-12 PROCEDURE — 85025 COMPLETE CBC W/AUTO DIFF WBC: CPT

## 2024-07-12 PROCEDURE — 2700000000 HC OXYGEN THERAPY PER DAY

## 2024-07-12 PROCEDURE — 36556 INSERT NON-TUNNEL CV CATH: CPT

## 2024-07-12 PROCEDURE — 6360000002 HC RX W HCPCS: Performed by: HOSPITALIST

## 2024-07-12 PROCEDURE — 87641 MR-STAPH DNA AMP PROBE: CPT

## 2024-07-12 PROCEDURE — 6370000000 HC RX 637 (ALT 250 FOR IP): Performed by: HOSPITALIST

## 2024-07-12 PROCEDURE — 36600 WITHDRAWAL OF ARTERIAL BLOOD: CPT

## 2024-07-12 PROCEDURE — 80048 BASIC METABOLIC PNL TOTAL CA: CPT

## 2024-07-12 PROCEDURE — 0BH17EZ INSERTION OF ENDOTRACHEAL AIRWAY INTO TRACHEA, VIA NATURAL OR ARTIFICIAL OPENING: ICD-10-PCS | Performed by: HOSPITALIST

## 2024-07-12 PROCEDURE — 36415 COLL VENOUS BLD VENIPUNCTURE: CPT

## 2024-07-12 PROCEDURE — 6360000002 HC RX W HCPCS: Performed by: EMERGENCY MEDICINE

## 2024-07-12 PROCEDURE — 82550 ASSAY OF CK (CPK): CPT

## 2024-07-12 PROCEDURE — 80307 DRUG TEST PRSMV CHEM ANLYZR: CPT

## 2024-07-12 PROCEDURE — 82803 BLOOD GASES ANY COMBINATION: CPT

## 2024-07-12 PROCEDURE — 84100 ASSAY OF PHOSPHORUS: CPT

## 2024-07-12 PROCEDURE — 2580000003 HC RX 258: Performed by: HOSPITALIST

## 2024-07-12 PROCEDURE — 94761 N-INVAS EAR/PLS OXIMETRY MLT: CPT

## 2024-07-12 PROCEDURE — 84484 ASSAY OF TROPONIN QUANT: CPT

## 2024-07-12 PROCEDURE — 83735 ASSAY OF MAGNESIUM: CPT

## 2024-07-12 PROCEDURE — 80202 ASSAY OF VANCOMYCIN: CPT

## 2024-07-12 PROCEDURE — 99291 CRITICAL CARE FIRST HOUR: CPT | Performed by: NURSE PRACTITIONER

## 2024-07-12 PROCEDURE — 02HV33Z INSERTION OF INFUSION DEVICE INTO SUPERIOR VENA CAVA, PERCUTANEOUS APPROACH: ICD-10-PCS | Performed by: HOSPITALIST

## 2024-07-12 PROCEDURE — 2000000000 HC ICU R&B

## 2024-07-12 PROCEDURE — 2580000003 HC RX 258: Performed by: NURSE PRACTITIONER

## 2024-07-12 PROCEDURE — 94002 VENT MGMT INPAT INIT DAY: CPT

## 2024-07-12 PROCEDURE — 83605 ASSAY OF LACTIC ACID: CPT

## 2024-07-12 PROCEDURE — 5A1D70Z PERFORMANCE OF URINARY FILTRATION, INTERMITTENT, LESS THAN 6 HOURS PER DAY: ICD-10-PCS | Performed by: INTERNAL MEDICINE

## 2024-07-12 PROCEDURE — 5A1955Z RESPIRATORY VENTILATION, GREATER THAN 96 CONSECUTIVE HOURS: ICD-10-PCS | Performed by: HOSPITALIST

## 2024-07-12 PROCEDURE — 99291 CRITICAL CARE FIRST HOUR: CPT | Performed by: INTERNAL MEDICINE

## 2024-07-12 RX ORDER — LORAZEPAM 2 MG/ML
2 INJECTION INTRAMUSCULAR
Status: DISCONTINUED | OUTPATIENT
Start: 2024-07-12 | End: 2024-07-24

## 2024-07-12 RX ORDER — GABAPENTIN 300 MG/1
300 CAPSULE ORAL NIGHTLY
Status: DISCONTINUED | OUTPATIENT
Start: 2024-07-12 | End: 2024-07-12

## 2024-07-12 RX ORDER — POLYETHYLENE GLYCOL 3350 17 G/17G
17 POWDER, FOR SOLUTION ORAL DAILY PRN
Status: DISCONTINUED | OUTPATIENT
Start: 2024-07-12 | End: 2024-07-19

## 2024-07-12 RX ORDER — LORAZEPAM 2 MG/ML
3 INJECTION INTRAMUSCULAR
Status: DISCONTINUED | OUTPATIENT
Start: 2024-07-12 | End: 2024-07-24

## 2024-07-12 RX ORDER — BUPROPION HYDROCHLORIDE 75 MG/1
150 TABLET ORAL
Status: DISCONTINUED | OUTPATIENT
Start: 2024-07-15 | End: 2024-07-19

## 2024-07-12 RX ORDER — PHENOBARBITAL SODIUM 65 MG/ML
32.5 INJECTION, SOLUTION INTRAMUSCULAR; INTRAVENOUS EVERY 12 HOURS
Status: COMPLETED | OUTPATIENT
Start: 2024-07-13 | End: 2024-07-13

## 2024-07-12 RX ORDER — LORAZEPAM 2 MG/ML
1 INJECTION INTRAMUSCULAR
Status: DISCONTINUED | OUTPATIENT
Start: 2024-07-12 | End: 2024-07-24

## 2024-07-12 RX ORDER — OXYCODONE HYDROCHLORIDE AND ACETAMINOPHEN 5; 325 MG/1; MG/1
1 TABLET ORAL EVERY 4 HOURS PRN
Status: DISCONTINUED | OUTPATIENT
Start: 2024-07-12 | End: 2024-07-25 | Stop reason: HOSPADM

## 2024-07-12 RX ORDER — PROPOFOL 10 MG/ML
5-50 INJECTION, EMULSION INTRAVENOUS CONTINUOUS
Status: DISCONTINUED | OUTPATIENT
Start: 2024-07-12 | End: 2024-07-12

## 2024-07-12 RX ORDER — SODIUM CHLORIDE 0.9 % (FLUSH) 0.9 %
5-40 SYRINGE (ML) INJECTION EVERY 12 HOURS SCHEDULED
Status: DISCONTINUED | OUTPATIENT
Start: 2024-07-12 | End: 2024-07-25 | Stop reason: HOSPADM

## 2024-07-12 RX ORDER — SODIUM CHLORIDE 9 MG/ML
INJECTION, SOLUTION INTRAVENOUS PRN
Status: DISCONTINUED | OUTPATIENT
Start: 2024-07-12 | End: 2024-07-25 | Stop reason: HOSPADM

## 2024-07-12 RX ORDER — SODIUM CHLORIDE 0.9 % (FLUSH) 0.9 %
5-40 SYRINGE (ML) INJECTION PRN
Status: DISCONTINUED | OUTPATIENT
Start: 2024-07-12 | End: 2024-07-14

## 2024-07-12 RX ORDER — LORAZEPAM 2 MG/ML
4 INJECTION INTRAMUSCULAR
Status: DISCONTINUED | OUTPATIENT
Start: 2024-07-12 | End: 2024-07-24

## 2024-07-12 RX ORDER — SODIUM CHLORIDE 0.9 % (FLUSH) 0.9 %
5-40 SYRINGE (ML) INJECTION PRN
Status: DISCONTINUED | OUTPATIENT
Start: 2024-07-12 | End: 2024-07-25 | Stop reason: HOSPADM

## 2024-07-12 RX ORDER — FENTANYL CITRATE-0.9 % NACL/PF 10 MCG/ML
25-200 PLASTIC BAG, INJECTION (ML) INTRAVENOUS CONTINUOUS
Status: DISCONTINUED | OUTPATIENT
Start: 2024-07-12 | End: 2024-07-18

## 2024-07-12 RX ORDER — QUETIAPINE FUMARATE 25 MG/1
75 TABLET, FILM COATED ORAL 2 TIMES DAILY
Status: DISCONTINUED | OUTPATIENT
Start: 2024-07-12 | End: 2024-07-21 | Stop reason: SDUPTHER

## 2024-07-12 RX ORDER — DEXTROSE MONOHYDRATE AND SODIUM CHLORIDE 5; .9 G/100ML; G/100ML
INJECTION, SOLUTION INTRAVENOUS CONTINUOUS
Status: DISCONTINUED | OUTPATIENT
Start: 2024-07-12 | End: 2024-07-19

## 2024-07-12 RX ORDER — ACETAMINOPHEN 650 MG/1
650 SUPPOSITORY RECTAL EVERY 6 HOURS PRN
Status: DISCONTINUED | OUTPATIENT
Start: 2024-07-12 | End: 2024-07-16

## 2024-07-12 RX ORDER — HEPARIN SODIUM 1000 [USP'U]/ML
3600 INJECTION, SOLUTION INTRAVENOUS; SUBCUTANEOUS PRN
Status: DISCONTINUED | OUTPATIENT
Start: 2024-07-12 | End: 2024-07-25 | Stop reason: HOSPADM

## 2024-07-12 RX ORDER — FOLIC ACID 1 MG/1
1 TABLET ORAL DAILY
Status: DISCONTINUED | OUTPATIENT
Start: 2024-07-12 | End: 2024-07-25 | Stop reason: HOSPADM

## 2024-07-12 RX ORDER — LORAZEPAM 1 MG/1
4 TABLET ORAL
Status: DISCONTINUED | OUTPATIENT
Start: 2024-07-12 | End: 2024-07-24

## 2024-07-12 RX ORDER — GABAPENTIN 100 MG/1
100 CAPSULE ORAL NIGHTLY
Status: DISCONTINUED | OUTPATIENT
Start: 2024-07-12 | End: 2024-07-16

## 2024-07-12 RX ORDER — PHENOBARBITAL SODIUM 65 MG/ML
16.2 INJECTION, SOLUTION INTRAMUSCULAR; INTRAVENOUS EVERY 12 HOURS
Status: COMPLETED | OUTPATIENT
Start: 2024-07-14 | End: 2024-07-14

## 2024-07-12 RX ORDER — ONDANSETRON 4 MG/1
4 TABLET, ORALLY DISINTEGRATING ORAL EVERY 8 HOURS PRN
Status: DISCONTINUED | OUTPATIENT
Start: 2024-07-12 | End: 2024-07-25 | Stop reason: HOSPADM

## 2024-07-12 RX ORDER — HYDRALAZINE HYDROCHLORIDE 20 MG/ML
5 INJECTION INTRAMUSCULAR; INTRAVENOUS EVERY 6 HOURS PRN
Status: DISCONTINUED | OUTPATIENT
Start: 2024-07-12 | End: 2024-07-25 | Stop reason: HOSPADM

## 2024-07-12 RX ORDER — GLUCAGON 1 MG/ML
1 KIT INJECTION PRN
Status: DISCONTINUED | OUTPATIENT
Start: 2024-07-12 | End: 2024-07-25 | Stop reason: HOSPADM

## 2024-07-12 RX ORDER — CARVEDILOL 6.25 MG/1
6.25 TABLET ORAL 2 TIMES DAILY WITH MEALS
Status: DISCONTINUED | OUTPATIENT
Start: 2024-07-12 | End: 2024-07-25 | Stop reason: HOSPADM

## 2024-07-12 RX ORDER — VANCOMYCIN 1.75 G/350ML
1250 INJECTION, SOLUTION INTRAVENOUS ONCE
Status: COMPLETED | OUTPATIENT
Start: 2024-07-12 | End: 2024-07-12

## 2024-07-12 RX ORDER — HEPARIN SODIUM 5000 [USP'U]/ML
5000 INJECTION, SOLUTION INTRAVENOUS; SUBCUTANEOUS EVERY 8 HOURS SCHEDULED
Status: DISCONTINUED | OUTPATIENT
Start: 2024-07-12 | End: 2024-07-25 | Stop reason: HOSPADM

## 2024-07-12 RX ORDER — LORAZEPAM 1 MG/1
3 TABLET ORAL
Status: DISCONTINUED | OUTPATIENT
Start: 2024-07-12 | End: 2024-07-24

## 2024-07-12 RX ORDER — CINACALCET 30 MG/1
60 TABLET, FILM COATED ORAL
Status: DISCONTINUED | OUTPATIENT
Start: 2024-07-12 | End: 2024-07-25 | Stop reason: HOSPADM

## 2024-07-12 RX ORDER — ERGOCALCIFEROL 1.25 MG/1
50000 CAPSULE ORAL
Status: DISCONTINUED | OUTPATIENT
Start: 2024-07-15 | End: 2024-07-25 | Stop reason: HOSPADM

## 2024-07-12 RX ORDER — PROPOFOL 10 MG/ML
INJECTION, EMULSION INTRAVENOUS
Status: DISPENSED
Start: 2024-07-12 | End: 2024-07-12

## 2024-07-12 RX ORDER — PROPOFOL 10 MG/ML
5-50 INJECTION, EMULSION INTRAVENOUS CONTINUOUS
Status: DISCONTINUED | OUTPATIENT
Start: 2024-07-12 | End: 2024-07-18

## 2024-07-12 RX ORDER — PHENOBARBITAL SODIUM 65 MG/ML
32.5 INJECTION, SOLUTION INTRAMUSCULAR; INTRAVENOUS EVERY 6 HOURS
Status: COMPLETED | OUTPATIENT
Start: 2024-07-12 | End: 2024-07-13

## 2024-07-12 RX ORDER — VENLAFAXINE HYDROCHLORIDE 75 MG/1
150 CAPSULE, EXTENDED RELEASE ORAL
Status: DISCONTINUED | OUTPATIENT
Start: 2024-07-12 | End: 2024-07-12

## 2024-07-12 RX ORDER — DEXTROSE MONOHYDRATE 100 MG/ML
INJECTION, SOLUTION INTRAVENOUS CONTINUOUS PRN
Status: DISCONTINUED | OUTPATIENT
Start: 2024-07-12 | End: 2024-07-25 | Stop reason: HOSPADM

## 2024-07-12 RX ORDER — SODIUM CHLORIDE 9 MG/ML
INJECTION, SOLUTION INTRAVENOUS PRN
Status: DISCONTINUED | OUTPATIENT
Start: 2024-07-12 | End: 2024-07-14

## 2024-07-12 RX ORDER — ONDANSETRON 2 MG/ML
4 INJECTION INTRAMUSCULAR; INTRAVENOUS EVERY 6 HOURS PRN
Status: DISCONTINUED | OUTPATIENT
Start: 2024-07-12 | End: 2024-07-25 | Stop reason: HOSPADM

## 2024-07-12 RX ORDER — GAUZE BANDAGE 2" X 2"
100 BANDAGE TOPICAL DAILY
Status: DISCONTINUED | OUTPATIENT
Start: 2024-07-12 | End: 2024-07-12

## 2024-07-12 RX ORDER — NOREPINEPHRINE BITARTRATE 0.06 MG/ML
INJECTION, SOLUTION INTRAVENOUS
Status: DISPENSED
Start: 2024-07-12 | End: 2024-07-12

## 2024-07-12 RX ORDER — LORAZEPAM 1 MG/1
1 TABLET ORAL
Status: DISCONTINUED | OUTPATIENT
Start: 2024-07-12 | End: 2024-07-24

## 2024-07-12 RX ORDER — QUETIAPINE 150 MG/1
150 TABLET, FILM COATED, EXTENDED RELEASE ORAL NIGHTLY
Status: DISCONTINUED | OUTPATIENT
Start: 2024-07-12 | End: 2024-07-12

## 2024-07-12 RX ORDER — SODIUM CHLORIDE 0.9 % (FLUSH) 0.9 %
5-40 SYRINGE (ML) INJECTION EVERY 12 HOURS SCHEDULED
Status: DISCONTINUED | OUTPATIENT
Start: 2024-07-12 | End: 2024-07-14

## 2024-07-12 RX ORDER — LORAZEPAM 1 MG/1
2 TABLET ORAL
Status: DISCONTINUED | OUTPATIENT
Start: 2024-07-12 | End: 2024-07-24

## 2024-07-12 RX ORDER — VENLAFAXINE 37.5 MG/1
75 TABLET ORAL 2 TIMES DAILY
Status: COMPLETED | OUTPATIENT
Start: 2024-07-12 | End: 2024-07-21

## 2024-07-12 RX ORDER — LEVOTHYROXINE SODIUM 0.03 MG/1
25 TABLET ORAL DAILY
Status: DISCONTINUED | OUTPATIENT
Start: 2024-07-12 | End: 2024-07-25 | Stop reason: HOSPADM

## 2024-07-12 RX ORDER — ACETAMINOPHEN 325 MG/1
650 TABLET ORAL EVERY 6 HOURS PRN
Status: DISCONTINUED | OUTPATIENT
Start: 2024-07-12 | End: 2024-07-16

## 2024-07-12 RX ORDER — DEXMEDETOMIDINE HYDROCHLORIDE 4 UG/ML
.1-1.5 INJECTION, SOLUTION INTRAVENOUS CONTINUOUS
Status: DISCONTINUED | OUTPATIENT
Start: 2024-07-12 | End: 2024-07-18

## 2024-07-12 RX ORDER — LABETALOL HYDROCHLORIDE 5 MG/ML
10 INJECTION, SOLUTION INTRAVENOUS EVERY 4 HOURS PRN
Status: DISCONTINUED | OUTPATIENT
Start: 2024-07-12 | End: 2024-07-25 | Stop reason: HOSPADM

## 2024-07-12 RX ORDER — SEVELAMER CARBONATE 800 MG/1
2400 TABLET, FILM COATED ORAL
Status: DISCONTINUED | OUTPATIENT
Start: 2024-07-12 | End: 2024-07-23

## 2024-07-12 RX ORDER — MIDAZOLAM HYDROCHLORIDE 1 MG/ML
2 INJECTION INTRAMUSCULAR; INTRAVENOUS ONCE
Status: DISCONTINUED | OUTPATIENT
Start: 2024-07-12 | End: 2024-07-12

## 2024-07-12 RX ORDER — THIAMINE HYDROCHLORIDE 100 MG/ML
100 INJECTION, SOLUTION INTRAMUSCULAR; INTRAVENOUS DAILY
Status: DISCONTINUED | OUTPATIENT
Start: 2024-07-12 | End: 2024-07-25 | Stop reason: HOSPADM

## 2024-07-12 RX ORDER — ASPIRIN 81 MG/1
81 TABLET, CHEWABLE ORAL DAILY
Status: DISCONTINUED | OUTPATIENT
Start: 2024-07-12 | End: 2024-07-25 | Stop reason: HOSPADM

## 2024-07-12 RX ORDER — LORAZEPAM 2 MG/ML
1 INJECTION INTRAMUSCULAR ONCE
Status: COMPLETED | OUTPATIENT
Start: 2024-07-12 | End: 2024-07-12

## 2024-07-12 RX ADMIN — HEPARIN SODIUM 3600 UNITS: 1000 INJECTION INTRAVENOUS; SUBCUTANEOUS at 17:28

## 2024-07-12 RX ADMIN — PROPOFOL 20 MCG/KG/MIN: 10 INJECTION, EMULSION INTRAVENOUS at 22:04

## 2024-07-12 RX ADMIN — SODIUM CHLORIDE, PRESERVATIVE FREE 10 ML: 5 INJECTION INTRAVENOUS at 09:53

## 2024-07-12 RX ADMIN — PHENOBARBITAL SODIUM 32.5 MG: 65 INJECTION INTRAMUSCULAR at 23:51

## 2024-07-12 RX ADMIN — GABAPENTIN 100 MG: 100 CAPSULE ORAL at 20:42

## 2024-07-12 RX ADMIN — LORAZEPAM 1 MG: 2 INJECTION INTRAMUSCULAR; INTRAVENOUS at 00:34

## 2024-07-12 RX ADMIN — CARVEDILOL 6.25 MG: 6.25 TABLET, FILM COATED ORAL at 17:34

## 2024-07-12 RX ADMIN — HEPARIN SODIUM 5000 UNITS: 5000 INJECTION INTRAVENOUS; SUBCUTANEOUS at 13:35

## 2024-07-12 RX ADMIN — CEFEPIME 1000 MG: 1 INJECTION, POWDER, FOR SOLUTION INTRAMUSCULAR; INTRAVENOUS at 20:46

## 2024-07-12 RX ADMIN — VANCOMYCIN HYDROCHLORIDE 750 MG: 750 INJECTION, POWDER, LYOPHILIZED, FOR SOLUTION INTRAVENOUS at 22:14

## 2024-07-12 RX ADMIN — LORAZEPAM 1 MG: 2 INJECTION INTRAMUSCULAR; INTRAVENOUS at 03:11

## 2024-07-12 RX ADMIN — QUETIAPINE FUMARATE 75 MG: 25 TABLET ORAL at 20:42

## 2024-07-12 RX ADMIN — Medication 50 MCG/HR: at 13:31

## 2024-07-12 RX ADMIN — VANCOMYCIN 1250 MG: 1.75 INJECTION, SOLUTION INTRAVENOUS at 04:50

## 2024-07-12 RX ADMIN — DEXTROSE MONOHYDRATE 125 ML: 100 INJECTION, SOLUTION INTRAVENOUS at 21:14

## 2024-07-12 RX ADMIN — PHENOBARBITAL SODIUM 32.5 MG: 65 INJECTION INTRAMUSCULAR at 13:34

## 2024-07-12 RX ADMIN — DEXMEDETOMIDINE HYDROCHLORIDE 0.2 MCG/KG/HR: 4 INJECTION, SOLUTION INTRAVENOUS at 09:53

## 2024-07-12 RX ADMIN — HEPARIN SODIUM 5000 UNITS: 5000 INJECTION INTRAVENOUS; SUBCUTANEOUS at 22:06

## 2024-07-12 RX ADMIN — PROPOFOL 20 MCG/KG/MIN: 10 INJECTION, EMULSION INTRAVENOUS at 12:20

## 2024-07-12 RX ADMIN — HEPARIN SODIUM 5000 UNITS: 5000 INJECTION INTRAVENOUS; SUBCUTANEOUS at 05:08

## 2024-07-12 RX ADMIN — DEXTROSE MONOHYDRATE 125 ML: 100 INJECTION, SOLUTION INTRAVENOUS at 23:41

## 2024-07-12 RX ADMIN — PHENOBARBITAL SODIUM 32.5 MG: 65 INJECTION INTRAMUSCULAR at 17:37

## 2024-07-12 RX ADMIN — SODIUM CHLORIDE, PRESERVATIVE FREE 10 ML: 5 INJECTION INTRAVENOUS at 23:51

## 2024-07-12 RX ADMIN — SODIUM CHLORIDE, PRESERVATIVE FREE 10 ML: 5 INJECTION INTRAVENOUS at 20:42

## 2024-07-12 RX ADMIN — THIAMINE HYDROCHLORIDE 100 MG: 100 INJECTION, SOLUTION INTRAMUSCULAR; INTRAVENOUS at 09:45

## 2024-07-12 RX ADMIN — DEXTROSE AND SODIUM CHLORIDE: 5; 900 INJECTION, SOLUTION INTRAVENOUS at 14:13

## 2024-07-12 RX ADMIN — Medication 25 MCG/HR: at 22:42

## 2024-07-12 RX ADMIN — HYDRALAZINE HYDROCHLORIDE 5 MG: 20 INJECTION INTRAMUSCULAR; INTRAVENOUS at 10:06

## 2024-07-12 RX ADMIN — PROPOFOL 35 MCG/KG/MIN: 10 INJECTION, EMULSION INTRAVENOUS at 15:18

## 2024-07-12 RX ADMIN — DEXTROSE AND SODIUM CHLORIDE: 5; 900 INJECTION, SOLUTION INTRAVENOUS at 10:15

## 2024-07-12 RX ADMIN — SODIUM CHLORIDE, PRESERVATIVE FREE 10 ML: 5 INJECTION INTRAVENOUS at 09:54

## 2024-07-12 RX ADMIN — VENLAFAXINE 75 MG: 37.5 TABLET ORAL at 22:07

## 2024-07-12 ASSESSMENT — PULMONARY FUNCTION TESTS
PIF_VALUE: 16
PIF_VALUE: 15
PIF_VALUE: 16
PIF_VALUE: 15
PIF_VALUE: 16
PIF_VALUE: 15
PIF_VALUE: 16
PIF_VALUE: 15
PIF_VALUE: 16
PIF_VALUE: 15
PIF_VALUE: 16
PIF_VALUE: 15

## 2024-07-12 ASSESSMENT — PAIN SCALES - GENERAL
PAINLEVEL_OUTOF10: 0
PAINLEVEL_OUTOF10: 2

## 2024-07-12 NOTE — ACP (ADVANCE CARE PLANNING)
Advance Care Planning     Advance Care Planning Activator (Inpatient)  Conversation Note      Date of ACP Conversation: 7/12/2024     Conversation Conducted with: Patient with Decision Making Capacity and Healthcare Decision Maker    ACP Activator: Angie Abarca RN    Health Care Decision Maker:     Current Designated Health Care Decision Maker:     Primary Decision Maker: Junie Sanchez - Niece/Nephew - [phone]    Care Preferences    Ventilation:  \"If you were in your present state of health and suddenly became very ill and were unable to breathe on your own, what would your preference be about the use of a ventilator (breathing machine) if it were available to you?\"      Would the patient desire the use of ventilator (breathing machine)?: no    \"If your health worsens and it becomes clear that your chance of recovery is unlikely, what would your preference be about the use of a ventilator (breathing machine) if it were available to you?\"     Would the patient desire the use of ventilator (breathing machine)?: No      Resuscitation  \"CPR works best to restart the heart when there is a sudden event, like a heart attack, in someone who is otherwise healthy. Unfortunately, CPR does not typically restart the heart for people who have serious health conditions or who are very sick.\"    \"In the event your heart stopped as a result of an underlying serious health condition, would you want attempts to be made to restart your heart (answer \"yes\" for attempt to resuscitate) or would you prefer a natural death (answer \"no\" for do not attempt to resuscitate)?\" no       [] Yes   [x] No   Educated Patient / Decision Maker regarding differences between Advance Directives and portable DNR orders.    Length of ACP Conversation in minutes:  5    Conversation Outcomes:  ACP discussion completed    Follow-up plan:    [] Schedule follow-up conversation to continue planning  [] Referred individual to Provider for additional

## 2024-07-12 NOTE — PROGRESS NOTES
Pt arrived to ICU via stretcher from ED. Bedside report received from ED RN. Pt attached to ICU Bedside Monitor. Pt alert and oriented x0, very lethargic, unable to answer orientation questions but moves extremities to command. Rhythm at this time is atrial fibrillation.. Currently getting no infusions. Bed alarm active and call light within reach. This RN observes peripheral IV that ED RN states she placed in the L arm, where patient has a documented HD fistula. IV removed, fistula still positive for thrill and bruit. US PIV placed in R forearm at this time.    Electronically signed by Kavya Biggs RN on 7/12/2024 at 3:47 AM

## 2024-07-12 NOTE — PROGRESS NOTES
Round with Dr. Nicolas. Dr. Nicolas notified pt unable to take PO medications due to mental status. Dr. Nicolas aware and will switch thiamine to IV and OK to hold all other PO meds. Stat labs order per Dr. Nicolas.Use CIWA for pt and dose ativan per protocol.

## 2024-07-12 NOTE — ED PROVIDER NOTES
[facility] EMERGENCY DEPARTMENT     EMERGENCY DEPARTMENT ENCOUNTER            Pt Name: Cal Sanchez   MRN: [id number]   Birthdate 1960   Date of evaluation: 7/11/2024   Provider: Ramon Garrett MD   PCP: Tania Moses MD   Note Started: 8:32 PM EDT 7/11/24          CHIEF COMPLAINT     Altered mental status     HISTORY OF PRESENT ILLNESS:   History from : Patient and EMS   Limitations to history : Altered Mental Status     Cal Sanchez is a 64 y.o. male who presents to the emergency room for evaluation of altered mental status and shortness of breath.  Patient is alert to person but not place or time.  He thinks he is in [place] and is unable to tell me the year.  Patient is moving all extremities.  He has no numbness or weakness.  He denies any pain anywhere.  EMS was called because patient was found to be altered by his family today.  SEBASTIÁN Marin did speak with the patient's daughter on the phone to find out when patient's last known well was and the last time she saw the patient normal at his baseline was Tuesday afternoon, 2 days ago.  Patient is able to tell me his name.  Patient does have history of ESRD on HD.  States he did have a full session of dialysis yesterday.  Patient denies any vomiting.  Patient states he does use IV drugs and used IV cocaine earlier today.  Unable to elicit any further information from the patient secondary to his altered mental status.    Nursing Notes were all reviewed and agreed with, or any disagreements were addressed in the HPI.     REVIEW OF SYSTEMS :    Positives and Pertinent negatives as per HPI.      MEDICAL HISTORY   has a past medical history of Atrial fibrillation (HCC), CAD (coronary artery disease) (4/3/2014), Chronic kidney disease, Depression, Diabetes mellitus (HCC), Dialysis patient (HCC), Glaucoma, Hemodialysis patient (HCC), Hyperlipidemia, Hypertension, RLL pneumonia (1/23/2018), and Sleep apnea.    Past  Surgical History:   Procedure Laterality Date    COLONOSCOPY      PACEMAKER INSERTION      PACEMAKER PLACEMENT      TOE AMPUTATION Left 12/1/2023    AMPUTATION SECOND AND THIRD DIGITS LEFT FOOT performed by Horace Jordan DPM at [facility] OR    TOE AMPUTATION Left 2/12/2024    LEFT HALLUX AMPUTATION performed by Horace Jordan DPM at [facility] OR      CURRENTMEDICATIONS       Previous Medications    ASPIRIN 81 MG CHEWABLE TABLET    Take 1 tablet by mouth daily    ATORVASTATIN (LIPITOR) 80 MG TABLET    Take 1 tablet by mouth nightly    BUPROPION (WELLBUTRIN) 75 MG TABLET    Take 2 tablets by mouth every 72 hours    CARVEDILOL (COREG) 6.25 MG TABLET    Take 1 tablet by mouth 2 times daily (with meals)    CINACALCET (SENSIPAR) 60 MG TABLET    Take 1 tablet by mouth three times a week Takes on dialysis days at bedtime Mon, Wed, Fri    GABAPENTIN (NEURONTIN) 300 MG CAPSULE    Take 1 capsule by mouth at bedtime.    LEVOTHYROXINE (SYNTHROID) 25 MCG TABLET    Take 1 tablet by mouth Daily    MIDODRINE (PROAMATINE) 10 MG TABLET    Take 1 tablet by mouth See Admin Instructions Before and during dialysis three times weekly Mon, Wed, Fri    OXYCODONE-ACETAMINOPHEN (PERCOCET) 5-325 MG PER TABLET    Take 1 tablet by mouth every 4 hours as needed for Pain. Max Daily Amount: 6 tablets    QUETIAPINE (SEROQUEL XR) 150 MG TB24 EXTENDED RELEASE TABLET    Take 1 tablet by mouth at bedtime at bedtime.    SEVELAMER (RENVELA) 800 MG TABLET    Take 3 tablets by mouth 3 times daily (with meals)    VENLAFAXINE (EFFEXOR XR) 75 MG EXTENDED RELEASE CAPSULE    Take 1 capsule by mouth daily (with breakfast)    VITAMIN D (ERGOCALCIFEROL) 1.25 MG (41586 UT) CAPS CAPSULE    Take 1 capsule by mouth every 30 days On the 15th      SCREENINGS          Michaela Coma Scale  Eye Opening: Spontaneous  Best Verbal Response: Oriented  Best Motor Response: Obeys commands  Michaela Coma Scale Score: 15                CIWA Assessment  BP: (!) 184/101  Pulse: (!) 107

## 2024-07-12 NOTE — PROGRESS NOTES
1159: 5ml of propfol pushed by Dr. Mabel Maguire  1200: 3ml of propfol pushed by Dr. Mabel Maguire and 50mg of rocuronium given  1201: 7.5 ETT placed at 23cm

## 2024-07-12 NOTE — PROGRESS NOTES
[address] Notified Dr. Vahid Reynoso of bilateral green eye exudate, tacky green consistency. I have cleaned eye several times today, and within 5 minutes, exudate returns, order obtained. 1830 To Or per bed. Updated Vanessa in surgery of patient status, [facility] and LANDY Larson and her phone number as it is in the chart. Consent was signed by Praveena SIMS this am with LANDY. Spoke with Junie Orourke CNP regarding starting diltiazem drip. Pt was just started on precedex for restless movements per Dr. Chavis. Pt's HR down to 100-110's. Per Junie Orourke CNP ok to hold diltiazem drip for now and use PRNs if BP does not correct with precedex. If continue to have tachycardia in 120's or hypertension, reach back out to cardiology.

## 2024-07-12 NOTE — PROCEDURES
ICU PROCEDURE - ENDOTRACHEAL INTUBATION    Cal Sanchez     MRN#: [id number]  23      Acct #:[unfilled]     : 1960      INDICATION: as above  Severe respiratory distress, CHF    TIME OUT: taken    Permission obtained, risks/benefits reviewed:    ANESTHESIA:   []Ketamine  []Ativan  [] Morphine  []Propofol  []Other medications:      ESTIMATED BLOOD LOSS:  None.    COMPLICATIONS:  []N/A  [] Other:    LARYNGOSCOPIC AIRWAY GRADE (CORMACK-LEHANE):[]1  []2a  []2b []3  []4        INTUBATION EQUIPMENT USED:  [x] Direct laryngoscope only    OUTCOME: Successful placement of #  7.5 cm  Taperguard Evac endotracheal via   [x]Oral route    INSERTION DEPTH:  23    cm from   [x]lip           CONFIRMATION OF TUBE POSITION:   [x]Capnography - Strong & repeatable exhaled CO2 detection   [x]Multiple point auscultation   [x]SpO2 response   [x]STAT X-ray   []Bronchoscopic assessment    UNUSUAL FINDINGS:    PROCEDURE:     Using direct laryngoscopy, the vocal cords were visualized and the endotracheal tube was placed through the cords under direct vision.     Good breath sounds were auscultated bilaterally without sounds over abdomen.  Appropriate strong & repeatable exhaled CO2 detection was confirmed.       Electronically signed by Mabel Saldaña MD on 2024 at 12:45 PM

## 2024-07-12 NOTE — CONSULTS
thrombus or vegetations.   mild tricuspid regurgitation.        LLE Peripheral intervention: 2/2024  99% ostial AT/PT   S/p successful orbital atherectomy and balloon angioplasty of the proximal AT and PT   Aggressive medical therapy for PAD     NM Stress 3/2024:  Summary Pharmacological Stress/MPI Results: 1. Technically a satisfactory study. 2. No evidence of Ischemia by Myocardial Perfusion Imaging. 3. Gated Study shows normal LV size and Systolic function; EF is 53 %.     CT pelvis/abd:  IMPRESSION:  1. Moderate bilateral pleural effusions.  2. Cirrhotic liver morphology with splenomegaly and small amount of free  fluid in the abdomen and pelvis.  3. 3.1 cm homogeneous mass in the right lower renal pole is favored to be a  proteinaceous cyst. Recommend follow-up renal ultrasound in 6 months.  4. 2 cm left adrenal myelolipoma.    Head CT:  IMPRESSION:  1. No acute intracranial abnormality.  2. Chronic microvascular ischemic changes.    CXR:  IMPRESSION:  Cardiomegaly and perihilar congestion and mild interstitial edema.    EKG: AF, no ischemia  RBBB  Consistent with prior EKGs    Assessment & Plan:    1). AF RVR:  A-fib has been persistent s/p Watchman- asa , no OAC  Currently not getting his oral Coreg due to confusion and irritability  Elevated heart rate most likely physiological response to acute illness  Heart rate 110s to 120s, blood pressure elevated at 169/100  Add Cardizem drip with goal heart rate less than 100    2.) Elevated Trop: 151, 172  Chronically elevated-likely from CKD  This admission troponins are actually improved from last done in March when nuclear med stress test was negative  Left heart cath 2019 showed nonocclusive CAD  EKG without ischemic changes  .  3.) Acute on chronic HFpEF/ NICM:  NYHA IV  LVEF 45-50% per last ECHO 2/2024  Elevated BNP  CXR w pulm congestion  SOB multifactorial- pulm edema, possible PNA, ? COPD- chart indicates no smoking hx  Nephrology has been consulted.  Will

## 2024-07-12 NOTE — PROGRESS NOTES
NAME:  Cal Sanchez  YOB: 1960  MEDICAL RECORD NUMBER:  [id number]    Shift Summary: Patient admitted for AMS, lethargic, 1mg ativan given for CIWA symtoms. BP elevated, on 2L nasal cannula, otherwise patient stable. Patient unable to answer orientation questions.     Family updated: No    Rhythm: Atrial Fibrillation     Most recent vitals:   Visit Vitals  BP (!) 169/100   Pulse (!) 114   Temp 98.6 °F (37 °C) (Oral)   Resp 18   Ht 1.778 m (5' 10\")   Wt 81.6 kg (179 lb 14.3 oz)   SpO2 100%   BMI 25.81 kg/m²           No data found.    No data found.      Respiratory support needed (if any):  - O2 - NC - 2 lpm    Admission weight Weight - Scale: 85.1 kg (187 lb 9.8 oz) (07/11/24 2219)    Today's weight    Wt Readings from Last 1 Encounters:   07/12/24 81.6 kg (179 lb 14.3 oz)        Tracy need assessed each shift: N/A - no tracy present  UOP >30ml/hr: NO - anuric?  Last documented BM (in last 48 hrs):  No data found.             Restraints (in use currently or dc'd in last 12 hrs): No    Order current and documentation up to date? No    Lines/Drains reviewed @ bedside.  Peripheral IV 07/11/24 Right Wrist (Active)   Number of days: 0       Peripheral IV 07/12/24 Right;Anterior Forearm (Active)   Number of days: 0         Drip rates at handoff:    sodium chloride      sodium chloride      dexmedeTOMIDine         Lab Data:   CBC:   Recent Labs     07/11/24 2105 07/12/24 0238   WBC 6.6 7.0   HGB 12.9* 13.0*   HCT 38.5* 38.6*   MCV 97.2 97.4   * 114*     BMP:    Recent Labs     07/11/24 2034 07/12/24 0238    138   K 4.4 4.2   CO2 19* 19*   BUN 33* 35*   CREATININE 5.9* 6.2*     LIVR:   Recent Labs     07/11/24 2034   AST 81*   ALT 63*     PT/INR:   Recent Labs     07/11/24 2034   INR 1.22*     APTT: No results for input(s): \"APTT\" in the last 72 hours.  ABG: No results for input(s): \"PHART\", \"SJO9BUF\", \"PO2ART\" in the last 72 hours.    Any consults during the shift? Yes: Consults

## 2024-07-12 NOTE — ED NOTES
Pt repeatedly asking to urinate, RN has provided a urinal and pt agrees to use it but then again asks to use to bathroom.  RN has inquired if pt needs to urinate or have a bowel movement and pt responds that he needs to urinate.  Pt has attempted to urinate repeatedly but no quantifiable urine has been produced.  RN has observed 1-2 drops of bright red tinged urine in the urinal and on the inside of pt's brief near the head of his penis

## 2024-07-12 NOTE — PROGRESS NOTES
4 Eyes Skin Assessment     NAME:  Cal Sanchez  YOB: 1960  MEDICAL RECORD NUMBER:  [id number]    The patient is being assessed for  Admission    I agree that at least one RN has performed a thorough Head to Toe Skin Assessment on the patient. ALL assessment sites listed below have been assessed.      Areas assessed by both nurses:    Head, Face, Ears, Shoulders, Back, Chest, Arms, Elbows, Hands, Sacrum. Buttock, Coccyx, Ischium, Legs. Feet and Heels, and Under Medical Devices         Does the Patient have a Wound? No noted wound(s)       Smith Prevention initiated by RN: Yes  Wound Care Orders initiated by RN: No    Pressure Injury (Stage 3,4, Unstageable, DTI, NWPT, and Complex wounds) if present, place Wound referral order by RN under : No    New Ostomies, if present place, Ostomy referral order under : No     Nurse 1 eSignature: Electronically signed by Kavya Biggs RN on 7/12/24 at 8:01 AM EDT    **SHARE this note so that the co-signing nurse can place an eSignature**    Nurse 2 eSignature: Electronically signed by Clari Edwards RN on 7/12/24 at 6:38 PM EDT

## 2024-07-12 NOTE — PROGRESS NOTES
Dr. Chavis notified on assessment pt only responding to pain. Pt not opening eyes but withdrawals from pain. Pt not following commands. Dr. Chavis at bedside to assess pt. Per Dr. Chavis, do not give ativan at this time but start pt on precedex for agitated and restless movements. ABG overnight normal so no need to recheck at this point. Continue to monitor mental status.

## 2024-07-12 NOTE — PROGRESS NOTES
Rounds with Dr. Chavis. Notified pt has tunneled dialysis line on R chest that does not look super clean. Will wait for cultures. Notified pt hypertensive and has PRN medications ordered for SBP >180 and DBP >100. Pt's HR in 120's known history of afib s/p watchman procedure. Pt not to take any PO medications at this time due to mental status. [facility] protocol already ordered. RN to obtain consent for thoracentesis. Will wait to see if nephrology plans for HD today and see if thoracentesis needed.

## 2024-07-12 NOTE — PROGRESS NOTES
Patient bladder scanned at this time, 0mL stored in bladder.    Electronically signed by Kavya Biggs RN on 7/12/2024 at 3:48 AM

## 2024-07-12 NOTE — PROGRESS NOTES
Department of Internal Medicine  Nephrology Progress Note    SUBJECTIVE:  We are following this patient for ERSD. Patient progress reviewed. The patient is confused and disoriented .     REVIEW OF SYSTEMS:  Unobtainable     Physical Exam:    VITALS:  BP (!) 157/117   Pulse (!) 104   Temp 97.6 °F (36.4 °C) (Axillary)   Resp 17   Ht 1.778 m (5' 10\")   Wt 81.6 kg (179 lb 14.3 oz)   SpO2 100%   BMI 25.81 kg/m²   24HR INTAKE/OUTPUT:    Intake/Output Summary (Last 24 hours) at 7/12/2024 1347  Last data filed at 7/12/2024 1220  Gross per 24 hour   Intake 830.34 ml   Output --   Net 830.34 ml       Constitutional:  confused   Respiratory:  Few rhonchi   Gastrointestinal: No  tenderness.  Normal Bowel Sounds  Cardiovascular:  S1, S2 Irregular   Edema:   + edema    DATA:    CBC:  Lab Results   Component Value Date/Time    WBC 7.0 07/12/2024 02:38 AM    RBC 3.97 07/12/2024 02:38 AM    HGB 13.0 07/12/2024 02:38 AM    HCT 38.6 07/12/2024 02:38 AM    MCV 97.4 07/12/2024 02:38 AM    MCH 32.8 07/12/2024 02:38 AM    MCHC 33.6 07/12/2024 02:38 AM    RDW 18.1 07/12/2024 02:38 AM     07/12/2024 02:38 AM    MPV 9.5 07/12/2024 02:38 AM     CMP:  Lab Results   Component Value Date/Time     07/12/2024 02:38 AM    K 4.2 07/12/2024 02:38 AM    CL 95 07/12/2024 02:38 AM    CO2 19 07/12/2024 02:38 AM    BUN 35 07/12/2024 02:38 AM    CREATININE 6.2 07/12/2024 02:38 AM    GFRAA 12 02/18/2022 12:11 AM    AGRATIO 1.4 07/11/2024 08:34 PM    LABGLOM 9 07/12/2024 02:38 AM    LABGLOM 10 03/20/2024 10:13 AM    GLUCOSE 89 07/12/2024 02:38 AM    CALCIUM 10.2 07/12/2024 02:38 AM    BILITOT 1.0 07/11/2024 08:34 PM    ALKPHOS 92 07/11/2024 08:34 PM    AST 81 07/11/2024 08:34 PM    ALT 63 07/11/2024 08:34 PM      Hepatic Function Panel:   Lab Results   Component Value Date/Time    ALKPHOS 92 07/11/2024 08:34 PM    ALT 63 07/11/2024 08:34 PM    AST 81 07/11/2024 08:34 PM    BILITOT 1.0 07/11/2024 08:34 PM    BILIDIR <0.2 03/19/2024 04:45

## 2024-07-12 NOTE — PROGRESS NOTES
V2.0    [facility] Progress Note      Name:  Cal Sanchez /Age/Sex: 1960  (64 y.o. male)   MRN & CSN:  [id number] & [id number] Encounter Date/Time: 2024 7:41 AM EDT   Location:  [id number] PCP: Tania Moses MD     Attending:Reji Nicolas MD       Hospital Day: 2    Assessment and Recommendations   Cal Sanchez is a 64 y.o. male who presents with Acute delirium      Plan:   Alcohol abuse with acute delirium, CT scan negative could be alcohol intoxication, CIWA started on admission continue for now Precedex.  At risk for life-threatening complications closely monitor thiamine, folic acid  Hypertensive urgency, adding labetalol as needed expecting improvement with dialysis  End-stage renal disease nephrology consulted  Questionable sepsis due to pneumonia IV antibiotics empirically imaging rather present fluid overload  Possible pneumonia, empirically on IV antibiotics  Elevated lactic acid, multifactorial, trend with end-stage renal disease volume status very challenging.  CAD with episode of chest pain trending troponin had recent negative stress test  Thrombocytopenia, platelet 114 this morning  A-fib with RVR along with elevated troponin will consult cardiology  Cirrhotic liver on CT, likely alcohol use related counseled for alcohol cessation  3.1 cm homogeneous mass right kidney likely cyst needs follow-up with ultrasound in 6 months  Left adrenal myolipoma 4.2 cm follow-up as outpatient if deemed necessary    Total critical care time including but not limited to physical exam ordering and following on critical IV medication ordering on critical labs, in a patient with potential life-threatening complications 31 minutes  Diet Diet NPO   DVT Prophylaxis [] Lovenox, []  Heparin, [] SCDs, [] Ambulation,  [] Eliquis, [] Xarelto  [] Coumadin   Code Status Full Code             Personally reviewed Lab Studies and Imaging     Discussed management of the case with intensivist  including errors in grammar, punctuation, and spelling, as well as words and phrases that may be inappropriate. If there are any questions or concerns, please feel free to contact the dictating provider for clarification.

## 2024-07-12 NOTE — CONSULTS
[facility]          [address] [place] [address]                              CONSULTATION      PATIENT NAME: NATHALIA HOFF               : 1960  MED REC NO: [id number]                      ROOM: [place] [address]  ACCOUNT NO: [id number]                       ADMIT DATE: 2024  PROVIDER: Junaid Landry MD      CONSULT DATE: 2024    REASON FOR CONSULTATION:  Maintenance dialysis.    HISTORY OF PRESENT ILLNESS:  A 64-year-old  gentleman with past medical history significant for hypertension, coronary artery disease, chronic Afib, diabetes mellitus type 2; end-stage renal disease, on hemodialysis Monday, Wednesday, Friday (follows up with  Nephrology); peripheral vascular disease, status post toe amputation; chronic alcohol abuse, IV drug abuse, brought to ER because of for worsening mental status as well as some confusion and disorientation.  The patient was admitted in ICU for further care and management.  Sepsis was suspected, and cultures were sent, and the patient was started on the broad-spectrum antibiotic.  At the time of presentation, found to be in Afib with rapid ventricular response and admitted in ICU.    At the time of consultation, the patient is confused, disoriented.  Most of the information obtained from the records.    PAST MEDICAL HISTORY:    1. IV drug abuse.  2. Chronic alcohol abuse.  3. Coronary artery disease.  4. Chronic Afib.  5. Hypertension.  6. Diabetes mellitus type 2.  7. End-stage renal disease, on hemodialysis Monday, Wednesday, Friday, and follows up with  Nephrology.    PAST SURGICAL HISTORY:    1. Status post 2nd and 3rd left foot toe amputation.  2. Status post pacemaker placement.  3. Status post dialysis catheter placement.  4. Hypothyroidism.    OUTPATIENT MEDICATIONS:  Include Synthroid, vitamin D supplement, Neurontin, Lipitor, Coreg, Wellbutrin, Seroquel, Effexor, Sensipar, Renvela.    ALLERGIES:

## 2024-07-12 NOTE — CONSULTS
Clinical Pharmacy Note  Vancomycin Consult    Cal Sanchez is a 64 y.o. male ordered Vancomycin for Pneumonia; consult received from Dr. Ramos to manage therapy. Also receiving cefepime.    Allergies:  Patient has no known allergies.     Temp max:  Temp (24hrs), Av.5 °F (36.9 °C), Min:98.2 °F (36.8 °C), Max:98.6 °F (37 °C)      Recent Labs     24   WBC 6.6       Recent Labs     24   BUN 33*   CREATININE 5.9*         Intake/Output Summary (Last 24 hours) at 2024 0321  Last data filed at 2024 0314  Gross per 24 hour   Intake 400.47 ml   Output --   Net 400.47 ml       Culture Results:      Ht Readings from Last 1 Encounters:   24 1.778 m (5' 10\")        Wt Readings from Last 1 Encounters:   24 81.6 kg (179 lb 14.3 oz)         Estimated Creatinine Clearance: 13 mL/min (A) (based on SCr of 5.9 mg/dL (HH)).    Assessment/Plan:  Day # 1 of vancomycin.  Vancomycin 1250 mg IV x 1 dose ordered.    Goal -600  Will plan future doses according to HD schedule and vancomycin levels    Thank you for the consult.   William Smith, PharmD

## 2024-07-12 NOTE — ED PROVIDER NOTES
I was asked to see the patient pending admission orders. Patient was walking out of his room asking to urinate. Nursing attempted to place tracy but no urine in bladder. He keeps asking for assistance so he can urinate. For his safety, he was given ativan 1mg IV. He seems uncomfortable and was given his home percocet dose. On reassessment, he appears less agitated but still attempting to get out of the stretcher. Ordered additional 1mg IV ativan.      Kristin Mac MD  07/17/24 6481

## 2024-07-12 NOTE — H&P
HISTORY AND PHYSICAL             Date: 7/12/2024        Patient Name: Cal Sanchez     YOB: 1960      Age:  64 y.o.    Chief Complaint     Chief Complaint   Patient presents with    Diarrhea    Altered Mental Status     Pt's niece contacted EMS because pt has had multiple instances of diarrhea throughout the day and seems confused.  EMS reports pt A&O x 2; Pt admits to using cocaine IV this morning          History Obtained From   Patient, electronic medical record    History of Present Illness   64m withESRD MWF, IVDU, reported EtOH abuse, last seen normal 2 days ago, though noted to have attended yesterday's dialysis session, presents with altered mental status and shortness of breath. Patient has become more agitated, combative since presentation, however remains on room air with O2 sat remaining above 90, hypertensive with afib rvr.   Patient is seen in the ER, somnolent, however able to respond verbally when intermittently agitated and awake, initially denying chest pain or sob, but is increasingly disoriented and agitated    Past Medical History     Past Medical History:   Diagnosis Date    Atrial fibrillation (HCC)     CAD (coronary artery disease) 4/3/2014    Chronic kidney disease     Depression     Diabetes mellitus (HCC)     Dialysis patient (HCC)     left upper arm fistula    Glaucoma     Hemodialysis patient (HCC)     Hyperlipidemia     Hypertension     RLL pneumonia 1/23/2018    Sleep apnea     uses CPAP        Past Surgical History     Past Surgical History:   Procedure Laterality Date    COLONOSCOPY      PACEMAKER INSERTION      PACEMAKER PLACEMENT      TOE AMPUTATION Left 12/1/2023    AMPUTATION SECOND AND THIRD DIGITS LEFT FOOT performed by Horace Jordan DPM at [facility] OR    TOE AMPUTATION Left 2/12/2024    LEFT HALLUX AMPUTATION performed by Horace Jordan DPM at [facility] OR        Medications Prior to Admission     Prior to Admission medications    Medication Sig Start Date End Date  hypodensities, suspected to be cysts. Calcifications in the renal allegra bilaterally are probably vascular calcifications.  In the right lower renal pole, there is a 3.1 cm homogeneous mass which averages 40 Hounsfield units on this exam with contrast.  Although slightly larger on current exam, similar finding was present on prior exam of February 2016.  It is favored to be a proteinaceous cyst. GI/Bowel: Normal appendix.  The stomach is nondistended.  No evidence of bowel obstruction or free intraperitoneal air. Pelvis: Bladder is empty.  No free fluid in the pelvis.  No evidence of inguinal or pelvic lymphadenopathy. Peritoneum/Retroperitoneum: Abdominal aorta is atherosclerotic.  No retroperitoneal adenopathy.  Small amount of free fluid in the abdomen and pelvis. Bones/Soft Tissues: No aggressive lytic or blastic bony lesion.     1. Moderate bilateral pleural effusions. 2. Cirrhotic liver morphology with splenomegaly and small amount of free fluid in the abdomen and pelvis. 3. 3.1 cm homogeneous mass in the right lower renal pole is favored to be a proteinaceous cyst. Recommend follow-up renal ultrasound in 6 months. 4. 2 cm left adrenal myelolipoma.     CT HEAD WO CONTRAST    Result Date: 7/11/2024  EXAMINATION: CT OF THE HEAD WITHOUT CONTRAST  7/11/2024 8:05 pm TECHNIQUE: CT of the head was performed without the administration of intravenous contrast. Automated exposure control, iterative reconstruction, and/or weight based adjustment of the mA/kV was utilized to reduce the radiation dose to as low as reasonably achievable. COMPARISON: None. HISTORY: ORDERING SYSTEM PROVIDED HISTORY: AMS TECHNOLOGIST PROVIDED HISTORY: Reason for exam:->AMS Has a \"code stroke\" or \"stroke alert\" been called?->No Decision Support Exception - unselect if not a suspected or confirmed emergency medical condition->Emergency Medical Condition (MA) Reason for Exam: ams FINDINGS: BRAIN/VENTRICLES: Chronic microvascular ischemic changes in

## 2024-07-12 NOTE — PROGRESS NOTES
Dr. Chavis notified pt's color increasingly poor. Pt's face gray. Pt tachypneic with accessory muscle use and minimally responsive to pain. Per Dr. Chavis, pt to be intubated and CVC placed. RN to call and notify niece listed as next of kin. Smith RT notified and kennedi Colmenares RN notified of above.   1148: RN called niece at number listed in chart. No answer. HIPAA compliant voicemail left.  1157: Pt's niece, Junie Sanchez, called back. Niece aware of pt condition and gave consent over phone for intubation, CVC placement and thoracentesis if necessary. Consent witnessed by second nurse, SEBASTIÁN Mcfadden.

## 2024-07-12 NOTE — PLAN OF CARE
Problem: Discharge Planning  Goal: Discharge to home or other facility with appropriate resources  Outcome: Not Progressing  Flowsheets  Taken 7/12/2024 0900 by Clari Edwards RN  Discharge to home or other facility with appropriate resources: Identify barriers to discharge with patient and caregiver    Problem: Pain  Goal: Verbalizes/displays adequate comfort level or baseline comfort level  Outcome: Progressing  Flowsheets (Taken 7/12/2024 0900)  Verbalizes/displays adequate comfort level or baseline comfort level: Assess pain using appropriate pain scale     Problem: Safety - Adult  Goal: Free from fall injury  Outcome: Progressing  Flowsheets (Taken 7/12/2024 0900)  Free From Fall Injury: Based on caregiver fall risk screen, instruct family/caregiver to ask for assistance with transferring infant if caregiver noted to have fall risk factors     Problem: Skin/Tissue Integrity  Goal: Absence of new skin breakdown  Description: 1.  Monitor for areas of redness and/or skin breakdown  2.  Assess vascular access sites hourly  3.  Every 4-6 hours minimum:  Change oxygen saturation probe site  4.  Every 4-6 hours:  If on nasal continuous positive airway pressure, respiratory therapy assess nares and determine need for appliance change or resting period.  Outcome: Progressing

## 2024-07-12 NOTE — PROGRESS NOTES
Dr. Chavis notified pt's glucose is trending down with last check at 73. Dr. Chavis ordered D5NS at 75mL/hr. Dr. Chavis aware pt is HD pt.

## 2024-07-12 NOTE — PROGRESS NOTES
Notified by RN that pt was intubated/sedated and HR now controlled.   Cardizem gtt was never started. Will DC order.   However, ECHO was ordered by Pulm/CC with concern for reduced EF.   Will review in AM after completed.

## 2024-07-12 NOTE — PROCEDURES
PATIENT NAME: Cal Sanchez  MEDICAL RECORD NO. [id number]  DATE: 7/12/2024      PROCEDURE PERFORMED:  Left Internal Jugular Vein Central Line Insertion  ANESTHESIA:  Local utilizing 1% lidocaine  ESTIMATED BLOOD LOSS:  Less than 5 ml  COMPLICATIONS:  None immediately appreciated.    Consent: obtained explaining indications, benefits, possible complications and alternatives explained.     PROCEDURE:  A timeout was initiated by the bedside nurse and was confirmed by those present.  The patient was placed in a supine position. The skin overlying the Left Internal Jugular Vein was prepped with chlorhexadine and draped in sterile fashion. The skin was infiltrated with local anesthetic. Through the anesthetized region, the introducer needle was inserted into the internal jugular vein returning dark red non pulsatile blood. A guidewire was placed through the center of the needle with no resistance. This is non tunneled catheter. A small incision made in the skin with a #11 scalpel blade. The dilator was inserted into the skin and vein over guidewire using Seldinger technique. The dilator was then removed and 20 cm triple catheter was placed in the vein over the guidewire using Seldinger technique terminating in the SVC, right atrium area.The guidewire was then removed and all ports aspirated and flushed appropriately.   The catheter then secured using silk suture and a temporary sterile dressing was applied.  No immediate complication was evident.  All sponge, instrument and needle counts were correct at the completion of the procedure.    Postprocedural chest x-ray showed good position of the catheter with no evidence of hemothorax or pneumothorax. The patient tolerated the procedure well with no immediate complication evident.     Mabel Saldaña MD  7/12/2024, 12:44 PM

## 2024-07-12 NOTE — PROGRESS NOTES
NAME:  Cal Sanchez  YOB: 1960  MEDICAL RECORD NUMBER:  [id number]    Shift Summary: Intubated, CVC placed, code status changed to limited    Family updated: Yes:  niece, Junie and brother, Wei    Rhythm: Atrial Fibrillation     Most recent vitals:   Visit Vitals  /80   Pulse 88   Temp (!) 96.5 °F (35.8 °C) (Axillary)   Resp 15   Ht 1.778 m (5' 10\")   Wt 80.5 kg (177 lb 7.5 oz)   SpO2 100%   BMI 25.46 kg/m²           No data found.    No data found.      Respiratory support needed (if any):  - Ventilator Settings:  Vent Days 1       Resp Rate (Set): 15 bpm  FiO2 : 40 %    PEEP/CPAP (cmH2O): 5       Admission weight Weight - Scale: 85.1 kg (187 lb 9.8 oz) (07/11/24 2219)    Today's weight    Wt Readings from Last 1 Encounters:   07/12/24 80.5 kg (177 lb 7.5 oz)        Tracy need assessed each shift: N/A - no tracy present  UOP >30ml/hr: NO - Anuria - HD pt  Last documented BM (in last 48 hrs):  No data found.             Restraints (in use currently or dc'd in last 12 hrs): Yes    Order current and documentation up to date? Yes    Lines/Drains reviewed @ bedside.  CVC  07/12/24 Left Internal jugular (Active)   Number of days: 0         Drip rates at handoff:    sodium chloride      sodium chloride      dexmedeTOMIDine Stopped (07/12/24 1206)    dextrose 5 % and 0.9 % NaCl 50 mL/hr at 07/12/24 1413    propofol 30 mcg/kg/min (07/12/24 1730)    fentaNYL 50 mcg/hr (07/12/24 1439)       Lab Data:   CBC:   Recent Labs     07/11/24  2105 07/12/24  0238   WBC 6.6 7.0   HGB 12.9* 13.0*   HCT 38.5* 38.6*   MCV 97.2 97.4   * 114*     BMP:    Recent Labs     07/11/24 2034 07/12/24  0238    138   K 4.4 4.2   CO2 19* 19*   BUN 33* 35*   CREATININE 5.9* 6.2*     LIVR:   Recent Labs     07/11/24 2034   AST 81*   ALT 63*     PT/INR:   Recent Labs     07/11/24 2034   INR 1.22*     APTT: No results for input(s): \"APTT\" in the last 72 hours.  ABG:   Recent Labs     07/12/24  1810   PHART 7.394

## 2024-07-12 NOTE — CONSULTS
Pulmonary Critical Care Consult Note     Patient's name:  Cal Sanchez  Medical Record Number: [id number]  Patient's account/billing number: [id number]  Patient's YOB: 1960  Age: 64 y.o.  Date of Admission: 7/11/2024  8:15 PM  Date of Consult: 7/12/2024      Primary Care Physician: Tania Moses MD      Code Status: Full Code    Reason for consult: severe sepsis     Assessment and Plan     Severe sepsis  Acute systolic CHF  Acute metabolic encephalopathy  Multisubstance abuse  End-stage renal disease on hemodialysis  Bilateral pleural effusions  Alcoholic cirrhosis  A-fib with RVR, status post Watchman  Chronic heart failure  Hypoglycemia      Plan:  Severe sepsis unclear source,? Viral, blood culture sent, continue current antibiotics cefepime and bank, aspiration precautions, monitor mental status closely currently able to protect airways but high risk for intubation.  CIWA protocol, Precedex  Rate control  Dextrose  Check ECHO  Hemodialysis per nephrology.  Due to the immediate potential for life-threatening deterioration due to above , I spent 33 minutes providing critical care.  This time is excluding time spent performing procedures.  This note was transcribed using Dragon Dictation software. Please disregard any translational errors.          HISTORY OF PRESENT ILLNESS:   Mr./Ms. Cal Sanchez is a 64 y.o. gentleman with past medical history stated below significant for end-stage renal disease on hemodialysis through his fistula, he does have right subclavian tunneled catheter, multisubstance abuse including IV drug use and alcohol presented with altered mental status agitation combative.      CT head was negative  Chest x-ray with bilateral pleural effusions  Ammonia level normal.      Past Medical History:        Diagnosis Date    Atrial fibrillation (HCC)     CAD (coronary artery disease) 4/3/2014    Chronic kidney disease

## 2024-07-12 NOTE — PROGRESS NOTES
Treatment time: 4 hours  Net UF: 700 ml     Pre weight: 81.1 kg  Post weight:80.5 kg  EDW: 84 kg    Access used: R chest CVC    Access function: Good with  ml/min     Medications or blood products given: HD line heparin dwells     Regular outpatient schedule: MWF     Summary of response to treatment: Patient tolerated treatment well and without any complications. Patient remained stable throughout entire treatment and upon the dialysis RN exiting the ICU suite.     Copy of dialysis treatment record placed in chart, to be scanned into EMR.

## 2024-07-12 NOTE — PROGRESS NOTES
1200: Cephalic IV placed in R arm as pt only had 1 IV. Blood pressure cuff moved to R upper leg as only option for BP checks as pt is no checks in L arm. Pt intubated and very agitated. BP very elevated but likely related to movement during checks and location of BP checks.  1425: CXR confirmed placement of CVC and IV removed. BP moved to RUE and BP normalized. BP not treated prior due to problematic placement and difficulty with sedating pt. Once cuff placed on RUE, BP well controlled and no treatment necessary.

## 2024-07-13 LAB
ALBUMIN SERPL-MCNC: 3.2 G/DL (ref 3.4–5)
ALP SERPL-CCNC: 77 U/L (ref 40–129)
ALT SERPL-CCNC: 679 U/L (ref 10–40)
ANION GAP SERPL CALCULATED.3IONS-SCNC: 13 MMOL/L (ref 3–16)
ANISOCYTOSIS BLD QL SMEAR: ABNORMAL
APTT BLD: 32 SEC (ref 22.1–36.4)
AST SERPL-CCNC: 686 U/L (ref 15–37)
BASOPHILS # BLD: 0 K/UL (ref 0–0.2)
BASOPHILS NFR BLD: 0.7 %
BILIRUB DIRECT SERPL-MCNC: 0.6 MG/DL (ref 0–0.3)
BILIRUB INDIRECT SERPL-MCNC: 0.4 MG/DL (ref 0–1)
BILIRUB SERPL-MCNC: 1 MG/DL (ref 0–1)
BUN SERPL-MCNC: 20 MG/DL (ref 7–20)
CALCIUM SERPL-MCNC: 9.5 MG/DL (ref 8.3–10.6)
CHLORIDE SERPL-SCNC: 98 MMOL/L (ref 99–110)
CO2 SERPL-SCNC: 25 MMOL/L (ref 21–32)
CREAT SERPL-MCNC: 3.8 MG/DL (ref 0.8–1.3)
CRP SERPL-MCNC: 48.4 MG/L (ref 0–5.1)
DEPRECATED RDW RBC AUTO: 17.4 % (ref 12.4–15.4)
EOSINOPHIL # BLD: 0 K/UL (ref 0–0.6)
EOSINOPHIL NFR BLD: 0.9 %
FIBRINOGEN PPP-MCNC: 222 MG/DL (ref 227–534)
FOLATE SERPL-MCNC: 8.14 NG/ML (ref 4.78–24.2)
GFR SERPLBLD CREATININE-BSD FMLA CKD-EPI: 17 ML/MIN/{1.73_M2}
GLUCOSE BLD-MCNC: 107 MG/DL (ref 70–99)
GLUCOSE BLD-MCNC: 84 MG/DL (ref 70–99)
GLUCOSE BLD-MCNC: 93 MG/DL (ref 70–99)
GLUCOSE BLD-MCNC: 94 MG/DL (ref 70–99)
GLUCOSE BLD-MCNC: 96 MG/DL (ref 70–99)
GLUCOSE BLD-MCNC: 97 MG/DL (ref 70–99)
GLUCOSE SERPL-MCNC: 88 MG/DL (ref 70–99)
HAPTOGLOB SERPL-MCNC: 60 MG/DL (ref 30–200)
HCT VFR BLD AUTO: 34.6 % (ref 40.5–52.5)
HGB BLD-MCNC: 11.5 G/DL (ref 13.5–17.5)
INR PPP: 1.37 (ref 0.85–1.15)
LDH SERPL L TO P-CCNC: 351 U/L (ref 100–190)
LYMPHOCYTES # BLD: 0.6 K/UL (ref 1–5.1)
LYMPHOCYTES NFR BLD: 14.7 %
MACROCYTES BLD QL SMEAR: ABNORMAL
MCH RBC QN AUTO: 32.3 PG (ref 26–34)
MCHC RBC AUTO-ENTMCNC: 33.2 G/DL (ref 31–36)
MCV RBC AUTO: 97.1 FL (ref 80–100)
MONOCYTES # BLD: 0.3 K/UL (ref 0–1.3)
MONOCYTES NFR BLD: 6.9 %
MRSA DNA SPEC QL NAA+PROBE: NORMAL
NEUTROPHILS # BLD: 3.4 K/UL (ref 1.7–7.7)
NEUTROPHILS NFR BLD: 76.8 %
OVALOCYTES BLD QL SMEAR: ABNORMAL
PATH INTERP BLD-IMP: NORMAL
PERFORMED ON: ABNORMAL
PERFORMED ON: NORMAL
PHOSPHATE SERPL-MCNC: 3.7 MG/DL (ref 2.5–4.9)
PLATELET # BLD AUTO: 68 K/UL (ref 135–450)
PLATELET BLD QL SMEAR: ABNORMAL
PMV BLD AUTO: 9.1 FL (ref 5–10.5)
POIKILOCYTOSIS BLD QL SMEAR: ABNORMAL
POTASSIUM SERPL-SCNC: 3.6 MMOL/L (ref 3.5–5.1)
POTASSIUM SERPL-SCNC: 3.6 MMOL/L (ref 3.5–5.1)
PROT SERPL-MCNC: 5.5 G/DL (ref 6.4–8.2)
PROTHROMBIN TIME: 17 SEC (ref 11.9–14.9)
RBC # BLD AUTO: 3.57 M/UL (ref 4.2–5.9)
SLIDE REVIEW: ABNORMAL
SODIUM SERPL-SCNC: 136 MMOL/L (ref 136–145)
VIT B12 SERPL-MCNC: 1852 PG/ML (ref 211–911)
WBC # BLD AUTO: 4.4 K/UL (ref 4–11)

## 2024-07-13 PROCEDURE — 2580000003 HC RX 258: Performed by: INTERNAL MEDICINE

## 2024-07-13 PROCEDURE — 85730 THROMBOPLASTIN TIME PARTIAL: CPT

## 2024-07-13 PROCEDURE — 85610 PROTHROMBIN TIME: CPT

## 2024-07-13 PROCEDURE — 36592 COLLECT BLOOD FROM PICC: CPT

## 2024-07-13 PROCEDURE — 83010 ASSAY OF HAPTOGLOBIN QUANT: CPT

## 2024-07-13 PROCEDURE — 99291 CRITICAL CARE FIRST HOUR: CPT | Performed by: INTERNAL MEDICINE

## 2024-07-13 PROCEDURE — 86140 C-REACTIVE PROTEIN: CPT

## 2024-07-13 PROCEDURE — 6370000000 HC RX 637 (ALT 250 FOR IP): Performed by: INTERNAL MEDICINE

## 2024-07-13 PROCEDURE — 6360000002 HC RX W HCPCS: Performed by: INTERNAL MEDICINE

## 2024-07-13 PROCEDURE — 83615 LACTATE (LD) (LDH) ENZYME: CPT

## 2024-07-13 PROCEDURE — 94761 N-INVAS EAR/PLS OXIMETRY MLT: CPT

## 2024-07-13 PROCEDURE — 85384 FIBRINOGEN ACTIVITY: CPT

## 2024-07-13 PROCEDURE — 86022 PLATELET ANTIBODIES: CPT

## 2024-07-13 PROCEDURE — 94003 VENT MGMT INPAT SUBQ DAY: CPT

## 2024-07-13 PROCEDURE — 99232 SBSQ HOSP IP/OBS MODERATE 35: CPT | Performed by: NURSE PRACTITIONER

## 2024-07-13 PROCEDURE — 2700000000 HC OXYGEN THERAPY PER DAY

## 2024-07-13 PROCEDURE — 85025 COMPLETE CBC W/AUTO DIFF WBC: CPT

## 2024-07-13 PROCEDURE — 6370000000 HC RX 637 (ALT 250 FOR IP): Performed by: HOSPITALIST

## 2024-07-13 PROCEDURE — 82607 VITAMIN B-12: CPT

## 2024-07-13 PROCEDURE — 80069 RENAL FUNCTION PANEL: CPT

## 2024-07-13 PROCEDURE — 80076 HEPATIC FUNCTION PANEL: CPT

## 2024-07-13 PROCEDURE — 2000000000 HC ICU R&B

## 2024-07-13 PROCEDURE — 2580000003 HC RX 258: Performed by: HOSPITALIST

## 2024-07-13 PROCEDURE — 82746 ASSAY OF FOLIC ACID SERUM: CPT

## 2024-07-13 RX ADMIN — SEVELAMER CARBONATE 2400 MG: 800 TABLET, FILM COATED ORAL at 07:43

## 2024-07-13 RX ADMIN — PHENOBARBITAL SODIUM 32.5 MG: 65 INJECTION, SOLUTION INTRAMUSCULAR; INTRAVENOUS at 12:02

## 2024-07-13 RX ADMIN — VENLAFAXINE 75 MG: 37.5 TABLET ORAL at 07:47

## 2024-07-13 RX ADMIN — DEXTROSE AND SODIUM CHLORIDE: 5; 900 INJECTION, SOLUTION INTRAVENOUS at 08:16

## 2024-07-13 RX ADMIN — SEVELAMER CARBONATE 2400 MG: 800 TABLET, FILM COATED ORAL at 16:49

## 2024-07-13 RX ADMIN — PHENOBARBITAL SODIUM 32.5 MG: 65 INJECTION, SOLUTION INTRAMUSCULAR; INTRAVENOUS at 20:25

## 2024-07-13 RX ADMIN — PHENOBARBITAL SODIUM 32.5 MG: 65 INJECTION INTRAMUSCULAR at 05:23

## 2024-07-13 RX ADMIN — THIAMINE HYDROCHLORIDE 100 MG: 100 INJECTION, SOLUTION INTRAMUSCULAR; INTRAVENOUS at 07:43

## 2024-07-13 RX ADMIN — CEFEPIME 1000 MG: 1 INJECTION, POWDER, FOR SOLUTION INTRAMUSCULAR; INTRAVENOUS at 20:29

## 2024-07-13 RX ADMIN — SEVELAMER CARBONATE 2400 MG: 800 TABLET, FILM COATED ORAL at 12:03

## 2024-07-13 RX ADMIN — SODIUM CHLORIDE, PRESERVATIVE FREE 10 ML: 5 INJECTION INTRAVENOUS at 20:26

## 2024-07-13 RX ADMIN — SODIUM CHLORIDE, PRESERVATIVE FREE 10 ML: 5 INJECTION INTRAVENOUS at 07:44

## 2024-07-13 RX ADMIN — LEVOTHYROXINE SODIUM 25 MCG: 0.03 TABLET ORAL at 06:27

## 2024-07-13 RX ADMIN — CARVEDILOL 6.25 MG: 6.25 TABLET, FILM COATED ORAL at 16:49

## 2024-07-13 RX ADMIN — SODIUM CHLORIDE, PRESERVATIVE FREE 10 ML: 5 INJECTION INTRAVENOUS at 07:43

## 2024-07-13 RX ADMIN — QUETIAPINE FUMARATE 75 MG: 25 TABLET ORAL at 07:43

## 2024-07-13 RX ADMIN — FOLIC ACID 1 MG: 1 TABLET ORAL at 07:43

## 2024-07-13 RX ADMIN — CARVEDILOL 6.25 MG: 6.25 TABLET, FILM COATED ORAL at 07:43

## 2024-07-13 RX ADMIN — VENLAFAXINE 75 MG: 37.5 TABLET ORAL at 20:25

## 2024-07-13 RX ADMIN — PROPOFOL 10 MCG/KG/MIN: 10 INJECTION, EMULSION INTRAVENOUS at 22:13

## 2024-07-13 RX ADMIN — PROPOFOL 10 MCG/KG/MIN: 10 INJECTION, EMULSION INTRAVENOUS at 08:12

## 2024-07-13 RX ADMIN — ASPIRIN 81 MG: 81 TABLET, CHEWABLE ORAL at 07:43

## 2024-07-13 RX ADMIN — QUETIAPINE FUMARATE 75 MG: 25 TABLET ORAL at 20:25

## 2024-07-13 RX ADMIN — GABAPENTIN 100 MG: 100 CAPSULE ORAL at 20:25

## 2024-07-13 ASSESSMENT — PULMONARY FUNCTION TESTS
PIF_VALUE: 16
PIF_VALUE: 15
PIF_VALUE: 15
PIF_VALUE: 16
PIF_VALUE: 15
PIF_VALUE: 15
PIF_VALUE: 16
PIF_VALUE: 15
PIF_VALUE: 15
PIF_VALUE: 18
PIF_VALUE: 15
PIF_VALUE: 16
PIF_VALUE: 15
PIF_VALUE: 15
PIF_VALUE: 16
PIF_VALUE: 18
PIF_VALUE: 18
PIF_VALUE: 15
PIF_VALUE: 18
PIF_VALUE: 15

## 2024-07-13 NOTE — PLAN OF CARE
Problem: Discharge Planning  Goal: Discharge to home or other facility with appropriate resources  7/12/2024 2330 by Yanni Bone RN  Outcome: Progressing     Problem: Pain  Goal: Verbalizes/displays adequate comfort level or baseline comfort level  7/12/2024 2330 by Yanni Bone RN  Outcome: Progressing     Problem: Safety - Adult  Goal: Free from fall injury  7/12/2024 2330 by Yanni Bone RN  Outcome: Progressing     Problem: Skin/Tissue Integrity  Goal: Absence of new skin breakdown  Description: 1.  Monitor for areas of redness and/or skin breakdown  2.  Assess vascular access sites hourly  3.  Every 4-6 hours minimum:  Change oxygen saturation probe site  4.  Every 4-6 hours:  If on nasal continuous positive airway pressure, respiratory therapy assess nares and determine need for appliance change or resting period.  7/12/2024 2330 by Yanni Bone RN  Outcome: Progressing     Problem: Safety - Medical Restraint  Goal: Remains free of injury from restraints (Restraint for Interference with Medical Device)  Description: INTERVENTIONS:  1. Determine that other, less restrictive measures have been tried or would not be effective before applying the restraint  2. Evaluate the patient's condition at the time of restraint application  3. Inform patient/family regarding the reason for restraint  4. Q2H: Monitor safety, psychosocial status, comfort, nutrition and hydration  7/12/2024 2330 by Yanni Bone RN  Outcome: Progressing

## 2024-07-13 NOTE — PROGRESS NOTES
Clinical Pharmacy Note  Vancomycin Consult    Cal Sanchez is a 64 y.o. male ordered Vancomycin for Pneumonia; consult received from Dr. Ramos to manage therapy. Also receiving cefepime.    Allergies:  Patient has no known allergies.     Temp max:  Temp (24hrs), Av.8 °F (36.6 °C), Min:96.5 °F (35.8 °C), Max:98.6 °F (37 °C)      Recent Labs     24  0238   WBC 6.6 7.0         Recent Labs     24  0238   BUN 33* 35*   CREATININE 5.9* 6.2*           Intake/Output Summary (Last 24 hours) at 2024  Last data filed at 2024 1811  Gross per 24 hour   Intake 1179.26 ml   Output --   Net 1179.26 ml         Culture Results:  pending    Ht Readings from Last 1 Encounters:   24 1.778 m (5' 10\")        Wt Readings from Last 1 Encounters:   24 80.5 kg (177 lb 7.5 oz)         Estimated Creatinine Clearance: 12 mL/min (A) (based on SCr of 6.2 mg/dL (HH)).    Assessment/Plan:  Day # 1 of Vancomycin.  Random Vancomycin level post-HD = 8 ug/mL.    Will re-dose Vancomycin 750 mg IVPB x 1 now to keep patient therapeutic through the weekend.    Will continue dosing subsequent Vancomycin based on random levels in coordination with ongoing HD sessions    Thank you for the consult. Will continue to follow.    Tomas Baker RPH, PharmD, BCPS  2024 9:33 PM

## 2024-07-13 NOTE — PROGRESS NOTES
Department of Internal Medicine  Nephrology Progress Note    SUBJECTIVE:  We are following this patient for ERSD. Patient progress reviewed.   Events noted , on vent for decline in his MS and breathing .     REVIEW OF SYSTEMS:  Unobtainable / on vent     Physical Exam:    VITALS:  /83   Pulse 90   Temp (!) 95.5 °F (35.3 °C) (Axillary)   Resp 15   Ht 1.778 m (5' 10\")   Wt 83.9 kg (184 lb 15.5 oz)   SpO2 100%   BMI 26.54 kg/m²   24HR INTAKE/OUTPUT:    Intake/Output Summary (Last 24 hours) at 7/13/2024 1410  Last data filed at 7/13/2024 0630  Gross per 24 hour   Intake 1522.34 ml   Output --   Net 1522.34 ml         Constitutional: on vent   Respiratory:  Few rhonchi   Gastrointestinal: No  tenderness.  Normal Bowel Sounds  Cardiovascular:  S1, S2 Irregular   Edema:   + edema  Acc Rt TDC   DATA:    CBC:  Lab Results   Component Value Date/Time    WBC 4.4 07/13/2024 04:10 AM    RBC 3.57 07/13/2024 04:10 AM    HGB 11.5 07/13/2024 04:10 AM    HCT 34.6 07/13/2024 04:10 AM    MCV 97.1 07/13/2024 04:10 AM    MCH 32.3 07/13/2024 04:10 AM    MCHC 33.2 07/13/2024 04:10 AM    RDW 17.4 07/13/2024 04:10 AM    PLT 68 07/13/2024 04:10 AM    MPV 9.1 07/13/2024 04:10 AM     CMP:  Lab Results   Component Value Date/Time     07/13/2024 04:10 AM    K 3.6 07/13/2024 04:10 AM    K 3.6 07/13/2024 04:10 AM    CL 98 07/13/2024 04:10 AM    CO2 25 07/13/2024 04:10 AM    BUN 20 07/13/2024 04:10 AM    CREATININE 3.8 07/13/2024 04:10 AM    GFRAA 12 02/18/2022 12:11 AM    AGRATIO 1.4 07/11/2024 08:34 PM    LABGLOM 17 07/13/2024 04:10 AM    LABGLOM 10 03/20/2024 10:13 AM    GLUCOSE 88 07/13/2024 04:10 AM    CALCIUM 9.5 07/13/2024 04:10 AM    BILITOT 1.0 07/13/2024 04:10 AM    ALKPHOS 77 07/13/2024 04:10 AM     07/13/2024 04:10 AM     07/13/2024 04:10 AM      Hepatic Function Panel:   Lab Results   Component Value Date/Time    ALKPHOS 77 07/13/2024 04:10 AM     07/13/2024 04:10 AM     07/13/2024 04:10 AM     BILITOT 1.0 07/13/2024 04:10 AM    BILIDIR 0.6 07/13/2024 04:10 AM    IBILI 0.4 07/13/2024 04:10 AM      Phosphorus:   Lab Results   Component Value Date/Time    PHOS 3.7 07/13/2024 04:10 AM       ASSESSMENT:  Principal Problem:    Atrial fibrillation with rapid ventricular response (HCC)  Active Problems:    Elevated troponin I level    Acute delirium    Hypertensive urgency    Acute on chronic heart failure with preserved ejection fraction (HCC)    Altered mental status    Cardiogenic shock (HCC)  Resolved Problems:    * No resolved hospital problems. *      PLAN:  Encephalopathy R/o infection .H/o drug and ETOH use . Sedated now .   ESRD HD MWF, next tx Monday .lower wt as tolerated .   Asp pneumonia ?  On abx .  HTN controlled .   Anemia  Hb noted . Hold epo with HD .   Ac resp failure on vent per Pul     Junaid Landry MD, FACP

## 2024-07-13 NOTE — CONSULTS
[facility]    Consult Note      Requesting Physician:  danielle    CHIEF COMPLAINT:  pt came in agitated now intubated       HISTORY OF PRESENT ILLNESS:    Mr. Sanchez  is a 64 y.o. male we are seeing in consultation for thrombocytopenia  Pt currently intubated sedated unable to provide hx  Chart review shows agitation/elevated lft /sepsis /aspiration pneumonia  Blood cultures ngtd  Liver showing cirrhosis   Etoh level neg  Poss etoh withdraw  Pts plts have dropped since admit and we are asked to eval   Looking at old plts -sometimes he is normal sometimes he is low   Feb was normal , jan 2023 ran in the 80 range       ICD-10-CM    1. Severe sepsis (HCC)  A41.9     R65.20       2. IV drug user  F19.90       3. Altered mental status, unspecified altered mental status type  R41.82       4. Bilateral pleural effusion  J90       5. Atrial fibrillation with rapid ventricular response (HCC)  I48.91       6. Elevated troponin  R79.89       7. ESRD on hemodialysis (HCC)  N18.6     Z99.2       8. Cardiomyopathy, unspecified type (HCC)  I42.9 Echo (TTE) limited (PRN contrast/bubble/strain/3D)     Echo (TTE) limited (PRN contrast/bubble/strain/3D)             Past Medical History:  Past Medical History:   Diagnosis Date    Atrial fibrillation (HCC)     CAD (coronary artery disease) 4/3/2014    Chronic kidney disease     Depression     Diabetes mellitus (HCC)     Dialysis patient (HCC)     left upper arm fistula    Glaucoma     Hemodialysis patient (HCC)     Hyperlipidemia     Hypertension     RLL pneumonia 1/23/2018    Sleep apnea     uses CPAP       Past Surgical History:  Past Surgical History:   Procedure Laterality Date    COLONOSCOPY      PACEMAKER INSERTION      PACEMAKER PLACEMENT      TOE AMPUTATION Left 12/1/2023    AMPUTATION SECOND AND THIRD DIGITS LEFT FOOT performed by Horace Jordan  supportive transfuse if needed     TTP not likely scenerio will check ldh hapto but may be abnormal given his lft thus I highly doubt ttp   Suspicoun is not high for this -will not check adamts 13   May consdier smear eval     MOst likley cause is sepsis/pneumoina/etoh use marrow suppression of which care is  supportive   Transfuse if under 10 or if bleeding keep above 50         Thank you for asking me to see the patient.       Frances Stern MD  Please Contact Through [employer]

## 2024-07-13 NOTE — PROGRESS NOTES
2NAME:  Cal Sanchez  YOB: 1960  MEDICAL RECORD NUMBER:  [id number]    Shift Summary: remains vented- settings unchanged, sedated on Fentanyl and Prop. Hypoglycemic- D 10 bolus x 2, D5NS infusion. More responsive this am. May need thoracentesis    Family updated: No    Rhythm: Atrial Fibrillation     Most recent vitals:   Visit Vitals  BP (!) 150/90   Pulse 89   Temp 97.5 °F (36.4 °C) (Axillary)   Resp 15   Ht 1.778 m (5' 10\")   Wt 83.9 kg (184 lb 15.5 oz)   SpO2 100%   BMI 26.54 kg/m²           No data found.    No data found.      Respiratory support needed (if any):  - Ventilator Settings:  Vent Days        Resp Rate (Set): 15 bpm  FiO2 : 40 %    PEEP/CPAP (cmH2O): 5       Admission weight Weight - Scale: 85.1 kg (187 lb 9.8 oz) (07/11/24 2219)    Today's weight    Wt Readings from Last 1 Encounters:   07/13/24 83.9 kg (184 lb 15.5 oz)        Tracy need assessed each shift: N/A - no tracy present  UOP >30ml/hr: NO - anuric  Last documented BM (in last 48 hrs):  No data found.             Restraints (in use currently or dc'd in last 12 hrs): Yes    Order current and documentation up to date? Yes    Lines/Drains reviewed @ bedside.  CVC  07/12/24 Left Internal jugular (Active)   Number of days: 0         Drip rates at handoff:    sodium chloride      sodium chloride      dexmedeTOMIDine Stopped (07/12/24 1206)    dextrose 5 % and 0.9 % NaCl 50 mL/hr at 07/13/24 0530    propofol 10 mcg/kg/min (07/13/24 0530)    fentaNYL 25 mcg/hr (07/13/24 0530)    dextrose         Lab Data:   CBC:   Recent Labs     07/12/24 0238 07/13/24  0410   WBC 7.0 4.4   HGB 13.0* 11.5*   HCT 38.6* 34.6*   MCV 97.4 97.1   * 68*     BMP:    Recent Labs     07/12/24 0238 07/13/24  0410    136   K 4.2 3.6   CO2 19* 25   BUN 35* 20   CREATININE 6.2* 3.8*     LIVR:   Recent Labs     07/11/24 2034 07/13/24  0410   AST 81* 686*   ALT 63* 679*     PT/INR:   Recent Labs     07/11/24 2034   INR 1.22*     APTT: No

## 2024-07-13 NOTE — PROGRESS NOTES
NAME:  Cal Sanchez  YOB: 1960  MEDICAL RECORD NUMBER:  [id number]    Shift Summary: Patient on mechanical ventilator,sedated with propofol and fentanyl.Consult with dietician for feeding tube.Pt on restraints.VS stable.    Family updated: No    Rhythm: Normal Sinus Rhythm     Most recent vitals:   Visit Vitals  /87   Pulse 91   Temp (!) 96.5 °F (35.8 °C) (Axillary)   Resp 15   Ht 1.778 m (5' 10\")   Wt 83.9 kg (184 lb 15.5 oz)   SpO2 100%   BMI 26.54 kg/m²           No data found.    No data found.      Respiratory support needed (if any):  - Ventilator Settings:  Vent Days 2       Resp Rate (Set): 15 bpm  FiO2 : 30 %    PEEP/CPAP (cmH2O): 5       Admission weight Weight - Scale: 85.1 kg (187 lb 9.8 oz) (07/11/24 2219)    Today's weight    Wt Readings from Last 1 Encounters:   07/13/24 83.9 kg (184 lb 15.5 oz)        Tracy need assessed each shift: N/A - no tracy present  UOP >30ml/hr: NO - HD pt  Last documented BM (in last 48 hrs):  No data found.             Restraints (in use currently or dc'd in last 12 hrs): Yes    Order current and documentation up to date? Yes    Lines/Drains reviewed @ bedside.  CVC  07/12/24 Left Internal jugular (Active)   Number of days: 1         Drip rates at handoff:    sodium chloride      sodium chloride      dexmedeTOMIDine Stopped (07/12/24 1206)    dextrose 5 % and 0.9 % NaCl 50 mL/hr at 07/13/24 1802    propofol 10 mcg/kg/min (07/13/24 1802)    fentaNYL 25 mcg/hr (07/13/24 1802)    dextrose         Lab Data:   CBC:   Recent Labs     07/12/24  0238 07/13/24  0410   WBC 7.0 4.4   HGB 13.0* 11.5*   HCT 38.6* 34.6*   MCV 97.4 97.1   * 68*     BMP:    Recent Labs     07/12/24  0238 07/13/24  0410    136   K 4.2 3.6  3.6   CO2 19* 25   BUN 35* 20   CREATININE 6.2* 3.8*     LIVR:   Recent Labs     07/11/24 2034 07/13/24  0410   AST 81* 686*   ALT 63* 679*     PT/INR:   Recent Labs     07/11/24 2034 07/13/24  1045   INR 1.22* 1.37*

## 2024-07-13 NOTE — PROGRESS NOTES
Pulmonary Critical Care progress note     Patient's name:  Cal Sanchez  Medical Record Number: [id number]  Patient's account/billing number: [id number]  Patient's YOB: 1960  Age: 64 y.o.  Date of Admission: 7/11/2024  8:15 PM  Date of Consult: 7/13/2024      Primary Care Physician: Tania Moses MD      Code Status: Limited    Reason for consult: severe sepsis     Assessment and Plan     Severe sepsis  Acute systolic CHF, suspect worsening cardiomyopathy  Acute respiratory failure with hypoxia status post intubation mechanical ventilation  Acute metabolic encephalopathy  Multisubstance abuse  End-stage renal disease on hemodialysis  Bilateral pleural effusions  Alcoholic cirrhosis  A-fib with RVR, status post Watchman  Chronic heart failure  Hypoglycemia      Plan:  Ventilator support, settings reviewed and adjusted  Echocardiogram pending to evaluate EF  Sedation with propofol  CIWA protocol  Hemodialysis per nephrology.  Due to the immediate potential for life-threatening deterioration due to above , I spent 33 minutes providing critical care.  This time is excluding time spent performing procedures.  This note was transcribed using Dragon Dictation software. Please disregard any translational errors.          HISTORY OF PRESENT ILLNESS:   Mr./MsJitendra Sanchez is a 64 y.o. gentleman with past medical history stated below significant for end-stage renal disease on hemodialysis through his fistula, he does have right subclavian tunneled catheter, multisubstance abuse including IV drug use and alcohol presented with altered mental status agitation combative.      CT head was negative  Chest x-ray with bilateral pleural effusions  Ammonia level normal.        REVIEW OF SYSTEMS:  Review of Systems -   Unable to obtain encephalopathic      Physical Exam:    Vitals: BP (!) 156/91   Pulse 90   Temp 97.5 °F (36.4 °C) (Axillary)   Resp 13

## 2024-07-13 NOTE — PLAN OF CARE
Problem: Discharge Planning  Goal: Discharge to home or other facility with appropriate resources  7/12/2024 2328 by Yanni Bone, RN  Outcome: Progressing  Flowsheets (Taken 7/12/2024 2035)  Discharge to home or other facility with appropriate resources:   Identify barriers to discharge with patient and caregiver   Arrange for needed discharge resources and transportation as appropriate   Identify discharge learning needs (meds, wound care, etc)   Refer to discharge planning if patient needs post-hospital services based on physician order or complex needs related to functional status, cognitive ability or social support system  7/12/2024 1601 by Clari Edwards, RN  Outcome: Not Progressing  Flowsheets  Taken 7/12/2024 0900 by Clari Edwards, RN  Discharge to home or other facility with appropriate resources: Identify barriers to discharge with patient and caregiver  Taken 7/12/2024 0300 by Kavya Biggs, RN  Discharge to home or other facility with appropriate resources:   Identify barriers to discharge with patient and caregiver   Refer to discharge planning if patient needs post-hospital services based on physician order or complex needs related to functional status, cognitive ability or social support system   Arrange for needed discharge resources and transportation as appropriate   Arrange for interpreters to assist at discharge as needed   Identify discharge learning needs (meds, wound care, etc)      Statement Selected

## 2024-07-13 NOTE — PROGRESS NOTES
[facility]   Daily Progress Note      Admit Date:  7/11/2024    CC: elevated trop.    Subjective: patient is on a vent and sedated. ECHO pending.    HPI:   Mr. Sanchez  is a 64 y.o. year old male with past medical history significant for ESRD/HD X3 week, IVDU, AF s/p Watchman s/p WM (5/19/2021)  - s/p DCCV X 2 (3/2014, 1/2020), PVI/roof line/mitral annulus line (10/2017) and PVI/roof line/posterior box line/mitral annulus line RFCA (5/2020)  - s/p [employer] dual-chamber PPM implantation (12/2013) - , CAD non obstructive s/p LHC 3/2019, DM, HLP, HTN, PAD s/p revascularization of LLE 2/2024, CARMINE, ETOH abuse.     Adm to [facility] w MS changes X2 days. Associated with diarrhea and SOB.   Found to be in AF RVR  Cocaine use on same day of adm.   Trop elevated.     On 7/12/2024 he remains confused and agitated  He  appeared short of breath and confirms that he feels short of breath when questioned     7/12/2024 due to SOB/ pneumonia- patient was intubated and sedated.    The patient was seen and examined. Notes, labs and recent testing reviewed.   There were not complications over night.    The patient is being seen for atrial fibrillation.      Objective:     /88   Pulse 91   Temp 97.5 °F (36.4 °C) (Axillary)   Resp 15   Ht 1.778 m (5' 10\")   Wt 83.9 kg (184 lb 15.5 oz)   SpO2 100%   BMI 26.54 kg/m²      Intake/Output Summary (Last 24 hours) at 7/13/2024 1150  Last data filed at 7/13/2024 0630  Gross per 24 hour   Intake 1687.41 ml   Output --   Net 1687.41 ml       Physical Exam:  General:  intubated and sedated  Skin:  Warm and dry  Neck:  JVD<8, no bruit  Chest:  Clear to auscultation, no wheezes/rhonchi/rales  Cardiovascular: atrial fibrillation   Abdomen:  Soft, nontender, +bowel sounds  Extremities:  no edema    Medications:    aspirin  81 mg Oral Daily    [START ON 7/15/2024] buPROPion  150 mg Oral Q72H    carvedilol  6.25 mg Oral BID WC    [Held by provider] cinacalcet  60 mg Oral

## 2024-07-13 NOTE — PLAN OF CARE
hydration  7/13/2024 1325 by Addy Abreu, RN  Outcome: Progressing  Flowsheets (Taken 7/13/2024 1325)  Remains free of injury from restraints (restraint for interference with medical device):   Determine that other, less restrictive measures have been tried or would not be effective before applying the restraint   Evaluate the patient's condition at the time of restraint application   Every 2 hours: Monitor safety, psychosocial status, comfort, nutrition and hydration   Inform patient/family regarding the reason for restraint     Problem: ABCDS Injury Assessment  Goal: Absence of physical injury  Outcome: Progressing     Problem: Neurosensory - Adult  Goal: Achieves stable or improved neurological status  Outcome: Progressing  Flowsheets (Taken 7/13/2024 1325)  Achieves stable or improved neurological status:   Assess for and report changes in neurological status   Initiate measures to prevent increased intracranial pressure   Maintain blood pressure and fluid volume within ordered parameters to optimize cerebral perfusion and minimize risk of hemorrhage   Monitor temperature, glucose, and sodium. Initiate appropriate interventions as ordered     Problem: Neurosensory - Adult  Goal: Absence of seizures  Outcome: Progressing     Problem: Neurosensory - Adult  Goal: Remains free of injury related to seizures activity  Outcome: Progressing     Problem: Neurosensory - Adult  Goal: Achieves maximal functionality and self care  Outcome: Progressing     Problem: Respiratory - Adult  Goal: Achieves optimal ventilation and oxygenation  Outcome: Progressing  Flowsheets (Taken 7/13/2024 1325)  Achieves optimal ventilation and oxygenation:   Assess for changes in respiratory status   Assess for changes in mentation and behavior   Position to facilitate oxygenation and minimize respiratory effort   Oxygen supplementation based on oxygen saturation or arterial blood gases   Initiate smoking cessation protocol as  indicated   Encourage broncho-pulmonary hygiene including cough, deep breathe, incentive spirometry     Problem: Cardiovascular - Adult  Goal: Maintains optimal cardiac output and hemodynamic stability  Outcome: Progressing  Flowsheets (Taken 7/13/2024 1325)  Maintains optimal cardiac output and hemodynamic stability:   Monitor blood pressure and heart rate   Monitor urine output and notify Licensed Independent Practitioner for values outside of normal range   Assess for signs of decreased cardiac output   Administer fluid and/or volume expanders as ordered   Administer vasoactive medications as ordered     Problem: Cardiovascular - Adult  Goal: Absence of cardiac dysrhythmias or at baseline  Outcome: Progressing  Flowsheets (Taken 7/13/2024 1325)  Absence of cardiac dysrhythmias or at baseline:   Monitor cardiac rate and rhythm   Assess for signs of decreased cardiac output   Administer antiarrhythmia medication and electrolyte replacement as ordered     Problem: Metabolic/Fluid and Electrolytes - Adult  Goal: Electrolytes maintained within normal limits  Outcome: Progressing  Flowsheets (Taken 7/13/2024 1325)  Electrolytes maintained within normal limits:   Monitor labs and assess patient for signs and symptoms of electrolyte imbalances   Administer electrolyte replacement as ordered   Monitor response to electrolyte replacements, including repeat lab results as appropriate     Problem: Metabolic/Fluid and Electrolytes - Adult  Goal: Hemodynamic stability and optimal renal function maintained  Outcome: Progressing     Problem: Metabolic/Fluid and Electrolytes - Adult  Goal: Glucose maintained within prescribed range  Outcome: Progressing  Flowsheets (Taken 7/13/2024 1325)  Glucose maintained within prescribed range:   Monitor blood glucose as ordered   Assess for signs and symptoms of hyperglycemia and hypoglycemia   Administer ordered medications to maintain glucose within target range   Assess barriers to

## 2024-07-13 NOTE — PROGRESS NOTES
Hospitalist Progress Note  7/13/2024 7:25 AM  Subjective:   Admit Date: 7/11/2024  PCP: Tania Moses MD Status: Inpatient   Interval History: Hospital Day: 3, admitted with acute alcohol toxicity encephalopathy, aspiration pneumonia with respiratory failure requiring intubation (7/12), dexmedetomidine changed to propofol.  Antibiotic therapy with cefepime and vancomycin.  Atrial fibrillation with rapid ventricular response initially requiring IV diltiazem, discontinued.     Medical co-morbidities include CAD, chronic afib, ESRD on hemodialysis MWF (follows up with  Nephrology), peripheral vascular disease, status post toe amputation; chronic alcohol abuse, IV drug abuse .    Diet: NPO  Endotracheal tube (7/12, day #2)  Left internal jugular CVC (7/12, day #2)  Orogastric tube (7/12, day #2)  Left forearm hemodialysis fistula (POA)    Medications:     dextrose 5 % and 0.9 % NaCl 50 mL/hr   propofol 10 mcg/kg/min   fentanyl 25 mcg/hr     aspirin  81 mg Oral Daily   bupropion  150 mg Oral Q72H   carvedilol  6.25 mg Oral BID WC   cinacalcet  60 mg Oral Mon Wed Fri [held]   levothyroxine  25 mcg Oral Daily   sevelamer  2,400 mg Oral TID WC   vitamin D  50,000 Units Oral Q30 Days   heparin   5,000 Units SubCUTAneous 3 times per day   cefepime  1,000 mg IntraVENous Q24H   vancomycin   Per pharmacy Intravenous Per pharmacy   folic acid  1 mg Oral Daily   thiamine  100 mg IntraVENous Daily   gabapentin  100 mg Oral Nightly   phenobarbital  32.5 mg IntraVENous Q12H   quetiapine  75 mg Oral BID   venlafaxine  75 mg Oral BID     Recent Labs     07/11/24 2105 07/12/24 0238 07/13/24  0410   WBC 6.6 7.0 4.4   HGB 12.9* 13.0* 11.5*   * 114* 68*   MCV 97.2 97.4 97.1     Recent Labs     07/11/24 2034 07/12/24  0238 07/13/24  0410    138 136   K 4.4 4.2 3.6  3.6   CL 95* 95* 98*   CO2 19* 19* 25   BUN 33* 35* 20   CREATININE 5.9* 6.2* 3.8*   GLUCOSE 85 89 88     Recent Labs     07/11/24 2034

## 2024-07-14 LAB
ALBUMIN SERPL-MCNC: 3.1 G/DL (ref 3.4–5)
ALP SERPL-CCNC: 73 U/L (ref 40–129)
ALT SERPL-CCNC: 543 U/L (ref 10–40)
AMPHETAMINES SERPL QL SCN: NEGATIVE NG/ML
ANION GAP SERPL CALCULATED.3IONS-SCNC: 14 MMOL/L (ref 3–16)
ANNOTATION COMMENT IMP: NORMAL
AST SERPL-CCNC: 358 U/L (ref 15–37)
BARBITURATES SERPL QL SCN: NEGATIVE NG/ML
BASOPHILS # BLD: 0 K/UL (ref 0–0.2)
BASOPHILS NFR BLD: 1.2 %
BENZODIAZ SERPL QL SCN: NEGATIVE NG/ML
BILIRUB DIRECT SERPL-MCNC: 0.6 MG/DL (ref 0–0.3)
BILIRUB INDIRECT SERPL-MCNC: 0.3 MG/DL (ref 0–1)
BILIRUB SERPL-MCNC: 0.9 MG/DL (ref 0–1)
BUN SERPL-MCNC: 22 MG/DL (ref 7–20)
BUPRENORPHINE SERPL-MCNC: NEGATIVE NG/ML
CALCIUM SERPL-MCNC: 9.3 MG/DL (ref 8.3–10.6)
CANNABINOIDS SERPL QL SCN: POSITIVE NG/ML
CHLORIDE SERPL-SCNC: 98 MMOL/L (ref 99–110)
CO2 SERPL-SCNC: 24 MMOL/L (ref 21–32)
COCAINE SERPL QL SCN: NEGATIVE NG/ML
CREAT SERPL-MCNC: 5.1 MG/DL (ref 0.8–1.3)
DEPRECATED RDW RBC AUTO: 18.5 % (ref 12.4–15.4)
EKG ATRIAL RATE: 107 BPM
EKG DIAGNOSIS: NORMAL
EKG Q-T INTERVAL: 414 MS
EKG QRS DURATION: 168 MS
EKG QTC CALCULATION (BAZETT): 506 MS
EKG R AXIS: 267 DEGREES
EKG T AXIS: 37 DEGREES
EKG VENTRICULAR RATE: 90 BPM
EOSINOPHIL # BLD: 0 K/UL (ref 0–0.6)
EOSINOPHIL NFR BLD: 0.9 %
GFR SERPLBLD CREATININE-BSD FMLA CKD-EPI: 12 ML/MIN/{1.73_M2}
GLUCOSE BLD-MCNC: 102 MG/DL (ref 70–99)
GLUCOSE BLD-MCNC: 103 MG/DL (ref 70–99)
GLUCOSE BLD-MCNC: 121 MG/DL (ref 70–99)
GLUCOSE BLD-MCNC: 67 MG/DL (ref 70–99)
GLUCOSE SERPL-MCNC: 91 MG/DL (ref 70–99)
HCT VFR BLD AUTO: 34 % (ref 40.5–52.5)
HEPARIN INDUCED PLATELET ANTIBODY: NEGATIVE
HGB BLD-MCNC: 11.5 G/DL (ref 13.5–17.5)
LYMPHOCYTES # BLD: 0.6 K/UL (ref 1–5.1)
LYMPHOCYTES NFR BLD: 16 %
MCH RBC QN AUTO: 32.9 PG (ref 26–34)
MCHC RBC AUTO-ENTMCNC: 33.8 G/DL (ref 31–36)
MCV RBC AUTO: 97.3 FL (ref 80–100)
METHADONE SERPL QL SCN: NEGATIVE NG/ML
METHAMPHET SERPL QL: NEGATIVE NG/ML
MONOCYTES # BLD: 0.3 K/UL (ref 0–1.3)
MONOCYTES NFR BLD: 8.4 %
NEUTROPHILS # BLD: 2.9 K/UL (ref 1.7–7.7)
NEUTROPHILS NFR BLD: 73.5 %
OPIATES SERPL QL SCN: NEGATIVE NG/ML
OXYCODONE SERPL QL: NEGATIVE NG/ML
PCP SERPL QL SCN: NEGATIVE NG/ML
PERFORMED ON: ABNORMAL
PHOSPHATE SERPL-MCNC: 3.8 MG/DL (ref 2.5–4.9)
PLATELET # BLD AUTO: 56 K/UL (ref 135–450)
PMV BLD AUTO: 9.2 FL (ref 5–10.5)
POTASSIUM SERPL-SCNC: 3.4 MMOL/L (ref 3.5–5.1)
PROT SERPL-MCNC: 5 G/DL (ref 6.4–8.2)
RBC # BLD AUTO: 3.49 M/UL (ref 4.2–5.9)
SODIUM SERPL-SCNC: 136 MMOL/L (ref 136–145)
WBC # BLD AUTO: 3.9 K/UL (ref 4–11)

## 2024-07-14 PROCEDURE — 93010 ELECTROCARDIOGRAM REPORT: CPT | Performed by: INTERNAL MEDICINE

## 2024-07-14 PROCEDURE — 2500000003 HC RX 250 WO HCPCS: Performed by: INTERNAL MEDICINE

## 2024-07-14 PROCEDURE — 99232 SBSQ HOSP IP/OBS MODERATE 35: CPT | Performed by: NURSE PRACTITIONER

## 2024-07-14 PROCEDURE — 85025 COMPLETE CBC W/AUTO DIFF WBC: CPT

## 2024-07-14 PROCEDURE — 2580000003 HC RX 258: Performed by: HOSPITALIST

## 2024-07-14 PROCEDURE — 2580000003 HC RX 258: Performed by: INTERNAL MEDICINE

## 2024-07-14 PROCEDURE — 6360000002 HC RX W HCPCS: Performed by: INTERNAL MEDICINE

## 2024-07-14 PROCEDURE — 94003 VENT MGMT INPAT SUBQ DAY: CPT

## 2024-07-14 PROCEDURE — 80069 RENAL FUNCTION PANEL: CPT

## 2024-07-14 PROCEDURE — 80076 HEPATIC FUNCTION PANEL: CPT

## 2024-07-14 PROCEDURE — 6370000000 HC RX 637 (ALT 250 FOR IP): Performed by: INTERNAL MEDICINE

## 2024-07-14 PROCEDURE — 93005 ELECTROCARDIOGRAM TRACING: CPT | Performed by: REGISTERED NURSE

## 2024-07-14 PROCEDURE — 2000000000 HC ICU R&B

## 2024-07-14 PROCEDURE — 36592 COLLECT BLOOD FROM PICC: CPT

## 2024-07-14 PROCEDURE — 2700000000 HC OXYGEN THERAPY PER DAY

## 2024-07-14 PROCEDURE — 94761 N-INVAS EAR/PLS OXIMETRY MLT: CPT

## 2024-07-14 PROCEDURE — 6370000000 HC RX 637 (ALT 250 FOR IP): Performed by: HOSPITALIST

## 2024-07-14 PROCEDURE — 99291 CRITICAL CARE FIRST HOUR: CPT | Performed by: INTERNAL MEDICINE

## 2024-07-14 RX ADMIN — VENLAFAXINE 75 MG: 37.5 TABLET ORAL at 08:41

## 2024-07-14 RX ADMIN — SEVELAMER CARBONATE 2400 MG: 800 TABLET, FILM COATED ORAL at 08:18

## 2024-07-14 RX ADMIN — LEVOTHYROXINE SODIUM 25 MCG: 0.03 TABLET ORAL at 08:18

## 2024-07-14 RX ADMIN — QUETIAPINE FUMARATE 75 MG: 25 TABLET ORAL at 20:44

## 2024-07-14 RX ADMIN — THIAMINE HYDROCHLORIDE 100 MG: 100 INJECTION, SOLUTION INTRAMUSCULAR; INTRAVENOUS at 08:18

## 2024-07-14 RX ADMIN — VENLAFAXINE 75 MG: 37.5 TABLET ORAL at 20:44

## 2024-07-14 RX ADMIN — SEVELAMER CARBONATE 2400 MG: 800 TABLET, FILM COATED ORAL at 17:19

## 2024-07-14 RX ADMIN — PHENOBARBITAL SODIUM 16.2 MG: 65 INJECTION INTRAMUSCULAR at 20:41

## 2024-07-14 RX ADMIN — CARVEDILOL 6.25 MG: 6.25 TABLET, FILM COATED ORAL at 17:19

## 2024-07-14 RX ADMIN — SODIUM CHLORIDE, PRESERVATIVE FREE 10 ML: 5 INJECTION INTRAVENOUS at 08:20

## 2024-07-14 RX ADMIN — ASPIRIN 81 MG: 81 TABLET, CHEWABLE ORAL at 08:18

## 2024-07-14 RX ADMIN — DEXTROSE AND SODIUM CHLORIDE: 5; 900 INJECTION, SOLUTION INTRAVENOUS at 02:29

## 2024-07-14 RX ADMIN — CARVEDILOL 6.25 MG: 6.25 TABLET, FILM COATED ORAL at 08:18

## 2024-07-14 RX ADMIN — CEFEPIME 1000 MG: 1 INJECTION, POWDER, FOR SOLUTION INTRAMUSCULAR; INTRAVENOUS at 20:41

## 2024-07-14 RX ADMIN — SODIUM CHLORIDE, PRESERVATIVE FREE 10 ML: 5 INJECTION INTRAVENOUS at 08:19

## 2024-07-14 RX ADMIN — SODIUM CHLORIDE, PRESERVATIVE FREE 10 ML: 5 INJECTION INTRAVENOUS at 20:43

## 2024-07-14 RX ADMIN — FOLIC ACID 1 MG: 1 TABLET ORAL at 08:18

## 2024-07-14 RX ADMIN — Medication 25 MCG/HR: at 06:33

## 2024-07-14 RX ADMIN — PROPOFOL 10 MCG/KG/MIN: 10 INJECTION, EMULSION INTRAVENOUS at 16:07

## 2024-07-14 RX ADMIN — PHENOBARBITAL SODIUM 16.2 MG: 65 INJECTION INTRAMUSCULAR at 08:19

## 2024-07-14 RX ADMIN — QUETIAPINE FUMARATE 75 MG: 25 TABLET ORAL at 08:18

## 2024-07-14 RX ADMIN — GABAPENTIN 100 MG: 100 CAPSULE ORAL at 20:44

## 2024-07-14 RX ADMIN — DEXTROSE AND SODIUM CHLORIDE: 5; 900 INJECTION, SOLUTION INTRAVENOUS at 22:29

## 2024-07-14 RX ADMIN — SEVELAMER CARBONATE 2400 MG: 800 TABLET, FILM COATED ORAL at 11:57

## 2024-07-14 ASSESSMENT — PULMONARY FUNCTION TESTS
PIF_VALUE: 20
PIF_VALUE: 15
PIF_VALUE: 21
PIF_VALUE: 15
PIF_VALUE: 15
PIF_VALUE: 20
PIF_VALUE: 15
PIF_VALUE: 20
PIF_VALUE: 15
PIF_VALUE: 15
PIF_VALUE: 20
PIF_VALUE: 15
PIF_VALUE: 15
PIF_VALUE: 21
PIF_VALUE: 15
PIF_VALUE: 20
PIF_VALUE: 24
PIF_VALUE: 15
PIF_VALUE: 20
PIF_VALUE: 15
PIF_VALUE: 20
PIF_VALUE: 15
PIF_VALUE: 20
PIF_VALUE: 15
PIF_VALUE: 20
PIF_VALUE: 20
PIF_VALUE: 15
PIF_VALUE: 20
PIF_VALUE: 15
PIF_VALUE: 15
PIF_VALUE: 23
PIF_VALUE: 15
PIF_VALUE: 20
PIF_VALUE: 15
PIF_VALUE: 22

## 2024-07-14 ASSESSMENT — PAIN SCALES - GENERAL
PAINLEVEL_OUTOF10: 0
PAINLEVEL_OUTOF10: 6
PAINLEVEL_OUTOF10: 2

## 2024-07-14 NOTE — PROGRESS NOTES
Hospitalist Progress Note  7/14/2024 1:37 PM  Subjective:   Admit Date: 7/11/2024  PCP: Tania Moses MD Status: Inpatient   Interval History: Hospital Day: 4, admitted with acute alcohol toxicity encephalopathy, aspiration pneumonia with respiratory failure requiring intubation (7/12), dexmedetomidine changed to propofol.  Antibiotic therapy with cefepime and vancomycin.  Atrial fibrillation with rapid ventricular response initially requiring IV diltiazem, discontinued.     Medical co-morbidities include CAD, chronic afib, ESRD on hemodialysis MWF (follows up with  Nephrology), peripheral vascular disease, status post toe amputation; chronic alcohol abuse, IV drug abuse .    7/14    Remains intubated      Diet: NPO  Endotracheal tube (7/12, day #2)  Left internal jugular CVC (7/12, day #2)  Orogastric tube (7/12, day #2)  Left forearm hemodialysis fistula (POA)    Medications:     dextrose 5 % and 0.9 % NaCl 50 mL/hr   propofol 10 mcg/kg/min   fentanyl 25 mcg/hr     aspirin  81 mg Oral Daily   bupropion  150 mg Oral Q72H   carvedilol  6.25 mg Oral BID WC   cinacalcet  60 mg Oral Mon Wed Fri [held]   levothyroxine  25 mcg Oral Daily   sevelamer  2,400 mg Oral TID WC   vitamin D  50,000 Units Oral Q30 Days   heparin   5,000 Units SubCUTAneous 3 times per day   cefepime  1,000 mg IntraVENous Q24H   vancomycin   Per pharmacy Intravenous Per pharmacy   folic acid  1 mg Oral Daily   thiamine  100 mg IntraVENous Daily   gabapentin  100 mg Oral Nightly   phenobarbital  32.5 mg IntraVENous Q12H   quetiapine  75 mg Oral BID   venlafaxine  75 mg Oral BID     Recent Labs     07/12/24  0238 07/13/24  0410 07/14/24  0446   WBC 7.0 4.4 3.9*   HGB 13.0* 11.5* 11.5*   * 68* 56*   MCV 97.4 97.1 97.3       Recent Labs     07/12/24  0238 07/13/24  0410 07/14/24  0446    136 136   K 4.2 3.6  3.6 3.4*   CL 95* 98* 98*   CO2 19* 25 24   BUN 35* 20 22*   CREATININE 6.2* 3.8* 5.1*   GLUCOSE 89 88 91

## 2024-07-14 NOTE — PROGRESS NOTES
Pulmonary Critical Care progress note     Patient's name:  Cal Sanchez  Medical Record Number: [id number]  Patient's account/billing number: [id number]  Patient's YOB: 1960  Age: 64 y.o.  Date of Admission: 7/11/2024  8:15 PM  Date of Consult: 7/14/2024      Primary Care Physician: Tania Moses MD      Code Status: Limited    Reason for consult: severe sepsis     Assessment and Plan     Severe sepsis  Acute systolic CHF, suspect worsening cardiomyopathy  Acute respiratory failure with hypoxia status post intubation mechanical ventilation  Acute metabolic encephalopathy  Multisubstance abuse  End-stage renal disease on hemodialysis  Bilateral pleural effusions  Alcoholic cirrhosis  A-fib with RVR, status post Watchman  Chronic heart failure  Hypoglycemia  Thrombocytopenia      Plan:  Ventilator support, settings reviewed and adjusted  Echocardiogram pending to evaluate EF  Sedation with propofol  CIWA protocol  Hemodialysis per nephrology.  Monitor platelets  Sedation propofol and fentanyl  Due to the immediate potential for life-threatening deterioration due to above , I spent 33 minutes providing critical care.  This time is excluding time spent performing procedures.  This note was transcribed using Dragon Dictation software. Please disregard any translational errors.          HISTORY OF PRESENT ILLNESS:   Mr./Ms. Cal Sanchez is a 64 y.o. gentleman with past medical history stated below significant for end-stage renal disease on hemodialysis through his fistula, he does have right subclavian tunneled catheter, multisubstance abuse including IV drug use and alcohol presented with altered mental status agitation combative.      CT head was negative  Chest x-ray with bilateral pleural effusions  Ammonia level normal.        REVIEW OF SYSTEMS:  Review of Systems -   Unable to obtain encephalopathic      Physical Exam:    Vitals: BP

## 2024-07-14 NOTE — CONSULTS
Comprehensive Nutrition Assessment    Type and Reason for Visit:  Initial, Consult    Nutrition Recommendations/Plan:   TF recommendations:  - Start trophic feeds Jevity 1.5 at 20 ml/hr.  - Advance 10 ml every 4 hours until goal rate of 60ml/hr is reached.  - Water flush 30 ml q 4hr or per provider  To Provide over 20 hours/day: TV 1200ml, 1800 kcal, 76g Protein, 1032ml free water  - Propofol provides an additional 108 kcal from lipids over 20 hours - total 1908kcal  2. Continue thiamine supplementation     Malnutrition Assessment:  Malnutrition Status:  Insufficient data (CELIA) (07/14/24 1001)      Nutrition Assessment:    Consult for TF recieved, recommendations above. Pt presents with AMS and SOB. PMH ESRD on HD MWF, A-fib, CAD, DM, IVDU, cirrhosis, HLD, HTN, depression, and sleep apnea. Possible sepsis. Intubated 7/12. Propofol running 4.9ml/hr to provide 129 kcal from lipids over 24 hours. Pt without urine output. 7/12 dialysis, EDW 84kg.    Nutrition Related Findings:    K 3.4, BUN 22, Creat 5.1, eGFR 12, altered liver labs. LBM PTA. Wound Type: None       Current Nutrition Intake & Therapies:    Average Meal Intake: NPO  Average Supplements Intake: NPO  Diet NPO    Anthropometric Measures:  Height: 177.8 cm (5' 10\")  Ideal Body Weight (IBW): 166 lbs (75 kg)       Current Body Weight: 83.7 kg (184 lb 8.4 oz), 111.2 % IBW. Weight Source: Bed Scale  Current BMI (kg/m2): 26.5                          BMI Categories: Overweight (BMI 25.0-29.9)    Estimated Daily Nutrient Needs:  Energy Requirements Based On: Kcal/kg  Weight Used for Energy Requirements: Current  Energy (kcal/day): 1674-2093kcal (20-25kcal/kg)  Weight Used for Protein Requirements: Ideal  Protein (g/day): 76-106g (1-1.4g/kg X IBW 75.5kg)  Method Used for Fluid Requirements: 1 ml/kcal (or per provider)  Fluid (ml/day):      Nutrition Diagnosis:   Inadequate oral intake related to inadequate protein-energy intake as evidenced by NPO or clear liquid
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
## 2024-07-14 NOTE — PLAN OF CARE
Problem: Discharge Planning  Goal: Discharge to home or other facility with appropriate resources  Outcome: Progressing     Problem: Pain  Goal: Verbalizes/displays adequate comfort level or baseline comfort level  Outcome: Progressing  Flowsheets (Taken 7/14/2024 1318)  Verbalizes/displays adequate comfort level or baseline comfort level:   Encourage patient to monitor pain and request assistance   Assess pain using appropriate pain scale   Administer analgesics based on type and severity of pain and evaluate response   Implement non-pharmacological measures as appropriate and evaluate response     Problem: Safety - Adult  Goal: Free from fall injury  Outcome: Progressing     Problem: Skin/Tissue Integrity  Goal: Absence of new skin breakdown  Description: 1.  Monitor for areas of redness and/or skin breakdown  2.  Assess vascular access sites hourly  3.  Every 4-6 hours minimum:  Change oxygen saturation probe site  4.  Every 4-6 hours:  If on nasal continuous positive airway pressure, respiratory therapy assess nares and determine need for appliance change or resting period.  Outcome: Progressing     Problem: Safety - Medical Restraint  Goal: Remains free of injury from restraints (Restraint for Interference with Medical Device)  Description: INTERVENTIONS:  1. Determine that other, less restrictive measures have been tried or would not be effective before applying the restraint  2. Evaluate the patient's condition at the time of restraint application  3. Inform patient/family regarding the reason for restraint  4. Q2H: Monitor safety, psychosocial status, comfort, nutrition and hydration  Outcome: Progressing  Flowsheets (Taken 7/14/2024 1318)  Remains free of injury from restraints (restraint for interference with medical device):   Determine that other, less restrictive measures have been tried or would not be effective before applying the restraint   Evaluate the patient's condition at the time of restraint

## 2024-07-14 NOTE — PROGRESS NOTES
ONCOLOGY HEMATOLOGY CARE PROGRESS NOTE      SUBJECTIVE:  Cal is intubated    ROS: can't obtain  OBJECTIVE    c    Physical    VITALS:  Patient Vitals for the past 24 hrs:   BP Temp Temp src Pulse Resp SpO2 Weight   07/14/24 0835 -- -- -- 83 15 100 % --   07/14/24 0800 (!) 135/93 97.6 °F (36.4 °C) Oral 82 15 100 % --   07/14/24 0700 (!) 143/84 -- -- 84 15 100 % --   07/14/24 0600 122/76 -- -- 84 15 100 % --   07/14/24 0545 -- -- -- 88 15 100 % --   07/14/24 0530 -- -- -- 88 15 100 % --   07/14/24 0515 -- -- -- 81 15 100 % --   07/14/24 0500 137/85 -- -- 83 15 100 % --   07/14/24 0445 -- -- -- 88 15 100 % --   07/14/24 0430 -- -- -- 87 15 100 % --   07/14/24 0426 -- -- -- 86 15 100 % --   07/14/24 0415 -- -- -- 92 15 100 % --   07/14/24 0400 120/75 97.9 °F (36.6 °C) Oral 79 15 100 % --   07/14/24 0345 -- -- -- 86 15 100 % --   07/14/24 0330 -- -- -- 86 15 100 % --   07/14/24 0315 -- -- -- 94 15 100 % --   07/14/24 0300 127/85 -- -- 85 15 100 % --   07/14/24 0245 -- -- -- 85 15 100 % --   07/14/24 0230 -- -- -- 94 15 100 % --   07/14/24 0215 -- -- -- 83 15 100 % --   07/14/24 0200 132/83 -- -- 89 15 100 % --   07/14/24 0145 -- -- -- 93 15 100 % --   07/14/24 0130 -- -- -- 91 15 100 % --   07/14/24 0115 -- -- -- 84 15 100 % --   07/14/24 0100 132/81 -- -- 91 13 100 % --   07/14/24 0045 -- -- -- 89 15 100 % --   07/14/24 0030 -- -- -- 88 15 100 % --   07/14/24 0015 -- -- -- 88 15 100 % --   07/14/24 0000 135/86 98.5 °F (36.9 °C) Oral 95 15 100 % 83.7 kg (184 lb 8.4 oz)   07/13/24 2345 -- -- -- (!) 101 13 98 % --   07/13/24 2335 -- -- -- 89 15 100 % --   07/13/24 2330 -- -- -- 92 20 100 % --   07/13/24 2315 -- -- -- 92 15 100 % --   07/13/24 2300 124/76 -- -- 84 15 100 % --   07/13/24 2245 -- -- -- 92 15 100 % --   07/13/24 2230 -- -- -- 94 15 100 % --   07/13/24 2215 -- -- -- 96 15 100 % --   07/13/24 2200 115/75 -- -- 89 15 100 % --   07/13/24 2145 -- -- -- 96 14 100 % --   07/13/24  1045   APTT 32.0       CMP:    Recent Labs     07/14/24  0446 07/13/24  0410 07/12/24  0238 07/11/24 2034    136 138 138   K 3.4* 3.6  3.6 4.2 4.4   CL 98* 98* 95* 95*   CO2 24 25 19* 19*   GLUCOSE 91 88 89 85   BUN 22* 20 35* 33*   CREATININE 5.1* 3.8* 6.2* 5.9*   LABGLOM 12* 17* 9* 10*   CALCIUM 9.3 9.5 10.2 10.4   AGRATIO  --   --   --  1.4   BILITOT 0.9 1.0  --  1.0   ALKPHOS 73 77  --  92   * 679*  --  63*   * 686*  --  81*   MG  --   --  2.30  --        Lab Results   Component Value Date    CALCIUM 9.3 07/14/2024    PHOS 3.8 07/14/2024       LDH:  Recent Labs     07/13/24  1045   *       Radiology Review:  XR CHEST PORTABLE  Narrative: EXAMINATION:  ONE XRAY VIEW OF THE CHEST    7/12/2024 12:35 pm    COMPARISON:  07/11/2024    HISTORY:  ORDERING SYSTEM PROVIDED HISTORY: ETT, OG, CVC placement.  TECHNOLOGIST PROVIDED HISTORY:  Reason for exam:->ETT, OG, CVC placement.  Reason for Exam: ETT, OG, CVC placement.    FINDINGS:  Tip of ET tube projects at level of midthoracic trachea    Tip of NG tube projects in region of stomach    Pacer is seen.  Tip of dialysis catheter projects in the region of the right  atrium    Left-sided central line tip projects in region of SVC    Heterogeneous opacity is seen throughout the lungs bilaterally, suggesting  underlying pulmonary edema.  There is slight improved aeration of the  left  lung.  There is questionable increased pleural-parenchymal disease on the  right.    No obvious pneumothorax    Vascular stent also seen on left  Impression: Lines and tubes as above    Bilateral pleuroparenchymal disease, similar on the right left, but increased  on the right  XR CHEST PORTABLE  Narrative: EXAMINATION:  ONE XRAY VIEW OF THE CHEST    7/11/2024 8:32 pm    COMPARISON:  March 19, 2024    HISTORY:  ORDERING SYSTEM PROVIDED HISTORY: Altered Mental Status  TECHNOLOGIST PROVIDED HISTORY:  Reason for exam:->Altered Mental Status  Reason for Exam:

## 2024-07-14 NOTE — PROGRESS NOTES
NAME:  Cal Sanchez  YOB: 1960  MEDICAL RECORD NUMBER:  [id number]    Shift Summary: Uneventful shift. Sedation unchanged. Morning creatinine 5.1.     Family updated: No    Rhythm: Atrial Fibrillation     Most recent vitals:   Visit Vitals  /76   Pulse 84   Temp 97.9 °F (36.6 °C) (Oral)   Resp 15   Ht 1.778 m (5' 10\")   Wt 83.7 kg (184 lb 8.4 oz)   SpO2 100%   BMI 26.48 kg/m²           No data found.    No data found.      Respiratory support needed (if any):  - Ventilator Settings:  Vent Days 3       Resp Rate (Set): 15 bpm  FiO2 : 30 %    PEEP/CPAP (cmH2O): 5       Admission weight Weight - Scale: 85.1 kg (187 lb 9.8 oz) (07/11/24 2219)    Today's weight    Wt Readings from Last 1 Encounters:   07/14/24 83.7 kg (184 lb 8.4 oz)        Tracy need assessed each shift: N/A - no tracy present  UOP >30ml/hr: NO - anuric  Last documented BM (in last 48 hrs):  No data found.             Restraints (in use currently or dc'd in last 12 hrs): Yes    Order current and documentation up to date? Yes    Lines/Drains reviewed @ bedside.  CVC  07/12/24 Left Internal jugular (Active)   Number of days: 1         Drip rates at handoff:    sodium chloride      sodium chloride      dexmedeTOMIDine Stopped (07/12/24 1206)    dextrose 5 % and 0.9 % NaCl 50 mL/hr at 07/14/24 0600    propofol 10 mcg/kg/min (07/14/24 0600)    fentaNYL 25 mcg/hr (07/14/24 0633)    dextrose         Lab Data:   CBC:   Recent Labs     07/13/24  0410 07/14/24 0446   WBC 4.4 3.9*   HGB 11.5* 11.5*   HCT 34.6* 34.0*   MCV 97.1 97.3   PLT 68* 56*     BMP:    Recent Labs     07/13/24  0410 07/14/24 0446    136   K 3.6  3.6 3.4*   CO2 25 24   BUN 20 22*   CREATININE 3.8* 5.1*     LIVR:   Recent Labs     07/13/24 0410 07/14/24  0446   * 358*   * 543*     PT/INR:   Recent Labs     07/11/24  2034 07/13/24  1045   INR 1.22* 1.37*     APTT:   Recent Labs     07/13/24  1045   APTT 32.0     ABG:   Recent Labs      07/12/24  1810   PHART 7.394   BSV0KCC 46.1*   PO2ART 43.8*       Any consults during the shift? No    Any signed and held orders to be released?  No        4 Eyes Skin Assessment       The patient is being assessed for  Shift Handoff    I agree that at least one RN has performed a thorough Head to Toe Skin Assessment on the patient. ALL assessment sites listed below have been assessed.      Areas assessed by both nurses: Head, Face, Ears, Shoulders, Back, Chest, Arms, Elbows, Hands, Sacrum. Buttock, Coccyx, Ischium, Legs. Feet and Heels, and Under Medical Devices         Does the Patient have a Wound? No noted wound(s)    Wound Care Orders initiated by RN: No       Smith Prevention initiated by RN: Yes    Pressure Injury (Stage 3,4, Unstageable, DTI, NWPT, and Complex wounds) if present, place Wound referral order by RN under : No    New Ostomies, if present place, Ostomy referral order under : No     Nurse 1 eSignature: Electronically signed by Mayra Plasencia RN on 7/14/24 at 6:38 AM EDT    **SHARE this note so that the co-signing nurse can place an eSignature**    Nurse 2 eSignature: Electronically signed by Addy Abreu RN on 7/14/24 at 9:20 AM EDT

## 2024-07-14 NOTE — PROGRESS NOTES
Department of Internal Medicine  Nephrology Progress Note    SUBJECTIVE:  We are following this patient for ERSD. Patient progress reviewed.   Events noted , on vent for decline in his MS and breathing .     REVIEW OF SYSTEMS:  Unobtainable / on vent     Physical Exam:    VITALS:  /78   Pulse 84   Temp 97.5 °F (36.4 °C) (Oral)   Resp 15   Ht 1.778 m (5' 10\")   Wt 83.7 kg (184 lb 8.4 oz)   SpO2 99%   BMI 26.48 kg/m²   24HR INTAKE/OUTPUT:    Intake/Output Summary (Last 24 hours) at 7/14/2024 1258  Last data filed at 7/14/2024 0600  Gross per 24 hour   Intake 1448.24 ml   Output --   Net 1448.24 ml         Constitutional: on vent   Respiratory:  Few rhonchi   Gastrointestinal: No  tenderness.  Normal Bowel Sounds  Cardiovascular:  S1, S2 Irregular   Edema:   + edema  Acc Rt TDC   DATA:    CBC:  Lab Results   Component Value Date/Time    WBC 3.9 07/14/2024 04:46 AM    RBC 3.49 07/14/2024 04:46 AM    HGB 11.5 07/14/2024 04:46 AM    HCT 34.0 07/14/2024 04:46 AM    MCV 97.3 07/14/2024 04:46 AM    MCH 32.9 07/14/2024 04:46 AM    MCHC 33.8 07/14/2024 04:46 AM    RDW 18.5 07/14/2024 04:46 AM    PLT 56 07/14/2024 04:46 AM    MPV 9.2 07/14/2024 04:46 AM     CMP:  Lab Results   Component Value Date/Time     07/14/2024 04:46 AM    K 3.4 07/14/2024 04:46 AM    K 3.6 07/13/2024 04:10 AM    CL 98 07/14/2024 04:46 AM    CO2 24 07/14/2024 04:46 AM    BUN 22 07/14/2024 04:46 AM    CREATININE 5.1 07/14/2024 04:46 AM    GFRAA 12 02/18/2022 12:11 AM    AGRATIO 1.4 07/11/2024 08:34 PM    LABGLOM 12 07/14/2024 04:46 AM    LABGLOM 10 03/20/2024 10:13 AM    GLUCOSE 91 07/14/2024 04:46 AM    CALCIUM 9.3 07/14/2024 04:46 AM    BILITOT 0.9 07/14/2024 04:46 AM    ALKPHOS 73 07/14/2024 04:46 AM     07/14/2024 04:46 AM     07/14/2024 04:46 AM      Hepatic Function Panel:   Lab Results   Component Value Date/Time    ALKPHOS 73 07/14/2024 04:46 AM     07/14/2024 04:46 AM     07/14/2024 04:46 AM     BILITOT 0.9 07/14/2024 04:46 AM    BILIDIR 0.6 07/14/2024 04:46 AM    IBILI 0.3 07/14/2024 04:46 AM      Phosphorus:   Lab Results   Component Value Date/Time    PHOS 3.8 07/14/2024 04:46 AM       ASSESSMENT:  Principal Problem:    Atrial fibrillation with rapid ventricular response (HCC)  Active Problems:    Elevated troponin I level    Acute delirium    Hypertensive urgency    Acute on chronic heart failure with preserved ejection fraction (HCC)    Altered mental status    Cardiogenic shock (HCC)  Resolved Problems:    * No resolved hospital problems. *      PLAN:  Encephalopathy R/o infection .H/o drug and ETOH use . On vent now.  ESRD HD MWF, next tx Monday .lower wt as tolerated .   Asp pneumonia ?  On abx .  HTN controlled .   Anemia  Hb noted . Hold epo with HD .   Ac resp failure on vent per Pul .  Low Plt Hem following .   Pts family updated . Prognosis guarded .     Junaid Landry MD, FACP

## 2024-07-14 NOTE — PROGRESS NOTES
NAME:  Cal Sanchez  YOB: 1960  MEDICAL RECORD NUMBER:  [id number]    Shift Summary: Patient on mechanical ventilator,sedated with propofol and fentanyl.Feeding tube was started today.VS stable.Plan for ECHO and HD tomorrow.    Family updated: Yes:  Pt's niece and brother    Rhythm: Normal Sinus Rhythm     Most recent vitals:   Visit Vitals  BP (!) 143/87   Pulse 93   Temp 98 °F (36.7 °C) (Oral)   Resp 15   Ht 1.778 m (5' 10\")   Wt 83.7 kg (184 lb 8.4 oz)   SpO2 100%   BMI 26.48 kg/m²           No data found.    No data found.      Respiratory support needed (if any):  - Ventilator Settings:  Vent Days 3       Resp Rate (Set): 15 bpm  FiO2 : 30 %    PEEP/CPAP (cmH2O): 5       Admission weight Weight - Scale: 85.1 kg (187 lb 9.8 oz) (07/11/24 2219)    Today's weight    Wt Readings from Last 1 Encounters:   07/14/24 83.7 kg (184 lb 8.4 oz)        Tracy need assessed each shift: N/A - no tracy present  UOP >30ml/hr: NO - HD pt  Last documented BM (in last 48 hrs):  No data found.             Restraints (in use currently or dc'd in last 12 hrs): Yes    Order current and documentation up to date? Yes    Lines/Drains reviewed @ bedside.  CVC  07/12/24 Left Internal jugular (Active)   Number of days: 2         Drip rates at handoff:    sodium chloride      dexmedeTOMIDine Stopped (07/12/24 1206)    dextrose 5 % and 0.9 % NaCl 50 mL/hr at 07/14/24 1818    propofol 10 mcg/kg/min (07/14/24 1818)    fentaNYL 25 mcg/hr (07/14/24 1818)    dextrose         Lab Data:   CBC:   Recent Labs     07/13/24  0410 07/14/24 0446   WBC 4.4 3.9*   HGB 11.5* 11.5*   HCT 34.6* 34.0*   MCV 97.1 97.3   PLT 68* 56*     BMP:    Recent Labs     07/13/24  0410 07/14/24 0446    136   K 3.6  3.6 3.4*   CO2 25 24   BUN 20 22*   CREATININE 3.8* 5.1*     LIVR:   Recent Labs     07/13/24  0410 07/14/24  0446   * 358*   * 543*     PT/INR:   Recent Labs     07/11/24 2034 07/13/24  1045   INR 1.22* 1.37*

## 2024-07-15 ENCOUNTER — APPOINTMENT (OUTPATIENT)
Age: 64
End: 2024-07-15
Attending: INTERNAL MEDICINE
Payer: COMMERCIAL

## 2024-07-15 LAB
ANION GAP SERPL CALCULATED.3IONS-SCNC: 15 MMOL/L (ref 3–16)
ANISOCYTOSIS BLD QL SMEAR: ABNORMAL
BACTERIA BLD CULT ORG #2: NORMAL
BACTERIA BLD CULT: NORMAL
BASE EXCESS BLDA CALC-SCNC: -0.4 MMOL/L (ref -3–3)
BASE EXCESS BLDV CALC-SCNC: 1.9 MMOL/L
BASOPHILS # BLD: 0 K/UL (ref 0–0.2)
BASOPHILS NFR BLD: 0 %
BUN SERPL-MCNC: 34 MG/DL (ref 7–20)
CALCIUM SERPL-MCNC: 9.9 MG/DL (ref 8.3–10.6)
CHLORIDE SERPL-SCNC: 99 MMOL/L (ref 99–110)
CO2 BLDA-SCNC: 23.5 MMOL/L
CO2 BLDV-SCNC: 26 MMOL/L
CO2 SERPL-SCNC: 24 MMOL/L (ref 21–32)
COHGB MFR BLDA: 1.6 % (ref 0–1.5)
COHGB MFR BLDV: 1.6 %
CREAT SERPL-MCNC: 5.8 MG/DL (ref 0.8–1.3)
DEPRECATED RDW RBC AUTO: 18.1 % (ref 12.4–15.4)
ECHO BSA: 2.08 M2
ECHO LV FRACTIONAL SHORTENING: 14 % (ref 28–44)
ECHO LV INTERNAL DIMENSION DIASTOLE INDEX: 2.48 CM/M2
ECHO LV INTERNAL DIMENSION DIASTOLIC: 5.1 CM (ref 4.2–5.9)
ECHO LV INTERNAL DIMENSION SYSTOLIC INDEX: 2.14 CM/M2
ECHO LV INTERNAL DIMENSION SYSTOLIC: 4.4 CM
ECHO LV IVSD: 1.2 CM (ref 0.6–1)
ECHO LV MASS 2D: 241.2 G (ref 88–224)
ECHO LV MASS INDEX 2D: 117.1 G/M2 (ref 49–115)
ECHO LV POSTERIOR WALL DIASTOLIC: 1.2 CM (ref 0.6–1)
ECHO LV RELATIVE WALL THICKNESS RATIO: 0.47
ECHO RV BASAL DIMENSION: 3.8 CM
ECHO RV MID DIMENSION: 2.5 CM
ECHO RV TAPSE: 1 CM (ref 1.7–?)
EOSINOPHIL # BLD: 0 K/UL (ref 0–0.6)
EOSINOPHIL NFR BLD: 0 %
GFR SERPLBLD CREATININE-BSD FMLA CKD-EPI: 10 ML/MIN/{1.73_M2}
GLUCOSE BLD-MCNC: 121 MG/DL (ref 70–99)
GLUCOSE BLD-MCNC: 122 MG/DL (ref 70–99)
GLUCOSE BLD-MCNC: 133 MG/DL (ref 70–99)
GLUCOSE BLD-MCNC: 135 MG/DL (ref 70–99)
GLUCOSE BLD-MCNC: 138 MG/DL (ref 70–99)
GLUCOSE BLD-MCNC: 162 MG/DL (ref 70–99)
GLUCOSE SERPL-MCNC: 164 MG/DL (ref 70–99)
HCO3 BLDA-SCNC: 22.5 MMOL/L (ref 21–29)
HCO3 BLDV-SCNC: 25 MMOL/L (ref 23–29)
HCT VFR BLD AUTO: 34.7 % (ref 40.5–52.5)
HGB BLD-MCNC: 11.6 G/DL (ref 13.5–17.5)
HGB BLDA-MCNC: 12.5 G/DL (ref 13.5–17.5)
LYMPHOCYTES # BLD: 0.3 K/UL (ref 1–5.1)
LYMPHOCYTES NFR BLD: 7 %
MAGNESIUM SERPL-MCNC: 2.1 MG/DL (ref 1.8–2.4)
MCH RBC QN AUTO: 32.5 PG (ref 26–34)
MCHC RBC AUTO-ENTMCNC: 33.6 G/DL (ref 31–36)
MCV RBC AUTO: 96.8 FL (ref 80–100)
METHGB MFR BLDA: 0.2 %
METHGB MFR BLDV: 0.3 %
MONOCYTES # BLD: 0.2 K/UL (ref 0–1.3)
MONOCYTES NFR BLD: 6 %
NEUTROPHILS # BLD: 3.5 K/UL (ref 1.7–7.7)
NEUTROPHILS NFR BLD: 87 %
O2 CT VFR BLDV CALC: ABNORMAL ML/DL
O2 THERAPY: ABNORMAL
O2 THERAPY: ABNORMAL
OVALOCYTES BLD QL SMEAR: ABNORMAL
PATH INTERP BLD-IMP: NORMAL
PCO2 BLDA: 30.9 MMHG (ref 35–45)
PCO2 BLDV: 34.9 MMHG (ref 40–50)
PERFORMED ON: ABNORMAL
PH BLDA: 7.47 [PH] (ref 7.35–7.45)
PH BLDV: 7.47 [PH] (ref 7.35–7.45)
PLATELET # BLD AUTO: 63 K/UL (ref 135–450)
PMV BLD AUTO: 9 FL (ref 5–10.5)
PO2 BLDA: 106 MMHG (ref 75–108)
PO2 BLDV: 44 MMHG
POIKILOCYTOSIS BLD QL SMEAR: ABNORMAL
POTASSIUM SERPL-SCNC: 3.4 MMOL/L (ref 3.5–5.1)
POTASSIUM SERPL-SCNC: 3.4 MMOL/L (ref 3.5–5.1)
PROCALCITONIN SERPL IA-MCNC: 0.51 NG/ML (ref 0–0.15)
RBC # BLD AUTO: 3.58 M/UL (ref 4.2–5.9)
SAO2 % BLDA: 99 %
SAO2 % BLDV: 81 %
SODIUM SERPL-SCNC: 138 MMOL/L (ref 136–145)
WBC # BLD AUTO: 4 K/UL (ref 4–11)

## 2024-07-15 PROCEDURE — 93308 TTE F-UP OR LMTD: CPT

## 2024-07-15 PROCEDURE — 36415 COLL VENOUS BLD VENIPUNCTURE: CPT

## 2024-07-15 PROCEDURE — 6360000002 HC RX W HCPCS: Performed by: INTERNAL MEDICINE

## 2024-07-15 PROCEDURE — 2580000003 HC RX 258: Performed by: INTERNAL MEDICINE

## 2024-07-15 PROCEDURE — 84145 PROCALCITONIN (PCT): CPT

## 2024-07-15 PROCEDURE — 99291 CRITICAL CARE FIRST HOUR: CPT | Performed by: INTERNAL MEDICINE

## 2024-07-15 PROCEDURE — 2700000000 HC OXYGEN THERAPY PER DAY

## 2024-07-15 PROCEDURE — 85025 COMPLETE CBC W/AUTO DIFF WBC: CPT

## 2024-07-15 PROCEDURE — 2580000003 HC RX 258: Performed by: NURSE PRACTITIONER

## 2024-07-15 PROCEDURE — 2500000003 HC RX 250 WO HCPCS: Performed by: NURSE PRACTITIONER

## 2024-07-15 PROCEDURE — 94003 VENT MGMT INPAT SUBQ DAY: CPT

## 2024-07-15 PROCEDURE — 2000000000 HC ICU R&B

## 2024-07-15 PROCEDURE — 93308 TTE F-UP OR LMTD: CPT | Performed by: INTERNAL MEDICINE

## 2024-07-15 PROCEDURE — 94761 N-INVAS EAR/PLS OXIMETRY MLT: CPT

## 2024-07-15 PROCEDURE — 6370000000 HC RX 637 (ALT 250 FOR IP): Performed by: INTERNAL MEDICINE

## 2024-07-15 PROCEDURE — 2500000003 HC RX 250 WO HCPCS: Performed by: HOSPITALIST

## 2024-07-15 PROCEDURE — 80048 BASIC METABOLIC PNL TOTAL CA: CPT

## 2024-07-15 PROCEDURE — 82803 BLOOD GASES ANY COMBINATION: CPT

## 2024-07-15 PROCEDURE — 83735 ASSAY OF MAGNESIUM: CPT

## 2024-07-15 PROCEDURE — 2580000003 HC RX 258: Performed by: HOSPITALIST

## 2024-07-15 PROCEDURE — 36600 WITHDRAWAL OF ARTERIAL BLOOD: CPT

## 2024-07-15 PROCEDURE — 6370000000 HC RX 637 (ALT 250 FOR IP): Performed by: HOSPITALIST

## 2024-07-15 PROCEDURE — 6360000002 HC RX W HCPCS: Performed by: NURSE PRACTITIONER

## 2024-07-15 PROCEDURE — APPNB15 APP NON BILLABLE TIME 0-15 MINS: Performed by: NURSE PRACTITIONER

## 2024-07-15 PROCEDURE — 6360000002 HC RX W HCPCS: Performed by: HOSPITALIST

## 2024-07-15 PROCEDURE — 2500000003 HC RX 250 WO HCPCS: Performed by: INTERNAL MEDICINE

## 2024-07-15 PROCEDURE — 90935 HEMODIALYSIS ONE EVALUATION: CPT

## 2024-07-15 RX ORDER — POTASSIUM CHLORIDE 29.8 MG/ML
20 INJECTION INTRAVENOUS ONCE
Status: COMPLETED | OUTPATIENT
Start: 2024-07-15 | End: 2024-07-15

## 2024-07-15 RX ADMIN — HEPARIN SODIUM 5000 UNITS: 5000 INJECTION INTRAVENOUS; SUBCUTANEOUS at 21:45

## 2024-07-15 RX ADMIN — FOLIC ACID 1 MG: 1 TABLET ORAL at 12:33

## 2024-07-15 RX ADMIN — ASPIRIN 81 MG: 81 TABLET, CHEWABLE ORAL at 12:33

## 2024-07-15 RX ADMIN — QUETIAPINE FUMARATE 75 MG: 25 TABLET ORAL at 20:59

## 2024-07-15 RX ADMIN — Medication 25 MCG/HR: at 04:47

## 2024-07-15 RX ADMIN — PROPOFOL 15 MCG/KG/MIN: 10 INJECTION, EMULSION INTRAVENOUS at 18:27

## 2024-07-15 RX ADMIN — HEPARIN SODIUM 5000 UNITS: 5000 INJECTION INTRAVENOUS; SUBCUTANEOUS at 15:18

## 2024-07-15 RX ADMIN — PROPOFOL 10 MCG/KG/MIN: 10 INJECTION, EMULSION INTRAVENOUS at 06:02

## 2024-07-15 RX ADMIN — VENLAFAXINE 75 MG: 37.5 TABLET ORAL at 21:00

## 2024-07-15 RX ADMIN — LEVOTHYROXINE SODIUM 25 MCG: 0.03 TABLET ORAL at 12:34

## 2024-07-15 RX ADMIN — ERGOCALCIFEROL 50000 UNITS: 1.25 CAPSULE ORAL at 12:34

## 2024-07-15 RX ADMIN — SODIUM CHLORIDE, PRESERVATIVE FREE 20 MG: 5 INJECTION INTRAVENOUS at 12:49

## 2024-07-15 RX ADMIN — CEFEPIME 1000 MG: 1 INJECTION, POWDER, FOR SOLUTION INTRAMUSCULAR; INTRAVENOUS at 21:11

## 2024-07-15 RX ADMIN — CARVEDILOL 6.25 MG: 6.25 TABLET, FILM COATED ORAL at 12:34

## 2024-07-15 RX ADMIN — GABAPENTIN 100 MG: 100 CAPSULE ORAL at 20:59

## 2024-07-15 RX ADMIN — POTASSIUM CHLORIDE 20 MEQ: 29.8 INJECTION, SOLUTION INTRAVENOUS at 12:32

## 2024-07-15 RX ADMIN — QUETIAPINE FUMARATE 75 MG: 25 TABLET ORAL at 12:33

## 2024-07-15 RX ADMIN — SODIUM CHLORIDE, PRESERVATIVE FREE 10 ML: 5 INJECTION INTRAVENOUS at 21:21

## 2024-07-15 RX ADMIN — THIAMINE HYDROCHLORIDE 100 MG: 100 INJECTION, SOLUTION INTRAMUSCULAR; INTRAVENOUS at 12:34

## 2024-07-15 RX ADMIN — VENLAFAXINE 75 MG: 37.5 TABLET ORAL at 12:35

## 2024-07-15 RX ADMIN — SODIUM CHLORIDE, PRESERVATIVE FREE 10 ML: 5 INJECTION INTRAVENOUS at 12:37

## 2024-07-15 RX ADMIN — CARVEDILOL 6.25 MG: 6.25 TABLET, FILM COATED ORAL at 18:39

## 2024-07-15 RX ADMIN — DEXMEDETOMIDINE HYDROCHLORIDE 0.2 MCG/KG/HR: 4 INJECTION, SOLUTION INTRAVENOUS at 20:43

## 2024-07-15 ASSESSMENT — PULMONARY FUNCTION TESTS
PIF_VALUE: 20
PIF_VALUE: 21
PIF_VALUE: 20
PIF_VALUE: 20
PIF_VALUE: 12
PIF_VALUE: 9
PIF_VALUE: 20
PIF_VALUE: 11
PIF_VALUE: 21
PIF_VALUE: 24
PIF_VALUE: 20
PIF_VALUE: 20
PIF_VALUE: 23
PIF_VALUE: 20
PIF_VALUE: 17
PIF_VALUE: 20
PIF_VALUE: 21
PIF_VALUE: 20
PIF_VALUE: 21
PIF_VALUE: 20
PIF_VALUE: 21
PIF_VALUE: 20
PIF_VALUE: 11
PIF_VALUE: 20
PIF_VALUE: 21
PIF_VALUE: 20
PIF_VALUE: 11
PIF_VALUE: 15
PIF_VALUE: 20
PIF_VALUE: 22
PIF_VALUE: 21
PIF_VALUE: 20
PIF_VALUE: 23
PIF_VALUE: 23
PIF_VALUE: 20
PIF_VALUE: 12
PIF_VALUE: 20
PIF_VALUE: 23
PIF_VALUE: 20
PIF_VALUE: 14
PIF_VALUE: 20
PIF_VALUE: 25
PIF_VALUE: 12
PIF_VALUE: 20
PIF_VALUE: 11
PIF_VALUE: 20
PIF_VALUE: 21
PIF_VALUE: 21
PIF_VALUE: 22
PIF_VALUE: 21
PIF_VALUE: 20
PIF_VALUE: 11
PIF_VALUE: 20
PIF_VALUE: 12
PIF_VALUE: 20
PIF_VALUE: 20
PIF_VALUE: 21
PIF_VALUE: 21
PIF_VALUE: 20
PIF_VALUE: 11
PIF_VALUE: 14
PIF_VALUE: 21
PIF_VALUE: 21
PIF_VALUE: 20
PIF_VALUE: 21
PIF_VALUE: 20
PIF_VALUE: 20
PIF_VALUE: 21
PIF_VALUE: 20
PIF_VALUE: 12
PIF_VALUE: 21
PIF_VALUE: 20
PIF_VALUE: 16
PIF_VALUE: 20
PIF_VALUE: 20
PIF_VALUE: 21
PIF_VALUE: 11
PIF_VALUE: 11
PIF_VALUE: 20
PIF_VALUE: 21
PIF_VALUE: 20
PIF_VALUE: 20
PIF_VALUE: 13
PIF_VALUE: 20
PIF_VALUE: 13
PIF_VALUE: 22
PIF_VALUE: 12
PIF_VALUE: 20
PIF_VALUE: 20
PIF_VALUE: 21
PIF_VALUE: 20
PIF_VALUE: 20
PIF_VALUE: 21
PIF_VALUE: 21
PIF_VALUE: 20
PIF_VALUE: 23
PIF_VALUE: 20
PIF_VALUE: 10
PIF_VALUE: 8
PIF_VALUE: 21
PIF_VALUE: 20
PIF_VALUE: 21
PIF_VALUE: 20
PIF_VALUE: 21
PIF_VALUE: 20
PIF_VALUE: 21
PIF_VALUE: 21
PIF_VALUE: 20
PIF_VALUE: 12
PIF_VALUE: 20
PIF_VALUE: 20
PIF_VALUE: 11
PIF_VALUE: 20
PIF_VALUE: 17
PIF_VALUE: 20
PIF_VALUE: 20
PIF_VALUE: 12
PIF_VALUE: 20
PIF_VALUE: 14
PIF_VALUE: 20
PIF_VALUE: 20
PIF_VALUE: 13
PIF_VALUE: 20
PIF_VALUE: 20
PIF_VALUE: 12
PIF_VALUE: 20
PIF_VALUE: 11
PIF_VALUE: 20
PIF_VALUE: 20
PIF_VALUE: 13
PIF_VALUE: 19
PIF_VALUE: 21

## 2024-07-15 ASSESSMENT — PAIN SCALES - GENERAL
PAINLEVEL_OUTOF10: 0
PAINLEVEL_OUTOF10: 2
PAINLEVEL_OUTOF10: 0

## 2024-07-15 NOTE — PROGRESS NOTES
Hospitalist Progress Note  7/15/2024 12:34 PM  Subjective:   Admit Date: 7/11/2024  PCP: Tania Moses MD Status: Inpatient   Interval History: Hospital Day: 5, admitted with acute alcohol toxicity encephalopathy, aspiration pneumonia with respiratory failure requiring intubation (7/12), dexmedetomidine changed to propofol.  Antibiotic therapy with cefepime and vancomycin.  Atrial fibrillation with rapid ventricular response initially requiring IV diltiazem, discontinued.     Medical co-morbidities include CAD, chronic afib, ESRD on hemodialysis MWF (follows up with  Nephrology), peripheral vascular disease, status post toe amputation; chronic alcohol abuse, IV drug abuse .      7/15    Getting HD ,  remains intubated  Sedated     7/14    Remains intubated      Diet: NPO  Endotracheal tube (7/12, day #2)  Left internal jugular CVC (7/12, day #2)  Orogastric tube (7/12, day #2)  Left forearm hemodialysis fistula (POA)    Medications:     dextrose 5 % and 0.9 % NaCl 50 mL/hr   propofol 10 mcg/kg/min   fentanyl 25 mcg/hr     aspirin  81 mg Oral Daily   bupropion  150 mg Oral Q72H   carvedilol  6.25 mg Oral BID WC   cinacalcet  60 mg Oral Mon Wed Fri [held]   levothyroxine  25 mcg Oral Daily   sevelamer  2,400 mg Oral TID    vitamin D  50,000 Units Oral Q30 Days   heparin   5,000 Units SubCUTAneous 3 times per day   cefepime  1,000 mg IntraVENous Q24H   vancomycin   Per pharmacy Intravenous Per pharmacy   folic acid  1 mg Oral Daily   thiamine  100 mg IntraVENous Daily   gabapentin  100 mg Oral Nightly   phenobarbital  32.5 mg IntraVENous Q12H   quetiapine  75 mg Oral BID   venlafaxine  75 mg Oral BID     Recent Labs     07/13/24  0410 07/14/24  0446 07/15/24  0445   WBC 4.4 3.9* 4.0   HGB 11.5* 11.5* 11.6*   PLT 68* 56* 63*   MCV 97.1 97.3 96.8       Recent Labs     07/13/24  0410 07/14/24  0446 07/15/24  0445    136 138   K 3.6  3.6 3.4* 3.4*  3.4*   CL 98* 98* 99   CO2 25 24 24   BUN 20 22*

## 2024-07-15 NOTE — DIALYSIS
Treatment time: 4 hours  Net UF: 3000 ml     Pre weight: 87.5. kg  Post weight:84.5 kg  EDW: 84.0 kg    Access used: R TDC    Access function: well with  ml/min     Medications or blood products given: heparin dwells     Regular outpatient schedule: MWF     Summary of response to treatment: Patient tolerated treatment well and without any complications. Patient remained stable throughout entire treatment. Copy of dialysis treatment record placed in chart, to be scanned into EMR.

## 2024-07-15 NOTE — DISCHARGE INSTRUCTIONS
Extra Heart Failure Education/ Tools/ Resources:     https://[email or web address]/[email or web address]=985130   --- this is American Heart Association interactive Healthier Living with Heart Failure guidebook.  Please click hyperlink or copy / paste link into search bar. The QR Code is also available below. Use your mouse to scroll through the pages.  Lots of information about weight monitoring, diet tips, activity, meds, etc    Heart Failure Tools and Resources QR Code is below. It includes multiple resources to include symptom tracker, med tracker, further HF info, and access to a HF Support Network online Community    HF [place] May[employer]  -- this is a free smart phone mayi available for iPhone and Android download.  Use your phone to track sodium / fluid intake, zone tool symptom tracking, weights, medications, etc. Click on this hyperlink  HF [place] [employer]   for QR code for easy download or the link is also found in the below HF Tools and Resources.      DASH (Dietary Approach to Stop Hypertension) diet --  https://www.nhlbi.nih.gov/education/dash-eating-plan -- this diet is a flexible eating plan that promotes heart healthy eating style.  Click on hyperlink or copy / paste link into search bar.  Lots of low sodium recipes and tips.    https://www.[employer]/recipes  -- more free recipes

## 2024-07-15 NOTE — DIALYSIS
Treatment time: 4 hours  Net UF: 3000 ml     Pre weight: 87.7 kg  Post weight:84.4 kg  EDW: 84.0 kg    Access used: R TDC    Access function: well with  ml/min     Medications or blood products given: heparin dwells     Regular outpatient schedule: MWF     Summary of response to treatment: Patient tolerated treatment well and without any complications. Patient remained stable throughout entire treatment. Copy of dialysis treatment record placed in chart, to be scanned into EMR.

## 2024-07-15 NOTE — PROGRESS NOTES
NAME:  Cal Sanchez  YOB: 1960  MEDICAL RECORD NUMBER:  [id number]    Shift Summary: HD done. 3L off. Echo completed. Pt sedated and intubated. Easily agitated. SAT/SBT done. Pt not following commands.       Family updated: No    Rhythm: Atrial Fibrillation     Most recent vitals:   Visit Vitals  BP (!) 121/92   Pulse 95   Temp 97 °F (36.1 °C) (Axillary)   Resp 28   Ht 1.778 m (5' 10\")   Wt 87.5 kg (193 lb)   SpO2 97%   BMI 27.69 kg/m²           No data found.    No data found.      Respiratory support needed (if any):  - Ventilator Settings:  Vent Days 4    Vt (Set, mL): 0 mL  Resp Rate (Set): 10 bpm  FiO2 : 29 %    PEEP/CPAP (cmH2O): 5       Admission weight Weight - Scale: 85.1 kg (187 lb 9.8 oz) (07/11/24 2219)    Today's weight    Wt Readings from Last 1 Encounters:   07/15/24 87.5 kg (193 lb)        Tracy need assessed each shift: N/A - no tracy present  UOP >30ml/hr: NO - anuric  Last documented BM (in last 48 hrs):  Patient Vitals for the past 48 hrs:   Last BM (including prior to admit) Stool Occurrence   07/15/24 0622 07/15/24 1   07/15/24 0800 07/15/24 --                Restraints (in use currently or dc'd in last 12 hrs): Yes    Order current and documentation up to date? No    Lines/Drains reviewed @ bedside.  CVC  07/12/24 Left Internal jugular (Active)   Number of days: 3       Tunneled Hemodialysis Catheter Right Subclavian (Active)   Number of days:          Drip rates at handoff:    sodium chloride      dexmedeTOMIDine Stopped (07/12/24 1206)    dextrose 5 % and 0.9 % NaCl Stopped (07/15/24 0557)    propofol 15 mcg/kg/min (07/15/24 1827)    fentaNYL Stopped (07/15/24 1436)    dextrose         Lab Data:   CBC:   Recent Labs     07/14/24  0446 07/15/24  0445   WBC 3.9* 4.0   HGB 11.5* 11.6*   HCT 34.0* 34.7*   MCV 97.3 96.8   PLT 56* 63*     BMP:    Recent Labs     07/14/24  0446 07/15/24  0445    138   K 3.4* 3.4*  3.4*   CO2 24 24   BUN 22* 34*   CREATININE 5.1* 5.8*

## 2024-07-15 NOTE — PLAN OF CARE
Problem: Discharge Planning  Goal: Discharge to home or other facility with appropriate resources  Outcome: Progressing     Problem: Pain  Goal: Verbalizes/displays adequate comfort level or baseline comfort level  Outcome: Progressing     Problem: Safety - Adult  Goal: Free from fall injury  Outcome: Progressing  Flowsheets (Taken 7/15/2024 1642)  Free From Fall Injury: Instruct family/caregiver on patient safety     Problem: Skin/Tissue Integrity  Goal: Absence of new skin breakdown  Description: 1.  Monitor for areas of redness and/or skin breakdown  2.  Assess vascular access sites hourly  3.  Every 4-6 hours minimum:  Change oxygen saturation probe site  4.  Every 4-6 hours:  If on nasal continuous positive airway pressure, respiratory therapy assess nares and determine need for appliance change or resting period.  Outcome: Progressing     Problem: Safety - Medical Restraint  Goal: Remains free of injury from restraints (Restraint for Interference with Medical Device)  Description: INTERVENTIONS:  1. Determine that other, less restrictive measures have been tried or would not be effective before applying the restraint  2. Evaluate the patient's condition at the time of restraint application  3. Inform patient/family regarding the reason for restraint  4. Q2H: Monitor safety, psychosocial status, comfort, nutrition and hydration  Outcome: Progressing  Flowsheets  Taken 7/15/2024 0937 by Kerley, Jessica L, RN  Remains free of injury from restraints (restraint for interference with medical device):   Determine that other, less restrictive measures have been tried or would not be effective before applying the restraint   Evaluate the patient's condition at the time of restraint application   Inform patient/family regarding the reason for restraint   Every 2 hours: Monitor safety, psychosocial status, comfort, nutrition and hydration  Taken 7/15/2024 0905 by Kerley, Jessica L, RN  Remains free of injury from

## 2024-07-15 NOTE — ACP (ADVANCE CARE PLANNING)
Advance Care Planning     Advance Care Planning Activator (Inpatient)  Conversation Note      Date of ACP Conversation: 7/15/2024     Conversation Conducted with: Legal next of kin    ACP Activator: Angie Abarca RN    Health Care Decision Maker:     Current Designated Health Care Decision Maker:     Primary Decision Maker: Junie Sanchez - Niece/Nephew - [phone]    Care Preferences    Ventilation:  \"If you were in your present state of health and suddenly became very ill and were unable to breathe on your own, what would your preference be about the use of a ventilator (breathing machine) if it were available to you?\"      Would the patient desire the use of ventilator (breathing machine)?: yes    \"If your health worsens and it becomes clear that your chance of recovery is unlikely, what would your preference be about the use of a ventilator (breathing machine) if it were available to you?\"     Would the patient desire the use of ventilator (breathing machine)?: No      Resuscitation  \"CPR works best to restart the heart when there is a sudden event, like a heart attack, in someone who is otherwise healthy. Unfortunately, CPR does not typically restart the heart for people who have serious health conditions or who are very sick.\"    \"In the event your heart stopped as a result of an underlying serious health condition, would you want attempts to be made to restart your heart (answer \"yes\" for attempt to resuscitate) or would you prefer a natural death (answer \"no\" for do not attempt to resuscitate)?\" no       [] Yes   [x] No   Educated Patient / Decision Maker regarding differences between Advance Directives and portable DNR orders.    Length of ACP Conversation in minutes:  5    Conversation Outcomes:  ACP discussion completed    Follow-up plan:    [] Schedule follow-up conversation to continue planning  [] Referred individual to Provider for additional questions/concerns   [] Advised patient/agent/surrogate to

## 2024-07-15 NOTE — PROGRESS NOTES
Report was received from SEBASTIÁN Prakash. Pt on mechanical ventilator,sedated with propofol and fentanyl,open eyes to name.Lugs:breath sounds decreased in both hemithorax.Bilateral soft wrist restraints.Feeding tube with Nepro running at 50 ml/hr ( goal).call bell in reach.side rails up x 3.Bed locked and low position. AT 1346 propofol and fentanyl rate was decreased to half for SAT purposes.DR. Berry aware.

## 2024-07-15 NOTE — CARE COORDINATION
Case Management Assessment  Initial Evaluation    Date/Time of Evaluation: 7/15/2024 12:39 PM  Assessment Completed by: Angie Abarca RN    If patient is discharged prior to next notation, then this note serves as note for discharge by case management.    Patient Name: Cal Sanchez                   YOB: 1960  Diagnosis: Acute delirium [R41.0]  Elevated troponin [R79.89]  IV drug user [F19.90]  Bilateral pleural effusion [J90]  Atrial fibrillation with rapid ventricular response (HCC) [I48.91]  ESRD on hemodialysis (HCC) [N18.6, Z99.2]  Severe sepsis (HCC) [A41.9, R65.20]  Altered mental status, unspecified altered mental status type [R41.82]                   Date / Time: 7/11/2024  8:15 PM    Patient Admission Status: Inpatient   Readmission Risk (Low < 19, Mod (19-27), High > 27): Readmission Risk Score: 23.4    Current PCP: Tania Moses MD  PCP verified by CM? Yes    Chart Reviewed: Yes      History Provided by: Child/Family  Patient Orientation: Sedated, Other (see comment) (Vent)    Patient Cognition: Other (see comment) (Sedated)    Hospitalization in the last 30 days (Readmission):  No    If yes, Readmission Assessment in CM Navigator will be completed.    Advance Directives:      Code Status: Limited   Patient's Primary Decision Maker is: Legal Next of Kin    Primary Decision Maker: Junie Sanchez - Niece/Nephew - [phone]    Discharge Planning:    Patient lives with: Family Members Type of Home: House  Primary Care Giver: Self  Patient Support Systems include: Family Members   Current Financial resources: Medicaid, Medicare  Current community resources: None  Current services prior to admission: Durable Medical Equipment            Current DME: Walker            Type of Home Care services:  None    ADLS  Prior functional level: Assistance with the following:, Mobility  Current functional level: Assistance with the following:, Mobility    PT AM-PAC:   /24  OT AM-PAC:

## 2024-07-15 NOTE — PROGRESS NOTES
Hand off completed. ON PS  5/5.  Fentanyl and Propofol infusing. Drips stopped.   Will monitor.      Electronically signed by Saray Wadsworth RN on 7/15/2024 at 7:56 PM

## 2024-07-15 NOTE — PROGRESS NOTES
Department of Internal Medicine  Nephrology Progress Note    SUBJECTIVE:  We are following this patient for ESRD management.    HPI:  Ventilated.  Patient seen on dialysis.  ROS:  In bed.  PMFSH:  medications reviewed.    Physical Exam:    VITALS:  /72   Pulse 92   Temp 97.7 °F (36.5 °C) (Axillary)   Resp 13   Ht 1.778 m (5' 10\")   Wt 87.5 kg (193 lb)   SpO2 100%   BMI 27.69 kg/m²   24HR INTAKE/OUTPUT:    Intake/Output Summary (Last 24 hours) at 7/15/2024 1528  Last data filed at 7/15/2024 1226  Gross per 24 hour   Intake 1859.88 ml   Output --   Net 1859.88 ml       Constitutional: on vent   Respiratory:  Few rhonchi   Gastrointestinal: No  tenderness.  Normal Bowel Sounds  Cardiovascular:  S1, S2 Irregular   Edema:   + edema  Acc Rt TDC     DATA:    CBC:  Lab Results   Component Value Date/Time    WBC 4.0 07/15/2024 04:45 AM    RBC 3.58 07/15/2024 04:45 AM    HGB 11.6 07/15/2024 04:45 AM    HCT 34.7 07/15/2024 04:45 AM    MCV 96.8 07/15/2024 04:45 AM    MCH 32.5 07/15/2024 04:45 AM    MCHC 33.6 07/15/2024 04:45 AM    RDW 18.1 07/15/2024 04:45 AM    PLT 63 07/15/2024 04:45 AM    MPV 9.0 07/15/2024 04:45 AM     CMP:  Lab Results   Component Value Date/Time     07/15/2024 04:45 AM    K 3.4 07/15/2024 04:45 AM    K 3.4 07/15/2024 04:45 AM    CL 99 07/15/2024 04:45 AM    CO2 24 07/15/2024 04:45 AM    BUN 34 07/15/2024 04:45 AM    CREATININE 5.8 07/15/2024 04:45 AM    GFRAA 12 02/18/2022 12:11 AM    AGRATIO 1.4 07/11/2024 08:34 PM    LABGLOM 10 07/15/2024 04:45 AM    LABGLOM 10 03/20/2024 10:13 AM    GLUCOSE 164 07/15/2024 04:45 AM    CALCIUM 9.9 07/15/2024 04:45 AM    BILITOT 0.9 07/14/2024 04:46 AM    ALKPHOS 73 07/14/2024 04:46 AM     07/14/2024 04:46 AM     07/14/2024 04:46 AM      Hepatic Function Panel:   Lab Results   Component Value Date/Time    ALKPHOS 73 07/14/2024 04:46 AM     07/14/2024 04:46 AM     07/14/2024 04:46 AM    BILITOT 0.9 07/14/2024 04:46 AM     BILIDIR 0.6 07/14/2024 04:46 AM    IBILI 0.3 07/14/2024 04:46 AM      Phosphorus:   Lab Results   Component Value Date/Time    PHOS 3.8 07/14/2024 04:46 AM       ASSESSMENT/PLAN:  Encephalopathy   - H/o drug and ETOH use .  - monitor    ESRD   - HD MWF, lower wt as tolerated .     ventilated  - per pulmonary    ID  - aspiration pneumonia?   - on cefepime    HTN controlled .     Anemia  Hb noted . Hold epo with HD .     thrombocytopenia  - hematology following .

## 2024-07-15 NOTE — PLAN OF CARE
Problem: Discharge Planning  Goal: Discharge to home or other facility with appropriate resources  7/15/2024 0056 by Saray Wadsworth RN  Outcome: Progressing  Flowsheets (Taken 7/14/2024 2000)  Discharge to home or other facility with appropriate resources:   Identify barriers to discharge with patient and caregiver   Refer to discharge planning if patient needs post-hospital services based on physician order or complex needs related to functional status, cognitive ability or social support system     Problem: Pain  Goal: Verbalizes/displays adequate comfort level or baseline comfort level  7/15/2024 0056 by Saray Wadsworth, RN  Outcome: Progressing     Sedation increased at the start of the shift, pt restless to agitated. See eMAR and flowsheet for more info. Will come down as per RASS/CPOT.  Problem: Skin/Tissue Integrity  Goal: Absence of new skin breakdown  Description: 1.  Monitor for areas of redness and/or skin breakdown  2.  Assess vascular access sites hourly  3.  Every 4-6 hours minimum:  Change oxygen saturation probe site  4.  Every 4-6 hours:  If on nasal continuous positive airway pressure, respiratory therapy assess nares and determine need for appliance change or resting period.  7/15/2024 0056 by Saray Wadsworth, RN  Outcome: Progressing     Problem: Safety - Medical Restraint  Goal: Remains free of injury from restraints (Restraint for Interference with Medical Device)  Description: INTERVENTIONS:  1. Determine that other, less restrictive measures have been tried or would not be effective before applying the restraint  2. Evaluate the patient's condition at the time of restraint application  3. Inform patient/family regarding the reason for restraint  4. Q2H: Monitor safety, psychosocial status, comfort, nutrition and hydration  7/15/2024 0056 by Saray Wadsworth, RN  Outcome: Progressing  Flowsheets  Taken 7/15/2024 0000  Remains free of injury from restraints (restraint for interference with  medical device): Every 2 hours: Monitor safety, psychosocial status, comfort, nutrition and hydration  Taken 7/14/2024 2000  Remains free of injury from restraints (restraint for interference with medical device): Every 2 hours: Monitor safety, psychosocial status, comfort, nutrition and hydration     Problem: Neurosensory - Adult  Goal: Achieves stable or improved neurological status  7/15/2024 0056 by Saray Wadsworth RN  Outcome: Progressing  Flowsheets (Taken 7/14/2024 2000)  Achieves stable or improved neurological status:   Assess for and report changes in neurological status   Monitor temperature, glucose, and sodium. Initiate appropriate interventions as ordered     Problem: Respiratory - Adult  Goal: Achieves optimal ventilation and oxygenation  7/15/2024 0056 by Saray Wadsworth RN  Outcome: Progressing    Problem: Cardiovascular - Adult  Goal: Absence of cardiac dysrhythmias or at baseline  7/15/2024 0056 by Saray Wadsworth RN  Outcome: Progressing  Flowsheets (Taken 7/14/2024 2000)  Absence of cardiac dysrhythmias or at baseline:   Monitor cardiac rate and rhythm   Assess for signs of decreased cardiac output     Problem: Cardiovascular - Adult  Goal: Maintains optimal cardiac output and hemodynamic stability  7/15/2024 0056 by Saray Wadsworth RN  Outcome: Progressing  Flowsheets (Taken 7/14/2024 2000)  Maintains optimal cardiac output and hemodynamic stability:   Monitor blood pressure and heart rate   Assess for signs of decreased cardiac output     Problem: Cardiovascular - Adult  Goal: Absence of cardiac dysrhythmias or at baseline  7/15/2024 0056 by Saray Wadsworth RN  Outcome: Progressing  Flowsheets (Taken 7/14/2024 2000)  Absence of cardiac dysrhythmias or at baseline:   Monitor cardiac rate and rhythm   Assess for signs of decreased cardiac output        Problem: Metabolic/Fluid and Electrolytes - Adult  Goal: Electrolytes maintained within normal limits  7/15/2024 0056 by Saray Wadsworth

## 2024-07-15 NOTE — PROGRESS NOTES
Pulmonary Progress Note    Date of Admission: 7/11/2024   LOS: 3 days       CC:  Chief Complaint   Patient presents with    Diarrhea    Altered Mental Status     Pt's niece contacted EMS because pt has had multiple instances of diarrhea throughout the day and seems confused.  EMS reports pt A&O x 2; Pt admits to using cocaine IV this morning        Subjective:      Intubated. No events over night  HD   On sedation          Assessment:          Plan:     This note may have been transcribed using Dragon Dictation software. Please disregard any translational errors.       Hospital Day: 3     Mechanical Vent   EtCO2 21  Currently PC 15, 15/5 @ 30%  Patient was intubated for periods of apnea followed by tachypnea.  At bedside echo was concern for poor ejection fraction.  Therefore this may be secondary to Cheyne-Nicolas.  End-tidal CO2 currently 21.  Therefore, changed over to AC VC tidal volume 4.7/kg, respiratory rate 10.  Not breathing above the ventilator.  End-tidal CO2 slowly climbing.  Currently 30.  Discussed with nurse.    Patient has 3 hours left on dialysis.  Will try SAT SBT after dialysis completed.  With potential change dose, will monitor for 1 hour and SAT SBT if possible to assess for extubation.  Echocardiogram ordered, pending    Mental status    Etiology likely cocaine / alcohol  CT head negative.    Assess with SAT after HD  Currently on propofol and fentanyl.  Monitor for withdrawal after this is discontinued for SAT.  Home medication of Seroquel.  Gabapentin Home dose, 300 current dose 100.      ESRD   HD MWF    ID  Currently on cefepime.  Unclear etiology.  Will check procalcitonin.  If this is less than 0.5, will discontinue.    Bilateral pleural effusions  Repeat chest x-ray tomorrow.    Electrolytes  - K: Replace potassium  - Ca:  - Mg:  - Phos:    Prophylaxis  - GI - protonix  - DVT -SCDs, start heparin      Nutrition  - Diet NPO  ADULT TUBE FEEDING; Orogastric; Renal Formula; Continuous; 20;  Yes; 10; Q 4 hours; 50; 30; Q 4 hours  -   Intake/Output Summary (Last 24 hours) at 7/15/2024 0901  Last data filed at 7/15/2024 0557  Gross per 24 hour   Intake 1652.77 ml   Output --   Net 1652.77 ml       Mobility      Access  Arterial      PICC          CVC                     I spent 34 minutes of critical care time with this patient excluding any procedures.     Data:        PHYSICAL EXAM:   Blood pressure 103/64, pulse 81, temperature 97.6 °F (36.4 °C), temperature source Oral, resp. rate 15, height 1.778 m (5' 10\"), weight 87.7 kg (193 lb 5.5 oz), SpO2 100 %.'  Body mass index is 27.74 kg/m².   Gen: No distress.    ENT:   Resp: No accessory muscle use. No crackles. No wheezes. No rhonchi.    CV: Regular rate. Regular rhythm. No murmur or rub. No edema.   Skin: Warm, dry, normal texture and turgor. No nodule on exposed extremities.   Missing several toes on left  M/S: No cyanosis. No clubbing. No joint deformity.  Psych: Sedated.      Medications:    Scheduled Meds:   aspirin  81 mg Oral Daily    buPROPion  150 mg Oral Q72H    carvedilol  6.25 mg Oral BID WC    [Held by provider] cinacalcet  60 mg Oral Once per day on Mon Wed Fri    levothyroxine  25 mcg Oral Daily    sevelamer  2,400 mg Oral TID     vitamin D  50,000 Units Oral Q30 Days    sodium chloride flush  5-40 mL IntraVENous 2 times per day    [Held by provider] heparin (porcine)  5,000 Units SubCUTAneous 3 times per day    cefepime  1,000 mg IntraVENous Q24H    folic acid  1 mg Oral Daily    thiamine  100 mg IntraVENous Daily    gabapentin  100 mg Oral Nightly    QUEtiapine  75 mg Oral BID    venlafaxine  75 mg Oral BID       Continuous Infusions:   sodium chloride      dexmedeTOMIDine Stopped (07/12/24 1206)    dextrose 5 % and 0.9 % NaCl 50 mL/hr at 07/15/24 0557    propofol 10 mcg/kg/min (07/15/24 0602)    fentaNYL 25 mcg/hr (07/15/24 0557)    dextrose         PRN Meds:  oxyCODONE-acetaminophen, sodium chloride flush, sodium chloride,

## 2024-07-16 ENCOUNTER — APPOINTMENT (OUTPATIENT)
Dept: GENERAL RADIOLOGY | Age: 64
End: 2024-07-16
Payer: COMMERCIAL

## 2024-07-16 PROBLEM — I42.9 MYOCARDIOPATHY (HCC): Status: ACTIVE | Noted: 2024-07-16

## 2024-07-16 PROBLEM — I48.20 CHRONIC ATRIAL FIBRILLATION (HCC): Status: ACTIVE | Noted: 2024-07-16

## 2024-07-16 LAB
ANION GAP SERPL CALCULATED.3IONS-SCNC: 14 MMOL/L (ref 3–16)
BASOPHILS # BLD: 0 K/UL (ref 0–0.2)
BASOPHILS NFR BLD: 0.8 %
BUN SERPL-MCNC: 28 MG/DL (ref 7–20)
CALCIUM SERPL-MCNC: 9.4 MG/DL (ref 8.3–10.6)
CHLORIDE SERPL-SCNC: 99 MMOL/L (ref 99–110)
CO2 SERPL-SCNC: 22 MMOL/L (ref 21–32)
CREAT SERPL-MCNC: 4.7 MG/DL (ref 0.8–1.3)
DEPRECATED RDW RBC AUTO: 18.9 % (ref 12.4–15.4)
EOSINOPHIL # BLD: 0 K/UL (ref 0–0.6)
EOSINOPHIL NFR BLD: 0.6 %
GFR SERPLBLD CREATININE-BSD FMLA CKD-EPI: 13 ML/MIN/{1.73_M2}
GLUCOSE BLD-MCNC: 116 MG/DL (ref 70–99)
GLUCOSE BLD-MCNC: 119 MG/DL (ref 70–99)
GLUCOSE BLD-MCNC: 129 MG/DL (ref 70–99)
GLUCOSE BLD-MCNC: 137 MG/DL (ref 70–99)
GLUCOSE BLD-MCNC: 203 MG/DL (ref 70–99)
GLUCOSE BLD-MCNC: 97 MG/DL (ref 70–99)
GLUCOSE SERPL-MCNC: 121 MG/DL (ref 70–99)
HCT VFR BLD AUTO: 30.3 % (ref 40.5–52.5)
HGB BLD-MCNC: 10.4 G/DL (ref 13.5–17.5)
LYMPHOCYTES # BLD: 0.5 K/UL (ref 1–5.1)
LYMPHOCYTES NFR BLD: 9.1 %
MAGNESIUM SERPL-MCNC: 2.2 MG/DL (ref 1.8–2.4)
MCH RBC QN AUTO: 33.4 PG (ref 26–34)
MCHC RBC AUTO-ENTMCNC: 34.4 G/DL (ref 31–36)
MCV RBC AUTO: 97 FL (ref 80–100)
MONOCYTES # BLD: 0.6 K/UL (ref 0–1.3)
MONOCYTES NFR BLD: 12 %
NEUTROPHILS # BLD: 3.9 K/UL (ref 1.7–7.7)
NEUTROPHILS NFR BLD: 77.5 %
PERFORMED ON: ABNORMAL
PERFORMED ON: NORMAL
PHOSPHATE SERPL-MCNC: 3 MG/DL (ref 2.5–4.9)
PLATELET # BLD AUTO: 57 K/UL (ref 135–450)
PMV BLD AUTO: 9 FL (ref 5–10.5)
POTASSIUM SERPL-SCNC: 4 MMOL/L (ref 3.5–5.1)
RBC # BLD AUTO: 3.12 M/UL (ref 4.2–5.9)
SODIUM SERPL-SCNC: 135 MMOL/L (ref 136–145)
WBC # BLD AUTO: 5.1 K/UL (ref 4–11)

## 2024-07-16 PROCEDURE — 80048 BASIC METABOLIC PNL TOTAL CA: CPT

## 2024-07-16 PROCEDURE — 2580000003 HC RX 258: Performed by: INTERNAL MEDICINE

## 2024-07-16 PROCEDURE — 85025 COMPLETE CBC W/AUTO DIFF WBC: CPT

## 2024-07-16 PROCEDURE — 2580000003 HC RX 258: Performed by: HOSPITALIST

## 2024-07-16 PROCEDURE — 94761 N-INVAS EAR/PLS OXIMETRY MLT: CPT

## 2024-07-16 PROCEDURE — 2500000003 HC RX 250 WO HCPCS

## 2024-07-16 PROCEDURE — 83735 ASSAY OF MAGNESIUM: CPT

## 2024-07-16 PROCEDURE — 71045 X-RAY EXAM CHEST 1 VIEW: CPT

## 2024-07-16 PROCEDURE — 2580000003 HC RX 258: Performed by: NURSE PRACTITIONER

## 2024-07-16 PROCEDURE — APPNB15 APP NON BILLABLE TIME 0-15 MINS: Performed by: NURSE PRACTITIONER

## 2024-07-16 PROCEDURE — 2700000000 HC OXYGEN THERAPY PER DAY

## 2024-07-16 PROCEDURE — 6370000000 HC RX 637 (ALT 250 FOR IP): Performed by: HOSPITALIST

## 2024-07-16 PROCEDURE — 2500000003 HC RX 250 WO HCPCS: Performed by: NURSE PRACTITIONER

## 2024-07-16 PROCEDURE — 84100 ASSAY OF PHOSPHORUS: CPT

## 2024-07-16 PROCEDURE — 94003 VENT MGMT INPAT SUBQ DAY: CPT

## 2024-07-16 PROCEDURE — 6370000000 HC RX 637 (ALT 250 FOR IP): Performed by: NURSE PRACTITIONER

## 2024-07-16 PROCEDURE — 6370000000 HC RX 637 (ALT 250 FOR IP): Performed by: INTERNAL MEDICINE

## 2024-07-16 PROCEDURE — 99223 1ST HOSP IP/OBS HIGH 75: CPT | Performed by: INTERNAL MEDICINE

## 2024-07-16 PROCEDURE — 2500000003 HC RX 250 WO HCPCS: Performed by: HOSPITALIST

## 2024-07-16 PROCEDURE — 6360000002 HC RX W HCPCS: Performed by: HOSPITALIST

## 2024-07-16 PROCEDURE — 6360000002 HC RX W HCPCS: Performed by: INTERNAL MEDICINE

## 2024-07-16 PROCEDURE — 99222 1ST HOSP IP/OBS MODERATE 55: CPT | Performed by: SURGERY

## 2024-07-16 PROCEDURE — 99291 CRITICAL CARE FIRST HOUR: CPT | Performed by: INTERNAL MEDICINE

## 2024-07-16 PROCEDURE — 2000000000 HC ICU R&B

## 2024-07-16 RX ORDER — NOREPINEPHRINE BITARTRATE 0.06 MG/ML
INJECTION, SOLUTION INTRAVENOUS
Status: COMPLETED
Start: 2024-07-16 | End: 2024-07-16

## 2024-07-16 RX ORDER — CHLORDIAZEPOXIDE HYDROCHLORIDE 5 MG/1
5 CAPSULE, GELATIN COATED ORAL 4 TIMES DAILY
Status: DISCONTINUED | OUTPATIENT
Start: 2024-07-16 | End: 2024-07-19

## 2024-07-16 RX ORDER — ACETAMINOPHEN 325 MG/1
650 TABLET ORAL EVERY 4 HOURS PRN
Status: DISCONTINUED | OUTPATIENT
Start: 2024-07-16 | End: 2024-07-25 | Stop reason: HOSPADM

## 2024-07-16 RX ORDER — GABAPENTIN 300 MG/1
300 CAPSULE ORAL NIGHTLY
Status: DISCONTINUED | OUTPATIENT
Start: 2024-07-16 | End: 2024-07-22

## 2024-07-16 RX ORDER — ACETAMINOPHEN 650 MG/1
650 SUPPOSITORY RECTAL EVERY 4 HOURS PRN
Status: DISCONTINUED | OUTPATIENT
Start: 2024-07-16 | End: 2024-07-25 | Stop reason: HOSPADM

## 2024-07-16 RX ORDER — NOREPINEPHRINE BITARTRATE 0.06 MG/ML
1-100 INJECTION, SOLUTION INTRAVENOUS CONTINUOUS
Status: DISCONTINUED | OUTPATIENT
Start: 2024-07-16 | End: 2024-07-18

## 2024-07-16 RX ADMIN — DEXMEDETOMIDINE HYDROCHLORIDE 1 MCG/KG/HR: 4 INJECTION, SOLUTION INTRAVENOUS at 03:07

## 2024-07-16 RX ADMIN — ASPIRIN 81 MG: 81 TABLET, CHEWABLE ORAL at 08:49

## 2024-07-16 RX ADMIN — HEPARIN SODIUM 5000 UNITS: 5000 INJECTION INTRAVENOUS; SUBCUTANEOUS at 21:40

## 2024-07-16 RX ADMIN — QUETIAPINE FUMARATE 75 MG: 25 TABLET ORAL at 08:48

## 2024-07-16 RX ADMIN — CHLORDIAZEPOXIDE HYDROCHLORIDE 5 MG: 5 CAPSULE ORAL at 20:44

## 2024-07-16 RX ADMIN — THIAMINE HYDROCHLORIDE 100 MG: 100 INJECTION, SOLUTION INTRAMUSCULAR; INTRAVENOUS at 08:49

## 2024-07-16 RX ADMIN — ACETAMINOPHEN 325MG 650 MG: 325 TABLET ORAL at 14:15

## 2024-07-16 RX ADMIN — LEVOTHYROXINE SODIUM 25 MCG: 0.03 TABLET ORAL at 08:49

## 2024-07-16 RX ADMIN — ACETAMINOPHEN 325MG 650 MG: 325 TABLET ORAL at 20:44

## 2024-07-16 RX ADMIN — SODIUM CHLORIDE, PRESERVATIVE FREE 10 ML: 5 INJECTION INTRAVENOUS at 20:47

## 2024-07-16 RX ADMIN — DEXMEDETOMIDINE HYDROCHLORIDE 0.8 MCG/KG/HR: 4 INJECTION, SOLUTION INTRAVENOUS at 15:43

## 2024-07-16 RX ADMIN — CHLORDIAZEPOXIDE HYDROCHLORIDE 5 MG: 5 CAPSULE ORAL at 16:18

## 2024-07-16 RX ADMIN — NOREPINEPHRINE BITARTRATE 5 MCG/MIN: 0.06 INJECTION, SOLUTION INTRAVENOUS at 17:25

## 2024-07-16 RX ADMIN — CEFEPIME 1000 MG: 1 INJECTION, POWDER, FOR SOLUTION INTRAMUSCULAR; INTRAVENOUS at 20:42

## 2024-07-16 RX ADMIN — CHLORDIAZEPOXIDE HYDROCHLORIDE 5 MG: 5 CAPSULE ORAL at 12:24

## 2024-07-16 RX ADMIN — VENLAFAXINE 75 MG: 37.5 TABLET ORAL at 21:45

## 2024-07-16 RX ADMIN — HEPARIN SODIUM 5000 UNITS: 5000 INJECTION INTRAVENOUS; SUBCUTANEOUS at 13:59

## 2024-07-16 RX ADMIN — SODIUM CHLORIDE, PRESERVATIVE FREE 20 MG: 5 INJECTION INTRAVENOUS at 08:48

## 2024-07-16 RX ADMIN — SODIUM CHLORIDE, PRESERVATIVE FREE 10 ML: 5 INJECTION INTRAVENOUS at 08:48

## 2024-07-16 RX ADMIN — HEPARIN SODIUM 5000 UNITS: 5000 INJECTION INTRAVENOUS; SUBCUTANEOUS at 08:49

## 2024-07-16 RX ADMIN — Medication 5 MCG/MIN: at 17:25

## 2024-07-16 RX ADMIN — QUETIAPINE FUMARATE 75 MG: 25 TABLET ORAL at 20:44

## 2024-07-16 RX ADMIN — GABAPENTIN 300 MG: 300 CAPSULE ORAL at 20:44

## 2024-07-16 RX ADMIN — CARVEDILOL 6.25 MG: 6.25 TABLET, FILM COATED ORAL at 08:49

## 2024-07-16 RX ADMIN — FOLIC ACID 1 MG: 1 TABLET ORAL at 08:48

## 2024-07-16 RX ADMIN — VENLAFAXINE 75 MG: 37.5 TABLET ORAL at 08:57

## 2024-07-16 RX ADMIN — ACETAMINOPHEN 325MG 650 MG: 325 TABLET ORAL at 09:21

## 2024-07-16 RX ADMIN — DEXMEDETOMIDINE HYDROCHLORIDE 0.2 MCG/KG/HR: 4 INJECTION, SOLUTION INTRAVENOUS at 08:40

## 2024-07-16 RX ADMIN — DEXMEDETOMIDINE HYDROCHLORIDE 1 MCG/KG/HR: 4 INJECTION, SOLUTION INTRAVENOUS at 11:19

## 2024-07-16 RX ADMIN — CHLORDIAZEPOXIDE HYDROCHLORIDE 5 MG: 5 CAPSULE ORAL at 08:57

## 2024-07-16 ASSESSMENT — PULMONARY FUNCTION TESTS
PIF_VALUE: 17
PIF_VALUE: 14
PIF_VALUE: 17
PIF_VALUE: 14
PIF_VALUE: 14
PIF_VALUE: 15
PIF_VALUE: 12
PIF_VALUE: 12
PIF_VALUE: 22
PIF_VALUE: 16
PIF_VALUE: 14
PIF_VALUE: 15
PIF_VALUE: 13
PIF_VALUE: 16
PIF_VALUE: 14
PIF_VALUE: 16
PIF_VALUE: 14
PIF_VALUE: 16
PIF_VALUE: 15
PIF_VALUE: 21
PIF_VALUE: 15
PIF_VALUE: 14
PIF_VALUE: 14
PIF_VALUE: 13
PIF_VALUE: 20
PIF_VALUE: 14
PIF_VALUE: 12
PIF_VALUE: 16
PIF_VALUE: 15
PIF_VALUE: 15
PIF_VALUE: 13
PIF_VALUE: 15
PIF_VALUE: 12
PIF_VALUE: 16
PIF_VALUE: 18
PIF_VALUE: 12
PIF_VALUE: 13
PIF_VALUE: 16
PIF_VALUE: 16
PIF_VALUE: 18
PIF_VALUE: 14
PIF_VALUE: 15
PIF_VALUE: 17
PIF_VALUE: 15
PIF_VALUE: 15
PIF_VALUE: 17
PIF_VALUE: 10
PIF_VALUE: 15

## 2024-07-16 ASSESSMENT — PAIN SCALES - GENERAL
PAINLEVEL_OUTOF10: 2
PAINLEVEL_OUTOF10: 2
PAINLEVEL_OUTOF10: 3
PAINLEVEL_OUTOF10: 2
PAINLEVEL_OUTOF10: 2
PAINLEVEL_OUTOF10: 3

## 2024-07-16 NOTE — PROGRESS NOTES
Hospitalist Progress Note  7/16/2024 11:54 AM  Subjective:   Admit Date: 7/11/2024  PCP: Tania Moses MD Status: Inpatient   Interval History: Hospital Day: 6, admitted with acute alcohol toxicity encephalopathy, aspiration pneumonia with respiratory failure requiring intubation (7/12), dexmedetomidine changed to propofol.  Antibiotic therapy with cefepime and vancomycin.  Atrial fibrillation with rapid ventricular response initially requiring IV diltiazem, discontinued.     Medical co-morbidities include CAD, chronic afib, ESRD on hemodialysis MWF (follows up with  Nephrology), peripheral vascular disease, status post toe amputation; chronic alcohol abuse, IV drug abuse .      7/16    SBT trial : patient increasingly agitated  , back to AC mode   AMS still an issue while off sedation    7/15    Getting HD ,  remains intubated  Sedated     7/14    Remains intubated      Diet: NPO  Endotracheal tube (7/12, day #2)  Left internal jugular CVC (7/12, day #2)  Orogastric tube (7/12, day #2)  Left forearm hemodialysis fistula (POA)    Medications:     dextrose 5 % and 0.9 % NaCl 50 mL/hr   propofol 10 mcg/kg/min   fentanyl 25 mcg/hr     aspirin  81 mg Oral Daily   bupropion  150 mg Oral Q72H   carvedilol  6.25 mg Oral BID WC   cinacalcet  60 mg Oral Mon Wed Fri [held]   levothyroxine  25 mcg Oral Daily   sevelamer  2,400 mg Oral TID    vitamin D  50,000 Units Oral Q30 Days   heparin   5,000 Units SubCUTAneous 3 times per day   cefepime  1,000 mg IntraVENous Q24H   vancomycin   Per pharmacy Intravenous Per pharmacy   folic acid  1 mg Oral Daily   thiamine  100 mg IntraVENous Daily   gabapentin  100 mg Oral Nightly   phenobarbital  32.5 mg IntraVENous Q12H   quetiapine  75 mg Oral BID   venlafaxine  75 mg Oral BID     Recent Labs     07/14/24  0446 07/15/24  0445 07/16/24  0440   WBC 3.9* 4.0 5.1   HGB 11.5* 11.6* 10.4*   PLT 56* 63* 57*   MCV 97.3 96.8 97.0       Recent Labs     07/14/24 0446

## 2024-07-16 NOTE — PROGRESS NOTES
Patient becoming increasingly agitated and breathing 40 bpm on PS. Patient flipped back to previous settings. MD made aware

## 2024-07-16 NOTE — PROGRESS NOTES
NAME:  Cal Sanchez  YOB: 1960  MEDICAL RECORD NUMBER:  [id number]    Shift Summary: Failed SBT this morning d/t tachypnea and agitation. Restarted precedex. Continues to not interact, even when off precedex (does not follow commands, occasionally opens eyes, etc) and frequently fidgeting in bed with no purposeful movements. Levo briefly started for hypotension. Vascular consulted for chronic PVD.    Family updated: Yes:  niece, Junie updated via phone.    Rhythm: Atrial Fibrillation     Most recent vitals:   Visit Vitals  BP 95/69   Pulse 92   Temp (!) 101.5 °F (38.6 °C) (Esophageal)   Resp 25   Ht 1.778 m (5' 10\")   Wt 84.2 kg (185 lb 10 oz)   SpO2 100%   BMI 26.63 kg/m²        Respiratory support needed (if any):  - Ventilator Settings:  Vent Days 5    Vt (Set, mL): 0 mL  Resp Rate (Set): 10 bpm  FiO2 : 28 %    PEEP/CPAP (cmH2O): 5       Admission weight Weight - Scale: 85.1 kg (187 lb 9.8 oz) (07/11/24 2219)    Today's weight    Wt Readings from Last 1 Encounters:   07/16/24 84.2 kg (185 lb 10 oz)        Tracy need assessed each shift: N/A - no tracy present  UOP >30ml/hr: NO - anuric.  Last documented BM (in last 48 hrs):  Patient Vitals for the past 48 hrs:   Last BM (including prior to admit) Stool Occurrence   07/15/24 0622 07/15/24 1   07/15/24 0800 07/15/24 --                Restraints (in use currently or dc'd in last 12 hrs): Yes    Order current and documentation up to date? Yes    Lines/Drains reviewed @ bedside.  CVC  07/12/24 Left Internal jugular (Active)   Number of days: 4       Tunneled Hemodialysis Catheter Right Subclavian (Active)   Number of days:          Drip rates at handoff:    norepinephrine Stopped (07/16/24 1833)    sodium chloride      dexmedeTOMIDine 0.4 mcg/kg/hr (07/16/24 1834)    dextrose 5 % and 0.9 % NaCl Stopped (07/15/24 0557)    propofol Stopped (07/15/24 1929)    fentaNYL Stopped (07/15/24 1436)    dextrose         Lab Data:   CBC:   Recent Labs      07/15/24  0445 07/16/24  0440   WBC 4.0 5.1   HGB 11.6* 10.4*   HCT 34.7* 30.3*   MCV 96.8 97.0   PLT 63* 57*     BMP:    Recent Labs     07/15/24  0445 07/16/24  0440    135*   K 3.4*  3.4* 4.0   CO2 24 22   BUN 34* 28*   CREATININE 5.8* 4.7*     LIVR:   Recent Labs     07/14/24  0446   *   *     PT/INR: No results for input(s): \"INR\" in the last 72 hours.    Invalid input(s): \"PROT\"  APTT: No results for input(s): \"APTT\" in the last 72 hours.  ABG:   Recent Labs     07/15/24  1730   PHART 7.471*   [id number] 30.9*   PO2ART 106.0       Any consults during the shift? Yes: Consults received: : Cardiology, Vascular Surgery    Any signed and held orders to be released?  No      4 Eyes Skin Assessment       The patient is being assessed for  Shift Handoff    I agree that at least one RN has performed a thorough Head to Toe Skin Assessment on the patient. ALL assessment sites listed below have been assessed.      Areas assessed by both nurses: Head, Face, Ears, Shoulders, Back, Chest, Arms, Elbows, Hands, Sacrum. Buttock, Coccyx, Ischium, Legs. Feet and Heels, and Under Medical Devices         Does the Patient have a Wound? Yes, scattered skin tears.    Wound Care Orders initiated by RN: No       Smith Prevention initiated by RN: Yes    Pressure Injury (Stage 3,4, Unstageable, DTI, NWPT, and Complex wounds) if present, place Wound referral order by RN under : No    New Ostomies, if present place, Ostomy referral order under : No     Nurse 1 eSignature: Electronically signed by Shannon Sagastume RN on 7/16/24 at 7:10 PM EDT    **SHARE this note so that the co-signing nurse can place an eSignature**    Nurse 2 eSignature: Electronically signed by Flakita Silverman RN on 7/16/24 at 11:10 PM EDT

## 2024-07-16 NOTE — CONSULTS
[facility]  Cardiology Consult Note        CC:      Cardiomyopathy             HPI:   This is a 64 y.o. male with chronic atrial fibrillation, end-stage renal disease on hemodialysis, diabetes came to the ER for altered mental status and shortness of breath.  In the ER the patient reported that he uses IV drugs and had used IV cocaine the morning of admission.  In the ER chest x-ray showed perihilar congestion and interstitial edema.    Initial workup revealed the patient to be septic, acute heart failure, metabolic encephalopathy.  He had to be intubated because of deteriorating respiratory status.  He is getting dialysis.    We were asked to see the patient for deterioration in LV function.  Limited echo done yesterday in the face of all the present comorbid conditions was stated to be around 30% which is a drop from his previously reported 40 to 45%.        Past Medical History:   Diagnosis Date    Atrial fibrillation (HCC)     CAD (coronary artery disease) 4/3/2014    Chronic kidney disease     Depression     Diabetes mellitus (HCC)     Dialysis patient (HCC)     left upper arm fistula    Glaucoma     Hemodialysis patient (HCC)     Hyperlipidemia     Hypertension     RLL pneumonia 1/23/2018    Sleep apnea     uses CPAP      Past Surgical History:   Procedure Laterality Date    COLONOSCOPY      PACEMAKER INSERTION      PACEMAKER PLACEMENT      TOE AMPUTATION Left 12/1/2023    AMPUTATION SECOND AND THIRD DIGITS LEFT FOOT performed by Horace Jordan DPM at [facility] OR    TOE AMPUTATION Left 2/12/2024    LEFT HALLUX AMPUTATION performed by Horace Jordan DPM at [facility] OR      Family History   Problem Relation Age of Onset    Heart Disease Father     High Blood Pressure Sister       Social History     Tobacco Use    Smoking status: Never    Smokeless tobacco: Never   Substance Use Topics    Alcohol use: Not Currently     Alcohol/week: 2.0 standard drinks of alcohol     Types: 2 Cans of beer per week      (brisk    flow).   4. 1st diagonal: Ostial lesion: There is a 70% stenosis.   5. Left circumflex: Mid-vessel lesion: There is a 65% stenosis with    non-hemodynamically signficant FFR of 0.91   6. Right coronary: Patent. Mild diffuse disease. ANTHONY grade 3 flow    (brisk flow).   7. LVEDP 20 mmHg.       ASSESSMENT AND PLAN:      The patient has a history of mild nonischemic cardiomyopathy, chronic atrial fibrillation on hemodialysis  He has had a drop in his EF to about 30% from 45% (reported 3 months ago).  Patient has a history of polysubstance abuse and consents to using IV cocaine on the morning of admission.  In addition is being treated for sepsis as well as pulmonary edema    The drop in his EF is either temporary due to the above comorbid conditions eluding sepsis, or may be permanent.  Given his complex medical issues, including hypotension/sepsis it is not possible to use GDMT which would include SGLT2 blocker, ACE inhibitor/ARB/ARNI, spironolactone.  We can only use beta-blocker which he is already on.    Removal of fluid for interstitial edema will depend on dialysis.        LENA Duarte M.D  7/16/2024

## 2024-07-16 NOTE — PROGRESS NOTES
Pulmonary Progress Note    Date of Admission: 7/11/2024   LOS: 4 days       CC:  Chief Complaint   Patient presents with    Diarrhea    Altered Mental Status     Pt's niece contacted EMS because pt has had multiple instances of diarrhea throughout the day and seems confused.  EMS reports pt A&O x 2; Pt admits to using cocaine IV this morning        Subjective:  SBT completed yesterday.  Mentation was concerning therefore was not extubated.         Assessment:          Plan:     This note may have been transcribed using Dragon Dictation software. Please disregard any translational errors.       Hospital Day: 4     Mechanical Vent  SAT SBT this morning.  Currently with significant tachypnea.  Likely will not be extubated today.  Will likely need cardiac optimization prior to extubation.    Mental status    Etiology likely cocaine / alcohol  Home medication of Seroquel.  Gabapentin Home dose, 300 current dose 100.  Return to home medication dose of 300  Precedex  Schedule Librium for concern for alcohol withdrawal  Propofol Fentanyl  Altered today.  ICU delirium versus withdrawal of consideration.      ESRD   HD MWF    ID  Currently on cefepime.  Unclear etiology.  Will check procalcitonin.  If this is less than 0.5, will discontinue.    Bilateral pleural effusions  Continues to have ascites with bilateral pleural effusion.  Would benefit from volume reduction.    Cardiovascular  EF 25%  Consult cardiology following  Needs volume reduction.    Electrolytes  - K: Replace potassium  - Ca:  - Mg:  - Phos:    Prophylaxis  - GI - protonix  - DVT -SCDs, start heparin      Nutrition  - Diet NPO  ADULT TUBE FEEDING; Orogastric; Renal Formula; Continuous; 20; Yes; 10; Q 4 hours; 50; 30; Q 4 hours  -   Intake/Output Summary (Last 24 hours) at 7/16/2024 0833  Last data filed at 7/16/2024 0534  Gross per 24 hour   Intake 1282.87 ml   Output --   Net 1282.87 ml         Mobility      Access  Arterial      PICC          CVC                      I spent 32 minutes of critical care time with this patient excluding any procedures.     Data:        PHYSICAL EXAM:   Blood pressure 107/75, pulse (!) 110, temperature 99 °F (37.2 °C), temperature source Oral, resp. rate 28, height 1.778 m (5' 10\"), weight 84.2 kg (185 lb 10 oz), SpO2 94 %.'  Body mass index is 26.63 kg/m².   Gen: No distress.    ENT:   Resp: No accessory muscle use. No crackles. No wheezes. No rhonchi.    CV: Regular rate. Regular rhythm. No murmur or rub. No edema.   Skin: Warm, dry, normal texture and turgor. No nodule on exposed extremities.   Missing several toes on left  M/S: No cyanosis. No clubbing. No joint deformity.  Psych: agitated .      Medications:    Scheduled Meds:   famotidine (PEPCID) injection  20 mg IntraVENous Daily    aspirin  81 mg Oral Daily    buPROPion  150 mg Oral Q72H    carvedilol  6.25 mg Oral BID WC    [Held by provider] cinacalcet  60 mg Oral Once per day on Mon Wed Fri    levothyroxine  25 mcg Oral Daily    sevelamer  2,400 mg Oral TID     vitamin D  50,000 Units Oral Q30 Days    sodium chloride flush  5-40 mL IntraVENous 2 times per day    heparin (porcine)  5,000 Units SubCUTAneous 3 times per day    cefepime  1,000 mg IntraVENous Q24H    folic acid  1 mg Oral Daily    thiamine  100 mg IntraVENous Daily    gabapentin  100 mg Oral Nightly    QUEtiapine  75 mg Oral BID    venlafaxine  75 mg Oral BID       Continuous Infusions:   sodium chloride      dexmedeTOMIDine Stopped (07/16/24 0608)    dextrose 5 % and 0.9 % NaCl Stopped (07/15/24 0557)    propofol Stopped (07/15/24 1929)    fentaNYL Stopped (07/15/24 1436)    dextrose         PRN Meds:  oxyCODONE-acetaminophen, sodium chloride flush, sodium chloride, ondansetron **OR** ondansetron, polyethylene glycol, acetaminophen **OR** acetaminophen, LORazepam **OR** LORazepam **OR** LORazepam **OR** LORazepam **OR** LORazepam **OR** LORazepam **OR** LORazepam **OR** LORazepam, labetalol, hydrALAZINE,

## 2024-07-16 NOTE — PROGRESS NOTES
GIven plts likely related to sepsis/meds etc  I dont have any other recs  Will sign off   Reconsult if needed

## 2024-07-16 NOTE — PROGRESS NOTES
Department of Internal Medicine  Nephrology Progress Note    SUBJECTIVE:  We are following this patient for ESRD management.    HPI:  Ventilated.  Failed SBT earlier today.  ROS:  In bed.  PMFSH:  medications reviewed.    Physical Exam:    VITALS:  /69   Pulse 88   Temp (!) 102 °F (38.9 °C) (Esophageal)   Resp 27   Ht 1.778 m (5' 10\")   Wt 84.2 kg (185 lb 10 oz)   SpO2 99%   BMI 26.63 kg/m²   24HR INTAKE/OUTPUT:    Intake/Output Summary (Last 24 hours) at 7/16/2024 1248  Last data filed at 7/16/2024 1244  Gross per 24 hour   Intake 1308 ml   Output 0 ml   Net 1308 ml       Constitutional: on vent   Respiratory:  Few rhonchi   Gastrointestinal: No  tenderness.  Normal Bowel Sounds  Cardiovascular:  S1, S2 Irregular   Edema:   + edema  Acc Rt TDC     DATA:    CBC:  Lab Results   Component Value Date/Time    WBC 5.1 07/16/2024 04:40 AM    RBC 3.12 07/16/2024 04:40 AM    HGB 10.4 07/16/2024 04:40 AM    HCT 30.3 07/16/2024 04:40 AM    MCV 97.0 07/16/2024 04:40 AM    MCH 33.4 07/16/2024 04:40 AM    MCHC 34.4 07/16/2024 04:40 AM    RDW 18.9 07/16/2024 04:40 AM    PLT 57 07/16/2024 04:40 AM    MPV 9.0 07/16/2024 04:40 AM     CMP:  Lab Results   Component Value Date/Time     07/16/2024 04:40 AM    K 4.0 07/16/2024 04:40 AM    K 3.4 07/15/2024 04:45 AM    CL 99 07/16/2024 04:40 AM    CO2 22 07/16/2024 04:40 AM    BUN 28 07/16/2024 04:40 AM    CREATININE 4.7 07/16/2024 04:40 AM    GFRAA 12 02/18/2022 12:11 AM    AGRATIO 1.4 07/11/2024 08:34 PM    LABGLOM 13 07/16/2024 04:40 AM    LABGLOM 10 03/20/2024 10:13 AM    GLUCOSE 121 07/16/2024 04:40 AM    CALCIUM 9.4 07/16/2024 04:40 AM    BILITOT 0.9 07/14/2024 04:46 AM    ALKPHOS 73 07/14/2024 04:46 AM     07/14/2024 04:46 AM     07/14/2024 04:46 AM      Hepatic Function Panel:   Lab Results   Component Value Date/Time    ALKPHOS 73 07/14/2024 04:46 AM     07/14/2024 04:46 AM     07/14/2024 04:46 AM    BILITOT 0.9 07/14/2024 04:46 AM

## 2024-07-16 NOTE — PROGRESS NOTES
NAME:  Cal Sanchez  YOB: 1960  MEDICAL RECORD NUMBER:  [id number]    Shift Summary: Pt switched to full vent support at 0224, tachypneic and low tidal volume. Still will respond to voice, but not really following commands. At some point, he did nod his head when asked if he is comfortable but was not duplicated again. He has been constantly moving in bed with any stimulation, needing constant repositioning.  Restraints for safety.  Will reach for the tube when off.  Hand off completed.    Family updated: No    Rhythm: Atrial Fibrillation     Most recent vitals:   Visit Vitals  /75   Pulse 94   Temp 99 °F (37.2 °C) (Oral)   Resp 25   Ht 1.778 m (5' 10\")   Wt 84.2 kg (185 lb 10 oz)   SpO2 99%   BMI 26.63 kg/m²           No data found.    No data found.      Respiratory support needed (if any):  - Ventilator Settings:  Vent Days 5    Vt (Set, mL): 0 mL  Resp Rate (Set): 10 bpm  FiO2 : 28 %    PEEP/CPAP (cmH2O): 5       Admission weight Weight - Scale: 85.1 kg (187 lb 9.8 oz) (07/11/24 2219)    Today's weight    Wt Readings from Last 1 Encounters:   07/16/24 84.2 kg (185 lb 10 oz)        Tracy need assessed each shift: N/A - no tracy present anuric  UOP >30ml/hr: NO - anuric  Last documented BM (in last 48 hrs):  Patient Vitals for the past 48 hrs:   Last BM (including prior to admit) Stool Occurrence   07/15/24 0622 07/15/24 1   07/15/24 0800 07/15/24 --                Restraints (in use currently or dc'd in last 12 hrs): Yes    Order current and documentation up to date? Yes    Lines/Drains reviewed @ bedside.  CVC  07/12/24 Left Internal jugular (Active)   Number of days: 3       Tunneled Hemodialysis Catheter Right Subclavian (Active)   Number of days:          Drip rates at handoff:    sodium chloride      dexmedeTOMIDine Stopped (07/16/24 0608)    dextrose 5 % and 0.9 % NaCl Stopped (07/15/24 0557)    propofol Stopped (07/15/24 1929)    fentaNYL Stopped (07/15/24 1436)    dextrose

## 2024-07-16 NOTE — CONSULTS
[facility]  Consultation Note    7/16/2024 1:25 PM    Chief Complaint: AMS on Admission     Reason for Consultation: PAD    History of Present Illness:  Patient is a 64 y.o. male who presented to the ED on 7/11/2024 with complaints of AMS. He has a significant PMH of A-fib, CAD, ESRD, HTN, Pacemaker, Left Toe Amputations and PAD. The patient is seen resting in bed in ICU, he does not awaken to voice or physical stimuli and is therefore unable to provide any history. Per chart review, the patient presented with concerns of AMS per family. The patient had reportedly used IV Cocaine earlier in the day prior to admission. The patient is on HD chronically and receives HD on MWF schedule. He is being treated for Pneumonia. He is currently seen intubated in the ICU. We have been consulted to evaluate the patient for PAD.    Review of Systems  Review of Systems   Unable to perform ROS: Intubated        Past Medical History:   Diagnosis Date    Atrial fibrillation (HCC)     CAD (coronary artery disease) 4/3/2014    Chronic kidney disease     Depression     Diabetes mellitus (HCC)     Dialysis patient (HCC)     left upper arm fistula    Glaucoma     Hemodialysis patient (HCC)     Hyperlipidemia     Hypertension     RLL pneumonia 1/23/2018    Sleep apnea     uses CPAP       Past Surgical History:   Procedure Laterality Date    COLONOSCOPY      PACEMAKER INSERTION      PACEMAKER PLACEMENT      TOE AMPUTATION Left 12/1/2023    AMPUTATION SECOND AND THIRD DIGITS LEFT FOOT performed by Horace Jordan DPM at [facility] OR    TOE AMPUTATION Left 2/12/2024    LEFT HALLUX AMPUTATION performed by Horace Jordan DPM at [facility] OR       No Known Allergies    Social History     Socioeconomic History    Marital status: Single     Spouse name: Not on file    Number of children: Not on file    Years of education: Not on file    Highest education level: Not on file   Occupational History    Not on file   Tobacco Use

## 2024-07-16 NOTE — PLAN OF CARE
Problem: Discharge Planning  Goal: Discharge to home or other facility with appropriate resources  Outcome: Progressing  Flowsheets (Taken 7/16/2024 1249)  Discharge to home or other facility with appropriate resources:   Identify barriers to discharge with patient and caregiver   Identify discharge learning needs (meds, wound care, etc)   Refer to discharge planning if patient needs post-hospital services based on physician order or complex needs related to functional status, cognitive ability or social support system   Arrange for needed discharge resources and transportation as appropriate     Problem: Safety - Adult  Goal: Free from fall injury  Outcome: Progressing  Flowsheets (Taken 7/16/2024 1249)  Free From Fall Injury: Instruct family/caregiver on patient safety     Problem: Skin/Tissue Integrity  Goal: Absence of new skin breakdown  Description: 1.  Monitor for areas of redness and/or skin breakdown  2.  Assess vascular access sites hourly  3.  Every 4-6 hours minimum:  Change oxygen saturation probe site  4.  Every 4-6 hours:  If on nasal continuous positive airway pressure, respiratory therapy assess nares and determine need for appliance change or resting period.  Outcome: Progressing     Problem: Safety - Medical Restraint  Goal: Remains free of injury from restraints (Restraint for Interference with Medical Device)  Description: INTERVENTIONS:  1. Determine that other, less restrictive measures have been tried or would not be effective before applying the restraint  2. Evaluate the patient's condition at the time of restraint application  3. Inform patient/family regarding the reason for restraint  4. Q2H: Monitor safety, psychosocial status, comfort, nutrition and hydration  Outcome: Progressing  Flowsheets  Taken 7/16/2024 1249 by Shannon Sagastume, RN  Remains free of injury from restraints (restraint for interference with medical device):   Evaluate the patient's condition at the time of restraint

## 2024-07-17 ENCOUNTER — APPOINTMENT (OUTPATIENT)
Dept: GENERAL RADIOLOGY | Age: 64
End: 2024-07-17
Payer: COMMERCIAL

## 2024-07-17 LAB
ANION GAP SERPL CALCULATED.3IONS-SCNC: 12 MMOL/L (ref 3–16)
BASOPHILS # BLD: 0 K/UL (ref 0–0.2)
BASOPHILS NFR BLD: 0.8 %
BUN SERPL-MCNC: 45 MG/DL (ref 7–20)
CALCIUM SERPL-MCNC: 9.5 MG/DL (ref 8.3–10.6)
CANNABINOIDS SERPL-MCNC: 49 NG/ML
CHLORIDE SERPL-SCNC: 97 MMOL/L (ref 99–110)
CO2 SERPL-SCNC: 23 MMOL/L (ref 21–32)
CREAT SERPL-MCNC: 5.5 MG/DL (ref 0.8–1.3)
DEPRECATED RDW RBC AUTO: 19.8 % (ref 12.4–15.4)
EOSINOPHIL # BLD: 0 K/UL (ref 0–0.6)
EOSINOPHIL NFR BLD: 0.3 %
GFR SERPLBLD CREATININE-BSD FMLA CKD-EPI: 11 ML/MIN/{1.73_M2}
GLUCOSE BLD-MCNC: 102 MG/DL (ref 70–99)
GLUCOSE BLD-MCNC: 115 MG/DL (ref 70–99)
GLUCOSE BLD-MCNC: 128 MG/DL (ref 70–99)
GLUCOSE BLD-MCNC: 129 MG/DL (ref 70–99)
GLUCOSE BLD-MCNC: 95 MG/DL (ref 70–99)
GLUCOSE SERPL-MCNC: 212 MG/DL (ref 70–99)
HCT VFR BLD AUTO: 29.6 % (ref 40.5–52.5)
HGB BLD-MCNC: 9.9 G/DL (ref 13.5–17.5)
LYMPHOCYTES # BLD: 0.6 K/UL (ref 1–5.1)
LYMPHOCYTES NFR BLD: 12.8 %
MAGNESIUM SERPL-MCNC: 2.2 MG/DL (ref 1.8–2.4)
MCH RBC QN AUTO: 33.3 PG (ref 26–34)
MCHC RBC AUTO-ENTMCNC: 33.6 G/DL (ref 31–36)
MCV RBC AUTO: 99.1 FL (ref 80–100)
MONOCYTES # BLD: 0.6 K/UL (ref 0–1.3)
MONOCYTES NFR BLD: 12.3 %
NEUTROPHILS # BLD: 3.6 K/UL (ref 1.7–7.7)
NEUTROPHILS NFR BLD: 73.8 %
PERFORMED ON: ABNORMAL
PERFORMED ON: NORMAL
PHOSPHATE SERPL-MCNC: 3.9 MG/DL (ref 2.5–4.9)
PLATELET # BLD AUTO: 58 K/UL (ref 135–450)
PMV BLD AUTO: 9.4 FL (ref 5–10.5)
POTASSIUM SERPL-SCNC: 4.2 MMOL/L (ref 3.5–5.1)
RBC # BLD AUTO: 2.99 M/UL (ref 4.2–5.9)
SODIUM SERPL-SCNC: 132 MMOL/L (ref 136–145)
WBC # BLD AUTO: 4.9 K/UL (ref 4–11)

## 2024-07-17 PROCEDURE — 85025 COMPLETE CBC W/AUTO DIFF WBC: CPT

## 2024-07-17 PROCEDURE — 6360000002 HC RX W HCPCS: Performed by: INTERNAL MEDICINE

## 2024-07-17 PROCEDURE — 71045 X-RAY EXAM CHEST 1 VIEW: CPT

## 2024-07-17 PROCEDURE — 2500000003 HC RX 250 WO HCPCS: Performed by: NURSE PRACTITIONER

## 2024-07-17 PROCEDURE — 2580000003 HC RX 258: Performed by: NURSE PRACTITIONER

## 2024-07-17 PROCEDURE — 6370000000 HC RX 637 (ALT 250 FOR IP): Performed by: INTERNAL MEDICINE

## 2024-07-17 PROCEDURE — 94003 VENT MGMT INPAT SUBQ DAY: CPT

## 2024-07-17 PROCEDURE — 84100 ASSAY OF PHOSPHORUS: CPT

## 2024-07-17 PROCEDURE — 2580000003 HC RX 258: Performed by: INTERNAL MEDICINE

## 2024-07-17 PROCEDURE — 99291 CRITICAL CARE FIRST HOUR: CPT | Performed by: INTERNAL MEDICINE

## 2024-07-17 PROCEDURE — 94761 N-INVAS EAR/PLS OXIMETRY MLT: CPT

## 2024-07-17 PROCEDURE — 80048 BASIC METABOLIC PNL TOTAL CA: CPT

## 2024-07-17 PROCEDURE — 74018 RADEX ABDOMEN 1 VIEW: CPT

## 2024-07-17 PROCEDURE — 2700000000 HC OXYGEN THERAPY PER DAY

## 2024-07-17 PROCEDURE — 6370000000 HC RX 637 (ALT 250 FOR IP): Performed by: HOSPITALIST

## 2024-07-17 PROCEDURE — 2500000003 HC RX 250 WO HCPCS: Performed by: HOSPITALIST

## 2024-07-17 PROCEDURE — APPNB15 APP NON BILLABLE TIME 0-15 MINS: Performed by: NURSE PRACTITIONER

## 2024-07-17 PROCEDURE — 2000000000 HC ICU R&B

## 2024-07-17 PROCEDURE — 83735 ASSAY OF MAGNESIUM: CPT

## 2024-07-17 PROCEDURE — 99231 SBSQ HOSP IP/OBS SF/LOW 25: CPT | Performed by: SURGERY

## 2024-07-17 PROCEDURE — 90935 HEMODIALYSIS ONE EVALUATION: CPT

## 2024-07-17 PROCEDURE — 6370000000 HC RX 637 (ALT 250 FOR IP): Performed by: NURSE PRACTITIONER

## 2024-07-17 PROCEDURE — 2580000003 HC RX 258: Performed by: HOSPITALIST

## 2024-07-17 PROCEDURE — 6360000002 HC RX W HCPCS: Performed by: HOSPITALIST

## 2024-07-17 RX ORDER — OXYCODONE HYDROCHLORIDE 5 MG/1
5 TABLET ORAL EVERY 6 HOURS
Status: DISCONTINUED | OUTPATIENT
Start: 2024-07-17 | End: 2024-07-20

## 2024-07-17 RX ADMIN — HEPARIN SODIUM 5000 UNITS: 5000 INJECTION INTRAVENOUS; SUBCUTANEOUS at 14:53

## 2024-07-17 RX ADMIN — PROPOFOL 20 MCG/KG/MIN: 10 INJECTION, EMULSION INTRAVENOUS at 00:39

## 2024-07-17 RX ADMIN — SODIUM CHLORIDE, PRESERVATIVE FREE 10 ML: 5 INJECTION INTRAVENOUS at 12:32

## 2024-07-17 RX ADMIN — LEVOTHYROXINE SODIUM 25 MCG: 0.03 TABLET ORAL at 05:39

## 2024-07-17 RX ADMIN — DEXMEDETOMIDINE HYDROCHLORIDE 0.2 MCG/KG/HR: 4 INJECTION, SOLUTION INTRAVENOUS at 11:55

## 2024-07-17 RX ADMIN — VENLAFAXINE 75 MG: 37.5 TABLET ORAL at 21:39

## 2024-07-17 RX ADMIN — OXYCODONE 5 MG: 5 TABLET ORAL at 17:23

## 2024-07-17 RX ADMIN — OXYCODONE 5 MG: 5 TABLET ORAL at 12:32

## 2024-07-17 RX ADMIN — FOLIC ACID 1 MG: 1 TABLET ORAL at 12:26

## 2024-07-17 RX ADMIN — CARVEDILOL 6.25 MG: 6.25 TABLET, FILM COATED ORAL at 12:25

## 2024-07-17 RX ADMIN — THIAMINE HYDROCHLORIDE 100 MG: 100 INJECTION, SOLUTION INTRAMUSCULAR; INTRAVENOUS at 12:25

## 2024-07-17 RX ADMIN — CEFEPIME 1000 MG: 1 INJECTION, POWDER, FOR SOLUTION INTRAMUSCULAR; INTRAVENOUS at 20:36

## 2024-07-17 RX ADMIN — CARVEDILOL 6.25 MG: 6.25 TABLET, FILM COATED ORAL at 17:23

## 2024-07-17 RX ADMIN — GABAPENTIN 300 MG: 300 CAPSULE ORAL at 21:38

## 2024-07-17 RX ADMIN — HEPARIN SODIUM 3600 UNITS: 1000 INJECTION INTRAVENOUS; SUBCUTANEOUS at 12:10

## 2024-07-17 RX ADMIN — QUETIAPINE FUMARATE 75 MG: 25 TABLET ORAL at 21:38

## 2024-07-17 RX ADMIN — DEXMEDETOMIDINE HYDROCHLORIDE 0.4 MCG/KG/HR: 4 INJECTION, SOLUTION INTRAVENOUS at 02:42

## 2024-07-17 RX ADMIN — PROPOFOL 20 MCG/KG/MIN: 10 INJECTION, EMULSION INTRAVENOUS at 11:56

## 2024-07-17 RX ADMIN — SODIUM CHLORIDE, PRESERVATIVE FREE 10 ML: 5 INJECTION INTRAVENOUS at 20:28

## 2024-07-17 RX ADMIN — SODIUM CHLORIDE: 9 INJECTION, SOLUTION INTRAVENOUS at 20:29

## 2024-07-17 RX ADMIN — VENLAFAXINE 75 MG: 37.5 TABLET ORAL at 12:32

## 2024-07-17 RX ADMIN — HEPARIN SODIUM 5000 UNITS: 5000 INJECTION INTRAVENOUS; SUBCUTANEOUS at 21:39

## 2024-07-17 RX ADMIN — HEPARIN SODIUM 5000 UNITS: 5000 INJECTION INTRAVENOUS; SUBCUTANEOUS at 05:39

## 2024-07-17 RX ADMIN — DEXMEDETOMIDINE HYDROCHLORIDE 0.4 MCG/KG/HR: 4 INJECTION, SOLUTION INTRAVENOUS at 14:24

## 2024-07-17 RX ADMIN — PROPOFOL 20 MCG/KG/MIN: 10 INJECTION, EMULSION INTRAVENOUS at 23:44

## 2024-07-17 RX ADMIN — QUETIAPINE FUMARATE 75 MG: 25 TABLET ORAL at 12:27

## 2024-07-17 RX ADMIN — PROPOFOL 25 MCG/KG/MIN: 10 INJECTION, EMULSION INTRAVENOUS at 14:23

## 2024-07-17 RX ADMIN — SODIUM CHLORIDE, PRESERVATIVE FREE 20 MG: 5 INJECTION INTRAVENOUS at 12:25

## 2024-07-17 RX ADMIN — ASPIRIN 81 MG: 81 TABLET, CHEWABLE ORAL at 12:25

## 2024-07-17 RX ADMIN — OXYCODONE 5 MG: 5 TABLET ORAL at 21:39

## 2024-07-17 ASSESSMENT — PULMONARY FUNCTION TESTS
PIF_VALUE: 15
PIF_VALUE: 10
PIF_VALUE: 17
PIF_VALUE: 11
PIF_VALUE: 13
PIF_VALUE: 15
PIF_VALUE: 17
PIF_VALUE: 12
PIF_VALUE: 16
PIF_VALUE: 13
PIF_VALUE: 14
PIF_VALUE: 10
PIF_VALUE: 11
PIF_VALUE: 16
PIF_VALUE: 11
PIF_VALUE: 11
PIF_VALUE: 10
PIF_VALUE: 15
PIF_VALUE: 16
PIF_VALUE: 16
PIF_VALUE: 14
PIF_VALUE: 12
PIF_VALUE: 11
PIF_VALUE: 25
PIF_VALUE: 11
PIF_VALUE: 19
PIF_VALUE: 14
PIF_VALUE: 11
PIF_VALUE: 10
PIF_VALUE: 13
PIF_VALUE: 12
PIF_VALUE: 14
PIF_VALUE: 18
PIF_VALUE: 12
PIF_VALUE: 13
PIF_VALUE: 12
PIF_VALUE: 16
PIF_VALUE: 23
PIF_VALUE: 11

## 2024-07-17 ASSESSMENT — PAIN SCALES - GENERAL
PAINLEVEL_OUTOF10: 0
PAINLEVEL_OUTOF10: 2

## 2024-07-17 NOTE — PROGRESS NOTES
Patient having multiple apnea alarms in the past 30 minutes despite sedation being weaned. Call place to RT to notify. Pt flipped back onto previous vent settings.    Electronically signed by Gordo Frost RN on 7/17/2024 at 6:34 PM

## 2024-07-17 NOTE — PROGRESS NOTES
Comprehensive Nutrition Assessment    Type and Reason for Visit:  Reassess    Nutrition Recommendations/Plan:   Continue Nepro 1.8 @ goal rate of 50 ml/hr + 30 ml fw flush q4h     Malnutrition Assessment:  Malnutrition Status:  At risk for malnutrition (Comment) (07/17/24 1258)    Context:  Acute Illness       Nutrition Assessment:    Follow up. Pt remains intubated, sedated on 9.8 ml/hr propofol providing 260 kcals/day. Pt on tube feeding via ogt. Nepro running @ goal rate of 50 ml/hr. Nutrition related labs reviewed. Current  TF order meets pts nutrition needs at this time.    Nutrition Related Findings:    115 glucose, 132 Na, Wound Type: None       Current Nutrition Intake & Therapies:    Average Meal Intake: NPO  Average Supplements Intake: NPO  Diet NPO  ADULT TUBE FEEDING; Orogastric; Renal Formula; Continuous; 20; Yes; 10; Q 4 hours; 50; 30; Q 4 hours  Current Tube Feeding (TF) Orders:  Feeding Route: Orogastric  Formula: Standard with Fiber  Schedule: Continuous  Feeding Regimen: Start feeds at 20ml/hr. Advance 10ml q 4hr until goal rate of 50 ml/hr is reached.  Additives/Modulars: None  Water Flushes: 30 q 4  Current TF & Flush Orders Provides:    Goal TF & Flush Orders Provides: Total Volume 1200ml, 1800 kcal, 76g Protein, 1032ml free water    Anthropometric Measures:  Height: 177.8 cm (5' 10\")  Ideal Body Weight (IBW): 166 lbs (75 kg)       Current Body Weight: 83.7 kg (184 lb 8.4 oz), 111.2 % IBW. Weight Source: Bed Scale  Current BMI (kg/m2): 26.5                          BMI Categories: Overweight (BMI 25.0-29.9)    Estimated Daily Nutrient Needs:  Energy Requirements Based On: Kcal/kg  Weight Used for Energy Requirements: Current  Energy (kcal/day): 1674-2093kcal (20-25kcal/kg)  Weight Used for Protein Requirements: Ideal  Protein (g/day): 76-106g (1-1.4g/kg X IBW 75.5kg)  Method Used for Fluid Requirements: 1 ml/kcal (or per provider)  Fluid (ml/day):      Nutrition Diagnosis:   Inadequate oral  Med: glimepiride  Dosage: 4mg  Sig:    Quantity requested:  30    Med: losartan  Dosage: 100mg  Sig:    Quantity requested:  90   pt request  Mail Order: yes    Preferred pharmacy has been set up and verified.

## 2024-07-17 NOTE — CARE COORDINATION
Discharge Planning:     Remains on the vent with sedation. Failed SBT's today. Has failed three days of vent weaning. IV Cefepime with stop date of 7/18/24.     [place] [facility] following the patient and will check with insurance to determine ability to obtain precert without trach in place. Outpatient HD at [facility].     Angie ROWE RN  Case Management  [phone]    Electronically signed by Angie Abarca RN on 7/17/2024 at 3:56 PM

## 2024-07-17 NOTE — PROGRESS NOTES
Pulmonary Progress Note    Date of Admission: 7/11/2024   LOS: 5 days       CC:  Chief Complaint   Patient presents with    Diarrhea    Altered Mental Status     Pt's niece contacted EMS because pt has had multiple instances of diarrhea throughout the day and seems confused.  EMS reports pt A&O x 2; Pt admits to using cocaine IV this morning        Subjective:            Assessment:          Plan:     This note may have been transcribed using Dragon Dictation software. Please disregard any translational errors.       Hospital Day: 5     Mechanical Vent  SAT SBT daily   Currently on HD   Changed to spontaneous today     Mental status    Etiology likely cocaine / alcohol  Home medication of Seroquel.  Gabapentin Home dose, 300   Effexor - home dose.   Negative head CT 7/11.   Precedex - weaning 0.4.   Schedule Librium for concern for alcohol withdrawal  Propofol - wean.  5  Fentanyl  Poor today.  Will stop all IV sedation for SAT.    Will hold Librium.    Will try narcotics.   Delirium       ESRD   HD MWF    ID   Cefepime completed.   Monitor       Cardiovascular  Bilateral pleural effusions  EF 25%  Consult cardiology following  Needs volume reduction.  Currently 8.5 L positive.   Document only 1 Kg over dry weight.   Continues to have ascites with bilateral pleural effusion.  Would benefit from volume reduction.  chest X-ray with concern for pulmonary edema yesterday.   Repeat today   Message sent to nephrology to discuss.     Electrolytes  - K:    - Ca:  - Mg:  - Phos:    Prophylaxis  - GI - protonix  - DVT - heparin      Nutrition  - Diet NPO  ADULT TUBE FEEDING; Orogastric; Renal Formula; Continuous; 20; Yes; 10; Q 4 hours; 50; 30; Q 4 hours  -   Intake/Output Summary (Last 24 hours) at 7/17/2024 0936  Last data filed at 7/17/2024 0642  Gross per 24 hour   Intake 1700.1 ml   Output 0 ml   Net 1700.1 ml         Mobility      Access  Arterial      PICC          CVC                     I spent 32 minutes of critical

## 2024-07-17 NOTE — PLAN OF CARE
Problem: Discharge Planning  Goal: Discharge to home or other facility with appropriate resources  7/16/2024 2310 by Flakita Silverman RN  Outcome: Progressing  7/16/2024 1249 by Shannon Sagastume RN  Outcome: Progressing  Flowsheets (Taken 7/16/2024 1249)  Discharge to home or other facility with appropriate resources:   Identify barriers to discharge with patient and caregiver   Identify discharge learning needs (meds, wound care, etc)   Refer to discharge planning if patient needs post-hospital services based on physician order or complex needs related to functional status, cognitive ability or social support system   Arrange for needed discharge resources and transportation as appropriate     Problem: Pain  Goal: Verbalizes/displays adequate comfort level or baseline comfort level  Outcome: Progressing     Problem: Safety - Adult  Goal: Free from fall injury  7/16/2024 2310 by Flakita Silverman RN  Outcome: Progressing  7/16/2024 1249 by Shannon Sagastume RN  Outcome: Progressing  Flowsheets (Taken 7/16/2024 1249)  Free From Fall Injury: Instruct family/caregiver on patient safety     Problem: Skin/Tissue Integrity  Goal: Absence of new skin breakdown  Description: 1.  Monitor for areas of redness and/or skin breakdown  2.  Assess vascular access sites hourly  3.  Every 4-6 hours minimum:  Change oxygen saturation probe site  4.  Every 4-6 hours:  If on nasal continuous positive airway pressure, respiratory therapy assess nares and determine need for appliance change or resting period.  7/16/2024 2310 by Flakita Silverman RN  Outcome: Progressing  7/16/2024 1249 by Shannon Sagastume RN  Outcome: Progressing     Problem: Safety - Medical Restraint  Goal: Remains free of injury from restraints (Restraint for Interference with Medical Device)  Description: INTERVENTIONS:  1. Determine that other, less restrictive measures have been tried or would not be effective before applying the restraint  2. Evaluate the patient's

## 2024-07-17 NOTE — PROGRESS NOTES
Hospitalist Progress Note  7/17/2024 12:25 PM  Subjective:   Admit Date: 7/11/2024  PCP: Tania Moses MD Status: Inpatient   Interval History: Hospital Day: 7, admitted with acute alcohol toxicity encephalopathy, aspiration pneumonia with respiratory failure requiring intubation (7/12), dexmedetomidine changed to propofol.  Antibiotic therapy with cefepime and vancomycin.  Atrial fibrillation with rapid ventricular response initially requiring IV diltiazem, discontinued.     Medical co-morbidities include CAD, chronic afib, ESRD on hemodialysis MWF (follows up with  Nephrology), peripheral vascular disease, status post toe amputation; chronic alcohol abuse, IV drug abuse .      7/17    Getting SBT  Patient alert,awake  Getting HD  No chest pain     7/16    SBT trial : patient increasingly agitated  , back to AC mode   AMS still an issue while off sedation    7/15    Getting HD ,  remains intubated  Sedated     7/14    Remains intubated      Diet: NPO  Endotracheal tube (7/12, day #2)  Left internal jugular CVC (7/12, day #2)  Orogastric tube (7/12, day #2)  Left forearm hemodialysis fistula (POA)    Medications:     dextrose 5 % and 0.9 % NaCl 50 mL/hr   propofol 10 mcg/kg/min   fentanyl 25 mcg/hr     aspirin  81 mg Oral Daily   bupropion  150 mg Oral Q72H   carvedilol  6.25 mg Oral BID WC   cinacalcet  60 mg Oral Mon Wed Fri [held]   levothyroxine  25 mcg Oral Daily   sevelamer  2,400 mg Oral TID    vitamin D  50,000 Units Oral Q30 Days   heparin   5,000 Units SubCUTAneous 3 times per day   cefepime  1,000 mg IntraVENous Q24H   vancomycin   Per pharmacy Intravenous Per pharmacy   folic acid  1 mg Oral Daily   thiamine  100 mg IntraVENous Daily   gabapentin  100 mg Oral Nightly   phenobarbital  32.5 mg IntraVENous Q12H   quetiapine  75 mg Oral BID   venlafaxine  75 mg Oral BID     Recent Labs     07/15/24  0445 07/16/24  0440 07/17/24  0330   WBC 4.0 5.1 4.9   HGB 11.6* 10.4* 9.9*   PLT 63* 57* 58*  mild to moderately dilated at 4.5cm. Normal right ventricular size. Right ventricular systolic function is at the lower limits of normal. Pacer / ICD wire is visualized in the right ventricle with no evidence of thrombus or vegetations. Mild tricuspid  regurgitation.      TTE (7/12) pending    Objective:   Vitals:  /88   Pulse 98   Temp 97.5 °F (axillary)   Resp 15   Ht 5' 10\"  Wt 83.9 kg  SpO2 100% on 40% FiO2  BMI 26.54 kg/m²   General appearance: sedated on vent  Lungs:  ventilated bilaterally  Heart: Irregular rate and rhythm without apperciable murmur  Abdomen:  benign, audible bowel sounds  Extremities:  2nd and 3rd left foot toe amputation, neurovascular status intact    Could not appreciate pedal , post tib pulses , left  Neurologic: sedated on vent    Assessment and Plan:   Acute alcohol toxicity encephalopathy with withdrawal, phenobarbital IV.    2   Acute respiratory failure s/p intubation , weaning trial as per ICU  Severe sepsis secondary to aspiration pneumonia with respiratory failure requiring intubation (7/12), dexmedetomidine changed to propofol with phenobarbital as above.  Antibiotic therapy with cefepime and vancomycin.          Sepsis resolved   ESRD on HD MWF, next treatment on Monday. Rollowed by nephrology (Dr. Landry)   Atrial fibrillation with rapid ventricular response initially requiring IV diltiazem, discontinued.    Bilateral pleural effusion, may need thoracentesis.   Coronary artery disease s/p LAD and proximal LCx stent (Mar 2019).   Acute on chronic HFpEF / NICM with LVEF 45-50% on TTE (Feb 2024).  Was on carvedilol prior to admission.  Dialysis for fluid removal. SGLT2i, MRA, ARNI contraindicated with end-stage renal disease.               Thrombocytopenia. Unclear etiology.  Heparin held. Reduced now below 100.  Hem/Onc evaluation           Hematology eval : thrombocytopenia mostly 2/2 sepsis    Cirrhotic liver on CT, likely alcohol use related counseled for alcohol

## 2024-07-17 NOTE — PROGRESS NOTES
Since this RN shift pt unable to follow commands, non purposeful movements, and unable to make eye contact. At 0030 pt became extremely restless, making eye contact, RASS +3. Pt shifted down in bed and bent down to attempt to remove ETT. Pt continuously attempting to grab ETT. Propofol started, ETT remains in place, VSS, respiratory effort remains unchanged.

## 2024-07-17 NOTE — PROGRESS NOTES
Patient's brother, Wei, at bedside. Updates provided and all questions answered at this time.    Electronically signed by Gordo Frost RN on 7/17/2024 at 1:56 PM

## 2024-07-17 NOTE — PROGRESS NOTES
NAME:  Cal Sanchez  YOB: 1960  MEDICAL RECORD NUMBER:  [id number]    Shift Summary: VSS overnight. Propofol started for extreme restlessness. Sedation titrated for SBT in the AM.    Family updated: No    Rhythm: Atrial Fibrillation     Most recent vitals:   Visit Vitals  /74   Pulse 80   Temp 97.4 °F (36.3 °C) (Esophageal)   Resp 23   Ht 1.778 m (5' 10\")   Wt 85 kg (187 lb 6.3 oz)   SpO2 100%   BMI 26.89 kg/m²           No data found.    No data found.      Respiratory support needed (if any):  - Ventilator Settings:  Vent Days 6    Vt (Set, mL): 0 mL  Resp Rate (Set): 10 bpm  FiO2 : 28 %    PEEP/CPAP (cmH2O): 5       Admission weight Weight - Scale: 85.1 kg (187 lb 9.8 oz) (07/11/24 2219)    Today's weight    Wt Readings from Last 1 Encounters:   07/17/24 85 kg (187 lb 6.3 oz)        Tracy need assessed each shift: N/A - no tracy present  UOP >30ml/hr: NO - anuric  Last documented BM (in last 48 hrs):  Patient Vitals for the past 48 hrs:   Last BM (including prior to admit) Stool Occurrence   07/15/24 0622 07/15/24 1   07/15/24 0800 07/15/24 --                Restraints (in use currently or dc'd in last 12 hrs): Yes    Order current and documentation up to date? Yes    Lines/Drains reviewed @ bedside.  CVC  07/12/24 Left Internal jugular (Active)   Number of days: 4       Tunneled Hemodialysis Catheter Right Subclavian (Active)   Number of days:          Drip rates at handoff:    norepinephrine Stopped (07/16/24 1833)    sodium chloride      dexmedeTOMIDine 0.6 mcg/kg/hr (07/17/24 0427)    dextrose 5 % and 0.9 % NaCl Stopped (07/15/24 0557)    propofol 10 mcg/kg/min (07/17/24 0427)    fentaNYL Stopped (07/15/24 1436)    dextrose         Lab Data:   CBC:   Recent Labs     07/16/24  0440 07/17/24  0330   WBC 5.1 4.9   HGB 10.4* 9.9*   HCT 30.3* 29.6*   MCV 97.0 99.1   PLT 57* 58*     BMP:    Recent Labs     07/16/24  0440 07/17/24  0330   * 132*   K 4.0 4.2   CO2 22 23   BUN 28* 45*

## 2024-07-17 NOTE — PROGRESS NOTES
[facility]  Progress Note    7/17/2024 10:09 AM    Chief Complaint: AMS on Admission     Reason for Consult: PAD    Subjective: Pt seen resting in bed, intubated and sedated, sedation increased overnight d/t pulling at lines/tubes    Vital Signs:  Vitals:    07/17/24 0600 07/17/24 0700 07/17/24 0735 07/17/24 0808   BP: (!) 91/58 91/66     Pulse: 83 80  85   Resp: 16 18 23 23   Temp:       TempSrc:       SpO2: 100% 100%  99%   Weight:   85 kg (187 lb 6.3 oz)    Height:           I/O:    Intake/Output Summary (Last 24 hours) at 7/17/2024 1009  Last data filed at 7/17/2024 0642  Gross per 24 hour   Intake 1685.45 ml   Output 0 ml   Net 1685.45 ml       Physical Exam:   General: no apparent distress, T-max 101.2 over the past 24 hours, intubated and sedated  Chest/Lungs: non labored breathing no tachypnea, retractions or cyanosis, intubated  Cardiac: Heart regular rate and rhythm  Abdomen: soft and distended  Extremities: no significant edema to BLE  -LLE - 1st, 2nd and 3rd toe amputations, foot feels slightly warmer to touch today, toes cool to touch, no evidence of ischemia or cyanosis  -RLE - foot feels slightly warmer to touch today, toes cool to touch, no evidence of ischemia or cyanosis    Labs:   Lab Results   Component Value Date/Time     07/17/2024 03:30 AM    K 4.2 07/17/2024 03:30 AM    K 3.4 07/15/2024 04:45 AM    CL 97 07/17/2024 03:30 AM    CO2 23 07/17/2024 03:30 AM    BUN 45 07/17/2024 03:30 AM    CREATININE 5.5 07/17/2024 03:30 AM    GFRAA 12 02/18/2022 12:11 AM    LABGLOM 11 07/17/2024 03:30 AM    LABGLOM 10 03/20/2024 10:13 AM    GLUCOSE 212 07/17/2024 03:30 AM    PHOS 3.9 07/17/2024 03:30 AM    MG 2.20 07/17/2024 03:30 AM    CALCIUM 9.5 07/17/2024 03:30 AM     Lab Results   Component Value Date/Time    WBC 4.9 07/17/2024 03:30 AM    RBC 2.99 07/17/2024 03:30 AM    HGB 9.9 07/17/2024 03:30 AM    HCT 29.6 07/17/2024 03:30 AM    MCV 99.1 07/17/2024 03:30 AM    RDW 19.8

## 2024-07-17 NOTE — PROGRESS NOTES
Department of Internal Medicine  Nephrology Progress Note    SUBJECTIVE:  We are following this patient for ESRD management.    HPI:  Ventilated.  Failed SBT earlier today.  Patient seen on dialysis.  BP issues.  ROS:  In bed.  No fever.  PMFSH:  medications reviewed.    Physical Exam:    VITALS:  /70   Pulse 86   Temp 97.4 °F (36.3 °C) (Esophageal)   Resp 30   Ht 1.778 m (5' 10\")   Wt 85 kg (187 lb 6.3 oz)   SpO2 100%   BMI 26.89 kg/m²   24HR INTAKE/OUTPUT:    Intake/Output Summary (Last 24 hours) at 7/17/2024 1141  Last data filed at 7/17/2024 0642  Gross per 24 hour   Intake 1685.45 ml   Output 0 ml   Net 1685.45 ml       Constitutional: on vent   Respiratory:  Few rhonchi   Gastrointestinal: No  tenderness.  Normal Bowel Sounds  Cardiovascular:  S1, S2 Irregular   Edema:   + edema  Acc Rt TDC     DATA:    CBC:  Lab Results   Component Value Date/Time    WBC 4.9 07/17/2024 03:30 AM    RBC 2.99 07/17/2024 03:30 AM    HGB 9.9 07/17/2024 03:30 AM    HCT 29.6 07/17/2024 03:30 AM    MCV 99.1 07/17/2024 03:30 AM    MCH 33.3 07/17/2024 03:30 AM    MCHC 33.6 07/17/2024 03:30 AM    RDW 19.8 07/17/2024 03:30 AM    PLT 58 07/17/2024 03:30 AM    MPV 9.4 07/17/2024 03:30 AM     CMP:  Lab Results   Component Value Date/Time     07/17/2024 03:30 AM    K 4.2 07/17/2024 03:30 AM    K 3.4 07/15/2024 04:45 AM    CL 97 07/17/2024 03:30 AM    CO2 23 07/17/2024 03:30 AM    BUN 45 07/17/2024 03:30 AM    CREATININE 5.5 07/17/2024 03:30 AM    GFRAA 12 02/18/2022 12:11 AM    AGRATIO 1.4 07/11/2024 08:34 PM    LABGLOM 11 07/17/2024 03:30 AM    LABGLOM 10 03/20/2024 10:13 AM    GLUCOSE 212 07/17/2024 03:30 AM    CALCIUM 9.5 07/17/2024 03:30 AM    BILITOT 0.9 07/14/2024 04:46 AM    ALKPHOS 73 07/14/2024 04:46 AM     07/14/2024 04:46 AM     07/14/2024 04:46 AM      Hepatic Function Panel:   Lab Results   Component Value Date/Time    ALKPHOS 73 07/14/2024 04:46 AM     07/14/2024 04:46 AM

## 2024-07-17 NOTE — PROGRESS NOTES
Abdominal KUB result indicated dilated small bowel loops indicating obstruction or ileus, DR Han notified via secure message, tube feed held

## 2024-07-17 NOTE — PLAN OF CARE
Problem: Safety - Adult  Goal: Free from fall injury  Outcome: Progressing     Problem: Neurosensory - Adult  Goal: Absence of seizures  Outcome: Progressing     Problem: Cardiovascular - Adult  Goal: Maintains optimal cardiac output and hemodynamic stability  Outcome: Progressing     Problem: Discharge Planning  Goal: Discharge to home or other facility with appropriate resources  Outcome: Not Progressing     Problem: Safety - Medical Restraint  Goal: Remains free of injury from restraints (Restraint for Interference with Medical Device)  Description: INTERVENTIONS:  1. Determine that other, less restrictive measures have been tried or would not be effective before applying the restraint  2. Evaluate the patient's condition at the time of restraint application  3. Inform patient/family regarding the reason for restraint  4. Q2H: Monitor safety, psychosocial status, comfort, nutrition and hydration  Outcome: Not Progressing  Flowsheets (Taken 7/17/2024 0936)  Remains free of injury from restraints (restraint for interference with medical device): Determine that other, less restrictive measures have been tried or would not be effective before applying the restraint     Problem: Neurosensory - Adult  Goal: Achieves stable or improved neurological status  Outcome: Not Progressing  Goal: Achieves maximal functionality and self care  Outcome: Not Progressing     Problem: Metabolic/Fluid and Electrolytes - Adult  Goal: Hemodynamic stability and optimal renal function maintained  Outcome: Not Progressing

## 2024-07-17 NOTE — PROGRESS NOTES
NAME:  Cal Sanchez  YOB: 1960  MEDICAL RECORD NUMBER:  [id number]    Shift Summary: Levo briefly on this morning during dialysis, but once pt started to wake up from SAT, became hypertensive. Levo off. HD 2.1L off. SAT/SBT failed d/t tachycardia, tachypnea, and hypertension. Precedex and Propofol gtt restarted. KUB ordered for abdominal distention. KUB shows possible ileus vs small bowel obstruction. Orders to hold TF.     Family updated: Yes:  Wei - pt's brother at bedside    Rhythm: Atrial Fibrillation     Most recent vitals:   Visit Vitals  BP (!) 82/62   Pulse 62   Temp 97 °F (36.1 °C) (Esophageal)   Resp 16   Ht 1.778 m (5' 10\")   Wt 82.9 kg (182 lb 12.2 oz)   SpO2 100%   BMI 26.22 kg/m²           No data found.    No data found.      Respiratory support needed (if any):  - Ventilator Settings:  Vent Days 5    Vt (Set, mL): 0 mL  Resp Rate (Set): 10 bpm  FiO2 : 27 %    PEEP/CPAP (cmH2O): 5       Admission weight Weight - Scale: 85.1 kg (187 lb 9.8 oz) (07/11/24 2219)    Today's weight    Wt Readings from Last 1 Encounters:   07/17/24 82.9 kg (182 lb 12.2 oz)        Tracy need assessed each shift: N/A - no tracy present  UOP >30ml/hr: NO - Anuric, HD MWF  Last documented BM (in last 48 hrs):  Patient Vitals for the past 48 hrs:   Last BM (including prior to admit)   07/17/24 0815 07/15/24                Restraints (in use currently or dc'd in last 12 hrs): Yes    Order current and documentation up to date? Yes    Lines/Drains reviewed @ bedside.  CVC  07/12/24 Left Internal jugular (Active)   Number of days: 5       Tunneled Hemodialysis Catheter Right Subclavian (Active)   Number of days:          Drip rates at handoff:    norepinephrine Stopped (07/16/24 1833)    sodium chloride      dexmedeTOMIDine 0.4 mcg/kg/hr (07/17/24 1737)    dextrose 5 % and 0.9 % NaCl Stopped (07/15/24 0557)    propofol 20 mcg/kg/min (07/17/24 3520)    fentaNYL Stopped (07/15/24 1436)    dextrose         Lab

## 2024-07-18 ENCOUNTER — APPOINTMENT (OUTPATIENT)
Dept: GENERAL RADIOLOGY | Age: 64
End: 2024-07-18
Payer: COMMERCIAL

## 2024-07-18 LAB
ANION GAP SERPL CALCULATED.3IONS-SCNC: 10 MMOL/L (ref 3–16)
BUN SERPL-MCNC: 27 MG/DL (ref 7–20)
CALCIUM SERPL-MCNC: 9.9 MG/DL (ref 8.3–10.6)
CHLORIDE SERPL-SCNC: 98 MMOL/L (ref 99–110)
CO2 SERPL-SCNC: 26 MMOL/L (ref 21–32)
CREAT SERPL-MCNC: 3.3 MG/DL (ref 0.8–1.3)
DEPRECATED RDW RBC AUTO: 19.5 % (ref 12.4–15.4)
GFR SERPLBLD CREATININE-BSD FMLA CKD-EPI: 20 ML/MIN/{1.73_M2}
GLUCOSE BLD-MCNC: 102 MG/DL (ref 70–99)
GLUCOSE BLD-MCNC: 103 MG/DL (ref 70–99)
GLUCOSE BLD-MCNC: 111 MG/DL (ref 70–99)
GLUCOSE BLD-MCNC: 112 MG/DL (ref 70–99)
GLUCOSE BLD-MCNC: 98 MG/DL (ref 70–99)
GLUCOSE BLD-MCNC: 99 MG/DL (ref 70–99)
GLUCOSE SERPL-MCNC: 112 MG/DL (ref 70–99)
HCT VFR BLD AUTO: 29.5 % (ref 40.5–52.5)
HGB BLD-MCNC: 9.8 G/DL (ref 13.5–17.5)
MAGNESIUM SERPL-MCNC: 2.1 MG/DL (ref 1.8–2.4)
MCH RBC QN AUTO: 33 PG (ref 26–34)
MCHC RBC AUTO-ENTMCNC: 33.3 G/DL (ref 31–36)
MCV RBC AUTO: 99.1 FL (ref 80–100)
PERFORMED ON: ABNORMAL
PERFORMED ON: NORMAL
PERFORMED ON: NORMAL
PHOSPHATE SERPL-MCNC: 2.9 MG/DL (ref 2.5–4.9)
PLATELET # BLD AUTO: 59 K/UL (ref 135–450)
PMV BLD AUTO: 9.4 FL (ref 5–10.5)
POTASSIUM SERPL-SCNC: 3.9 MMOL/L (ref 3.5–5.1)
RBC # BLD AUTO: 2.97 M/UL (ref 4.2–5.9)
SODIUM SERPL-SCNC: 134 MMOL/L (ref 136–145)
WBC # BLD AUTO: 4.4 K/UL (ref 4–11)

## 2024-07-18 PROCEDURE — 85027 COMPLETE CBC AUTOMATED: CPT

## 2024-07-18 PROCEDURE — 6360000002 HC RX W HCPCS: Performed by: INTERNAL MEDICINE

## 2024-07-18 PROCEDURE — 83735 ASSAY OF MAGNESIUM: CPT

## 2024-07-18 PROCEDURE — 2500000003 HC RX 250 WO HCPCS: Performed by: NURSE PRACTITIONER

## 2024-07-18 PROCEDURE — 6370000000 HC RX 637 (ALT 250 FOR IP): Performed by: INTERNAL MEDICINE

## 2024-07-18 PROCEDURE — 6370000000 HC RX 637 (ALT 250 FOR IP): Performed by: NURSE PRACTITIONER

## 2024-07-18 PROCEDURE — 71045 X-RAY EXAM CHEST 1 VIEW: CPT

## 2024-07-18 PROCEDURE — 94761 N-INVAS EAR/PLS OXIMETRY MLT: CPT

## 2024-07-18 PROCEDURE — 2700000000 HC OXYGEN THERAPY PER DAY

## 2024-07-18 PROCEDURE — 94003 VENT MGMT INPAT SUBQ DAY: CPT

## 2024-07-18 PROCEDURE — 80048 BASIC METABOLIC PNL TOTAL CA: CPT

## 2024-07-18 PROCEDURE — 2580000003 HC RX 258: Performed by: HOSPITALIST

## 2024-07-18 PROCEDURE — 6370000000 HC RX 637 (ALT 250 FOR IP): Performed by: HOSPITALIST

## 2024-07-18 PROCEDURE — 6360000002 HC RX W HCPCS: Performed by: HOSPITALIST

## 2024-07-18 PROCEDURE — 84100 ASSAY OF PHOSPHORUS: CPT

## 2024-07-18 PROCEDURE — 2580000003 HC RX 258: Performed by: NURSE PRACTITIONER

## 2024-07-18 PROCEDURE — 2000000000 HC ICU R&B

## 2024-07-18 PROCEDURE — 99291 CRITICAL CARE FIRST HOUR: CPT | Performed by: INTERNAL MEDICINE

## 2024-07-18 RX ADMIN — OXYCODONE 5 MG: 5 TABLET ORAL at 09:58

## 2024-07-18 RX ADMIN — SODIUM CHLORIDE, PRESERVATIVE FREE 10 ML: 5 INJECTION INTRAVENOUS at 19:25

## 2024-07-18 RX ADMIN — THIAMINE HYDROCHLORIDE 100 MG: 100 INJECTION, SOLUTION INTRAMUSCULAR; INTRAVENOUS at 08:13

## 2024-07-18 RX ADMIN — CARVEDILOL 6.25 MG: 6.25 TABLET, FILM COATED ORAL at 16:37

## 2024-07-18 RX ADMIN — GABAPENTIN 300 MG: 300 CAPSULE ORAL at 20:45

## 2024-07-18 RX ADMIN — OXYCODONE 5 MG: 5 TABLET ORAL at 16:36

## 2024-07-18 RX ADMIN — VENLAFAXINE 75 MG: 37.5 TABLET ORAL at 08:12

## 2024-07-18 RX ADMIN — LEVOTHYROXINE SODIUM 25 MCG: 0.03 TABLET ORAL at 06:12

## 2024-07-18 RX ADMIN — HEPARIN SODIUM 5000 UNITS: 5000 INJECTION INTRAVENOUS; SUBCUTANEOUS at 21:43

## 2024-07-18 RX ADMIN — CARVEDILOL 6.25 MG: 6.25 TABLET, FILM COATED ORAL at 08:13

## 2024-07-18 RX ADMIN — SODIUM CHLORIDE, PRESERVATIVE FREE 20 MG: 5 INJECTION INTRAVENOUS at 08:13

## 2024-07-18 RX ADMIN — OXYCODONE 5 MG: 5 TABLET ORAL at 20:44

## 2024-07-18 RX ADMIN — OXYCODONE 5 MG: 5 TABLET ORAL at 04:24

## 2024-07-18 RX ADMIN — HEPARIN SODIUM 5000 UNITS: 5000 INJECTION INTRAVENOUS; SUBCUTANEOUS at 13:52

## 2024-07-18 RX ADMIN — FOLIC ACID 1 MG: 1 TABLET ORAL at 08:13

## 2024-07-18 RX ADMIN — VENLAFAXINE 75 MG: 37.5 TABLET ORAL at 20:45

## 2024-07-18 RX ADMIN — HEPARIN SODIUM 5000 UNITS: 5000 INJECTION INTRAVENOUS; SUBCUTANEOUS at 06:12

## 2024-07-18 RX ADMIN — SODIUM CHLORIDE, PRESERVATIVE FREE 10 ML: 5 INJECTION INTRAVENOUS at 08:18

## 2024-07-18 RX ADMIN — ASPIRIN 81 MG: 81 TABLET, CHEWABLE ORAL at 08:13

## 2024-07-18 RX ADMIN — QUETIAPINE FUMARATE 75 MG: 25 TABLET ORAL at 20:45

## 2024-07-18 RX ADMIN — QUETIAPINE FUMARATE 75 MG: 25 TABLET ORAL at 08:13

## 2024-07-18 ASSESSMENT — PULMONARY FUNCTION TESTS
PIF_VALUE: 11
PIF_VALUE: 11
PIF_VALUE: 14
PIF_VALUE: 30
PIF_VALUE: 12
PIF_VALUE: 10
PIF_VALUE: 20
PIF_VALUE: 10
PIF_VALUE: 11
PIF_VALUE: 14
PIF_VALUE: 18
PIF_VALUE: 22
PIF_VALUE: 12
PIF_VALUE: 16
PIF_VALUE: 19

## 2024-07-18 ASSESSMENT — PAIN SCALES - GENERAL
PAINLEVEL_OUTOF10: 0
PAINLEVEL_OUTOF10: 4
PAINLEVEL_OUTOF10: 0

## 2024-07-18 NOTE — PROGRESS NOTES
Department of Internal Medicine  Nephrology Progress Note    SUBJECTIVE:  We are following this patient for ESRD management.    HPI:  Extubated.  BP issues. Still somnolent.  ROS:  In bed.  No fever.  PMFSH:  medications reviewed.    Physical Exam:    VITALS:  /71   Pulse 78   Temp 98.1 °F (36.7 °C) (Oral)   Resp 22   Ht 1.778 m (5' 10\")   Wt 86 kg (189 lb 9.5 oz)   SpO2 100%   BMI 27.20 kg/m²   24HR INTAKE/OUTPUT:    Intake/Output Summary (Last 24 hours) at 7/18/2024 1212  Last data filed at 7/18/2024 1000  Gross per 24 hour   Intake 1455.07 ml   Output --   Net 1455.07 ml       Constitutional: on vent   Respiratory:  Few rhonchi   Gastrointestinal: No  tenderness.  Normal Bowel Sounds  Cardiovascular:  S1, S2 Irregular   Edema:   + edema  Acc Rt TDC     DATA:    CBC:  Lab Results   Component Value Date/Time    WBC 4.4 07/18/2024 04:05 AM    RBC 2.97 07/18/2024 04:05 AM    HGB 9.8 07/18/2024 04:05 AM    HCT 29.5 07/18/2024 04:05 AM    MCV 99.1 07/18/2024 04:05 AM    MCH 33.0 07/18/2024 04:05 AM    MCHC 33.3 07/18/2024 04:05 AM    RDW 19.5 07/18/2024 04:05 AM    PLT 59 07/18/2024 04:05 AM    MPV 9.4 07/18/2024 04:05 AM     CMP:  Lab Results   Component Value Date/Time     07/18/2024 04:05 AM    K 3.9 07/18/2024 04:05 AM    K 3.4 07/15/2024 04:45 AM    CL 98 07/18/2024 04:05 AM    CO2 26 07/18/2024 04:05 AM    BUN 27 07/18/2024 04:05 AM    CREATININE 3.3 07/18/2024 04:05 AM    GFRAA 12 02/18/2022 12:11 AM    AGRATIO 1.4 07/11/2024 08:34 PM    LABGLOM 20 07/18/2024 04:05 AM    LABGLOM 10 03/20/2024 10:13 AM    GLUCOSE 112 07/18/2024 04:05 AM    CALCIUM 9.9 07/18/2024 04:05 AM    BILITOT 0.9 07/14/2024 04:46 AM    ALKPHOS 73 07/14/2024 04:46 AM     07/14/2024 04:46 AM     07/14/2024 04:46 AM      Hepatic Function Panel:   Lab Results   Component Value Date/Time    ALKPHOS 73 07/14/2024 04:46 AM     07/14/2024 04:46 AM     07/14/2024 04:46 AM    BILITOT 0.9 07/14/2024 04:46

## 2024-07-18 NOTE — PROGRESS NOTES
adequately. Mild aortic regurgitation. The ascending aorta is mild to moderately dilated at 4.5cm. Normal right ventricular size. Right ventricular systolic function is at the lower limits of normal. Pacer / ICD wire is visualized in the right ventricle with no evidence of thrombus or vegetations. Mild tricuspid  regurgitation.      TTE (7/12) pending    Objective:   Vitals:  /88   Pulse 98   Temp 97.5 °F (axillary)   Resp 15   Ht 5' 10\"  Wt 83.9 kg  SpO2 100% on 40% FiO2  BMI 26.54 kg/m²   General appearance: sedated on vent  Lungs:  ventilated bilaterally  Heart: Irregular rate and rhythm without apperciable murmur  Abdomen:  benign, audible bowel sounds  Extremities:  2nd and 3rd left foot toe amputation, neurovascular status intact    Could not appreciate pedal , post tib pulses , left  Neurologic: sedated on vent    Assessment and Plan:   Acute alcohol toxicity encephalopathy with withdrawal, phenobarbital IV.    2   Acute respiratory failure s/p intubation , weaning trial as per ICU       Hopefully can get extubation today  Severe sepsis secondary to aspiration pneumonia with respiratory failure requiring intubation (7/12), dexmedetomidine changed to propofol with phenobarbital as above.  Antibiotic therapy with cefepime and vancomycin.          Sepsis resolved   ESRD on HD MWF, next treatment on Monday. Rollowed by nephrology (Dr. Landry)   Atrial fibrillation with rapid ventricular response initially requiring IV diltiazem, discontinued.    Bilateral pleural effusion, may need thoracentesis.   Coronary artery disease s/p LAD and proximal LCx stent (Mar 2019).   Acute on chronic HFpEF / NICM with LVEF 45-50% on TTE (Feb 2024).  Was on carvedilol prior to admission.  Dialysis for fluid removal. SGLT2i, MRA, ARNI contraindicated with end-stage renal disease.               Thrombocytopenia. Unclear etiology.  Heparin held. Reduced now below 100.  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

## 2024-07-18 NOTE — PLAN OF CARE
Problem: Discharge Planning  Goal: Discharge to home or other facility with appropriate resources  7/18/2024 0638 by Penny Castillo RN  Outcome: Progressing  Flowsheets (Taken 7/17/2024 1945)  Discharge to home or other facility with appropriate resources: Identify barriers to discharge with patient and caregiver  7/17/2024 1653 by Aretha Cueto RN  Outcome: Not Progressing     Problem: Pain  Goal: Verbalizes/displays adequate comfort level or baseline comfort level  7/18/2024 0638 by Penny Castillo RN  Outcome: Progressing  Flowsheets  Taken 7/18/2024 0638  Verbalizes/displays adequate comfort level or baseline comfort level: Assess pain using appropriate pain scale  Taken 7/17/2024 2000  Verbalizes/displays adequate comfort level or baseline comfort level: Assess pain using appropriate pain scale  7/17/2024 1653 by Aretha Cueto RN  Outcome: Progressing     Problem: Safety - Adult  Goal: Free from fall injury  7/18/2024 0638 by Penny Castillo RN  Outcome: Progressing  Flowsheets (Taken 7/17/2024 1945)  Free From Fall Injury: Instruct family/caregiver on patient safety  7/17/2024 1653 by Aretha Cueto RN  Outcome: Progressing     Problem: Skin/Tissue Integrity  Goal: Absence of new skin breakdown  Description: 1.  Monitor for areas of redness and/or skin breakdown  2.  Assess vascular access sites hourly  3.  Every 4-6 hours minimum:  Change oxygen saturation probe site  4.  Every 4-6 hours:  If on nasal continuous positive airway pressure, respiratory therapy assess nares and determine need for appliance change or resting period.  7/18/2024 0638 by Penny Castillo RN  Outcome: Progressing  7/17/2024 1653 by Aretha Cueto RN  Outcome: Progressing     Problem: Safety - Medical Restraint  Goal: Remains free of injury from restraints (Restraint for Interference with Medical Device)  Description: INTERVENTIONS:  1. Determine that other, less restrictive measures have been tried or would not be effective before

## 2024-07-18 NOTE — PROGRESS NOTES
NAME:  Cal Sanchez  YOB: 1960  MEDICAL RECORD NUMBER:  [id number]    Shift Summary: Patient extubated, weaned from 3L to RA. Small bore NGT placed and bridled to give PO meds since patient is still altered. Patient will wake to voice and intermittently follow commands. Patient has not spoken since extubation.    Family updated: No    Rhythm: Atrial Fibrillation     Most recent vitals:   Visit Vitals  BP (!) 141/75   Pulse 91   Temp 98.4 °F (36.9 °C) (Oral)   Resp 12   Ht 1.778 m (5' 10\")   Wt 86 kg (189 lb 9.5 oz)   SpO2 92%   BMI 27.20 kg/m²           No data found.    No data found.      Respiratory support needed (if any):  - RA    Admission weight Weight - Scale: 85.1 kg (187 lb 9.8 oz) (07/11/24 2219)    Today's weight    Wt Readings from Last 1 Encounters:   07/18/24 86 kg (189 lb 9.5 oz)        Tracy need assessed each shift: N/A - no tracy present  UOP >30ml/hr: NO - anuric, dialysis patient  Last documented BM (in last 48 hrs):  Patient Vitals for the past 48 hrs:   Last BM (including prior to admit)   07/17/24 0815 07/15/24                Restraints (in use currently or dc'd in last 12 hrs): Yes  order d/c today  Order current and documentation up to date? N/a    Lines/Drains reviewed @ bedside.  CVC  07/12/24 Left Internal jugular (Active)   Number of days: 6       Tunneled Hemodialysis Catheter Right Subclavian (Active)   Number of days:        Hemodialysis (linkable) Arteriovenous fistula Left Arm (Active)   Number of days:          Drip rates at handoff:    sodium chloride 5 mL/hr at 07/18/24 0605    dextrose 5 % and 0.9 % NaCl Stopped (07/15/24 0557)    dextrose         Lab Data:   CBC:   Recent Labs     07/17/24  0330 07/18/24  0405   WBC 4.9 4.4   HGB 9.9* 9.8*   HCT 29.6* 29.5*   MCV 99.1 99.1   PLT 58* 59*     BMP:    Recent Labs     07/17/24  0330 07/18/24  0405   * 134*   K 4.2 3.9   CO2 23 26   BUN 45* 27*   CREATININE 5.5* 3.3*     LIVR: No results for input(s): \"AST\",

## 2024-07-18 NOTE — PROGRESS NOTES
NAME:  Cal Sanchez  YOB: 1960  MEDICAL RECORD NUMBER:  [id number]    Shift Summary: Pt still not following commands, will withdraw to pain and will move his head to voice and will occasionally open eyes to voice. Precedex has been weaned off, but pt does get restless with stimulation so have been unable to come down on Propofol. Dr. Warren to bedside, updated on pt condition. Placed pt on spontaneous mode on ventilator, plan to turn off Propofol at shift change and likely extubate around 0800.     Family updated: No    Rhythm: Atrial Fibrillation     Most recent vitals:   Visit Vitals  /66   Pulse 90   Temp 98.3 °F (36.8 °C) (Oral)   Resp 29   Ht 1.778 m (5' 10\")   Wt 86 kg (189 lb 9.5 oz)   SpO2 99%   BMI 27.20 kg/m²           No data found.    No data found.      Respiratory support needed (if any):  - Ventilator Settings:  Vent Days 6    Vt (Set, mL): 0 mL  Resp Rate (Set): 10 bpm  FiO2 : 27 %    PEEP/CPAP (cmH2O): 5       Admission weight Weight - Scale: 85.1 kg (187 lb 9.8 oz) (07/11/24 2219)    Today's weight    Wt Readings from Last 1 Encounters:   07/18/24 86 kg (189 lb 9.5 oz)        Tracy need assessed each shift: N/A - no tracy present  UOP >30ml/hr: NO - anuric  Last documented BM (in last 48 hrs):  Patient Vitals for the past 48 hrs:   Last BM (including prior to admit)   07/17/24 0815 07/15/24                Restraints (in use currently or dc'd in last 12 hrs): Yes    Order current and documentation up to date? Yes    Lines/Drains reviewed @ bedside.  CVC  07/12/24 Left Internal jugular (Active)   Number of days: 5       Tunneled Hemodialysis Catheter Right Subclavian (Active)   Number of days:          Drip rates at handoff:    norepinephrine Stopped (07/16/24 1833)    sodium chloride 5 mL/hr at 07/18/24 0605    dexmedeTOMIDine Stopped (07/18/24 0404)    dextrose 5 % and 0.9 % NaCl Stopped (07/15/24 0557)    propofol 20 mcg/kg/min (07/18/24 0605)    fentaNYL Stopped

## 2024-07-18 NOTE — PROGRESS NOTES
[facility] Waste Documentation    Administered 45 mL of precedex and wasted 55 mL per [facility] policy.    Electronically signed by Nanci Paez RN on 7/18/2024 at 12:53 PM  Electronically signed by Tamika Jacques RN on 7/18/2024 at 1:09 PM

## 2024-07-18 NOTE — PLAN OF CARE
symptoms of bleeding or hemorrhage   Monitor labs for bleeding or clotting disorders  7/18/2024 0638 by Penny Castillo RN  Outcome: Progressing  Flowsheets (Taken 7/17/2024 1945)  Maintains hematologic stability: Assess for signs and symptoms of bleeding or hemorrhage     Problem: Nutrition Deficit:  Goal: Optimize nutritional status  7/18/2024 0944 by Nanci Paez RN  Outcome: Progressing  7/18/2024 0638 by Penny Castillo RN  Outcome: Not Progressing  Flowsheets (Taken 7/18/2024 0638)  Nutrient intake appropriate for improving, restoring, or maintaining nutritional needs: Monitor oral intake, labs, and treatment plans     Problem: Neurosensory - Adult  Goal: Achieves stable or improved neurological status  7/18/2024 0944 by Nanci Paez RN  Outcome: Progressing  Flowsheets (Taken 7/18/2024 0800)  Achieves stable or improved neurological status: Assess for and report changes in neurological status  7/18/2024 0638 by PostPenny RN  Outcome: Not Progressing  Flowsheets (Taken 7/17/2024 1945)  Achieves stable or improved neurological status: Assess for and report changes in neurological status  Goal: Achieves maximal functionality and self care  7/18/2024 0944 by Nanci Paez RN  Outcome: Progressing  Flowsheets (Taken 7/18/2024 0800)  Achieves maximal functionality and self care: Monitor swallowing and airway patency with patient fatigue and changes in neurological status  7/18/2024 0638 by Penny Castillo RN  Outcome: Not Progressing  Flowsheets (Taken 7/17/2024 1945)  Achieves maximal functionality and self care: Monitor swallowing and airway patency with patient fatigue and changes in neurological status     Problem: Respiratory - Adult  Goal: Achieves optimal ventilation and oxygenation  7/18/2024 0944 by Nanci Paez RN  Outcome: Progressing  Flowsheets (Taken 7/18/2024 0800)  Achieves optimal ventilation and oxygenation:   Assess for changes in respiratory status   Assess for changes  in mentation and behavior   Position to facilitate oxygenation and minimize respiratory effort   Oxygen supplementation based on oxygen saturation or arterial blood gases   Respiratory therapy support as indicated  7/18/2024 0638 by Penny Castillo RN  Outcome: Not Progressing  Flowsheets (Taken 7/17/2024 1945)  Achieves optimal ventilation and oxygenation: Assess for changes in respiratory status     Problem: Cardiovascular - Adult  Goal: Absence of cardiac dysrhythmias or at baseline  7/18/2024 0944 by Nanci Paez RN  Outcome: Progressing  Flowsheets (Taken 7/18/2024 0800)  Absence of cardiac dysrhythmias or at baseline:   Monitor cardiac rate and rhythm   Assess for signs of decreased cardiac output   Administer antiarrhythmia medication and electrolyte replacement as ordered  7/18/2024 0638 by Penny Castillo RN  Outcome: Not Progressing  Flowsheets (Taken 7/17/2024 1945)  Absence of cardiac dysrhythmias or at baseline: Monitor cardiac rate and rhythm     Problem: Nutrition Deficit:  Goal: Optimize nutritional status  7/18/2024 0944 by Nanci Paez RN  Outcome: Progressing  7/18/2024 0638 by Penny Castillo RN  Outcome: Not Progressing  Flowsheets (Taken 7/18/2024 0638)  Nutrient intake appropriate for improving, restoring, or maintaining nutritional needs: Monitor oral intake, labs, and treatment plans

## 2024-07-18 NOTE — PROGRESS NOTES
Patient tolerating PS 5/5 27% on ventilator. Patient will attempt to try and open eyes to voice but does not follow commands. Tidal volumes 300 - 400s. Extubation order received from Karine Lanza NP. Patient extubated by Nick HARDY at 1017 per order. Placed on 3L NC. Patient with rhonchi in upper airway/throat. Patient has strong cough. Patient lethargic and not following commands. Restraints d/c. Will continue to monitor.

## 2024-07-18 NOTE — PROGRESS NOTES
Pulmonary Progress Note    Date of Admission: 7/11/2024   LOS: 6 days       CC:  Chief Complaint   Patient presents with    Diarrhea    Altered Mental Status     Pt's niece contacted EMS because pt has had multiple instances of diarrhea throughout the day and seems confused.  EMS reports pt A&O x 2; Pt admits to using cocaine IV this morning        Subjective:     Some improved agitation overnight       Assessment:          Plan:     This note may have been transcribed using Dragon Dictation software. Please disregard any translational errors.       Hospital Day: 6     Mechanical Vent  SAT SBT daily   Currently on HD   She is a spontaneous 5/5 at 27%.  End-tidal CO2 currently 24 with RSBI less than 100.  Improvement station today.  Fully expect extubation.  Discussed with night shift nurse.  Will have sedation turned off for the day shift and likely extubate 730 8:00.    Mental status    Etiology likely cocaine / alcohol  Home medication of Seroquel.  Gabapentin Home dose, 300   Effexor - home dose.   Negative head CT 7/11.   Precedex - weaning 0.4.   Schedule Librium for concern for alcohol withdrawal which was subsequently held yesterday secondary to decreased mentation.  Propofol -currently low-dose.  See plan as above  Started on scheduled narcotics yesterday.    Unclear whether mentation today is improved because of scheduled narcotics, Librium being scheduled versus not, restarting gabapentin at home dose versus time      ESRD   HD MWF    ID   Cefepime completed.   Monitor       Cardiovascular  Bilateral pleural effusions  EF 25%  Consult cardiology following  Discussed with nephrology yesterday.  Had run of dialysis.  Discrepancy between ins and outs and dry weight.  Patient was taken back down to his dry weight but happens to be approximately 9 L positive for I's and O's.  Monitor pleural effusion and ascites.  Favor challenging dry weight but will assess further after extubation.      Electrolytes  - K:   Dragon Dictation software. Please disregard any translational errors.      KORY MEEKS   [facility] Pulmonary, Sleep and Critical Care  [phone]

## 2024-07-18 NOTE — PROGRESS NOTES
[facility]   Respiratory Therapy        Extubation Assessment        Name:  Cal Sanchez  Medical Record Number:  [id number]  Age: 64 y.o.   Gender: male  : 1960  Today's Date:  2024  Room:  X5J-5735      Assessment       Patient Admission Diagnosis      Allergies  No Known Allergies         /72   Pulse 95   Temp 98.5 °F (36.9 °C) (Esophageal)   Resp 29   Ht 1.778 m (5' 10\")   Wt 86 kg (189 lb 9.5 oz)   SpO2 99%   BMI 27.20 kg/m²       The patient's spontaneous breathing trial results were reviewed with  NP.    The order was received to extubate the patient.  The patient's sedation was turned off by the bedside nurse.  The patient was extubated at 10:17 and placed on  Nasal Cannula at 3 lpm . The patient was stable at the time of extubation. Stridor was not present      Patient/caregiver was educated on the extubation process:  Yes      Level of patient/caregiver understanding able to:   [] Verbalize understanding   [] Demonstrate understanding       [] Teach back        [] Needs reinforcement       []  No available caregiver               []  Other:       Response to education:  Poor       Teaching Time:  10  minutes         Jaskaran Smith RCP on 2024 at 10:56 AM

## 2024-07-18 NOTE — DISCHARGE INSTR - COC
Restriction: {CHP DME Yes amt example:910295596}  Last Modified Barium Swallow with Video (Video Swallowing Test): {Done Not Done Date:}    Treatments at the Time of Hospital Discharge:   Respiratory Treatments: ***  Oxygen Therapy:  {Therapy; copd oxygen:94510}  Ventilator:    {[facility] Vent List:079970739}    Rehab Therapies: {THERAPEUTIC INTERVENTION:4374117279}  Weight Bearing Status/Restrictions: {[facility] Weight Bearin}  Other Medical Equipment (for information only, NOT a DME order):  {EQUIPMENT:248782737}  Other Treatments: ***    Heart Failure Instructions for Daily Management  Patient was treated for acute on chronic systolic heart failure.  he  will require the following:    Please weigh daily on the same scale and approximately the same time of day.  Report weight gain of 3 pounds/day or 5 pounds/week to : [facility] [phone] option 3, hari ASHLEY, and Tania Moses [phone].  Please use hospital discharge weight as baseline reference.   Please monitor for signs and symptoms of and report to MD:  Worsening Heart Failure: sudden weight gain, shortness of breath, lower extremity or general edema/swelling, abdominal bloating/swelling, inability to lie flat, intolerance to usual activity, or cough (especially at night). Report these finding even if no increase in weight.  Dehydration:  having difficulty or a decrease in urination, dizziness, worsening fatigue, or new onset/worsening of generalized weakness.     Please continue a LOW SODIUM diet and LIMIT fluid intake to 48 - 64 ounces ( 1.5 - 2 liters) per day.     Call [facility] [phone] option 3, Tania Moses [phone], and hari ASHLEY and/or [facility] Heart Failure Resource Line @ [phone] with any questions or concerns.   Please continue heart failure education to patient and family/support system.  See After Visit Summary for hospital follow up appointment details.  Consider

## 2024-07-19 LAB
ANION GAP SERPL CALCULATED.3IONS-SCNC: 13 MMOL/L (ref 3–16)
BUN SERPL-MCNC: 36 MG/DL (ref 7–20)
CALCIUM SERPL-MCNC: 10.5 MG/DL (ref 8.3–10.6)
CHLORIDE SERPL-SCNC: 99 MMOL/L (ref 99–110)
CO2 SERPL-SCNC: 23 MMOL/L (ref 21–32)
CREAT SERPL-MCNC: 4.3 MG/DL (ref 0.8–1.3)
DEPRECATED RDW RBC AUTO: 19.4 % (ref 12.4–15.4)
GFR SERPLBLD CREATININE-BSD FMLA CKD-EPI: 14 ML/MIN/{1.73_M2}
GLUCOSE BLD-MCNC: 114 MG/DL (ref 70–99)
GLUCOSE BLD-MCNC: 76 MG/DL (ref 70–99)
GLUCOSE BLD-MCNC: 81 MG/DL (ref 70–99)
GLUCOSE BLD-MCNC: 84 MG/DL (ref 70–99)
GLUCOSE BLD-MCNC: 85 MG/DL (ref 70–99)
GLUCOSE BLD-MCNC: 95 MG/DL (ref 70–99)
GLUCOSE SERPL-MCNC: 85 MG/DL (ref 70–99)
HCT VFR BLD AUTO: 30.4 % (ref 40.5–52.5)
HGB BLD-MCNC: 10.3 G/DL (ref 13.5–17.5)
MCH RBC QN AUTO: 33.6 PG (ref 26–34)
MCHC RBC AUTO-ENTMCNC: 34.1 G/DL (ref 31–36)
MCV RBC AUTO: 98.5 FL (ref 80–100)
PERFORMED ON: ABNORMAL
PERFORMED ON: NORMAL
PLATELET # BLD AUTO: 72 K/UL (ref 135–450)
PMV BLD AUTO: 9.5 FL (ref 5–10.5)
POTASSIUM SERPL-SCNC: 4.5 MMOL/L (ref 3.5–5.1)
RBC # BLD AUTO: 3.08 M/UL (ref 4.2–5.9)
SODIUM SERPL-SCNC: 135 MMOL/L (ref 136–145)
WBC # BLD AUTO: 5.8 K/UL (ref 4–11)

## 2024-07-19 PROCEDURE — 85027 COMPLETE CBC AUTOMATED: CPT

## 2024-07-19 PROCEDURE — 6370000000 HC RX 637 (ALT 250 FOR IP): Performed by: INTERNAL MEDICINE

## 2024-07-19 PROCEDURE — 2060000000 HC ICU INTERMEDIATE R&B

## 2024-07-19 PROCEDURE — 6370000000 HC RX 637 (ALT 250 FOR IP): Performed by: HOSPITALIST

## 2024-07-19 PROCEDURE — 9990000010 HC NO CHARGE VISIT

## 2024-07-19 PROCEDURE — 2580000003 HC RX 258: Performed by: HOSPITALIST

## 2024-07-19 PROCEDURE — 90935 HEMODIALYSIS ONE EVALUATION: CPT

## 2024-07-19 PROCEDURE — 80048 BASIC METABOLIC PNL TOTAL CA: CPT

## 2024-07-19 PROCEDURE — 6370000000 HC RX 637 (ALT 250 FOR IP): Performed by: NURSE PRACTITIONER

## 2024-07-19 PROCEDURE — 6360000002 HC RX W HCPCS: Performed by: HOSPITALIST

## 2024-07-19 PROCEDURE — 6370000000 HC RX 637 (ALT 250 FOR IP)

## 2024-07-19 PROCEDURE — 6360000002 HC RX W HCPCS: Performed by: INTERNAL MEDICINE

## 2024-07-19 PROCEDURE — 99233 SBSQ HOSP IP/OBS HIGH 50: CPT | Performed by: INTERNAL MEDICINE

## 2024-07-19 PROCEDURE — 94761 N-INVAS EAR/PLS OXIMETRY MLT: CPT

## 2024-07-19 RX ORDER — BUPROPION HYDROCHLORIDE 75 MG/1
75 TABLET ORAL
Status: DISCONTINUED | OUTPATIENT
Start: 2024-07-19 | End: 2024-07-25 | Stop reason: HOSPADM

## 2024-07-19 RX ORDER — CASTOR OIL AND BALSAM, PERU 788; 87 MG/G; MG/G
OINTMENT TOPICAL 2 TIMES DAILY
Status: DISCONTINUED | OUTPATIENT
Start: 2024-07-19 | End: 2024-07-25 | Stop reason: HOSPADM

## 2024-07-19 RX ORDER — SENNA AND DOCUSATE SODIUM 50; 8.6 MG/1; MG/1
2 TABLET, FILM COATED ORAL 2 TIMES DAILY
Status: DISCONTINUED | OUTPATIENT
Start: 2024-07-19 | End: 2024-07-25 | Stop reason: HOSPADM

## 2024-07-19 RX ORDER — POLYETHYLENE GLYCOL 3350 17 G/17G
17 POWDER, FOR SOLUTION ORAL DAILY
Status: DISCONTINUED | OUTPATIENT
Start: 2024-07-19 | End: 2024-07-25 | Stop reason: HOSPADM

## 2024-07-19 RX ADMIN — SODIUM CHLORIDE, PRESERVATIVE FREE 10 ML: 5 INJECTION INTRAVENOUS at 07:54

## 2024-07-19 RX ADMIN — LEVOTHYROXINE SODIUM 25 MCG: 0.03 TABLET ORAL at 06:21

## 2024-07-19 RX ADMIN — THIAMINE HYDROCHLORIDE 100 MG: 100 INJECTION, SOLUTION INTRAMUSCULAR; INTRAVENOUS at 07:54

## 2024-07-19 RX ADMIN — GABAPENTIN 300 MG: 300 CAPSULE ORAL at 20:02

## 2024-07-19 RX ADMIN — SENNOSIDES AND DOCUSATE SODIUM 2 TABLET: 50; 8.6 TABLET ORAL at 12:59

## 2024-07-19 RX ADMIN — FOLIC ACID 1 MG: 1 TABLET ORAL at 07:54

## 2024-07-19 RX ADMIN — OXYCODONE 5 MG: 5 TABLET ORAL at 03:52

## 2024-07-19 RX ADMIN — HEPARIN SODIUM 5000 UNITS: 5000 INJECTION INTRAVENOUS; SUBCUTANEOUS at 06:21

## 2024-07-19 RX ADMIN — CARVEDILOL 6.25 MG: 6.25 TABLET, FILM COATED ORAL at 17:23

## 2024-07-19 RX ADMIN — CASTOR OIL AND BALSAM, PERU: 788; 87 OINTMENT TOPICAL at 20:02

## 2024-07-19 RX ADMIN — BUPROPION HYDROCHLORIDE 75 MG: 75 TABLET, FILM COATED ORAL at 12:59

## 2024-07-19 RX ADMIN — CARVEDILOL 6.25 MG: 6.25 TABLET, FILM COATED ORAL at 07:54

## 2024-07-19 RX ADMIN — VENLAFAXINE 75 MG: 37.5 TABLET ORAL at 20:01

## 2024-07-19 RX ADMIN — VENLAFAXINE 75 MG: 37.5 TABLET ORAL at 07:54

## 2024-07-19 RX ADMIN — ASPIRIN 81 MG: 81 TABLET, CHEWABLE ORAL at 07:54

## 2024-07-19 RX ADMIN — QUETIAPINE FUMARATE 75 MG: 25 TABLET ORAL at 20:02

## 2024-07-19 RX ADMIN — SODIUM CHLORIDE, PRESERVATIVE FREE 10 ML: 5 INJECTION INTRAVENOUS at 20:07

## 2024-07-19 RX ADMIN — QUETIAPINE FUMARATE 75 MG: 25 TABLET ORAL at 07:53

## 2024-07-19 RX ADMIN — HEPARIN SODIUM 5000 UNITS: 5000 INJECTION INTRAVENOUS; SUBCUTANEOUS at 22:18

## 2024-07-19 RX ADMIN — HEPARIN SODIUM 5000 UNITS: 5000 INJECTION INTRAVENOUS; SUBCUTANEOUS at 14:59

## 2024-07-19 RX ADMIN — BUPROPION HYDROCHLORIDE 75 MG: 75 TABLET, FILM COATED ORAL at 20:05

## 2024-07-19 RX ADMIN — SENNOSIDES AND DOCUSATE SODIUM 2 TABLET: 50; 8.6 TABLET ORAL at 20:02

## 2024-07-19 RX ADMIN — POLYETHYLENE GLYCOL 3350 17 G: 17 POWDER, FOR SOLUTION ORAL at 12:59

## 2024-07-19 ASSESSMENT — PAIN SCALES - GENERAL
PAINLEVEL_OUTOF10: 0

## 2024-07-19 NOTE — PLAN OF CARE
Problem: Discharge Planning  Goal: Discharge to home or other facility with appropriate resources  Outcome: Progressing  Flowsheets (Taken 7/18/2024 1920)  Discharge to home or other facility with appropriate resources: Identify barriers to discharge with patient and caregiver     Problem: Pain  Goal: Verbalizes/displays adequate comfort level or baseline comfort level  Outcome: Progressing  Flowsheets  Taken 7/19/2024 0514  Verbalizes/displays adequate comfort level or baseline comfort level: Assess pain using appropriate pain scale  Taken 7/18/2024 1920  Verbalizes/displays adequate comfort level or baseline comfort level: Assess pain using appropriate pain scale     Problem: Safety - Adult  Goal: Free from fall injury  Outcome: Progressing  Flowsheets (Taken 7/18/2024 1928)  Free From Fall Injury: Instruct family/caregiver on patient safety     Problem: Skin/Tissue Integrity  Goal: Absence of new skin breakdown  Description: 1.  Monitor for areas of redness and/or skin breakdown  2.  Assess vascular access sites hourly  3.  Every 4-6 hours minimum:  Change oxygen saturation probe site  4.  Every 4-6 hours:  If on nasal continuous positive airway pressure, respiratory therapy assess nares and determine need for appliance change or resting period.  Outcome: Progressing     Problem: ABCDS Injury Assessment  Goal: Absence of physical injury  Outcome: Progressing  Flowsheets (Taken 7/18/2024 1928)  Absence of Physical Injury: Implement safety measures based on patient assessment     Problem: Neurosensory - Adult  Goal: Achieves stable or improved neurological status  Outcome: Progressing  Flowsheets (Taken 7/18/2024 1920)  Achieves stable or improved neurological status: Assess for and report changes in neurological status     Problem: Respiratory - Adult  Goal: Achieves optimal ventilation and oxygenation  Outcome: Progressing  Flowsheets  Taken 7/19/2024 0514  Achieves optimal ventilation and oxygenation: Assess

## 2024-07-19 NOTE — PROGRESS NOTES
07/14/2024 04:46 AM    BILITOT 0.9 07/14/2024 04:46 AM    BILIDIR 0.6 07/14/2024 04:46 AM    IBILI 0.3 07/14/2024 04:46 AM      Phosphorus:   Lab Results   Component Value Date/Time    PHOS 2.9 07/18/2024 04:05 AM       ASSESSMENT/PLAN:  Encephalopathy   - H/o drug and ETOH use .  - monitor    ESRD   - HD MWF, lower wt as tolerated .     extubated  - per pulmonary    ID  - aspiration pneumonia?   - on cefepime    CV  - monitor    thrombocytopenia  - hematology following .

## 2024-07-19 NOTE — PROGRESS NOTES
NAME:  Cal Sanchez  YOB: 1960  MEDICAL RECORD NUMBER:  [id number]    Shift Summary: Uneventful shift. VSS, remains in a fib on monitor with occasional P waves. Pt is more alert, starting to say \"yeah\" and \"ok\" in reponse to questions. Following simple commands. To get HD today.     Family updated: No    Rhythm: Atrial Fibrillation     Most recent vitals:   Visit Vitals  BP (!) 161/79   Pulse 93   Temp 98.3 °F (36.8 °C) (Oral)   Resp (!) 31   Ht 1.778 m (5' 10\")   Wt 81.4 kg (179 lb 7.3 oz)   SpO2 97%   BMI 25.75 kg/m²           No data found.    No data found.      Respiratory support needed (if any):  - RA    Admission weight Weight - Scale: 85.1 kg (187 lb 9.8 oz) (07/11/24 2219)    Today's weight    Wt Readings from Last 1 Encounters:   07/19/24 81.4 kg (179 lb 7.3 oz)        Tracy need assessed each shift: N/A - no tracy present  UOP >30ml/hr: NO - anuric  Last documented BM (in last 48 hrs):  Patient Vitals for the past 48 hrs:   Last BM (including prior to admit)   07/17/24 0815 07/15/24   07/18/24 1920 07/15/24                Restraints (in use currently or dc'd in last 12 hrs): No        Lines/Drains reviewed @ bedside.  CVC  07/12/24 Left Internal jugular (Active)   Number of days: 6       Tunneled Hemodialysis Catheter Right Subclavian (Active)   Number of days:        Hemodialysis (linkable) Arteriovenous fistula Left Arm (Active)   Number of days:          Drip rates at handoff:    sodium chloride 5 mL/hr at 07/18/24 2144    dextrose 5 % and 0.9 % NaCl Stopped (07/15/24 0557)    dextrose         Lab Data:   CBC:   Recent Labs     07/18/24  0405 07/19/24  0345   WBC 4.4 5.8   HGB 9.8* 10.3*   HCT 29.5* 30.4*   MCV 99.1 98.5   PLT 59* 72*     BMP:    Recent Labs     07/18/24  0405 07/19/24  0345   * 135*   K 3.9 4.5   CO2 26 23   BUN 27* 36*   CREATININE 3.3* 4.3*     LIVR: No results for input(s): \"AST\", \"ALT\" in the last 72 hours.  PT/INR: No results for input(s): \"INR\" in the

## 2024-07-19 NOTE — PROGRESS NOTES
Comprehensive Nutrition Assessment    Type and Reason for Visit:  Reassess    Nutrition Recommendations/Plan:   Continue nepro 1.8, advance to goal rate of 50 ml/hr as able/tolerated. Add banatrol modular once daily to order     Malnutrition Assessment:  Malnutrition Status:  At risk for malnutrition (Comment) (07/17/24 1258)    Context:  Acute Illness       Nutrition Assessment:    Follow up. Pt extubated yesterday. Remains on tube feeding via ogt. Nepro running @ 20 ml/hr. Goal rate is 50 ml/hr. Noted distention and constipation. Adding fiber modular to TF order. Advance as able.    Nutrition Related Findings:    Nutrition related labs reviewed Wound Type: Stage I, Pressure Injury       Current Nutrition Intake & Therapies:    Average Meal Intake: NPO  Average Supplements Intake: NPO  Diet NPO  ADULT TUBE FEEDING; Orogastric; Renal Formula; Continuous; 20; Yes; 10; Q 4 hours; 50; 30; Q 4 hours  Current Tube Feeding (TF) Orders:  Feeding Route: Orogastric  Formula: Standard with Fiber  Schedule: Continuous  Feeding Regimen: Start feeds at 20ml/hr. Advance 10ml q 4hr until goal rate of 50 ml/hr is reached.  Additives/Modulars: None  Water Flushes: 30 q 4  Current TF & Flush Orders Provides:    Goal TF & Flush Orders Provides: Total Volume 1200ml, 1800 kcal, 76g Protein, 1032ml free water    Anthropometric Measures:  Height: 177.8 cm (5' 10\")  Ideal Body Weight (IBW): 166 lbs (75 kg)       Current Body Weight: 83.7 kg (184 lb 8.4 oz), 111.2 % IBW. Weight Source: Bed Scale  Current BMI (kg/m2): 26.5                          BMI Categories: Overweight (BMI 25.0-29.9)    Estimated Daily Nutrient Needs:  Energy Requirements Based On: Kcal/kg  Weight Used for Energy Requirements: Current  Energy (kcal/day): 1674-2093kcal (20-25kcal/kg)  Weight Used for Protein Requirements: Ideal  Protein (g/day): 76-106g (1-1.4g/kg X IBW 75.5kg)  Method Used for Fluid Requirements: 1 ml/kcal (or per provider)  Fluid (ml/day):

## 2024-07-19 NOTE — PROGRESS NOTES
exposed extremities.   Missing several toes on left  M/S: No cyanosis. No clubbing. No joint deformity.  Psych: Calm.  On HD currently       Medications:    Scheduled Meds:   oxyCODONE  5 mg Oral Q6H    gabapentin  300 mg Oral Nightly    [Held by provider] chlordiazePOXIDE  5 mg Oral 4x Daily    aspirin  81 mg Oral Daily    buPROPion  150 mg Oral Q72H    carvedilol  6.25 mg Oral BID WC    [Held by provider] cinacalcet  60 mg Oral Once per day on Mon Wed Fri    levothyroxine  25 mcg Oral Daily    sevelamer  2,400 mg Oral TID WC    vitamin D  50,000 Units Oral Q30 Days    sodium chloride flush  5-40 mL IntraVENous 2 times per day    heparin (porcine)  5,000 Units SubCUTAneous 3 times per day    folic acid  1 mg Oral Daily    thiamine  100 mg IntraVENous Daily    QUEtiapine  75 mg Oral BID    venlafaxine  75 mg Oral BID       Continuous Infusions:   sodium chloride Stopped (07/18/24 4599)    dextrose 5 % and 0.9 % NaCl Stopped (07/15/24 0589)    dextrose         PRN Meds:  acetaminophen **OR** acetaminophen, oxyCODONE-acetaminophen, sodium chloride flush, sodium chloride, ondansetron **OR** ondansetron, polyethylene glycol, LORazepam **OR** LORazepam **OR** LORazepam **OR** LORazepam **OR** LORazepam **OR** LORazepam **OR** LORazepam **OR** LORazepam, labetalol, hydrALAZINE, heparin (porcine), glucose, dextrose bolus **OR** dextrose bolus, glucagon (rDNA), dextrose    Labs reviewed:  CBC:   Recent Labs     07/17/24 0330 07/18/24 0405 07/19/24  0345   WBC 4.9 4.4 5.8   HGB 9.9* 9.8* 10.3*   HCT 29.6* 29.5* 30.4*   MCV 99.1 99.1 98.5   PLT 58* 59* 72*       BMP:   Recent Labs     07/17/24  0330 07/18/24  0405 07/19/24  0345   * 134* 135*   K 4.2 3.9 4.5   CL 97* 98* 99   CO2 23 26 23   PHOS 3.9 2.9  --    BUN 45* 27* 36*   CREATININE 5.5* 3.3* 4.3*       LIVER PROFILE:   No results for input(s): \"AST\", \"ALT\", \"LIPASE\", \"AMYLASE\", \"BILIDIR\", \"BILITOT\", \"ALKPHOS\" in the last 72 hours.    Invalid input(s):  \"ALB\"    PT/INR:   No results for input(s): \"PROTIME\", \"INR\" in the last 72 hours.    APTT:   No results for input(s): \"APTT\" in the last 72 hours.    UA:No results for input(s): \"NITRITE\", \"COLORU\", \"PHUR\", \"LABCAST\", \"WBCUA\", \"RBCUA\", \"MUCUS\", \"TRICHOMONAS\", \"YEAST\", \"BACTERIA\", \"CLARITYU\", \"SPECGRAV\", \"LEUKOCYTESUR\", \"UROBILINOGEN\", \"BILIRUBINUR\", \"BLOODU\", \"GLUCOSEU\", \"AMORPHOUS\" in the last 72 hours.    Invalid input(s): \"KETONESU\"  No results for input(s): \"PH\", \"PCO2\", \"PO2\" in the last 72 hours.    Cx:      Films:             This note was transcribed using Dragon Dictation software. Please disregard any translational errors.      KORY MEEKS   [facility] Pulmonary, Sleep and Critical Care  [phone]

## 2024-07-19 NOTE — CARE COORDINATION
[facility] Nurse  Consult Note       NAME:  Cal Sanchez  MEDICAL RECORD NUMBER:  [id number]  AGE: 64 y.o.   GENDER: male  : 1960  TODAY'S DATE:  2024    Subjective   Reason for WOCN Evaluation and Assessment: DTI vs. Stage 1 on sacrum/buttocks     Cal Sanchez is a 64 y.o. male referred by:   [] Physician  [x] Nursing  [] Other:     Wound Identification:  Wound Type:  friction vs. pressure  Contributing Factors: decreased mobility, AMS    Wound History: Pt currently in ICU, extubated . Pt with AMS, moving around in bed.   Current Wound Care Treatment:  n/a    Patient Goal of Care:  [x] Wound Healing  [] Odor Control  [] Palliative Care  [] Pain Control   [] Other:         PAST MEDICAL HISTORY        Diagnosis Date    Atrial fibrillation (HCC)     CAD (coronary artery disease) 4/3/2014    Chronic kidney disease     Depression     Diabetes mellitus (HCC)     Dialysis patient (HCC)     left upper arm fistula    Glaucoma     Hemodialysis patient (HCC)     Hyperlipidemia     Hypertension     RLL pneumonia 2018    Sleep apnea     uses CPAP       PAST SURGICAL HISTORY    Past Surgical History:   Procedure Laterality Date    COLONOSCOPY      PACEMAKER INSERTION      PACEMAKER PLACEMENT      TOE AMPUTATION Left 2023    AMPUTATION SECOND AND THIRD DIGITS LEFT FOOT performed by Horace Jordan DPM at [facility] OR    TOE AMPUTATION Left 2024    LEFT HALLUX AMPUTATION performed by Horace Jordan DPM at [facility] OR       FAMILY HISTORY    Family History   Problem Relation Age of Onset    Heart Disease Father     High Blood Pressure Sister        SOCIAL HISTORY    Social History     Tobacco Use    Smoking status: Never    Smokeless tobacco: Never   Substance Use Topics    Alcohol use: Not Currently     Alcohol/week: 2.0 standard drinks of alcohol     Types: 2 Cans of beer per week     Comment: once monthly    Drug use: Yes     Types: Marijuana (Weed), Opiates

## 2024-07-19 NOTE — CARE COORDINATION
Per chart review, on RA, tube feeding via NG tube for AMS.     Discharge plan is to return home w/his niece and outpatient HD at [facility]. PT/OT evaluations pending. May require SNF placement for therapy prior to a home discharge.     Angie ROWE RN  Case Management  [phone]    Electronically signed by Angie Abarca RN on 7/19/2024 at 3:22 PM

## 2024-07-19 NOTE — PROGRESS NOTES
Treatment time: 3 hours and 49 minutes  Net UF: 3000 ml     Pre weight: 81.4 kg  Post weight:77.9 kg    Access used:  r tunneled cvc       Access function: good with  ml/min      Regular outpatient schedule: mwf     Summary of response to treatment: Patient tolerated treatment well and without any complications. Patient remained stable throughout entire treatment and upon the RN leaving ICU room      Copy of dialysis treatment record placed in chart, to be scanned into EMR.

## 2024-07-19 NOTE — PROGRESS NOTES
SLP NOTE    Swallow eval order received. Patient discussed with RN with report for concern for diminished, but improving, mental status. ST to hold evaluation attempt until a later date as schedule permits pending continued improvement of mental status as well as pending medical status unless otherwise notified.    Thank you.  Landy Vela MA, CCC-SLP, #[id number]  Speech-Language Pathologist  Portable phone: [phone]

## 2024-07-19 NOTE — PROGRESS NOTES
visualized in the right ventricle with no evidence of thrombus or vegetations. Mild tricuspid  regurgitation.      TTE (7/12) pending    Objective:   Vitals:  /88   Pulse 98   Temp 97.5 °F (axillary)   Resp 15   Ht 5' 10\"  Wt 83.9 kg  SpO2 100% on 40% FiO2  BMI 26.54 kg/m²   General appearance: AAO x 2  Lungs:  ventilated bilaterally  Heart: Irregular rate and rhythm without apperciable murmur  Abdomen:  benign, audible bowel sounds  Extremities:  2nd and 3rd left foot toe amputation, neurovascular status intact          Assessment and Plan:   Acute alcohol toxicity encephalopathy with withdrawal : better   2   Acute respiratory failure s/p intubation , weaning trial as per ICU       S/p extubation  Severe sepsis secondary to aspiration pneumonia with respiratory failure requiring intubation (7/12), dexmedetomidine changed to propofol with phenobarbital as above.  Antibiotic therapy with cefepime and vancomycin.          Sepsis resolved            Sepsis resolved           ESRD on HD MWF, next treatment on Monday. Rollowed by nephrology (Dr. Landry)   Atrial fibrillation with rapid ventricular response initially requiring IV diltiazem, discontinued.    Bilateral pleural effusion, may need thoracentesis.   Coronary artery disease s/p LAD and proximal LCx stent (Mar 2019).   Acute on chronic HFpEF / NICM with LVEF 45-50% on TTE (Feb 2024).  Was on carvedilol prior to admission.  Dialysis for fluid removal. SGLT2i, MRA, ARNI contraindicated with end-stage renal disease.               Thrombocytopenia. Unclear etiology.  Heparin held. Reduced now below 100.  Hem/Onc evaluation           Hematology eval : thrombocytopenia mostly 2/2 sepsis    Cirrhotic liver on CT, likely alcohol use related counseled for alcohol cessation  3.1 cm homogeneous mass right kidney likely cyst needs follow-up with ultrasound in 6 months  Left adrenal myolipoma 4.2 cm follow-up as outpatient if deemed necessary  12 PAD - S/P

## 2024-07-19 NOTE — PROGRESS NOTES
Occupational Therapy  Cal Sanchez  [id number]  [id number]    OT orders received and pt chart reviewed. Therapist attempted to see pt for OT assessment, per RN, pt currently in dialysis. Will cont to follow and attempt again later today as therapy schedule permits.     Melissa Mustafa, OTR/L [id number]

## 2024-07-19 NOTE — PROGRESS NOTES
Physical Therapy  PT referral received and chart reviewed.  Pt unavailable due to HD.  Will follow-up with PT Eval as schedule permits.  Frances Daily, PT

## 2024-07-19 NOTE — PROGRESS NOTES
NAME:  Cal Sanchez  YOB: 1960  MEDICAL RECORD NUMBER:  [id number]    Shift Summary: Patient downgraded to PCU. Mentation improving but still impulsive at times attempting to pull at NGT and move legs over side rails. Patient more interactive and able to answer questions.    Family updated: Brother at bedside    Rhythm: Atrial Fibrillation     Most recent vitals:   Visit Vitals  BP (!) 152/87   Pulse 97   Temp 97.6 °F (36.4 °C) (Oral)   Resp 21   Ht 1.778 m (5' 10\")   Wt 81.4 kg (179 lb 7.3 oz)   SpO2 96%   BMI 25.75 kg/m²           No data found.    No data found.      Respiratory support needed (if any):  - RA    Admission weight Weight - Scale: 85.1 kg (187 lb 9.8 oz) (07/11/24 2219)    Today's weight    Wt Readings from Last 1 Encounters:   07/19/24 81.4 kg (179 lb 7.3 oz)        Tracy need assessed each shift: N/A - no tracy present  UOP >30ml/hr: NO - anuric  Last documented BM (in last 48 hrs):  Patient Vitals for the past 48 hrs:   Last BM (including prior to admit)   07/18/24 1920 07/15/24   07/19/24 0742 07/15/24                Restraints (in use currently or dc'd in last 12 hrs): No    Order current and documentation up to date? No    Lines/Drains reviewed @ bedside.  CVC  07/12/24 Left Internal jugular (Active)   Number of days: 7       Peripheral IV 07/19/24 Right Basilic (Active)   Number of days: 0       Peripheral IV 07/19/24 Posterior;Right Wrist (Active)   Number of days: 0       Tunneled Hemodialysis Catheter Right Subclavian (Active)   Number of days:        Hemodialysis (linkable) Arteriovenous fistula Left Arm (Active)   Number of days:          Drip rates at handoff:    sodium chloride Stopped (07/18/24 2149)    dextrose         Lab Data:   CBC:   Recent Labs     07/18/24  0405 07/19/24  0345   WBC 4.4 5.8   HGB 9.8* 10.3*   HCT 29.5* 30.4*   MCV 99.1 98.5   PLT 59* 72*     BMP:    Recent Labs     07/18/24  0405 07/19/24  0345   * 135*   K 3.9 4.5   CO2 26 23   BUN 27*

## 2024-07-20 LAB
ANION GAP SERPL CALCULATED.3IONS-SCNC: 13 MMOL/L (ref 3–16)
BUN SERPL-MCNC: 26 MG/DL (ref 7–20)
CALCIUM SERPL-MCNC: 10.1 MG/DL (ref 8.3–10.6)
CHLORIDE SERPL-SCNC: 95 MMOL/L (ref 99–110)
CO2 SERPL-SCNC: 25 MMOL/L (ref 21–32)
CREAT SERPL-MCNC: 3.4 MG/DL (ref 0.8–1.3)
DEPRECATED RDW RBC AUTO: 19.2 % (ref 12.4–15.4)
GFR SERPLBLD CREATININE-BSD FMLA CKD-EPI: 19 ML/MIN/{1.73_M2}
GLUCOSE BLD-MCNC: 111 MG/DL (ref 70–99)
GLUCOSE BLD-MCNC: 137 MG/DL (ref 70–99)
GLUCOSE BLD-MCNC: 171 MG/DL (ref 70–99)
GLUCOSE BLD-MCNC: 179 MG/DL (ref 70–99)
GLUCOSE BLD-MCNC: 179 MG/DL (ref 70–99)
GLUCOSE SERPL-MCNC: 156 MG/DL (ref 70–99)
HCT VFR BLD AUTO: 30.9 % (ref 40.5–52.5)
HGB BLD-MCNC: 10.4 G/DL (ref 13.5–17.5)
MCH RBC QN AUTO: 33.1 PG (ref 26–34)
MCHC RBC AUTO-ENTMCNC: 33.5 G/DL (ref 31–36)
MCV RBC AUTO: 98.6 FL (ref 80–100)
PERFORMED ON: ABNORMAL
PLATELET # BLD AUTO: 95 K/UL (ref 135–450)
PMV BLD AUTO: 9.9 FL (ref 5–10.5)
POTASSIUM SERPL-SCNC: 3.9 MMOL/L (ref 3.5–5.1)
RBC # BLD AUTO: 3.14 M/UL (ref 4.2–5.9)
SODIUM SERPL-SCNC: 133 MMOL/L (ref 136–145)
WBC # BLD AUTO: 5.7 K/UL (ref 4–11)

## 2024-07-20 PROCEDURE — 2580000003 HC RX 258: Performed by: HOSPITALIST

## 2024-07-20 PROCEDURE — 97530 THERAPEUTIC ACTIVITIES: CPT

## 2024-07-20 PROCEDURE — 2700000000 HC OXYGEN THERAPY PER DAY

## 2024-07-20 PROCEDURE — 85027 COMPLETE CBC AUTOMATED: CPT

## 2024-07-20 PROCEDURE — 97166 OT EVAL MOD COMPLEX 45 MIN: CPT

## 2024-07-20 PROCEDURE — 6370000000 HC RX 637 (ALT 250 FOR IP): Performed by: INTERNAL MEDICINE

## 2024-07-20 PROCEDURE — 80048 BASIC METABOLIC PNL TOTAL CA: CPT

## 2024-07-20 PROCEDURE — 6370000000 HC RX 637 (ALT 250 FOR IP): Performed by: NURSE PRACTITIONER

## 2024-07-20 PROCEDURE — 6360000002 HC RX W HCPCS: Performed by: HOSPITALIST

## 2024-07-20 PROCEDURE — 6370000000 HC RX 637 (ALT 250 FOR IP)

## 2024-07-20 PROCEDURE — 97161 PT EVAL LOW COMPLEX 20 MIN: CPT

## 2024-07-20 PROCEDURE — 2060000000 HC ICU INTERMEDIATE R&B

## 2024-07-20 PROCEDURE — 6370000000 HC RX 637 (ALT 250 FOR IP): Performed by: HOSPITALIST

## 2024-07-20 PROCEDURE — 6360000002 HC RX W HCPCS: Performed by: INTERNAL MEDICINE

## 2024-07-20 PROCEDURE — 94760 N-INVAS EAR/PLS OXIMETRY 1: CPT

## 2024-07-20 PROCEDURE — 92610 EVALUATE SWALLOWING FUNCTION: CPT

## 2024-07-20 PROCEDURE — 99233 SBSQ HOSP IP/OBS HIGH 50: CPT | Performed by: INTERNAL MEDICINE

## 2024-07-20 RX ORDER — LACTULOSE 10 G/15ML
20 SOLUTION ORAL DAILY
Status: DISCONTINUED | OUTPATIENT
Start: 2024-07-20 | End: 2024-07-25 | Stop reason: HOSPADM

## 2024-07-20 RX ADMIN — SODIUM CHLORIDE, PRESERVATIVE FREE 10 ML: 5 INJECTION INTRAVENOUS at 20:33

## 2024-07-20 RX ADMIN — ASPIRIN 81 MG: 81 TABLET, CHEWABLE ORAL at 08:17

## 2024-07-20 RX ADMIN — LACTULOSE 20 G: 10 SOLUTION ORAL at 10:57

## 2024-07-20 RX ADMIN — HEPARIN SODIUM 5000 UNITS: 5000 INJECTION INTRAVENOUS; SUBCUTANEOUS at 05:43

## 2024-07-20 RX ADMIN — SENNOSIDES AND DOCUSATE SODIUM 2 TABLET: 50; 8.6 TABLET ORAL at 08:17

## 2024-07-20 RX ADMIN — CARVEDILOL 6.25 MG: 6.25 TABLET, FILM COATED ORAL at 08:16

## 2024-07-20 RX ADMIN — CASTOR OIL AND BALSAM, PERU: 788; 87 OINTMENT TOPICAL at 20:15

## 2024-07-20 RX ADMIN — GABAPENTIN 300 MG: 300 CAPSULE ORAL at 20:32

## 2024-07-20 RX ADMIN — THIAMINE HYDROCHLORIDE 100 MG: 100 INJECTION, SOLUTION INTRAMUSCULAR; INTRAVENOUS at 08:18

## 2024-07-20 RX ADMIN — VENLAFAXINE 75 MG: 37.5 TABLET ORAL at 20:32

## 2024-07-20 RX ADMIN — LEVOTHYROXINE SODIUM 25 MCG: 0.03 TABLET ORAL at 08:17

## 2024-07-20 RX ADMIN — HEPARIN SODIUM 5000 UNITS: 5000 INJECTION INTRAVENOUS; SUBCUTANEOUS at 13:48

## 2024-07-20 RX ADMIN — SODIUM CHLORIDE, PRESERVATIVE FREE 10 ML: 5 INJECTION INTRAVENOUS at 08:18

## 2024-07-20 RX ADMIN — HEPARIN SODIUM 5000 UNITS: 5000 INJECTION INTRAVENOUS; SUBCUTANEOUS at 21:35

## 2024-07-20 RX ADMIN — POLYETHYLENE GLYCOL 3350 17 G: 17 POWDER, FOR SOLUTION ORAL at 08:18

## 2024-07-20 RX ADMIN — QUETIAPINE FUMARATE 75 MG: 25 TABLET ORAL at 20:32

## 2024-07-20 RX ADMIN — CARVEDILOL 6.25 MG: 6.25 TABLET, FILM COATED ORAL at 17:05

## 2024-07-20 RX ADMIN — QUETIAPINE FUMARATE 75 MG: 25 TABLET ORAL at 08:17

## 2024-07-20 RX ADMIN — FOLIC ACID 1 MG: 1 TABLET ORAL at 08:18

## 2024-07-20 RX ADMIN — VENLAFAXINE 75 MG: 37.5 TABLET ORAL at 08:20

## 2024-07-20 RX ADMIN — CASTOR OIL AND BALSAM, PERU: 788; 87 OINTMENT TOPICAL at 08:18

## 2024-07-20 RX ADMIN — SENNOSIDES AND DOCUSATE SODIUM 2 TABLET: 50; 8.6 TABLET ORAL at 20:32

## 2024-07-20 ASSESSMENT — PAIN SCALES - GENERAL
PAINLEVEL_OUTOF10: 0

## 2024-07-20 NOTE — PROGRESS NOTES
NAME:  Cal Sanchez  YOB: 1960  MEDICAL RECORD NUMBER:  [id number]    Shift Summary: Pt alert to self only for entirety of shift. Pt does respond to yes/no questions and maintains eye contact. Very quiet. Still attempting to pull at NGT. Respiratory efforts remain unchanged, RA.    Family updated: No    Rhythm: Normal Sinus Rhythm     Most recent vitals:   Visit Vitals  BP (!) 145/88   Pulse 92   Temp 98.5 °F (36.9 °C) (Oral)   Resp 18   Ht 1.778 m (5' 10\")   Wt 81.4 kg (179 lb 7.3 oz)   SpO2 96%   BMI 25.75 kg/m²           No data found.    No data found.      Respiratory support needed (if any):  - RA    Admission weight Weight - Scale: 85.1 kg (187 lb 9.8 oz) (07/11/24 2219)    Today's weight    Wt Readings from Last 1 Encounters:   07/19/24 81.4 kg (179 lb 7.3 oz)        Tracy need assessed each shift: N/A - no tracy present  UOP >30ml/hr: NO - Anuric HD pt  Last documented BM (in last 48 hrs):  Patient Vitals for the past 48 hrs:   Last BM (including prior to admit)   07/18/24 1920 07/15/24   07/19/24 0742 07/15/24                Restraints (in use currently or dc'd in last 12 hrs): No    Order current and documentation up to date? No    Lines/Drains reviewed @ bedside.  CVC  07/12/24 Left Internal jugular (Active)   Number of days: 7       Peripheral IV 07/19/24 Right Basilic (Active)   Number of days: 0       Peripheral IV 07/19/24 Posterior;Right Wrist (Active)   Number of days: 0       Tunneled Hemodialysis Catheter Right Subclavian (Active)   Number of days:        Hemodialysis (linkable) Arteriovenous fistula Left Arm (Active)   Number of days:          Drip rates at handoff:    sodium chloride Stopped (07/18/24 2149)    dextrose         Lab Data:   CBC:   Recent Labs     07/19/24  0345 07/20/24  0433   WBC 5.8 5.7   HGB 10.3* 10.4*   HCT 30.4* 30.9*   MCV 98.5 98.6   PLT 72* 95*     BMP:    Recent Labs     07/19/24  0345 07/20/24  0433   * 133*   K 4.5 3.9   CO2 23 25   BUN 36*

## 2024-07-20 NOTE — PROGRESS NOTES
Occupational Therapy  Facility/Department: [address] ICU  Occupational Therapy Initial Assessment    Name: Cal Sanchez  : 1960  MRN: [id number]  Date of Service: 2024    Discharge Recommendations:  3-5 sessions per week, Patient would benefit from continued therapy after discharge  OT Equipment Recommendations  Other: TBD by d/c site    Cal Sanchez scored a / on the AM-PAC ADL Inpatient form. Current research shows that an AM-PAC score of 17 or less is typically not associated with a discharge to the patient's home setting. Based on the patient's AM-PAC score and their current ADL deficits, it is recommended that the patient have 3-5 sessions per week of Occupational Therapy at d/c to increase the patient's independence.  Please see assessment section for further patient specific details.    If patient discharges prior to next session this note will serve as a discharge summary.  Please see below for the latest assessment towards goals.       Patient Diagnosis(es): The primary encounter diagnosis was Severe sepsis (HCC). Diagnoses of IV drug user, Altered mental status, unspecified altered mental status type, Bilateral pleural effusion, Atrial fibrillation with rapid ventricular response (HCC), Elevated troponin, ESRD on hemodialysis (HCC), and Cardiomyopathy, unspecified type (HCC) were also pertinent to this visit.  Past Medical History:  has a past medical history of Atrial fibrillation (HCC), CAD (coronary artery disease), Chronic kidney disease, Depression, Diabetes mellitus (HCC), Dialysis patient (HCC), Glaucoma, Hemodialysis patient (HCC), Hyperlipidemia, Hypertension, RLL pneumonia, and Sleep apnea.  Past Surgical History:  has a past surgical history that includes Pacemaker insertion; pacemaker placement; Colonoscopy; Toe amputation (Left, 2023); and Toe amputation (Left, 2024).    Treatment Diagnosis: decreased endurance/balance/ROM/cognition    Assessment   Performance

## 2024-07-20 NOTE — PROGRESS NOTES
Pulmonary Progress Note    Date of Admission: 7/11/2024   LOS: 8 days       CC:  Chief Complaint   Patient presents with    Diarrhea    Altered Mental Status     Pt's niece contacted EMS because pt has had multiple instances of diarrhea throughout the day and seems confused.  EMS reports pt A&O x 2; Pt admits to using cocaine IV this morning        Subjective:  Extubated yesterday        Assessment:          Plan:     This note may have been transcribed using Dragon Dictation software. Please disregard any translational errors.       Hospital Day: 8     Mechanical Vent  Cheyne-Nicolas respiration  Extubated 7/18.  On room air currently   Appears to have Cheyne-Nicolas like respirations on ventilator.  Patient will have apneic episodes followed by progressive breathing then tachypnea.  This would fit with his EF of 25%    Mental status    Etiology likely cocaine / alcohol  Home medication of Seroquel.  Gabapentin Home dose, 300   Effexor - home dose.   Wellbutin   Negative head CT 7/11.   Improving off of narcotics.  Discontinue oxycodone      ESRD   HD MWF         Cardiovascular  Bilateral pleural effusions  EF 25%  Consult cardiology following  Defer to cardiology for optimal management       Constipation   Bowel regiment.   Increase  Lactulose daily.        Will sign off at this time.  Thanks for the consult.   Please call with questions.          Data:        PHYSICAL EXAM:   Blood pressure (!) 154/81, pulse 91, temperature 98.5 °F (36.9 °C), temperature source Axillary, resp. rate 30, height 1.778 m (5' 10\"), weight 81.5 kg (179 lb 10.8 oz), SpO2 98 %.'  Body mass index is 25.78 kg/m².   Gen: No distress.    ENT:   Resp: No accessory muscle use. No crackles. No wheezes. No rhonchi.    CV: Regular rate. Regular rhythm. No murmur or rub. No edema.   Skin: Warm, dry, normal texture and turgor. No nodule on exposed extremities.   Missing several toes on left  M/S: No cyanosis. No clubbing. No joint deformity.  Psych:    No results for input(s): \"APTT\" in the last 72 hours.    UA:No results for input(s): \"NITRITE\", \"COLORU\", \"PHUR\", \"LABCAST\", \"WBCUA\", \"RBCUA\", \"MUCUS\", \"TRICHOMONAS\", \"YEAST\", \"BACTERIA\", \"CLARITYU\", \"SPECGRAV\", \"LEUKOCYTESUR\", \"UROBILINOGEN\", \"BILIRUBINUR\", \"BLOODU\", \"GLUCOSEU\", \"AMORPHOUS\" in the last 72 hours.    Invalid input(s): \"KETONESU\"  No results for input(s): \"PH\", \"PCO2\", \"PO2\" in the last 72 hours.    Cx:      Films:             This note was transcribed using Dragon Dictation software. Please disregard any translational errors.      KORY MEEKS   [facility]  [phone]

## 2024-07-20 NOTE — PROGRESS NOTES
Physical Therapy  Facility/Department: [address] ICU  Physical Therapy Initial Assessment    Name: Cal Sanchez  : 1960  MRN: [id number]  Date of Service: 2024    Discharge Recommendations:  Long Term Care with PT, Patient would benefit from continued therapy after discharge, Subacute/Skilled Nursing Facility   PT Equipment Recommendations  Equipment Needed: No      Patient Diagnosis(es): The primary encounter diagnosis was Severe sepsis (HCC). Diagnoses of IV drug user, Altered mental status, unspecified altered mental status type, Bilateral pleural effusion, Atrial fibrillation with rapid ventricular response (HCC), Elevated troponin, ESRD on hemodialysis (HCC), and Cardiomyopathy, unspecified type (HCC) were also pertinent to this visit.  Past Medical History:  has a past medical history of Atrial fibrillation (HCC), CAD (coronary artery disease), Chronic kidney disease, Depression, Diabetes mellitus (HCC), Dialysis patient (HCC), Glaucoma, Hemodialysis patient (HCC), Hyperlipidemia, Hypertension, RLL pneumonia, and Sleep apnea.  Past Surgical History:  has a past surgical history that includes Pacemaker insertion; pacemaker placement; Colonoscopy; Toe amputation (Left, 2023); and Toe amputation (Left, 2024).    Assessment   Body Structures, Functions, Activity Limitations Requiring Skilled Therapeutic Intervention: Decreased functional mobility ;Decreased cognition;Decreased ROM;Decreased strength;Decreased ADL status;Decreased endurance;Decreased balance;Decreased high-level IADLs;Decreased coordination  Assessment: Pt is a 64 y.o. M. admitted  for acute alcohol toxicity, respiratory failure, severe sepsis, ESRD on HD.  He presents lethargic and confused, unable to follow commands for strength/ROM testing.  He required Max A for positioning in bed, and was not safe to attempt OOB activity at this time.  Recommend continued therapy at low-moderate frequency therapy to improve strength,  Care  Education Method: Demonstration;Verbal  Education Outcome: Continued education needed      Therapy Time   Individual Concurrent Group Co-treatment   Time In 1035         Time Out 1105         Minutes 30         Timed Code Treatment Minutes: 15 Minutes   Low Complexity Evaluation    Abelardo Rausch PT   Electronically signed by Abelardo Rausch, JANY [id number] on 7/20/2024 at 11:09 AM

## 2024-07-20 NOTE — PROGRESS NOTES
Facility/Department: [address] ICU  Initial Assessment  DYSPHAGIA BEDSIDE SWALLOW EVALUATION     Patient: Cal Sanchez   : 1960   MRN: [id number]      Evaluation Date: 2024   Admitting Diagnosis:   Acute delirium [R41.0]  Elevated troponin [R79.89]  IV drug user [F19.90]  Bilateral pleural effusion [J90]  Atrial fibrillation with rapid ventricular response (HCC) [I48.91]  ESRD on hemodialysis (HCC) [N18.6, Z99.2]  Severe sepsis (HCC) [A41.9, R65.20]  Altered mental status, unspecified altered mental status type [R41.82]   has a past medical history of Atrial fibrillation (HCC), CAD (coronary artery disease), Chronic kidney disease, Depression, Diabetes mellitus (HCC), Dialysis patient (HCC), Glaucoma, Hemodialysis patient (HCC), Hyperlipidemia, Hypertension, RLL pneumonia, and Sleep apnea.   has a past surgical history that includes Pacemaker insertion; pacemaker placement; Colonoscopy; Toe amputation (Left, 2023); and Toe amputation (Left, 2024).  Allergies: documentation of 'no known allergies'                                         Onset date: 2024     Chart Review:   H&P: Per MD History and Physical Documentation  Chief Complaint   Patient presents with    Diarrhea    Altered Mental Status       Pt's niece contacted EMS because pt has had multiple instances of diarrhea throughout the day and seems confused.  EMS reports pt A&O x 2; Pt admits to using cocaine IV this morning   64m withESRD MWF, IVDU, reported EtOH abuse, last seen normal 2 days ago, though noted to have attended yesterday's dialysis session, presents with altered mental status and shortness of breath. Patient has become more agitated, combative since presentation, however remains on room air with O2 sat remaining above 90, hypertensive with afib rvr.   Patient is seen in the ER, somnolent, however able to respond verbally when intermittently agitated and awake, initially denying chest pain or sob, but is increasingly

## 2024-07-20 NOTE — PLAN OF CARE
Problem: Discharge Planning  Goal: Discharge to home or other facility with appropriate resources  Outcome: Progressing     Problem: Pain  Goal: Verbalizes/displays adequate comfort level or baseline comfort level  Outcome: Progressing     Problem: Safety - Adult  Goal: Free from fall injury  Outcome: Progressing     Problem: Skin/Tissue Integrity  Goal: Absence of new skin breakdown  Description: 1.  Monitor for areas of redness and/or skin breakdown  2.  Assess vascular access sites hourly  3.  Every 4-6 hours minimum:  Change oxygen saturation probe site  4.  Every 4-6 hours:  If on nasal continuous positive airway pressure, respiratory therapy assess nares and determine need for appliance change or resting period.  Outcome: Progressing     Problem: ABCDS Injury Assessment  Goal: Absence of physical injury  Outcome: Progressing     Problem: Neurosensory - Adult  Goal: Achieves stable or improved neurological status  Outcome: Progressing  Flowsheets (Taken 7/19/2024 2000)  Achieves stable or improved neurological status: Assess for and report changes in neurological status     Problem: Neurosensory - Adult  Goal: Achieves maximal functionality and self care  Outcome: Progressing  Flowsheets (Taken 7/19/2024 2000)  Achieves maximal functionality and self care: Monitor swallowing and airway patency with patient fatigue and changes in neurological status     Problem: Respiratory - Adult  Goal: Achieves optimal ventilation and oxygenation  Outcome: Progressing     Problem: Cardiovascular - Adult  Goal: Maintains optimal cardiac output and hemodynamic stability  Outcome: Progressing     Problem: Cardiovascular - Adult  Goal: Absence of cardiac dysrhythmias or at baseline  Outcome: Progressing     Problem: Metabolic/Fluid and Electrolytes - Adult  Goal: Electrolytes maintained within normal limits  Outcome: Progressing     Problem: Metabolic/Fluid and Electrolytes - Adult  Goal: Hemodynamic stability and optimal renal

## 2024-07-20 NOTE — PROGRESS NOTES
Hospitalist Progress Note  7/20/2024 7:45 AM  Subjective:   Admit Date: 7/11/2024  PCP: Tania Moses MD Status: Inpatient   Interval History: Hospital Day: 10, admitted with acute alcohol toxicity encephalopathy, aspiration pneumonia with respiratory failure requiring intubation (7/12-7/18), dexmedetomidine changed to propofol.  Antibiotic therapy with cefepime and vancomycin.  Atrial fibrillation with rapid ventricular response initially requiring IV diltiazem, discontinued.      Medical co-morbidities include CAD, chronic afib, ESRD on hemodialysis MWF (follows up with  Nephrology), peripheral vascular disease, status post toe amputation; chronic alcohol abuse, IV drug abuse .      Diet: Tube feed at 50 cc/hr  Left internal jugular CVC (7/12, day #9)  Right basilic peripheral IV (7/19, day #2)  Right nostril NG (7/18, day #3)  Right subclavian tunneled hemodialysis catheter (POA)    Medications:     bupropion  75 mg Oral Q72H   sennosides-docusate   2 tablet Oral BID   polyethylene glycol  17 g Oral Daily   bupropion  75 mg Oral Q72H   oxycodone  5 mg Oral Q6H [held]   gabapentin  300 mg Oral Nightly   aspirin  81 mg Oral Daily   carvedilol  6.25 mg Oral BID WC   levothyroxine  25 mcg Oral Daily   sevelamer  2,400 mg Oral TID WC   vitamin D  50,000 Units Oral Q30 Days   heparin (porcine)  5,000 Units SubCUTAneous 3 times per day   folic acid  1 mg Oral Daily   thiamine  100 mg IntraVENous Daily   quetiapine  75 mg Oral BID   venlafaxine  75 mg Oral BID     Recent Labs     07/18/24  0405 07/19/24  0345 07/20/24  0433   WBC 4.4 5.8 5.7   HGB 9.8* 10.3* 10.4*   PLT 59* 72* 95*   MCV 99.1 98.5 98.6     Recent Labs     07/18/24  0405 07/19/24  0345 07/20/24  0433   * 135* 133*   K 3.9 4.5 3.9   CL 98* 99 95*   CO2 26 23 25   BUN 27* 36* 26*   CREATININE 3.3* 4.3* 3.4*   GLUCOSE 112* 85 156*     ABG (7/12) 7.39 / 46 / 44  CRP (7/13) 48.4 mg/L  INR (7/11) 1.22  Lactate (7/12) 5.8 / 5.4 / 1.3  thrombus or vegetations. Mild tricuspid  regurgitation.        TTE (7/12)  Severely reduced left ventricular systolic function with a visually estimated EF of 25 - 30%. Left ventricle size is normal. Mildly increased wall thickness. Severe global hypokinesis present. Right Ventricle: Moderate to severely reduced systolic function. TAPSE is 1.0 cm.  Large bilateral pleural effusion. Ascites present.       Objective:   Vitals:  BP (!) 145/88   Pulse 92   Temp 98.5 °F (36.9 °C) (Oral)   Resp 18   Ht 1.778 m (5' 10\")   Wt 81.5 kg (179 lb 10.8 oz)   SpO2 96% on RA  BMI 25.78 kg/m²   General appearance: awake and alert and cooperative with exam  Lungs: diffuse rhonchi, reasonable overall air movement  Heart: regular rate and rhythm, S1, S2 normal, no murmur, click, rub or gallop  Abdomen: soft, non-tender; bowel sounds normal; no masses,  no organomegaly  Extremities: extremities normal, atraumatic, no cyanosis or edema  Neurologic: No obvious focal neurologic deficits.    Assessment and Plan:   Acute alcohol toxicity encephalopathy with withdrawal, completed phenobarbital IV taper.  Etiology likely cocaine and alcohol.   Severe sepsis secondary to aspiration pneumonia with respiratory failure requiring intubation (7/12 - 7/18).  Completed course of antibiotic therapy with cefepime and vancomycin.  Oxygen weaned to room air.   Systolic heart failure with severe global hypokineses, HFrEF (LVEF 25-30%) with pleural effusion.  Noted to have Cheyne-Nicolas like respirations.    ESRD on HD MWF.  Followed by nephrology (Dr. Landry as outpatient)   Peripheral artery disease s/p angioplasty to left PT/AT.    Atrial fibrillation with rapid ventricular response initially requiring IV diltiazem, discontinued.    Bilateral pleural effusion, improving per CXR.    Coronary artery disease s/p LAD and proximal LCx stent (Mar 2019).   Acute on chronic HFpEF / NICM with LVEF 45-50% on TTE (Feb 2024).  Was on carvedilol prior to

## 2024-07-20 NOTE — PROGRESS NOTES
Department of Internal Medicine  Nephrology Progress Note    SUBJECTIVE:  We are following this patient for ESRD management.    HPI:  Breathing stable.  BP better. More awake and interactive.  ROS:  In bed.  No fever.  PMFSH:  medications reviewed.    Physical Exam:    VITALS:  /81   Pulse 88   Temp 97.8 °F (36.6 °C) (Axillary)   Resp 14   Ht 1.778 m (5' 10\")   Wt 81.5 kg (179 lb 10.8 oz)   SpO2 97%   BMI 25.78 kg/m²   24HR INTAKE/OUTPUT:    Intake/Output Summary (Last 24 hours) at 7/20/2024 1429  Last data filed at 7/20/2024 1145  Gross per 24 hour   Intake 826 ml   Output 0 ml   Net 826 ml       Constitutional: NAD, A+Ox3  Respiratory:  Few rhonchi   Gastrointestinal: No  tenderness.  Normal Bowel Sounds  Cardiovascular:  S1, S2 Irregular   Edema:   no edema  Acc Rt TDC     DATA:    CBC:  Lab Results   Component Value Date/Time    WBC 5.7 07/20/2024 04:33 AM    RBC 3.14 07/20/2024 04:33 AM    HGB 10.4 07/20/2024 04:33 AM    HCT 30.9 07/20/2024 04:33 AM    MCV 98.6 07/20/2024 04:33 AM    MCH 33.1 07/20/2024 04:33 AM    MCHC 33.5 07/20/2024 04:33 AM    RDW 19.2 07/20/2024 04:33 AM    PLT 95 07/20/2024 04:33 AM    MPV 9.9 07/20/2024 04:33 AM     CMP:  Lab Results   Component Value Date/Time     07/20/2024 04:33 AM    K 3.9 07/20/2024 04:33 AM    K 3.4 07/15/2024 04:45 AM    CL 95 07/20/2024 04:33 AM    CO2 25 07/20/2024 04:33 AM    BUN 26 07/20/2024 04:33 AM    CREATININE 3.4 07/20/2024 04:33 AM    GFRAA 12 02/18/2022 12:11 AM    AGRATIO 1.4 07/11/2024 08:34 PM    LABGLOM 19 07/20/2024 04:33 AM    LABGLOM 10 03/20/2024 10:13 AM    GLUCOSE 156 07/20/2024 04:33 AM    CALCIUM 10.1 07/20/2024 04:33 AM    BILITOT 0.9 07/14/2024 04:46 AM    ALKPHOS 73 07/14/2024 04:46 AM     07/14/2024 04:46 AM     07/14/2024 04:46 AM      Hepatic Function Panel:   Lab Results   Component Value Date/Time    ALKPHOS 73 07/14/2024 04:46 AM     07/14/2024 04:46 AM     07/14/2024 04:46 AM     BILITOT 0.9 07/14/2024 04:46 AM    BILIDIR 0.6 07/14/2024 04:46 AM    IBILI 0.3 07/14/2024 04:46 AM      Phosphorus:   Lab Results   Component Value Date/Time    PHOS 2.9 07/18/2024 04:05 AM       ASSESSMENT/PLAN:  Encephalopathy   - H/o drug and ETOH use .  - monitor    ESRD   - HD MWF, lower wt as tolerated .     extubated  - pulmonary signed off    ID  - aspiration pneumonia?   - on cefepime    CV  - monitor    thrombocytopenia  - improving.

## 2024-07-21 LAB
ALBUMIN SERPL-MCNC: 3.2 G/DL (ref 3.4–5)
ANION GAP SERPL CALCULATED.3IONS-SCNC: 15 MMOL/L (ref 3–16)
BASOPHILS # BLD: 0.1 K/UL (ref 0–0.2)
BASOPHILS NFR BLD: 1.4 %
BUN SERPL-MCNC: 37 MG/DL (ref 7–20)
CALCIUM SERPL-MCNC: 10.5 MG/DL (ref 8.3–10.6)
CHLORIDE SERPL-SCNC: 97 MMOL/L (ref 99–110)
CO2 SERPL-SCNC: 26 MMOL/L (ref 21–32)
CREAT SERPL-MCNC: 4.3 MG/DL (ref 0.8–1.3)
DEPRECATED RDW RBC AUTO: 19.1 % (ref 12.4–15.4)
EOSINOPHIL # BLD: 0.1 K/UL (ref 0–0.6)
EOSINOPHIL NFR BLD: 1.2 %
GFR SERPLBLD CREATININE-BSD FMLA CKD-EPI: 14 ML/MIN/{1.73_M2}
GLUCOSE BLD-MCNC: 103 MG/DL (ref 70–99)
GLUCOSE BLD-MCNC: 135 MG/DL (ref 70–99)
GLUCOSE BLD-MCNC: 145 MG/DL (ref 70–99)
GLUCOSE BLD-MCNC: 147 MG/DL (ref 70–99)
GLUCOSE BLD-MCNC: 165 MG/DL (ref 70–99)
GLUCOSE BLD-MCNC: 180 MG/DL (ref 70–99)
GLUCOSE SERPL-MCNC: 152 MG/DL (ref 70–99)
HCT VFR BLD AUTO: 31.3 % (ref 40.5–52.5)
HGB BLD-MCNC: 10.4 G/DL (ref 13.5–17.5)
LYMPHOCYTES # BLD: 0.7 K/UL (ref 1–5.1)
LYMPHOCYTES NFR BLD: 13.4 %
MAGNESIUM SERPL-MCNC: 2.4 MG/DL (ref 1.8–2.4)
MCH RBC QN AUTO: 32.9 PG (ref 26–34)
MCHC RBC AUTO-ENTMCNC: 33.1 G/DL (ref 31–36)
MCV RBC AUTO: 99.5 FL (ref 80–100)
MONOCYTES # BLD: 0.9 K/UL (ref 0–1.3)
MONOCYTES NFR BLD: 16.7 %
NEUTROPHILS # BLD: 3.7 K/UL (ref 1.7–7.7)
NEUTROPHILS NFR BLD: 67.3 %
PERFORMED ON: ABNORMAL
PHOSPHATE SERPL-MCNC: 2.9 MG/DL (ref 2.5–4.9)
PLATELET # BLD AUTO: 106 K/UL (ref 135–450)
PMV BLD AUTO: 9.4 FL (ref 5–10.5)
POTASSIUM SERPL-SCNC: 4.1 MMOL/L (ref 3.5–5.1)
RBC # BLD AUTO: 3.15 M/UL (ref 4.2–5.9)
SODIUM SERPL-SCNC: 138 MMOL/L (ref 136–145)
WBC # BLD AUTO: 5.5 K/UL (ref 4–11)

## 2024-07-21 PROCEDURE — 6370000000 HC RX 637 (ALT 250 FOR IP): Performed by: HOSPITALIST

## 2024-07-21 PROCEDURE — 80069 RENAL FUNCTION PANEL: CPT

## 2024-07-21 PROCEDURE — 94761 N-INVAS EAR/PLS OXIMETRY MLT: CPT

## 2024-07-21 PROCEDURE — 6370000000 HC RX 637 (ALT 250 FOR IP): Performed by: NURSE PRACTITIONER

## 2024-07-21 PROCEDURE — 2580000003 HC RX 258: Performed by: HOSPITALIST

## 2024-07-21 PROCEDURE — 6360000002 HC RX W HCPCS: Performed by: HOSPITALIST

## 2024-07-21 PROCEDURE — 6360000002 HC RX W HCPCS: Performed by: INTERNAL MEDICINE

## 2024-07-21 PROCEDURE — 6370000000 HC RX 637 (ALT 250 FOR IP): Performed by: INTERNAL MEDICINE

## 2024-07-21 PROCEDURE — 2060000000 HC ICU INTERMEDIATE R&B

## 2024-07-21 PROCEDURE — 83735 ASSAY OF MAGNESIUM: CPT

## 2024-07-21 PROCEDURE — 85025 COMPLETE CBC W/AUTO DIFF WBC: CPT

## 2024-07-21 RX ORDER — VENLAFAXINE HYDROCHLORIDE 75 MG/1
150 CAPSULE, EXTENDED RELEASE ORAL
Status: DISCONTINUED | OUTPATIENT
Start: 2024-07-22 | End: 2024-07-25 | Stop reason: HOSPADM

## 2024-07-21 RX ORDER — QUETIAPINE 150 MG/1
150 TABLET, FILM COATED, EXTENDED RELEASE ORAL NIGHTLY
Status: DISCONTINUED | OUTPATIENT
Start: 2024-07-21 | End: 2024-07-22

## 2024-07-21 RX ADMIN — LEVOTHYROXINE SODIUM 25 MCG: 0.03 TABLET ORAL at 08:29

## 2024-07-21 RX ADMIN — HEPARIN SODIUM 5000 UNITS: 5000 INJECTION INTRAVENOUS; SUBCUTANEOUS at 22:32

## 2024-07-21 RX ADMIN — CASTOR OIL AND BALSAM, PERU: 788; 87 OINTMENT TOPICAL at 22:32

## 2024-07-21 RX ADMIN — SEVELAMER CARBONATE 2400 MG: 800 TABLET, FILM COATED ORAL at 12:25

## 2024-07-21 RX ADMIN — GABAPENTIN 300 MG: 300 CAPSULE ORAL at 20:38

## 2024-07-21 RX ADMIN — SODIUM CHLORIDE, PRESERVATIVE FREE 10 ML: 5 INJECTION INTRAVENOUS at 08:31

## 2024-07-21 RX ADMIN — QUETIAPINE FUMARATE 150 MG: 150 TABLET, EXTENDED RELEASE ORAL at 20:39

## 2024-07-21 RX ADMIN — SENNOSIDES AND DOCUSATE SODIUM 2 TABLET: 50; 8.6 TABLET ORAL at 08:29

## 2024-07-21 RX ADMIN — HEPARIN SODIUM 5000 UNITS: 5000 INJECTION INTRAVENOUS; SUBCUTANEOUS at 05:26

## 2024-07-21 RX ADMIN — POLYETHYLENE GLYCOL 3350 17 G: 17 POWDER, FOR SOLUTION ORAL at 08:30

## 2024-07-21 RX ADMIN — SEVELAMER CARBONATE 2400 MG: 800 TABLET, FILM COATED ORAL at 16:51

## 2024-07-21 RX ADMIN — CASTOR OIL AND BALSAM, PERU: 788; 87 OINTMENT TOPICAL at 08:30

## 2024-07-21 RX ADMIN — SEVELAMER CARBONATE 2400 MG: 800 TABLET, FILM COATED ORAL at 08:29

## 2024-07-21 RX ADMIN — ASPIRIN 81 MG: 81 TABLET, CHEWABLE ORAL at 08:29

## 2024-07-21 RX ADMIN — HEPARIN SODIUM 5000 UNITS: 5000 INJECTION INTRAVENOUS; SUBCUTANEOUS at 13:42

## 2024-07-21 RX ADMIN — LACTULOSE 20 G: 10 SOLUTION ORAL at 08:30

## 2024-07-21 RX ADMIN — THIAMINE HYDROCHLORIDE 100 MG: 100 INJECTION, SOLUTION INTRAMUSCULAR; INTRAVENOUS at 08:30

## 2024-07-21 RX ADMIN — FOLIC ACID 1 MG: 1 TABLET ORAL at 08:29

## 2024-07-21 RX ADMIN — CARVEDILOL 6.25 MG: 6.25 TABLET, FILM COATED ORAL at 16:51

## 2024-07-21 RX ADMIN — VENLAFAXINE 75 MG: 37.5 TABLET ORAL at 09:20

## 2024-07-21 RX ADMIN — VENLAFAXINE 75 MG: 37.5 TABLET ORAL at 20:39

## 2024-07-21 RX ADMIN — QUETIAPINE FUMARATE 75 MG: 25 TABLET ORAL at 08:29

## 2024-07-21 RX ADMIN — SENNOSIDES AND DOCUSATE SODIUM 2 TABLET: 50; 8.6 TABLET ORAL at 20:39

## 2024-07-21 RX ADMIN — SODIUM CHLORIDE, PRESERVATIVE FREE 5 ML: 5 INJECTION INTRAVENOUS at 20:34

## 2024-07-21 RX ADMIN — CARVEDILOL 6.25 MG: 6.25 TABLET, FILM COATED ORAL at 08:29

## 2024-07-21 ASSESSMENT — PAIN SCALES - GENERAL
PAINLEVEL_OUTOF10: 0
PAINLEVEL_OUTOF10: 0

## 2024-07-21 NOTE — PROGRESS NOTES
Department of Internal Medicine  Nephrology Progress Note    SUBJECTIVE:  We are following this patient for ESRD management.    HPI:  Breathing stable.  BP better. More awake and interactive.  ROS:  In bed.  No fever.  PMFSH:  medications reviewed.    Physical Exam:    VITALS:  /76   Pulse 88   Temp 98.4 °F (36.9 °C) (Oral)   Resp (!) 31   Ht 1.778 m (5' 10\")   Wt 81.7 kg (180 lb 1.9 oz)   SpO2 (!) 86%   BMI 25.84 kg/m²   24HR INTAKE/OUTPUT:    Intake/Output Summary (Last 24 hours) at 7/21/2024 1343  Last data filed at 7/21/2024 0144  Gross per 24 hour   Intake 602 ml   Output 0 ml   Net 602 ml       Constitutional: NAD, A+Ox3  Respiratory:  Few rhonchi   Gastrointestinal: No  tenderness.  Normal Bowel Sounds  Cardiovascular:  S1, S2 Irregular   Edema:   no edema  Acc Rt TDC     DATA:    CBC:  Lab Results   Component Value Date/Time    WBC 5.5 07/21/2024 03:51 AM    RBC 3.15 07/21/2024 03:51 AM    HGB 10.4 07/21/2024 03:51 AM    HCT 31.3 07/21/2024 03:51 AM    MCV 99.5 07/21/2024 03:51 AM    MCH 32.9 07/21/2024 03:51 AM    MCHC 33.1 07/21/2024 03:51 AM    RDW 19.1 07/21/2024 03:51 AM     07/21/2024 03:51 AM    MPV 9.4 07/21/2024 03:51 AM     CMP:  Lab Results   Component Value Date/Time     07/21/2024 03:51 AM    K 4.1 07/21/2024 03:51 AM    K 3.4 07/15/2024 04:45 AM    CL 97 07/21/2024 03:51 AM    CO2 26 07/21/2024 03:51 AM    BUN 37 07/21/2024 03:51 AM    CREATININE 4.3 07/21/2024 03:51 AM    GFRAA 12 02/18/2022 12:11 AM    AGRATIO 1.4 07/11/2024 08:34 PM    LABGLOM 14 07/21/2024 03:51 AM    LABGLOM 10 03/20/2024 10:13 AM    GLUCOSE 152 07/21/2024 03:51 AM    CALCIUM 10.5 07/21/2024 03:51 AM    BILITOT 0.9 07/14/2024 04:46 AM    ALKPHOS 73 07/14/2024 04:46 AM     07/14/2024 04:46 AM     07/14/2024 04:46 AM      Hepatic Function Panel:   Lab Results   Component Value Date/Time    ALKPHOS 73 07/14/2024 04:46 AM     07/14/2024 04:46 AM     07/14/2024 04:46 AM     BILITOT 0.9 07/14/2024 04:46 AM    BILIDIR 0.6 07/14/2024 04:46 AM    IBILI 0.3 07/14/2024 04:46 AM      Phosphorus:   Lab Results   Component Value Date/Time    PHOS 2.9 07/21/2024 03:51 AM       ASSESSMENT/PLAN:  Encephalopathy   - H/o drug and ETOH use .  - monitor    ESRD   - HD MWF, lower wt as tolerated .     extubated  - pulmonary signed off    ID  - aspiration pneumonia?   - on cefepime    EtOH cirrhosis    right 3.1 cm kidney mass/cyst  - f/u renal US in 6 months recommended    thrombocytopenia  - improving.     SNF?

## 2024-07-21 NOTE — PROGRESS NOTES
Hospitalist Progress Note  7/21/2024 10:06 AM  Subjective:   Admit Date: 7/11/2024  PCP: Tania Moses MD Status: Inpatient   Interval History: Hospital Day: 11, no events overnight.  Remains on room air with intermittent Cheyne Nicolas respiration.  Tolerating NG tube feeds.      History of present illness:  Admitted with acute alcohol toxicity encephalopathy, aspiration pneumonia with respiratory failure requiring intubation (7/12-7/18), dexmedetomidine changed to propofol.  Antibiotic therapy with cefepime and vancomycin.  Atrial fibrillation with rapid ventricular response initially requiring IV diltiazem, discontinued.      Medical co-morbidities include CAD, chronic afib, ESRD on hemodialysis MWF (follows up with  Nephrology), peripheral vascular disease, status post toe amputation; chronic alcohol abuse, IV drug abuse .     Diet: Nepro tube feed at 50 cc/hr  Left internal jugular CVC (7/12, day #10)  Right basilic peripheral IV (7/19, day #3)  Right nostril NG (7/18, day #4)  Right subclavian tunneled hemodialysis catheter (POA)    Medications:     venlafaxine  150 mg Oral Daily    quetiapine  150 mg Oral Nightly   lactulose  20 g Oral Daily   bupropion  75 mg Oral Q72H   sennosides-docusate  2 tablet Oral BID   polyethylene glycol  17 g Oral Daily   bupropion  75 mg Oral Q72H   gabapentin  300 mg Oral Nightly   aspirin  81 mg Oral Daily   carvedilol  6.25 mg Oral BID WC   levothyroxine  25 mcg Oral Daily   sevelamer  2,400 mg Oral TID WC   vitamin D  50,000 Units Oral Q30 Days   heparin   5,000 Units SubCUTAneous 3 times per day   folic acid  1 mg Oral Daily   thiamine  100 mg IntraVENous Daily     Recent Labs     07/19/24  0345 07/20/24  0433 07/21/24  0351   WBC 5.8 5.7 5.5   HGB 10.3* 10.4* 10.4*   PLT 72* 95* 106*   MCV 98.5 98.6 99.5     Recent Labs     07/19/24  0345 07/20/24  0433 07/21/24  0351   * 133* 138   K 4.5 3.9 4.1   CL 99 95* 97*   CO2 23 25 26   BUN 36* 26* 37*  Right ventricular systolic function is at the lower limits of normal. Pacer / ICD wire is visualized in the right ventricle with no evidence of thrombus or vegetations. Mild tricuspid  regurgitation.     TTE (7/12)  Severely reduced left ventricular systolic function with a visually estimated EF of 25 - 30%. Left ventricle size is normal. Mildly increased wall thickness. Severe global hypokinesis present. Right Ventricle: Moderate to severely reduced systolic function. TAPSE is 1.0 cm.  Large bilateral pleural effusion. Ascites present.       Objective:   Vitals:  /80   Pulse 92   Temp 98.4 °F (oral)   Resp 30   Ht 5' 10\"  Wt 81.7 kg  SpO2 91% on RA  BMI 25.84 kg/m²   General appearance: anxious, awake and alert and cooperative with exam, Cheyne-Nicolas like respirations noted  Lungs: diffuse rhonchi, reasonable overall air movement  Heart: regular rate and rhythm, S1, S2 normal, no murmur, click, rub or gallop  Abdomen: soft, non-tender; bowel sounds normal; no masses,  no organomegaly  Extremities: extremities normal, atraumatic, no cyanosis or edema  Neurologic: No obvious focal neurologic deficits.    Assessment and Plan:   Acute alcohol toxicity encephalopathy with withdrawal, completed phenobarbital IV taper.  Etiology likely cocaine and alcohol.   Severe sepsis secondary to aspiration pneumonia with respiratory failure requiring intubation (7/12 - 7/18).  Completed course of antibiotic therapy with cefepime and vancomycin.  Oxygen weaned to room air.   Systolic heart failure with severe global hypokineses, HFrEF (LVEF 25-30%) with pleural effusion.  Noted to have Cheyne-Nicolas like respirations.   ESRD on HD MWF.  Followed by nephrology (Dr. Landry as outpatient)   Peripheral artery disease s/p angioplasty to left PT/AT.    Atrial fibrillation with rapid ventricular response initially requiring IV diltiazem, discontinued.    Bilateral pleural effusion, improving per CXR.    Coronary artery disease

## 2024-07-21 NOTE — PROGRESS NOTES
NAME:  Cal Sanchez  YOB: 1960  MEDICAL RECORD NUMBER:  [id number]    Shift Summary: Shift overall uneventful. Pt remains on room air.     Family updated: No    Rhythm: Normal Sinus Rhythm  flips to and from Afib    Most recent vitals:   Visit Vitals  /74   Pulse 83   Temp 98 °F (36.7 °C) (Axillary)   Resp (!) 33   Ht 1.778 m (5' 10\")   Wt 81.5 kg (179 lb 10.8 oz)   SpO2 (!) 85%   BMI 25.78 kg/m²           No data found.    No data found.      Respiratory support needed (if any):  - RA    Admission weight Weight - Scale: 85.1 kg (187 lb 9.8 oz) (07/11/24 2219)    Today's weight    Wt Readings from Last 1 Encounters:   07/20/24 81.5 kg (179 lb 10.8 oz)        Tracy need assessed each shift: N/A - no tracy present  UOP >30ml/hr: NO - anuric  Last documented BM (in last 48 hrs):  Patient Vitals for the past 48 hrs:   Last BM (including prior to admit)   07/19/24 0742 07/15/24                Restraints (in use currently or dc'd in last 12 hrs): No    Order current and documentation up to date? No    Lines/Drains reviewed @ bedside.  CVC  07/12/24 Left Internal jugular (Active)   Number of days: 8       Peripheral IV 07/19/24 Right Basilic (Active)   Number of days: 1       Peripheral IV 07/19/24 Posterior;Right Wrist (Active)   Number of days: 1       Tunneled Hemodialysis Catheter Right Subclavian (Active)   Number of days:        Hemodialysis (linkable) Arteriovenous fistula Left Arm (Active)   Number of days:          Drip rates at handoff:    sodium chloride Stopped (07/18/24 2149)    dextrose         Lab Data:   CBC:   Recent Labs     07/19/24 0345 07/20/24  0433   WBC 5.8 5.7   HGB 10.3* 10.4*   HCT 30.4* 30.9*   MCV 98.5 98.6   PLT 72* 95*     BMP:    Recent Labs     07/19/24 0345 07/20/24  0433   * 133*   K 4.5 3.9   CO2 23 25   BUN 36* 26*   CREATININE 4.3* 3.4*     LIVR: No results for input(s): \"AST\", \"ALT\" in the last 72 hours.  PT/INR: No results for input(s): \"INR\" in the

## 2024-07-21 NOTE — PROGRESS NOTES
Tunneled HD cath dressing change not completed. Dressing states for dialysis RN to change dressing.

## 2024-07-21 NOTE — PLAN OF CARE
Problem: Discharge Planning  Goal: Discharge to home or other facility with appropriate resources  Outcome: Progressing  Flowsheets (Taken 7/20/2024 0800 by Oziel Mckenzie, RN)  Discharge to home or other facility with appropriate resources: Identify barriers to discharge with patient and caregiver     Problem: Pain  Goal: Verbalizes/displays adequate comfort level or baseline comfort level  Outcome: Progressing     Problem: Safety - Adult  Goal: Free from fall injury  Outcome: Progressing     Problem: Skin/Tissue Integrity  Goal: Absence of new skin breakdown  Description: 1.  Monitor for areas of redness and/or skin breakdown  2.  Assess vascular access sites hourly  3.  Every 4-6 hours minimum:  Change oxygen saturation probe site  4.  Every 4-6 hours:  If on nasal continuous positive airway pressure, respiratory therapy assess nares and determine need for appliance change or resting period.  Outcome: Progressing     Problem: ABCDS Injury Assessment  Goal: Absence of physical injury  Outcome: Progressing     Problem: Neurosensory - Adult  Goal: Achieves stable or improved neurological status  Outcome: Progressing     Problem: Neurosensory - Adult  Goal: Achieves maximal functionality and self care  Outcome: Progressing     Problem: Respiratory - Adult  Goal: Achieves optimal ventilation and oxygenation  Outcome: Progressing     Problem: Cardiovascular - Adult  Goal: Maintains optimal cardiac output and hemodynamic stability  Outcome: Progressing     Problem: Cardiovascular - Adult  Goal: Absence of cardiac dysrhythmias or at baseline  Outcome: Progressing     Problem: Metabolic/Fluid and Electrolytes - Adult  Goal: Electrolytes maintained within normal limits  Outcome: Progressing  Flowsheets (Taken 7/20/2024 0800 by Oziel Mckenzie, RN)  Electrolytes maintained within normal limits: Monitor labs and assess patient for signs and symptoms of electrolyte imbalances     Problem: Metabolic/Fluid and Electrolytes -

## 2024-07-21 NOTE — PLAN OF CARE
Problem: Discharge Planning  Goal: Discharge to home or other facility with appropriate resources  Outcome: Progressing     Problem: Pain  Goal: Verbalizes/displays adequate comfort level or baseline comfort level  Outcome: Progressing  Flowsheets (Taken 7/21/2024 1503)  Verbalizes/displays adequate comfort level or baseline comfort level:   Encourage patient to monitor pain and request assistance   Assess pain using appropriate pain scale   Administer analgesics based on type and severity of pain and evaluate response   Implement non-pharmacological measures as appropriate and evaluate response     Problem: Safety - Adult  Goal: Free from fall injury  Outcome: Progressing  Flowsheets (Taken 7/21/2024 1503)  Free From Fall Injury: Instruct family/caregiver on patient safety     Problem: Skin/Tissue Integrity  Goal: Absence of new skin breakdown  Description: 1.  Monitor for areas of redness and/or skin breakdown  2.  Assess vascular access sites hourly  3.  Every 4-6 hours minimum:  Change oxygen saturation probe site  4.  Every 4-6 hours:  If on nasal continuous positive airway pressure, respiratory therapy assess nares and determine need for appliance change or resting period.  Outcome: Progressing     Problem: ABCDS Injury Assessment  Goal: Absence of physical injury  Outcome: Progressing     Problem: Neurosensory - Adult  Goal: Achieves stable or improved neurological status  Outcome: Progressing  Flowsheets (Taken 7/21/2024 1503)  Achieves stable or improved neurological status:   Assess for and report changes in neurological status   Initiate measures to prevent increased intracranial pressure   Maintain blood pressure and fluid volume within ordered parameters to optimize cerebral perfusion and minimize risk of hemorrhage   Monitor temperature, glucose, and sodium. Initiate appropriate interventions as ordered     Problem: Neurosensory - Adult  Goal: Achieves maximal functionality and self care  Outcome:

## 2024-07-22 ENCOUNTER — APPOINTMENT (OUTPATIENT)
Dept: ULTRASOUND IMAGING | Age: 64
End: 2024-07-22
Payer: COMMERCIAL

## 2024-07-22 ENCOUNTER — APPOINTMENT (OUTPATIENT)
Dept: CT IMAGING | Age: 64
End: 2024-07-22
Payer: COMMERCIAL

## 2024-07-22 LAB
ALBUMIN SERPL-MCNC: 3 G/DL (ref 3.4–5)
ALBUMIN SERPL-MCNC: 3 G/DL (ref 3.4–5)
ALP SERPL-CCNC: 119 U/L (ref 40–129)
ALT SERPL-CCNC: 63 U/L (ref 10–40)
AMMONIA PLAS-SCNC: 23 UMOL/L (ref 16–60)
ANION GAP SERPL CALCULATED.3IONS-SCNC: 14 MMOL/L (ref 3–16)
AST SERPL-CCNC: 35 U/L (ref 15–37)
BASOPHILS # BLD: 0 K/UL (ref 0–0.2)
BASOPHILS NFR BLD: 0.8 %
BILIRUB DIRECT SERPL-MCNC: 0.4 MG/DL (ref 0–0.3)
BILIRUB INDIRECT SERPL-MCNC: 0.4 MG/DL (ref 0–1)
BILIRUB SERPL-MCNC: 0.8 MG/DL (ref 0–1)
BUN SERPL-MCNC: 50 MG/DL (ref 7–20)
CALCIUM SERPL-MCNC: 10.4 MG/DL (ref 8.3–10.6)
CHLORIDE SERPL-SCNC: 97 MMOL/L (ref 99–110)
CO2 SERPL-SCNC: 25 MMOL/L (ref 21–32)
CREAT SERPL-MCNC: 5.8 MG/DL (ref 0.8–1.3)
DEPRECATED RDW RBC AUTO: 18.9 % (ref 12.4–15.4)
EOSINOPHIL # BLD: 0.1 K/UL (ref 0–0.6)
EOSINOPHIL NFR BLD: 1.3 %
GFR SERPLBLD CREATININE-BSD FMLA CKD-EPI: 10 ML/MIN/{1.73_M2}
GLUCOSE BLD-MCNC: 131 MG/DL (ref 70–99)
GLUCOSE BLD-MCNC: 137 MG/DL (ref 70–99)
GLUCOSE BLD-MCNC: 160 MG/DL (ref 70–99)
GLUCOSE SERPL-MCNC: 137 MG/DL (ref 70–99)
HCT VFR BLD AUTO: 31.4 % (ref 40.5–52.5)
HGB BLD-MCNC: 10.7 G/DL (ref 13.5–17.5)
INR PPP: 1.03 (ref 0.85–1.15)
LYMPHOCYTES # BLD: 0.7 K/UL (ref 1–5.1)
LYMPHOCYTES NFR BLD: 13.3 %
MAGNESIUM SERPL-MCNC: 2.6 MG/DL (ref 1.8–2.4)
MCH RBC QN AUTO: 33.9 PG (ref 26–34)
MCHC RBC AUTO-ENTMCNC: 34 G/DL (ref 31–36)
MCV RBC AUTO: 99.7 FL (ref 80–100)
MONOCYTES # BLD: 0.8 K/UL (ref 0–1.3)
MONOCYTES NFR BLD: 15.5 %
NEUTROPHILS # BLD: 3.7 K/UL (ref 1.7–7.7)
NEUTROPHILS NFR BLD: 69.1 %
PERFORMED ON: ABNORMAL
PHOSPHATE SERPL-MCNC: 3.3 MG/DL (ref 2.5–4.9)
PLATELET # BLD AUTO: 117 K/UL (ref 135–450)
PMV BLD AUTO: 9.2 FL (ref 5–10.5)
POTASSIUM SERPL-SCNC: 4.2 MMOL/L (ref 3.5–5.1)
PROT SERPL-MCNC: 6.1 G/DL (ref 6.4–8.2)
PROTHROMBIN TIME: 13.7 SEC (ref 11.9–14.9)
RBC # BLD AUTO: 3.15 M/UL (ref 4.2–5.9)
SODIUM SERPL-SCNC: 136 MMOL/L (ref 136–145)
WBC # BLD AUTO: 5.4 K/UL (ref 4–11)

## 2024-07-22 PROCEDURE — 6370000000 HC RX 637 (ALT 250 FOR IP): Performed by: INTERNAL MEDICINE

## 2024-07-22 PROCEDURE — 90935 HEMODIALYSIS ONE EVALUATION: CPT

## 2024-07-22 PROCEDURE — 6360000002 HC RX W HCPCS: Performed by: HOSPITALIST

## 2024-07-22 PROCEDURE — 6360000002 HC RX W HCPCS: Performed by: REGISTERED NURSE

## 2024-07-22 PROCEDURE — 83735 ASSAY OF MAGNESIUM: CPT

## 2024-07-22 PROCEDURE — 6370000000 HC RX 637 (ALT 250 FOR IP): Performed by: HOSPITALIST

## 2024-07-22 PROCEDURE — 6360000002 HC RX W HCPCS: Performed by: INTERNAL MEDICINE

## 2024-07-22 PROCEDURE — 85025 COMPLETE CBC W/AUTO DIFF WBC: CPT

## 2024-07-22 PROCEDURE — 36415 COLL VENOUS BLD VENIPUNCTURE: CPT

## 2024-07-22 PROCEDURE — 94760 N-INVAS EAR/PLS OXIMETRY 1: CPT

## 2024-07-22 PROCEDURE — 6360000004 HC RX CONTRAST MEDICATION: Performed by: INTERNAL MEDICINE

## 2024-07-22 PROCEDURE — 2580000003 HC RX 258: Performed by: HOSPITALIST

## 2024-07-22 PROCEDURE — 80069 RENAL FUNCTION PANEL: CPT

## 2024-07-22 PROCEDURE — 92526 ORAL FUNCTION THERAPY: CPT

## 2024-07-22 PROCEDURE — 2060000000 HC ICU INTERMEDIATE R&B

## 2024-07-22 PROCEDURE — 85610 PROTHROMBIN TIME: CPT

## 2024-07-22 PROCEDURE — 82140 ASSAY OF AMMONIA: CPT

## 2024-07-22 PROCEDURE — 80076 HEPATIC FUNCTION PANEL: CPT

## 2024-07-22 PROCEDURE — 74176 CT ABD & PELVIS W/O CONTRAST: CPT

## 2024-07-22 RX ORDER — QUETIAPINE FUMARATE 50 MG/1
100 TABLET, EXTENDED RELEASE ORAL NIGHTLY
Status: DISCONTINUED | OUTPATIENT
Start: 2024-07-22 | End: 2024-07-23

## 2024-07-22 RX ORDER — METOCLOPRAMIDE HYDROCHLORIDE 5 MG/ML
5 INJECTION INTRAMUSCULAR; INTRAVENOUS EVERY 6 HOURS
Status: DISCONTINUED | OUTPATIENT
Start: 2024-07-23 | End: 2024-07-25 | Stop reason: HOSPADM

## 2024-07-22 RX ORDER — GABAPENTIN 100 MG/1
100 CAPSULE ORAL NIGHTLY
Status: DISCONTINUED | OUTPATIENT
Start: 2024-07-22 | End: 2024-07-25 | Stop reason: HOSPADM

## 2024-07-22 RX ADMIN — FOLIC ACID 1 MG: 1 TABLET ORAL at 07:55

## 2024-07-22 RX ADMIN — IOPAMIDOL 50 ML: 612 INJECTION, SOLUTION INTRAVENOUS at 20:09

## 2024-07-22 RX ADMIN — HEPARIN SODIUM 5000 UNITS: 5000 INJECTION INTRAVENOUS; SUBCUTANEOUS at 05:32

## 2024-07-22 RX ADMIN — SEVELAMER CARBONATE 2400 MG: 800 TABLET, FILM COATED ORAL at 18:19

## 2024-07-22 RX ADMIN — POLYETHYLENE GLYCOL 3350 17 G: 17 POWDER, FOR SOLUTION ORAL at 07:56

## 2024-07-22 RX ADMIN — SENNOSIDES AND DOCUSATE SODIUM 2 TABLET: 50; 8.6 TABLET ORAL at 20:31

## 2024-07-22 RX ADMIN — SEVELAMER CARBONATE 2400 MG: 800 TABLET, FILM COATED ORAL at 07:55

## 2024-07-22 RX ADMIN — BUPROPION HYDROCHLORIDE 75 MG: 75 TABLET, FILM COATED ORAL at 10:48

## 2024-07-22 RX ADMIN — CARVEDILOL 6.25 MG: 6.25 TABLET, FILM COATED ORAL at 07:55

## 2024-07-22 RX ADMIN — CARVEDILOL 6.25 MG: 6.25 TABLET, FILM COATED ORAL at 18:18

## 2024-07-22 RX ADMIN — CASTOR OIL AND BALSAM, PERU: 788; 87 OINTMENT TOPICAL at 07:55

## 2024-07-22 RX ADMIN — CASTOR OIL AND BALSAM, PERU: 788; 87 OINTMENT TOPICAL at 20:33

## 2024-07-22 RX ADMIN — SEVELAMER CARBONATE 2400 MG: 800 TABLET, FILM COATED ORAL at 11:37

## 2024-07-22 RX ADMIN — METOCLOPRAMIDE 5 MG: 5 INJECTION, SOLUTION INTRAMUSCULAR; INTRAVENOUS at 23:57

## 2024-07-22 RX ADMIN — SENNOSIDES AND DOCUSATE SODIUM 2 TABLET: 50; 8.6 TABLET ORAL at 07:55

## 2024-07-22 RX ADMIN — THIAMINE HYDROCHLORIDE 100 MG: 100 INJECTION, SOLUTION INTRAMUSCULAR; INTRAVENOUS at 07:56

## 2024-07-22 RX ADMIN — BUPROPION HYDROCHLORIDE 75 MG: 75 TABLET, FILM COATED ORAL at 20:33

## 2024-07-22 RX ADMIN — SODIUM CHLORIDE, PRESERVATIVE FREE 10 ML: 5 INJECTION INTRAVENOUS at 19:37

## 2024-07-22 RX ADMIN — GABAPENTIN 100 MG: 100 CAPSULE ORAL at 20:31

## 2024-07-22 RX ADMIN — LACTULOSE 20 G: 10 SOLUTION ORAL at 07:55

## 2024-07-22 RX ADMIN — ASPIRIN 81 MG: 81 TABLET, CHEWABLE ORAL at 07:55

## 2024-07-22 RX ADMIN — SODIUM CHLORIDE, PRESERVATIVE FREE 10 ML: 5 INJECTION INTRAVENOUS at 07:57

## 2024-07-22 RX ADMIN — LEVOTHYROXINE SODIUM 25 MCG: 0.03 TABLET ORAL at 05:29

## 2024-07-22 RX ADMIN — VENLAFAXINE HYDROCHLORIDE 150 MG: 75 CAPSULE, EXTENDED RELEASE ORAL at 07:55

## 2024-07-22 NOTE — PROGRESS NOTES
NAME:  Cal Sanchez  YOB: 1960  MEDICAL RECORD NUMBER:  [id number]    Shift Summary: Pt alert, oriented x 1, follow commands. Pt had HD today ( 1 lt removed). Order for CT abdomen to R/O ascitis. Plan  for PEG tube if pt doesn't have ascites.VS stable.    Family updated: No    Rhythm: Atrial Fibrillation     Most recent vitals:   Visit Vitals  BP (!) 152/74   Pulse 87   Temp 98.5 °F (36.9 °C) (Oral)   Resp 22   Ht 1.778 m (5' 10\")   Wt 79.6 kg (175 lb 7.8 oz)   SpO2 100%   BMI 25.18 kg/m²           No data found.    No data found.      Respiratory support needed (if any):  - RA    Admission weight Weight - Scale: 85.1 kg (187 lb 9.8 oz) (07/11/24 2219)    Today's weight    Wt Readings from Last 1 Encounters:   07/22/24 79.6 kg (175 lb 7.8 oz)        Tracy need assessed each shift: N/A - no tracy present  UOP >30ml/hr: NO - HD pt  Last documented BM (in last 48 hrs):  No data found.             Restraints (in use currently or dc'd in last 12 hrs): No    Order current and documentation up to date? No    Lines/Drains reviewed @ bedside.  Peripheral IV 07/21/24 Right;Upper Arm (Active)   Number of days: 1       Tunneled Hemodialysis Catheter Right Subclavian (Active)   Number of days:        Hemodialysis (linkable) Arteriovenous fistula Left Arm (Active)   Number of days:          Drip rates at handoff:    sodium chloride Stopped (07/18/24 2149)    dextrose         Lab Data:   CBC:   Recent Labs     07/21/24  0351 07/22/24  0442   WBC 5.5 5.4   HGB 10.4* 10.7*   HCT 31.3* 31.4*   MCV 99.5 99.7   * 117*     BMP:    Recent Labs     07/21/24  0351 07/22/24  0442    136   K 4.1 4.2   CO2 26 25   BUN 37* 50*   CREATININE 4.3* 5.8*     LIVR: No results for input(s): \"AST\", \"ALT\" in the last 72 hours.  PT/INR: No results for input(s): \"INR\" in the last 72 hours.    Invalid input(s): \"PROT\"  APTT: No results for input(s): \"APTT\" in the last 72 hours.  ABG: No results for input(s): \"PHART\",

## 2024-07-22 NOTE — CONSULTS
Cesar:  I have personally seen and examined the patient, reviewed the patient's medical record and pertinent labs and clinical imaging. I have personally staffed the case with JEMIMA Ward. I agree with her consultation note, exam findings, assessment and plans as written above. I have made appropriate modifications and edited her assessment and plan where needed to reflect my impression and plans for this patient.     Objective: Comfortable. NG in place.  Abdomen soft without obvious ascites    Impression:   Oropharyngeal dysphagia  Probable cirrhosis  Recent abnormal liver chemistries  EtOH abuse  Persistent encephalopathy  End-stage renal disease on hemodialysis  Diabetes mellitus  Atrial fibrillation    Discussion: There is a small amount of ascites on his CAT scan from July 11.  I do not see any other evidence of portal hypertension or varices otherwise    Plan:  Hepatic Function Panel and coagulation profile  CT abdomen and pelvis--r/o ascites        GASTROENTEROLOGY INPATIENT CONSULTATION        IDENTIFYING DATA/REASON FOR CONSULTATION   PATIENT:  Cal Sanchez  MRN:  [id number]  ADMIT DATE: 7/11/2024  TIME OF EVALUATION: 7/22/2024 3:24 PM  HOSPITAL STAY:   LOS: 10 days     REASON FOR CONSULTATION:  PEG tube    HISTORY OF PRESENT ILLNESS   Cal Sanchez is a 64 y.o. male with a PMH of IVDU, alcohol abuse, CAD, HFpEF, ESRD on HD, PVD, AFib, DM, HTN, HLD who presented on 7/11/2024 with AMS. Admitted with acute alcohol toxicity encephalopathy, aspiration pneumonia with respiratory failure requiring intubation (7/12-7/18).  Completed phenobarbital IV taper. Completed course of antibiotic therapy. Oxygen weaned to room air.  SLP has been following.  He remains NPO due to inability to follow compensatory swallow/dysphagia strategies.  He has NGT in place and receiving TFings. GI consulted for PEG tube.      AT time of visit pt is alert and answer simple yes/no questions.  He is unable to states where he  than left pleural effusions with hazy airspace   opacities dependently in the lungs most consistent with pulmonary edema.   Superimposed infection cannot be excluded.         XR CHEST PORTABLE   Final Result   Lines and tubes as above      Bilateral pleuroparenchymal disease, similar on the right left, but increased   on the right         CT ABDOMEN PELVIS W IV CONTRAST Additional Contrast? None   Final Result   1. Moderate bilateral pleural effusions.   2. Cirrhotic liver morphology with splenomegaly and small amount of free   fluid in the abdomen and pelvis.   3. 3.1 cm homogeneous mass in the right lower renal pole is favored to be a   proteinaceous cyst. Recommend follow-up renal ultrasound in 6 months.   4. 2 cm left adrenal myelolipoma.         CT HEAD WO CONTRAST   Final Result   1. No acute intracranial abnormality.   2. Chronic microvascular ischemic changes.         XR CHEST PORTABLE   Final Result   Cardiomegaly and perihilar congestion and mild interstitial edema.               ASSESSMENT AND RECOMMENDATIONS   64 y.o. male with a PMH of IVDU, alcohol abuse, CAD, HFpEF, ESRD on HD, PVD, AFib, DM, HTN, HLD who presented on 7/11/2024 with AMS. Admitted with acute alcohol toxicity encephalopathy, aspiration pneumonia We have been consulted regarding PEG tube    IMPRESSION:    Dysphagia 2/2 poor cognition.  SLP recommends NPO due inability to follow compensatory swallow/dysphagia strategies. Pt has NGT in place and receiving TFings for nutrition support  alcohol toxicity encephalopathy with withdrawal, completed phenobarbital IV taper. Remains confused.  CT head on admission with no acute intracranial abnormalities.  Ammonia level on admission normal.    aspiration pneumonia with respiratory failure requiring intubation (7/12 - 7/18). Completed course of antibiotic therapy   ESRD on HD  Hx of Afib, not on anticoagulation  Possible cirrhosis based on CT findings.  Likely alcohol induced.  CT showed evidence of

## 2024-07-22 NOTE — PLAN OF CARE
Problem: Discharge Planning  Goal: Discharge to home or other facility with appropriate resources  7/22/2024 0158 by Kely Perez RN  Outcome: Progressing  Flowsheets (Taken 7/21/2024 2000)  Discharge to home or other facility with appropriate resources:   Identify barriers to discharge with patient and caregiver   Arrange for needed discharge resources and transportation as appropriate   Identify discharge learning needs (meds, wound care, etc)     Problem: Pain  Goal: Verbalizes/displays adequate comfort level or baseline comfort level  7/22/2024 0158 by Kely Perez RN  Outcome: Progressing  Flowsheets (Taken 7/21/2024 2000)  Verbalizes/displays adequate comfort level or baseline comfort level:   Encourage patient to monitor pain and request assistance   Assess pain using appropriate pain scale   Administer analgesics based on type and severity of pain and evaluate response   Implement non-pharmacological measures as appropriate and evaluate response     Problem: Safety - Adult  Goal: Free from fall injury  7/22/2024 0158 by Kely Perez RN  Outcome: Progressing  Flowsheets (Taken 7/22/2024 0158)  Free From Fall Injury: Instruct family/caregiver on patient safety     Problem: Skin/Tissue Integrity  Goal: Absence of new skin breakdown  Description: 1.  Monitor for areas of redness and/or skin breakdown  2.  Assess vascular access sites hourly  3.  Every 4-6 hours minimum:  Change oxygen saturation probe site  4.  Every 4-6 hours:  If on nasal continuous positive airway pressure, respiratory therapy assess nares and determine need for appliance change or resting period.  7/22/2024 0158 by Kely Perez RN  Outcome: Progressing     Problem: ABCDS Injury Assessment  Goal: Absence of physical injury  7/22/2024 0158 by Kely Perez RN  Outcome: Progressing  Flowsheets (Taken 7/22/2024 0158)  Absence of Physical Injury: Implement safety measures based on patient assessment     Problem: Neurosensory -  output and hemodynamic stability:   Monitor blood pressure and heart rate   Monitor urine output and notify Licensed Independent Practitioner for values outside of normal range   Assess for signs of decreased cardiac output     Problem: Cardiovascular - Adult  Goal: Absence of cardiac dysrhythmias or at baseline  7/22/2024 0158 by Kely Perez RN  Outcome: Progressing  Flowsheets (Taken 7/21/2024 2000)  Absence of cardiac dysrhythmias or at baseline:   Monitor cardiac rate and rhythm   Assess for signs of decreased cardiac output   Administer antiarrhythmia medication and electrolyte replacement as ordered     Problem: Metabolic/Fluid and Electrolytes - Adult  Goal: Electrolytes maintained within normal limits  7/22/2024 0158 by Kely Perez RN  Outcome: Progressing  Flowsheets (Taken 7/21/2024 2000)  Electrolytes maintained within normal limits:   Monitor labs and assess patient for signs and symptoms of electrolyte imbalances   Administer electrolyte replacement as ordered   Monitor response to electrolyte replacements, including repeat lab results as appropriate     Problem: Metabolic/Fluid and Electrolytes - Adult  Goal: Hemodynamic stability and optimal renal function maintained  7/22/2024 0158 by Kely Perez RN  Outcome: Progressing  Flowsheets (Taken 7/21/2024 2000)  Hemodynamic stability and optimal renal function maintained:   Monitor labs and assess for signs and symptoms of volume excess or deficit   Monitor intake, output and patient weight   Monitor urine specific gravity, serum osmolarity and serum sodium as indicated or ordered   Monitor response to interventions for patient's volume status, including labs, urine output, blood pressure (other measures as available)     Problem: Metabolic/Fluid and Electrolytes - Adult  Goal: Glucose maintained within prescribed range  7/22/2024 0158 by Kely Perez RN  Outcome: Progressing  Flowsheets (Taken 7/21/2024 2000)  Glucose maintained within

## 2024-07-22 NOTE — PROGRESS NOTES
Progress Notes      Addendum     Date of Service: 7/21/2024  6:38 PM     Expand All Collapse All    NAME:  Cal Sanchez  YOB: 1960  MEDICAL RECORD NUMBER:  [id number]     Shift Summary: Patient alert, oriented x 1,follow commands.Planning to initiate transfer to SNF tomorrow.[facility] wa staken out, new PIV was placed.NC at 2 lt/min. VS stable.     Family updated: No     Rhythm: Normal Sinus Rhythm      Most recent vitals:   Visit Vitals  BP (!) 90/35   Pulse 89   Temp 98.2 °F (36.8 °C) (Oral)   Resp 29   Ht 1.778 m (5' 10\")   Wt 81.7 kg (180 lb 1.9 oz)   SpO2 96%   BMI 25.84 kg/m²         No data found.     No data found.        Respiratory support needed (if any):  - O2 - NC - 2 lpm     Admission weight Weight - Scale: 85.1 kg (187 lb 9.8 oz) (07/11/24 2219)    Today's weight        Wt Readings from Last 1 Encounters:   07/21/24 81.7 kg (180 lb 1.9 oz)         Tracy need assessed each shift: N/A - no tracy present  UOP >30ml/hr: NO - HD pt  Last documented BM (in last 48 hrs):  No data found.             Restraints (in use currently or dc'd in last 12 hrs): No    Order current and documentation up to date? No     Lines/Drains reviewed @ bedside.  Peripheral IV 07/19/24 Posterior;Right Wrist (Active)   Number of days: 2       Peripheral IV 07/21/24 Right;Upper Arm (Active)   Number of days: 0       Tunneled Hemodialysis Catheter Right Subclavian (Active)   Number of days:        Hemodialysis (linkable) Arteriovenous fistula Left Arm (Active)   Number of days:             Drip rates at handoff:   Infusions Meds    sodium chloride Stopped (07/18/24 2149)    dextrose              Lab Data:   CBC:         Recent Labs     07/20/24  0433 07/21/24  0351   WBC 5.7 5.5   HGB 10.4* 10.4*   HCT 30.9* 31.3*   MCV 98.6 99.5   PLT 95* 106*      BMP:          Recent Labs     07/20/24  0433 07/21/24  0351   * 138   K 3.9 4.1   CO2 25 26   BUN 26* 37*   CREATININE 3.4* 4.3*      LIVR:   No results for  input(s): \"AST\", \"ALT\" in the last 72 hours.  PT/INR: No results for input(s): \"INR\" in the last 72 hours.     Invalid input(s): \"PROT\"  APTT:  No results for input(s): \"APTT\" in the last 72 hours.  ABG: No results for input(s): \"PHART\", \"HCZ4KFC\", \"PO2ART\" in the last 72 hours.     Any consults during the shift? No     Any signed and held orders to be released?  No           4 Eyes Skin Assessment         The patient is being assessed for  Shift Handoff     I agree that at least one RN has performed a thorough Head to Toe Skin Assessment on the patient. ALL assessment sites listed below have been assessed.       Areas assessed by both nurses: Head, Face, Ears, Shoulders, Back, Chest, Arms, Elbows, Hands, Sacrum. Buttock, Coccyx, Ischium, and Legs. Feet and Heels                              Does the Patient have a Wound? Yes wound(s) were present on assessment. LDA wound assessment was Initiated and completed by RN     Wound Care Orders initiated by RN: Yes       Smith Prevention initiated by RN: Yes     Pressure Injury (Stage 3,4, Unstageable, DTI, NWPT, and Complex wounds) if present, place Wound referral order by RN under : No     New Ostomies, if present place, Ostomy referral order under : No      Nurse 1 eSignature: Electronically signed by Addy Abreu RN on 7/21/24 at 6:41 PM EDT    Electronically signed by Kely Perez RN on 7/22/2024 at 7:26 AM

## 2024-07-22 NOTE — PROGRESS NOTES
Hospitalist Progress Note      PCP: Tania Moses MD    Date of Admission: 7/11/2024    Subjective: cont to be pleasantly confused, still with NG feeding, getting dialysis    Medications:  Reviewed    Infusion Medications    sodium chloride Stopped (07/18/24 2149)    dextrose       Scheduled Medications    gabapentin  100 mg Oral Nightly    QUEtiapine  100 mg Oral Nightly    venlafaxine  150 mg Oral Daily with breakfast    lactulose  20 g Oral Daily    [Held by provider] buPROPion  75 mg Oral Q72H    sennosides-docusate sodium  2 tablet Oral BID    polyethylene glycol  17 g Oral Daily    buPROPion  75 mg Oral Q72H    balsum peru-castor oil   Topical BID    aspirin  81 mg Oral Daily    carvedilol  6.25 mg Oral BID WC    [Held by provider] cinacalcet  60 mg Oral Once per day on Mon Wed Fri    levothyroxine  25 mcg Oral Daily    sevelamer  2,400 mg Oral TID WC    vitamin D  50,000 Units Oral Q30 Days    sodium chloride flush  5-40 mL IntraVENous 2 times per day    heparin (porcine)  5,000 Units SubCUTAneous 3 times per day    folic acid  1 mg Oral Daily    thiamine  100 mg IntraVENous Daily     PRN Meds: acetaminophen **OR** acetaminophen, oxyCODONE-acetaminophen, sodium chloride flush, sodium chloride, ondansetron **OR** ondansetron, LORazepam **OR** LORazepam **OR** LORazepam **OR** LORazepam **OR** LORazepam **OR** LORazepam **OR** LORazepam **OR** LORazepam, labetalol, hydrALAZINE, heparin (porcine), glucose, dextrose bolus **OR** dextrose bolus, glucagon (rDNA), dextrose      Intake/Output Summary (Last 24 hours) at 7/22/2024 1504  Last data filed at 7/22/2024 0527  Gross per 24 hour   Intake 1474 ml   Output --   Net 1474 ml       Physical Exam Performed:    BP (!) 117/104   Pulse 89   Temp 98.5 °F (36.9 °C) (Oral)   Resp (!) 34   Ht 1.778 m (5' 10\")   Wt 79.6 kg (175 lb 7.8 oz)   SpO2 100%   BMI 25.18 kg/m²     General appearance: anxious, awake, oriented to self and time, not to

## 2024-07-22 NOTE — PROGRESS NOTES
[facility]  Phone: [phone]  Fax: [phone]  [place]     SUBJECTIVE:  We are following this patient for ESRD management.    HPI:  Breathing stable.  BP better. More awake but remains confused  ROS:  In bed.  No fever.  PMFSH:  medications reviewed.    Physical Exam:    VITALS:  BP (!) 117/104   Pulse 93   Temp 98.5 °F (36.9 °C) (Oral)   Resp 30   Ht 1.778 m (5' 10\")   Wt 79.6 kg (175 lb 7.8 oz)   SpO2 100%   BMI 25.18 kg/m²   24HR INTAKE/OUTPUT:    Intake/Output Summary (Last 24 hours) at 7/22/2024 1310  Last data filed at 7/22/2024 0527  Gross per 24 hour   Intake 1474 ml   Output --   Net 1474 ml     Constitutional: NAD   Respiratory:  Few rhonchi   Gastrointestinal: No  tenderness.  Normal Bowel Sounds  Cardiovascular:  S1, S2 Irregular   Edema:   no edema  Acc Rt TDC     DATA:    CBC:  Lab Results   Component Value Date/Time    WBC 5.4 07/22/2024 04:42 AM    RBC 3.15 07/22/2024 04:42 AM    HGB 10.7 07/22/2024 04:42 AM    HCT 31.4 07/22/2024 04:42 AM    MCV 99.7 07/22/2024 04:42 AM    MCH 33.9 07/22/2024 04:42 AM    MCHC 34.0 07/22/2024 04:42 AM    RDW 18.9 07/22/2024 04:42 AM     07/22/2024 04:42 AM    MPV 9.2 07/22/2024 04:42 AM     CMP:  Lab Results   Component Value Date/Time     07/22/2024 04:42 AM    K 4.2 07/22/2024 04:42 AM    K 3.4 07/15/2024 04:45 AM    CL 97 07/22/2024 04:42 AM    CO2 25 07/22/2024 04:42 AM    BUN 50 07/22/2024 04:42 AM    CREATININE 5.8 07/22/2024 04:42 AM    GFRAA 12 02/18/2022 12:11 AM    AGRATIO 1.4 07/11/2024 08:34 PM    LABGLOM 10 07/22/2024 04:42 AM    LABGLOM 10 03/20/2024 10:13 AM    GLUCOSE 137 07/22/2024 04:42 AM    CALCIUM 10.4 07/22/2024 04:42 AM    BILITOT 0.9 07/14/2024 04:46 AM    ALKPHOS 73 07/14/2024 04:46 AM     07/14/2024 04:46 AM     07/14/2024 04:46 AM      Hepatic Function Panel:   Lab Results   Component Value Date/Time    ALKPHOS 73 07/14/2024 04:46 AM     07/14/2024 04:46 AM    AST

## 2024-07-22 NOTE — PROGRESS NOTES
Facility/Department: [address] ICU   [facility] DYSPHAGIA THERAPY    Patient: Cal Sanchez   : 1960   MRN: [id number]    Treatment Date: 2024   Admitting Diagnosis:   Acute delirium [R41.0]  Elevated troponin [R79.89]  IV drug user [F19.90]  Bilateral pleural effusion [J90]  Atrial fibrillation with rapid ventricular response (HCC) [I48.91]  ESRD on hemodialysis (HCC) [N18.6, Z99.2]  Severe sepsis (HCC) [A41.9, R65.20]  Altered mental status, unspecified altered mental status type [R41.82]   has a past medical history of Atrial fibrillation (HCC), CAD (coronary artery disease) (4/3/2014), Chronic kidney disease, Depression, Diabetes mellitus (HCC), Dialysis patient (HCC), Glaucoma, Hemodialysis patient (HCC), Hyperlipidemia, Hypertension, RLL pneumonia (2018), and Sleep apnea.   has a past surgical history that includes Pacemaker insertion; pacemaker placement; Colonoscopy; Toe amputation (Left, 2023); and Toe amputation (Left, 2024).  Allergies: documentation of 'no known allergies'                                                             Baseline History/Prior Level of Function: Living Status: Lives with niece; dialysis MWF; no reports of dysphagia in the past; but pt unable to provide history information. No documented SLP dysphagia history in chart review of recent yr(s)    Onset: 2024       Chart Review:   H&P: Per MD History and Physical Documentation       Chief Complaint   Patient presents with    Diarrhea    Altered Mental Status       Pt's niece contacted EMS because pt has had multiple instances of diarrhea throughout the day and seems confused.  EMS reports pt A&O x 2; Pt admits to using cocaine IV this morning   64m withESRD MWF, IVDU, reported EtOH abuse, last seen normal 2 days ago, though noted to have attended yesterday's dialysis session, presents with altered mental status and shortness of breath. Patient has become more agitated, combative since presentation, however

## 2024-07-22 NOTE — CARE COORDINATION
[facility] Contin[facility] Nurse  Follow-up Progress Note       NAME:  Cal Sanchez  MEDICAL RECORD NUMBER:  [id number]  AGE:  64 y.o.   GENDER:  male  :  1960  TODAY'S DATE:  2024    Subjective:   Follow up for buttock DTI vs. Friction. Pt still in ICU.     Objective:    BP (!) 117/104   Pulse 89   Temp 98.5 °F (36.9 °C) (Oral)   Resp (!) 34   Ht 1.778 m (5' 10\")   Wt 79.6 kg (175 lb 7.8 oz)   SpO2 100%   BMI 25.18 kg/m²   Smith Risk Score: Smith Scale Score: 13  Assessment:        Worsening buttock wound with blistered area. Continuing to monitor. Pt now on rental specialty  mattress. Still using venelex.     Plan:   Plan of Care:   Continue venelex barrier, will continue to follow.    Specialty Bed Required : Yes   [x] Low Air Loss   [x] Pressure Redistribution  [] Fluid Immersion  [] Bariatric  [] Total Pressure Relief  [] Other:     Current Diet: Diet NPO  ADULT TUBE FEEDING; Orogastric; Renal Formula; Continuous; 20; Yes; 10; Q 4 hours; 50; 30; Q 4 hours; Fiber; 1 Dose; Daily  Dietician consult:  Yes    Discharge Plan:  Placement for patient upon discharge: skilled nursing   Patient appropriate for Outpatient Wound Care Center: No    Referrals:  []   [] Home Health Care  [] Supplies  [] Other    Patient/Caregiver Teaching:  Level of patient/caregiver understanding able to:   [] Indicates understanding       [] Needs reinforcement  [] Unsuccessful      [x] Verbal Understanding  [] Demonstrated understanding       [] No evidence of learning  [] Refused teaching         [] N/A       Electronically signed by Mayra Arechiga RN, CWOCN on 2024 at 2:11 PM

## 2024-07-22 NOTE — PROGRESS NOTES
Patient had HD , according to Dialysis RN, it was removed 1 lt. Patient tolerated well.Dialysis was done at 1815. Dr. Magana , Gastroenterologist, was consult for PEG placement. MD order to have CT abdomen with contrast to rule out ascites. Dr. Magana requested to cancel US abdomen( this order was placed earlier). Pt alert, oriented x 1. Follow commands, at room air.

## 2024-07-22 NOTE — DIALYSIS
Hemodialysis Treatment Note:     Treatment time: 3.5 hours  Net UF: 1 L    Pre weight: 79.6 kg  Post weight: 78.6 kg   EDW: 84 kg     Access used: CVC  Access function: Works well. Dressing changed and site care provided per protocol.     Summary of response to treatment: Well tolerated. 1L fluid removal approved by Nephrology as pt below established dry weight. BP stable throughout. All blood returned w/ rinse back. Pt stable w/ no complaints following session. Report given to primary RN at the bedside.      patient

## 2024-07-22 NOTE — PROGRESS NOTES
NAME:  Cal Sanchez  YOB: 1960  MEDICAL RECORD NUMBER:  [id number]    Shift Summary: Pt oriented to self only and lethargic, but will follow commands. On 1L NC. No bowel movements overnight, but passing flatus. Critical creatinine on AM labs at 5.8 - MD aware.     Family updated: No    Rhythm: Atrial Fibrillation     Most recent vitals:   Visit Vitals  BP (!) 112/92   Pulse 87   Temp 98.1 °F (36.7 °C) (Oral)   Resp 17   Ht 1.778 m (5' 10\")   Wt 79.6 kg (175 lb 7.8 oz)   SpO2 100%   BMI 25.18 kg/m²           No data found.    No data found.      Respiratory support needed (if any):  - O2 - NC - 1 lpm    Admission weight Weight - Scale: 85.1 kg (187 lb 9.8 oz) (07/11/24 2219)    Today's weight    Wt Readings from Last 1 Encounters:   07/22/24 79.6 kg (175 lb 7.8 oz)        Tracy need assessed each shift: N/A - no tracy present  UOP >30ml/hr: NO - anuric, HD MWF  Last documented BM (in last 48 hrs):  No data found.             Restraints (in use currently or dc'd in last 12 hrs): No    Order current and documentation up to date? N/A    Lines/Drains reviewed @ bedside.  Peripheral IV 07/19/24 Posterior;Right Wrist (Active)   Number of days: 2       Peripheral IV 07/21/24 Right;Upper Arm (Active)   Number of days: 0       Tunneled Hemodialysis Catheter Right Subclavian (Active)   Number of days:        Hemodialysis (linkable) Arteriovenous fistula Left Arm (Active)   Number of days:          Drip rates at handoff:    sodium chloride Stopped (07/18/24 2149)    dextrose         Lab Data:   CBC:   Recent Labs     07/21/24  0351 07/22/24  0442   WBC 5.5 5.4   HGB 10.4* 10.7*   HCT 31.3* 31.4*   MCV 99.5 99.7   * 117*     BMP:    Recent Labs     07/21/24  0351 07/22/24  0442    136   K 4.1 4.2   CO2 26 25   BUN 37* 50*   CREATININE 4.3* 5.8*     LIVR: No results for input(s): \"AST\", \"ALT\" in the last 72 hours.  PT/INR: No results for input(s): \"INR\" in the last 72 hours.    Invalid input(s):

## 2024-07-22 NOTE — PROGRESS NOTES
Dr. Zaida Manning notified of critical creatinine on AM labs of 5.8. Awaiting response at this time.

## 2024-07-22 NOTE — PROGRESS NOTES
First 250 ml of Oral contrast solution is provided at this time for CT abdomen purposes ,following MD orders. Planning to given total 1 lt. with 250 ml Q 15 min

## 2024-07-22 NOTE — PLAN OF CARE
Problem: Discharge Planning  Goal: Discharge to home or other facility with appropriate resources  7/22/2024 1339 by Addy Abreu RN  Outcome: Progressing     Problem: Pain  Goal: Verbalizes/displays adequate comfort level or baseline comfort level  7/22/2024 1339 by Addy Abreu RN  Outcome: Progressing  Flowsheets (Taken 7/22/2024 1339)  Verbalizes/displays adequate comfort level or baseline comfort level:   Encourage patient to monitor pain and request assistance   Assess pain using appropriate pain scale   Administer analgesics based on type and severity of pain and evaluate response   Implement non-pharmacological measures as appropriate and evaluate response     Problem: Safety - Adult  Goal: Free from fall injury  7/22/2024 1339 by Addy Abreu RN  Outcome: Progressing  Flowsheets (Taken 7/22/2024 1339)  Free From Fall Injury: Instruct family/caregiver on patient safety     Problem: Skin/Tissue Integrity  Goal: Absence of new skin breakdown  Description: 1.  Monitor for areas of redness and/or skin breakdown  2.  Assess vascular access sites hourly  3.  Every 4-6 hours minimum:  Change oxygen saturation probe site  4.  Every 4-6 hours:  If on nasal continuous positive airway pressure, respiratory therapy assess nares and determine need for appliance change or resting period.  7/22/2024 1339 by Addy Abreu RN  Outcome: Progressing     Problem: ABCDS Injury Assessment  Goal: Absence of physical injury  7/22/2024 1339 by Addy Abreu RN  Outcome: Progressing     Problem: Neurosensory - Adult  Goal: Achieves stable or improved neurological status  7/22/2024 1339 by Addy Abreu RN  Outcome: Progressing  Flowsheets (Taken 7/22/2024 1339)  Achieves stable or improved neurological status:   Assess for and report changes in neurological status   Initiate measures to prevent increased intracranial pressure   Maintain blood pressure and fluid volume within

## 2024-07-23 LAB
ALBUMIN SERPL-MCNC: 3.3 G/DL (ref 3.4–5)
ANION GAP SERPL CALCULATED.3IONS-SCNC: 13 MMOL/L (ref 3–16)
BUN SERPL-MCNC: 28 MG/DL (ref 7–20)
CALCIUM SERPL-MCNC: 10 MG/DL (ref 8.3–10.6)
CHLORIDE SERPL-SCNC: 93 MMOL/L (ref 99–110)
CO2 SERPL-SCNC: 26 MMOL/L (ref 21–32)
CREAT SERPL-MCNC: 3.6 MG/DL (ref 0.8–1.3)
DEPRECATED RDW RBC AUTO: 19 % (ref 12.4–15.4)
GFR SERPLBLD CREATININE-BSD FMLA CKD-EPI: 18 ML/MIN/{1.73_M2}
GLUCOSE BLD-MCNC: 101 MG/DL (ref 70–99)
GLUCOSE BLD-MCNC: 110 MG/DL (ref 70–99)
GLUCOSE BLD-MCNC: 111 MG/DL (ref 70–99)
GLUCOSE BLD-MCNC: 117 MG/DL (ref 70–99)
GLUCOSE BLD-MCNC: 119 MG/DL (ref 70–99)
GLUCOSE BLD-MCNC: 120 MG/DL (ref 70–99)
GLUCOSE BLD-MCNC: 126 MG/DL (ref 70–99)
GLUCOSE SERPL-MCNC: 105 MG/DL (ref 70–99)
HCT VFR BLD AUTO: 33 % (ref 40.5–52.5)
HGB BLD-MCNC: 10.8 G/DL (ref 13.5–17.5)
MCH RBC QN AUTO: 32.7 PG (ref 26–34)
MCHC RBC AUTO-ENTMCNC: 32.9 G/DL (ref 31–36)
MCV RBC AUTO: 99.5 FL (ref 80–100)
PERFORMED ON: ABNORMAL
PHOSPHATE SERPL-MCNC: 2.5 MG/DL (ref 2.5–4.9)
PLATELET # BLD AUTO: 150 K/UL (ref 135–450)
PMV BLD AUTO: 9 FL (ref 5–10.5)
POTASSIUM SERPL-SCNC: 4 MMOL/L (ref 3.5–5.1)
RBC # BLD AUTO: 3.32 M/UL (ref 4.2–5.9)
SODIUM SERPL-SCNC: 132 MMOL/L (ref 136–145)
WBC # BLD AUTO: 5.9 K/UL (ref 4–11)

## 2024-07-23 PROCEDURE — 80069 RENAL FUNCTION PANEL: CPT

## 2024-07-23 PROCEDURE — 6370000000 HC RX 637 (ALT 250 FOR IP): Performed by: INTERNAL MEDICINE

## 2024-07-23 PROCEDURE — 97530 THERAPEUTIC ACTIVITIES: CPT

## 2024-07-23 PROCEDURE — 2580000003 HC RX 258: Performed by: HOSPITALIST

## 2024-07-23 PROCEDURE — 6360000002 HC RX W HCPCS: Performed by: REGISTERED NURSE

## 2024-07-23 PROCEDURE — 85027 COMPLETE CBC AUTOMATED: CPT

## 2024-07-23 PROCEDURE — 6370000000 HC RX 637 (ALT 250 FOR IP): Performed by: HOSPITALIST

## 2024-07-23 PROCEDURE — 94760 N-INVAS EAR/PLS OXIMETRY 1: CPT

## 2024-07-23 PROCEDURE — 92526 ORAL FUNCTION THERAPY: CPT

## 2024-07-23 PROCEDURE — 6370000000 HC RX 637 (ALT 250 FOR IP): Performed by: REGISTERED NURSE

## 2024-07-23 PROCEDURE — 6360000002 HC RX W HCPCS: Performed by: INTERNAL MEDICINE

## 2024-07-23 PROCEDURE — 36415 COLL VENOUS BLD VENIPUNCTURE: CPT

## 2024-07-23 PROCEDURE — 2060000000 HC ICU INTERMEDIATE R&B

## 2024-07-23 PROCEDURE — 97129 THER IVNTJ 1ST 15 MIN: CPT

## 2024-07-23 RX ORDER — QUETIAPINE FUMARATE 25 MG/1
50 TABLET, FILM COATED ORAL 2 TIMES DAILY
Status: DISCONTINUED | OUTPATIENT
Start: 2024-07-23 | End: 2024-07-25 | Stop reason: HOSPADM

## 2024-07-23 RX ADMIN — LEVOTHYROXINE SODIUM 25 MCG: 0.03 TABLET ORAL at 05:25

## 2024-07-23 RX ADMIN — LACTULOSE 20 G: 10 SOLUTION ORAL at 09:24

## 2024-07-23 RX ADMIN — CARVEDILOL 6.25 MG: 6.25 TABLET, FILM COATED ORAL at 09:24

## 2024-07-23 RX ADMIN — CASTOR OIL AND BALSAM, PERU: 788; 87 OINTMENT TOPICAL at 21:34

## 2024-07-23 RX ADMIN — ASPIRIN 81 MG: 81 TABLET, CHEWABLE ORAL at 09:24

## 2024-07-23 RX ADMIN — CARVEDILOL 6.25 MG: 6.25 TABLET, FILM COATED ORAL at 18:50

## 2024-07-23 RX ADMIN — POLYETHYLENE GLYCOL 3350 17 G: 17 POWDER, FOR SOLUTION ORAL at 09:24

## 2024-07-23 RX ADMIN — SENNOSIDES AND DOCUSATE SODIUM 2 TABLET: 50; 8.6 TABLET ORAL at 09:24

## 2024-07-23 RX ADMIN — GABAPENTIN 100 MG: 100 CAPSULE ORAL at 21:35

## 2024-07-23 RX ADMIN — THIAMINE HYDROCHLORIDE 100 MG: 100 INJECTION, SOLUTION INTRAMUSCULAR; INTRAVENOUS at 09:24

## 2024-07-23 RX ADMIN — METOCLOPRAMIDE 5 MG: 5 INJECTION, SOLUTION INTRAMUSCULAR; INTRAVENOUS at 14:06

## 2024-07-23 RX ADMIN — CASTOR OIL AND BALSAM, PERU: 788; 87 OINTMENT TOPICAL at 09:23

## 2024-07-23 RX ADMIN — SODIUM CHLORIDE, PRESERVATIVE FREE 10 ML: 5 INJECTION INTRAVENOUS at 09:25

## 2024-07-23 RX ADMIN — SODIUM CHLORIDE, PRESERVATIVE FREE 10 ML: 5 INJECTION INTRAVENOUS at 21:35

## 2024-07-23 RX ADMIN — FOLIC ACID 1 MG: 1 TABLET ORAL at 09:24

## 2024-07-23 RX ADMIN — METOCLOPRAMIDE 5 MG: 5 INJECTION, SOLUTION INTRAMUSCULAR; INTRAVENOUS at 05:23

## 2024-07-23 RX ADMIN — SENNOSIDES AND DOCUSATE SODIUM 2 TABLET: 50; 8.6 TABLET ORAL at 21:34

## 2024-07-23 RX ADMIN — QUETIAPINE FUMARATE 50 MG: 25 TABLET ORAL at 22:34

## 2024-07-23 RX ADMIN — METOCLOPRAMIDE 5 MG: 5 INJECTION, SOLUTION INTRAMUSCULAR; INTRAVENOUS at 18:50

## 2024-07-23 ASSESSMENT — PAIN SCALES - GENERAL: PAINLEVEL_OUTOF10: 0

## 2024-07-23 NOTE — PROGRESS NOTES
[facility]  Phone: [phone]  Fax: [phone]  [place]     SUBJECTIVE:  We are following this patient for ESRD management.    HPI:  Breathing stable.  remains confused  ROS:  In bed.  No fever.HD yesterday 1 L removed   PMFSH:  medications reviewed.    Physical Exam:    VITALS:  BP (!) 153/89   Pulse 84   Temp 98.2 °F (36.8 °C) (Axillary)   Resp 15   Ht 1.778 m (5' 10\")   Wt 79.9 kg (176 lb 2.4 oz)   SpO2 98%   BMI 25.27 kg/m²   24HR INTAKE/OUTPUT:    Intake/Output Summary (Last 24 hours) at 7/23/2024 1311  Last data filed at 7/23/2024 0919  Gross per 24 hour   Intake 748 ml   Output --   Net 748 ml     Constitutional: NAD   Respiratory:  Few rhonchi   Gastrointestinal: No  tenderness.  Normal Bowel Sounds  Cardiovascular:  S1, S2 Irregular   Edema:   no edema  Acc Rt TDC     DATA:    CBC:  Lab Results   Component Value Date/Time    WBC 5.9 07/23/2024 04:21 AM    RBC 3.32 07/23/2024 04:21 AM    HGB 10.8 07/23/2024 04:21 AM    HCT 33.0 07/23/2024 04:21 AM    MCV 99.5 07/23/2024 04:21 AM    MCH 32.7 07/23/2024 04:21 AM    MCHC 32.9 07/23/2024 04:21 AM    RDW 19.0 07/23/2024 04:21 AM     07/23/2024 04:21 AM    MPV 9.0 07/23/2024 04:21 AM     CMP:  Lab Results   Component Value Date/Time     07/23/2024 04:21 AM    K 4.0 07/23/2024 04:21 AM    K 3.4 07/15/2024 04:45 AM    CL 93 07/23/2024 04:21 AM    CO2 26 07/23/2024 04:21 AM    BUN 28 07/23/2024 04:21 AM    CREATININE 3.6 07/23/2024 04:21 AM    GFRAA 12 02/18/2022 12:11 AM    AGRATIO 1.4 07/11/2024 08:34 PM    LABGLOM 18 07/23/2024 04:21 AM    LABGLOM 10 03/20/2024 10:13 AM    GLUCOSE 105 07/23/2024 04:21 AM    CALCIUM 10.0 07/23/2024 04:21 AM    BILITOT 0.8 07/22/2024 06:06 PM    ALKPHOS 119 07/22/2024 06:06 PM    AST 35 07/22/2024 06:06 PM    ALT 63 07/22/2024 06:06 PM      Hepatic Function Panel:   Lab Results   Component Value Date/Time    ALKPHOS 119 07/22/2024 06:06 PM    ALT 63 07/22/2024 06:06 PM    AST

## 2024-07-23 NOTE — PROGRESS NOTES
Facility/Department: [address] ICU   [facility] DYSPHAGIA THERAPY    Patient: Cal Sanchez   : 1960   MRN: [id number]    Treatment Date: 2024   Admitting Diagnosis:   Acute delirium [R41.0]  Elevated troponin [R79.89]  IV drug user [F19.90]  Bilateral pleural effusion [J90]  Atrial fibrillation with rapid ventricular response (HCC) [I48.91]  ESRD on hemodialysis (HCC) [N18.6, Z99.2]  Severe sepsis (HCC) [A41.9, R65.20]  Altered mental status, unspecified altered mental status type [R41.82]   has a past medical history of Atrial fibrillation (HCC), CAD (coronary artery disease) (4/3/2014), Chronic kidney disease, Depression, Diabetes mellitus (HCC), Dialysis patient (HCC), Glaucoma, Hemodialysis patient (HCC), Hyperlipidemia, Hypertension, RLL pneumonia (2018), and Sleep apnea.   has a past surgical history that includes Pacemaker insertion; pacemaker placement; Colonoscopy; Toe amputation (Left, 2023); and Toe amputation (Left, 2024).  Allergies: documentation of 'no known allergies'                                                             Baseline History/Prior Level of Function: Living Status: Lives with niece; dialysis MWF; no reports of dysphagia in the past; but pt unable to provide history information. No documented SLP dysphagia history in chart review of recent yr(s)    Onset: 2024       Chart Review:   H&P: Per MD History and Physical Documentation       Chief Complaint   Patient presents with    Diarrhea    Altered Mental Status       Pt's niece contacted EMS because pt has had multiple instances of diarrhea throughout the day and seems confused.  EMS reports pt A&O x 2; Pt admits to using cocaine IV this morning   64m withESRD MWF, IVDU, reported EtOH abuse, last seen normal 2 days ago, though noted to have attended yesterday's dialysis session, presents with altered mental status and shortness of breath. Patient has become more agitated, combative since presentation, however  signs/symptoms of penetration/aspiration with PO as well as slowly improving mental status, but there remains persistent concern for increased penetration/aspiration risk with PO r/t current mental status. patient with increasing oral holding and decreasing head/trunk control as PO trials progress and is not receptive to cueing  suspect potential for increased PO safety as/if mental and overall medical status continue to improve  Patient with belching with all PO trials; suspect underlying esophageal involvements; ETOH does predispose patient to potential for esophageal comorbidities  will continue to monitor for PO/MBS readiness.    Plan  Continue npo and NG tube feeding  Continue oral care  Continue SLP f/u 7/24/2024 for dysphagia tx and readiness for participation in instrumental MBSS as mentation clears    4.  MEDICAL OR COGNITIVE/BEHAVIORAL FACTORS WHICH CAN EXACERBATE: Medical DX; medical co-morbidities; mental status      Recommended Diet and Intervention 7/23/2024:  Diet Solids Recommendation:  Npo; NGT feeding per MD Liquid Consistency Recommendation:  Npo; NGT feeding per MD Recommended form of Meds: Via alternate means     Compensatory Swallowing Strategies:  Oral care    Dysphagia Therapeutic Intervention:  SHORT TERM DYSPHAGIA GOALS/PLAN OF CARE: Speech therapy for dysphagia tx 3-5 times/week; unless otherwise notified when mental status/medical status improves  Goals  The patient/caregiver will demonstrate understanding of compensatory strategies for improved swallowing safety with max cues: ongoing  The patient will improve oral motor function via therapeutic oral motor exercises to 5/5 each trial., : ongoing  The patient will demonstrate improved sensory/motor swallowing responses via neuromuscular sensory stimulation and/or pharyngolaryngeal ex.: ongoing      Dysphagia Prognosis: fair, guarded  Barriers to progress: Medical DX; medical co-morbidities; mental status    Discharge Recommendations:

## 2024-07-23 NOTE — CARE COORDINATION
Referral faxed to [facility] as requested by palliative care. Will most likely be LTC with Hospice. Niece and nephew are stating they can no longer care for the patient.     Angie ROWE RN  Case Management  [phone]    Electronically signed by Angie Abarca RN on 7/23/2024 at 5:20 PM

## 2024-07-23 NOTE — PROGRESS NOTES
07/21/24  0351 07/22/24  0442 07/23/24  0421    136 132*   K 4.1 4.2 4.0   CL 97* 97* 93*   CO2 26 25 26   PHOS 2.9 3.3 2.5   BUN 37* 50* 28*   CREATININE 4.3* 5.8* 3.6*     Recent Labs     07/22/24  1806   AST 35   ALT 63*   BILIDIR 0.4*   BILITOT 0.8   ALKPHOS 119     No results for input(s): \"LIPASE\", \"AMYLASE\" in the last 72 hours.  Recent Labs     07/22/24  1806   PROTIME 13.7   INR 1.03         Imaging  CT ABDOMEN PELVIS WO CONTRAST Additional Contrast? Oral   Final Result   1. Intramuscular hematoma of the right psoas muscle measures approximately   3.5 x 2.8 x 9.1 cm.   2. Cirrhotic liver morphology with splenomegaly and small volume ascites.   3. Moderate bilateral pleural effusions and adjacent atelectasis.   4. Cardiomegaly with three-vessel coronary artery disease.   5. Diffuse gas-filled mild dilation of the colon without evidence of   transition point, likely representing ileus.         XR CHEST PORTABLE   Final Result   Satisfactory placement of NG/Dobhoff tube.  No other significant interval   change.         XR ABDOMEN (KUB) (SINGLE AP VIEW)   Final Result   1. Multiple dilated loops of small bowel either due to a partial small bowel   obstruction or ileus.         XR CHEST PORTABLE   Final Result   Improving pulmonary edema and bilateral pleural effusions.      Stable lines and tubes.         XR CHEST PORTABLE   Final Result   Worsening right greater than left pleural effusions with hazy airspace   opacities dependently in the lungs most consistent with pulmonary edema.   Superimposed infection cannot be excluded.         XR CHEST PORTABLE   Final Result   Lines and tubes as above      Bilateral pleuroparenchymal disease, similar on the right left, but increased   on the right         CT ABDOMEN PELVIS W IV CONTRAST Additional Contrast? None   Final Result   1. Moderate bilateral pleural effusions.   2. Cirrhotic liver morphology with splenomegaly and small amount of free   fluid in the  disease.  Ammonia normal          If you have any questions or need any further information, please feel free to contact us [phone].  Thank you for allowing us to participate in the care of Cal Sanchez.    The note was completed using Dragon voice recognition transcription. Every effort was made to ensure accuracy; however, inadvertent transcription errors may be present despite my best efforts to edit errors.    Won ONOFRE

## 2024-07-23 NOTE — PROGRESS NOTES
Dr. Aguilar okay with porceeding with SLP PO trials despite possible ileus noted on CT. Attempted to call SLP to come evaluate pt but was instead sent to voicemail. Message left.

## 2024-07-23 NOTE — PLAN OF CARE
Problem: Discharge Planning  Goal: Discharge to home or other facility with appropriate resources  7/23/2024 1059 by Saundra Felix RN  Outcome: Progressing  Flowsheets (Taken 7/23/2024 0919)  Discharge to home or other facility with appropriate resources: Identify barriers to discharge with patient and caregiver     Problem: Pain  Goal: Verbalizes/displays adequate comfort level or baseline comfort level  7/23/2024 1059 by Saundra Felix RN  Outcome: Progressing     Problem: Safety - Adult  Goal: Free from fall injury  7/23/2024 1059 by Saundra Felix RN  Outcome: Progressing     Problem: Skin/Tissue Integrity  Goal: Absence of new skin breakdown  Description: 1.  Monitor for areas of redness and/or skin breakdown  2.  Assess vascular access sites hourly  3.  Every 4-6 hours minimum:  Change oxygen saturation probe site  4.  Every 4-6 hours:  If on nasal continuous positive airway pressure, respiratory therapy assess nares and determine need for appliance change or resting period.  7/23/2024 1059 by Saundra Felix RN  Outcome: Progressing     Problem: ABCDS Injury Assessment  Goal: Absence of physical injury  7/23/2024 1059 by Saundra Felix RN  Outcome: Progressing     Problem: Neurosensory - Adult  Goal: Achieves stable or improved neurological status  7/23/2024 1059 by Saundra Felix RN  Outcome: Progressing  Flowsheets (Taken 7/23/2024 0919)  Achieves stable or improved neurological status: Assess for and report changes in neurological status     Problem: Neurosensory - Adult  Goal: Absence of seizures  Recent Flowsheet Documentation  Taken 7/23/2024 0919 by Saundra Felix RN  Absence of seizures: Monitor for seizure activity.  If seizure occurs, document type and location of movements and any associated apnea     Problem: Neurosensory - Adult  Goal: Remains free of injury related to seizures activity  Recent Flowsheet Documentation  Taken 7/23/2024 0919 by Saundra Felix RN  Remains free of injury

## 2024-07-23 NOTE — PLAN OF CARE
Problem: Discharge Planning  Goal: Discharge to home or other facility with appropriate resources  7/23/2024 0204 by Kely Perez RN  Outcome: Progressing  Flowsheets (Taken 7/22/2024 1934)  Discharge to home or other facility with appropriate resources:   Identify barriers to discharge with patient and caregiver   Arrange for needed discharge resources and transportation as appropriate   Identify discharge learning needs (meds, wound care, etc)     Problem: Skin/Tissue Integrity  Goal: Absence of new skin breakdown  Description: 1.  Monitor for areas of redness and/or skin breakdown  2.  Assess vascular access sites hourly  3.  Every 4-6 hours minimum:  Change oxygen saturation probe site  4.  Every 4-6 hours:  If on nasal continuous positive airway pressure, respiratory therapy assess nares and determine need for appliance change or resting period.  7/23/2024 0204 by Kely Perez RN  Outcome: Not Progressing  Problem: Pain  Goal: Verbalizes/displays adequate comfort level or baseline comfort level  7/23/2024 0204 by Kely Perez RN  Outcome: Progressing  Flowsheets (Taken 7/22/2024 1934)  Verbalizes/displays adequate comfort level or baseline comfort level:   Encourage patient to monitor pain and request assistance   Assess pain using appropriate pain scale   Administer analgesics based on type and severity of pain and evaluate response   Implement non-pharmacological measures as appropriate and evaluate response     Problem: Safety - Adult  Goal: Free from fall injury  7/23/2024 0204 by Kely Perez RN  Outcome: Progressing  Flowsheets (Taken 7/23/2024 0204)  Free From Fall Injury: Instruct family/caregiver on patient safety     Problem: ABCDS Injury Assessment  Goal: Absence of physical injury  7/23/2024 0204 by Kely Perez RN  Outcome: Progressing  Flowsheets (Taken 7/22/2024 0158)  Absence of Physical Injury: Implement safety measures based on patient assessment     Problem: Neurosensory -

## 2024-07-23 NOTE — PROGRESS NOTES
Hospitalist Progress Note      PCP: Tania Moses MD    Date of Admission: 7/11/2024    Subjective: cont to be more awake and alert, still not taking PO    Medications:  Reviewed    Infusion Medications    sodium chloride Stopped (07/18/24 2149)    dextrose       Scheduled Medications    gabapentin  100 mg Oral Nightly    QUEtiapine  100 mg Oral Nightly    metoclopramide  5 mg IntraVENous Q6H    venlafaxine  150 mg Oral Daily with breakfast    lactulose  20 g Oral Daily    [Held by provider] buPROPion  75 mg Oral Q72H    sennosides-docusate sodium  2 tablet Oral BID    polyethylene glycol  17 g Oral Daily    buPROPion  75 mg Oral Q72H    balsum peru-castor oil   Topical BID    aspirin  81 mg Oral Daily    carvedilol  6.25 mg Oral BID WC    [Held by provider] cinacalcet  60 mg Oral Once per day on Mon Wed Fri    levothyroxine  25 mcg Oral Daily    vitamin D  50,000 Units Oral Q30 Days    sodium chloride flush  5-40 mL IntraVENous 2 times per day    [Held by provider] heparin (porcine)  5,000 Units SubCUTAneous 3 times per day    folic acid  1 mg Oral Daily    thiamine  100 mg IntraVENous Daily     PRN Meds: acetaminophen **OR** acetaminophen, oxyCODONE-acetaminophen, sodium chloride flush, sodium chloride, ondansetron **OR** ondansetron, LORazepam **OR** LORazepam **OR** LORazepam **OR** LORazepam **OR** LORazepam **OR** LORazepam **OR** LORazepam **OR** LORazepam, labetalol, hydrALAZINE, heparin (porcine), glucose, dextrose bolus **OR** dextrose bolus, glucagon (rDNA), dextrose      Intake/Output Summary (Last 24 hours) at 7/23/2024 1338  Last data filed at 7/23/2024 0919  Gross per 24 hour   Intake 748 ml   Output --   Net 748 ml       Physical Exam Performed:    BP (!) 153/89   Pulse 84   Temp 98.2 °F (36.8 °C) (Axillary)   Resp 15   Ht 1.778 m (5' 10\")   Wt 79.9 kg (176 lb 2.4 oz)   SpO2 98%   BMI 25.27 kg/m²     General appearance: anxious, awake, oriented to self and time, not to

## 2024-07-23 NOTE — PROGRESS NOTES
NAME:  Cal Sanchez  YOB: 1960  MEDICAL RECORD NUMBER:  [id number]    Shift Summary: Pt AAOx1 (self only) and follow commands. On RA. No bowel movements overnight. Tube feed held and reglan ordered, see other note. CT A/P completed.    Family updated: No    Rhythm: Atrial Fibrillation     Most recent vitals:   Visit Vitals  /81   Pulse 89   Temp 98.3 °F (36.8 °C) (Oral)   Resp 29   Ht 1.778 m (5' 10\")   Wt 79.9 kg (176 lb 2.4 oz)   SpO2 94%   BMI 25.27 kg/m²           No data found.    No data found.      Respiratory support needed (if any):  - RA    Admission weight Weight - Scale: 85.1 kg (187 lb 9.8 oz) (07/11/24 2219)    Today's weight    Wt Readings from Last 1 Encounters:   07/23/24 79.9 kg (176 lb 2.4 oz)        Tracy need assessed each shift: N/A - no tracy present  UOP >30ml/hr: NO - Anuric, HD MWF  Last documented BM (in last 48 hrs):  No data found.             Restraints (in use currently or dc'd in last 12 hrs): No    Order current and documentation up to date? N/A    Lines/Drains reviewed @ bedside.  Peripheral IV 07/21/24 Right;Upper Arm (Active)   Number of days: 1       Tunneled Hemodialysis Catheter Right Subclavian (Active)   Number of days:        Hemodialysis (linkable) Arteriovenous fistula Left Arm (Active)   Number of days:          Drip rates at handoff:    sodium chloride Stopped (07/18/24 2149)    dextrose         Lab Data:   CBC:   Recent Labs     07/22/24  0442 07/23/24 0421   WBC 5.4 5.9   HGB 10.7* 10.8*   HCT 31.4* 33.0*   MCV 99.7 99.5   * 150     BMP:    Recent Labs     07/22/24 0442 07/23/24 0421    132*   K 4.2 4.0   CO2 25 26   BUN 50* 28*   CREATININE 5.8* 3.6*     LIVR:   Recent Labs     07/22/24 1806   AST 35   ALT 63*     PT/INR:   Recent Labs     07/22/24 1806   INR 1.03     APTT: No results for input(s): \"APTT\" in the last 72 hours.  ABG: No results for input(s): \"PHART\", \"[id number]\", \"PO2ART\" in the last 72 hours.    Any consults

## 2024-07-23 NOTE — PROGRESS NOTES
Lorrie Pugh NP notified of CT A/P results. New orders received from MARY JANE Pugh NP to hold tube feed and to start reglan. See new orders.

## 2024-07-23 NOTE — CONSULTS
[facility]  Palliative Care   Consult Note    NAME:  Cal Sanchez  MEDICAL RECORD NUMBER:  [id number]  AGE: 64 y.o.   GENDER: male  : 1960  TODAY'S DATE:  2024    Subjective     Reason for Consult:  goals of care, hospice discussion, and code status   Visit Type: Initial Consult      Cal Sanchez is a 64 y.o. male referred by:   [x] Physician    PAST MEDICAL HISTORY      Diagnosis Date    Atrial fibrillation (HCC)     CAD (coronary artery disease) 4/3/2014    Chronic kidney disease     Depression     Diabetes mellitus (HCC)     Dialysis patient (HCC)     left upper arm fistula    Glaucoma     Hemodialysis patient (HCC)     Hyperlipidemia     Hypertension     RLL pneumonia 2018    Sleep apnea     uses CPAP       PAST SURGICAL HISTORY  Past Surgical History:   Procedure Laterality Date    COLONOSCOPY      PACEMAKER INSERTION      PACEMAKER PLACEMENT      TOE AMPUTATION Left 2023    AMPUTATION SECOND AND THIRD DIGITS LEFT FOOT performed by Horace Jordan DPM at [facility] OR    TOE AMPUTATION Left 2024    LEFT HALLUX AMPUTATION performed by Horace Jordan DPM at [facility] OR       FAMILY HISTORY  Family History   Problem Relation Age of Onset    Heart Disease Father     High Blood Pressure Sister        SOCIAL HISTORY  Social History     Tobacco Use    Smoking status: Never    Smokeless tobacco: Never   Substance Use Topics    Alcohol use: Not Currently     Alcohol/week: 2.0 standard drinks of alcohol     Types: 2 Cans of beer per week     Comment: once monthly    Drug use: Yes     Types: Marijuana (Weed), Opiates        ALLERGIES  No Known Allergies    MEDICATIONS  No current facility-administered medications on file prior to encounter.     Current Outpatient Medications on File Prior to Encounter   Medication Sig Dispense Refill    levothyroxine (SYNTHROID) 25 MCG tablet Take 1 tablet by mouth Daily      vitamin D (ERGOCALCIFEROL) 1.25 MG (60930 UT) CAPS capsule Take 1 capsule

## 2024-07-23 NOTE — PROGRESS NOTES
Occupational Therapy  Facility/Department: [address] ICU  Occupational Therapy Daily Treatment    Name: Cal Sanchez  : 1960  MRN: [id number]  Date of Service: 2024    Discharge Recommendations:  3-5 sessions per week, Patient would benefit from continued therapy after discharge  OT Equipment Recommendations  Other: TBD by d/c site  Cal Sanchez scored a 8/24 on the AM-PAC ADL Inpatient form. Current research shows that an AM-PAC score of 17 or less is typically not associated with a discharge to the patient's home setting. Based on the patient's AM-PAC score and their current ADL deficits, it is recommended that the patient have 3-5 sessions per week of Occupational Therapy at d/c to increase the patient's independence.  Please see assessment section for further patient specific details.    If patient discharges prior to next session this note will serve as a discharge summary.  Please see below for the latest assessment towards goals.       Patient Diagnosis(es): The primary encounter diagnosis was Severe sepsis (HCC). Diagnoses of IV drug user, Altered mental status, unspecified altered mental status type, Bilateral pleural effusion, Atrial fibrillation with rapid ventricular response (HCC), Elevated troponin, ESRD on hemodialysis (HCC), and Cardiomyopathy, unspecified type (HCC) were also pertinent to this visit.  Past Medical History:  has a past medical history of Atrial fibrillation (HCC), CAD (coronary artery disease), Chronic kidney disease, Depression, Diabetes mellitus (HCC), Dialysis patient (HCC), Glaucoma, Hemodialysis patient (HCC), Hyperlipidemia, Hypertension, RLL pneumonia, and Sleep apnea.  Past Surgical History:  has a past surgical history that includes Pacemaker insertion; pacemaker placement; Colonoscopy; Toe amputation (Left, 2023); and Toe amputation (Left, 2024).    Treatment Diagnosis: decreased endurance/balance/ROM/cognition      Assessment   Performance deficits  on Niece or Medical Transport.)  Patient's  Info: medical transportation or niece takes him  Occupation: On disability  Leisure & Hobbies: Watching tv and reading the paper  Additional Comments: Above info pertains to info provided from prior admit 2/2024.  Pt in/out SNF since that admit.       Objective   Safety Devices  Type of Devices: Bed alarm in place;Call light within reach;Left in bed;Nurse notified  Restraints  Restraints Initially in Place: No           ADL  LE Dressing: Dependent/Total  LE Dressing Skilled Clinical Factors: Total A to don non slip socks while supine in bed  Additional Comments: Pt too fatigued to complete further ADLs this date  Skin Care: N/A     Activity Tolerance  Activity Tolerance: Treatment limited secondary to decreased cognition;Patient limited by endurance;Patient limited by fatigue  Bed mobility  Rolling to Left: Maximum assistance  Rolling to Right: Maximum assistance  Supine to Sit: Maximum assistance  Sit to Supine: Maximum assistance  Scooting: Dependent/Total;2 Person assistance  Bed Mobility Comments: HOB elevated, pt showing initiation for OOB, but immediately laid head back down on pillow. Max A and cues to complete bed mob  Transfers  Sit to stand: Maximum assistance;2 Person assistance  Stand to sit: Maximum assistance;2 Person assistance  Transfer Comments: Pt attempting/completing x3 fxl transfers to/from stedy. First transfer, pt only able to achieve partial clearance from EOB w/ Max Ax2. He then completed x2 fxl transfers to stedy w/ assist of another in order to engage/disengage stedy seats. Pt requiring verbal cues to remain upright while in stedy. Cues for safe hand placement  Vision  Vision: Within Functional Limits  Hearing  Hearing: Within functional limits  Cognition  Overall Cognitive Status: Exceptions  Arousal/Alertness: Delayed responses to stimuli  Following Commands: Inconsistently follows commands  Attention Span: Attends with cues to

## 2024-07-23 NOTE — PROGRESS NOTES
Comprehensive Nutrition Assessment    Type and Reason for Visit:  Reassess    Nutrition Recommendations/Plan:   Restart Nepro 1.8 @ goal rate of 50 ml/hr + 1 banatrol daily when appropriate per provider     Malnutrition Assessment:  Malnutrition Status:  At risk for malnutrition (Comment) (07/17/24 1258)    Context:  Acute Illness       Nutrition Assessment:    Follow up. Pt remains on tube feeding via ngt. Pt w/ dysphagia, potential PEG placement. Pt on dialysis. Nepro 1.8 ordered w goal rate of 50 ml/hr. Pt previosuly noted to have distension and constipation. Fiber modular added, still no bowel movements. Tube feeding held and reglan ordered. Current tube feeding regimen remains approrpiate for pt needs. Restart when approrpiate per provider.    Nutrition Related Findings:    120 glucose, 132 Na, 7/15 BM Wound Type: Stage I, Pressure Injury       Current Nutrition Intake & Therapies:    Average Meal Intake: NPO  Average Supplements Intake: NPO  Diet NPO  ADULT TUBE FEEDING; Orogastric; Renal Formula; Continuous; 20; Yes; 10; Q 4 hours; 50; 30; Q 4 hours; Fiber; 1 Dose; Daily  Current Tube Feeding (TF) Orders:  Goal TF & Flush Orders Provides: Nepro 1.8 @ 50 ml/hr (Calculated over 20 hrs provides 1L, 1800 kcals, 81 g protein, 727 ml free water)    Anthropometric Measures:  Height: 177.8 cm (5' 10\")  Ideal Body Weight (IBW): 166 lbs (75 kg)       Current Body Weight: 83.7 kg (184 lb 8.4 oz), 111.2 % IBW. Weight Source: Bed Scale  Current BMI (kg/m2): 26.5                          BMI Categories: Overweight (BMI 25.0-29.9)    Estimated Daily Nutrient Needs:  Energy Requirements Based On: Kcal/kg  Weight Used for Energy Requirements: Current  Energy (kcal/day): 1674-2093kcal (20-25kcal/kg)  Weight Used for Protein Requirements: Ideal  Protein (g/day): 76-106g (1-1.4g/kg X IBW 75.5kg)  Method Used for Fluid Requirements: 1 ml/kcal (or per provider)  Fluid (ml/day):      Nutrition Diagnosis:   Inadequate oral intake  related to swallowing difficulty as evidenced by nutrition support - enteral nutrition    Nutrition Interventions:   Food and/or Nutrient Delivery: Continue Current Tube Feeding  Nutrition Education/Counseling: Education not indicated  Coordination of Nutrition Care: Continue to monitor while inpatient       Goals:  Previous Goal Met: Progressing toward Goal(s)  Goals: Meet at least 75% of estimated needs, by next RD assessment, Tolerate nutrition support at goal rate       Nutrition Monitoring and Evaluation:   Behavioral-Environmental Outcomes: None Identified  Food/Nutrient Intake Outcomes: Enteral Nutrition Intake/Tolerance  Physical Signs/Symptoms Outcomes: Biochemical Data, Weight, Nutrition Focused Physical Findings, GI Status    Discharge Planning:    No discharge needs at this time     Speedy Blanton RD  Contact: [phone]

## 2024-07-23 NOTE — PROGRESS NOTES
Speech Therapy note regarding Dysphagia     7/23/2024 dysphagia tx and re-assess po readiness or MBSS readiness attempt at 1320 pm but held due to medical    CT abdomen noted. RN reporting TF held due to concern for ileus.     Plan: re-attempt 7/24/2024 for ongoing assess of mental status and oropharyngeal swallow and readiness for MBSS more objective instrumental assessment of pharyngeal swallow  .     Signed  Reba CobbMS,CCC,SLP [id number]  Speech and Language Pathologist  7/23/2024 1326 pm

## 2024-07-23 NOTE — PROGRESS NOTES
Physical Therapy  Facility/Department: [address] ICU  Physical Therapy Treatment Note    Name: Cal Sanchez  : 1960  MRN: [id number]  Date of Service: 2024    Discharge Recommendations:  Continue to assess pending progress, Subacute/Skilled Nursing Facility   PT Equipment Recommendations  Other: Defer to next level of care.      Cal Sanchez scored a 9/24 on the AM-PAC short mobility form. Current research shows that an AM-PAC score of 17 or less is typically not associated with a discharge to the patient's home setting. Based on the patient's AM-PAC score and their current functional mobility deficits, it is recommended that the patient have 3-5 sessions per week of Physical Therapy at d/c to increase the patient's independence.  Please see assessment section for further patient specific details.    If patient discharges prior to next session this note will serve as a discharge summary.  Please see below for the latest assessment towards goals.      Assessment   Body Structures, Functions, Activity Limitations Requiring Skilled Therapeutic Intervention: Decreased functional mobility ;Decreased strength;Decreased safe awareness;Decreased cognition;Decreased endurance;Decreased balance  Assessment: 63 y/o male admit 2024 with Altered Mental Status, A-Fib with RVR, Severe Sepsis, B Pleural Effusions. CT Head : negative. 2024 CT Abd : R Psoas Hematoma, Mod Bilat Pleural Effusions. PMH as noted including CAD, A-Fib with RVR, Pacemaker, CKD (HD), IVDU, ETOH Abuse, L Toe Amp (2024). PTA pt living with family in house with few steps to enter, unsure extent of assist/support for daily care and functional activities.  Pt confused, unable to provide this info.  Pt currently appears alittle improved and able to participate with brief oob activities today. Pt requiring Max assist for bed mob and Max assist x 2 for Transfers via [employer].  At this time, would recommend cont PT (3-5) upon d/c.  Will cont

## 2024-07-24 LAB
ALBUMIN SERPL-MCNC: 3.2 G/DL (ref 3.4–5)
ANION GAP SERPL CALCULATED.3IONS-SCNC: 14 MMOL/L (ref 3–16)
BUN SERPL-MCNC: 41 MG/DL (ref 7–20)
CALCIUM SERPL-MCNC: 10.9 MG/DL (ref 8.3–10.6)
CHLORIDE SERPL-SCNC: 93 MMOL/L (ref 99–110)
CO2 SERPL-SCNC: 25 MMOL/L (ref 21–32)
CREAT SERPL-MCNC: 4.9 MG/DL (ref 0.8–1.3)
DEPRECATED RDW RBC AUTO: 18.3 % (ref 12.4–15.4)
GFR SERPLBLD CREATININE-BSD FMLA CKD-EPI: 12 ML/MIN/{1.73_M2}
GLUCOSE BLD-MCNC: 102 MG/DL (ref 70–99)
GLUCOSE BLD-MCNC: 106 MG/DL (ref 70–99)
GLUCOSE BLD-MCNC: 117 MG/DL (ref 70–99)
GLUCOSE BLD-MCNC: 87 MG/DL (ref 70–99)
GLUCOSE BLD-MCNC: 93 MG/DL (ref 70–99)
GLUCOSE BLD-MCNC: 99 MG/DL (ref 70–99)
GLUCOSE SERPL-MCNC: 107 MG/DL (ref 70–99)
HCT VFR BLD AUTO: 34.4 % (ref 40.5–52.5)
HGB BLD-MCNC: 11.7 G/DL (ref 13.5–17.5)
MCH RBC QN AUTO: 33.7 PG (ref 26–34)
MCHC RBC AUTO-ENTMCNC: 34.1 G/DL (ref 31–36)
MCV RBC AUTO: 98.7 FL (ref 80–100)
PERFORMED ON: ABNORMAL
PERFORMED ON: NORMAL
PHOSPHATE SERPL-MCNC: 4.1 MG/DL (ref 2.5–4.9)
PLATELET # BLD AUTO: 171 K/UL (ref 135–450)
PMV BLD AUTO: 8.8 FL (ref 5–10.5)
POTASSIUM SERPL-SCNC: 4.7 MMOL/L (ref 3.5–5.1)
RBC # BLD AUTO: 3.48 M/UL (ref 4.2–5.9)
SODIUM SERPL-SCNC: 132 MMOL/L (ref 136–145)
WBC # BLD AUTO: 6.9 K/UL (ref 4–11)

## 2024-07-24 PROCEDURE — 36415 COLL VENOUS BLD VENIPUNCTURE: CPT

## 2024-07-24 PROCEDURE — 80069 RENAL FUNCTION PANEL: CPT

## 2024-07-24 PROCEDURE — 6370000000 HC RX 637 (ALT 250 FOR IP): Performed by: HOSPITALIST

## 2024-07-24 PROCEDURE — 85027 COMPLETE CBC AUTOMATED: CPT

## 2024-07-24 PROCEDURE — 90935 HEMODIALYSIS ONE EVALUATION: CPT

## 2024-07-24 PROCEDURE — 6370000000 HC RX 637 (ALT 250 FOR IP): Performed by: REGISTERED NURSE

## 2024-07-24 PROCEDURE — 6370000000 HC RX 637 (ALT 250 FOR IP): Performed by: INTERNAL MEDICINE

## 2024-07-24 PROCEDURE — 94760 N-INVAS EAR/PLS OXIMETRY 1: CPT

## 2024-07-24 PROCEDURE — 92526 ORAL FUNCTION THERAPY: CPT

## 2024-07-24 PROCEDURE — 2060000000 HC ICU INTERMEDIATE R&B

## 2024-07-24 PROCEDURE — 97129 THER IVNTJ 1ST 15 MIN: CPT

## 2024-07-24 RX ORDER — MORPHINE SULFATE 20 MG/ML
4 SOLUTION ORAL EVERY 4 HOURS PRN
Status: DISCONTINUED | OUTPATIENT
Start: 2024-07-24 | End: 2024-07-25 | Stop reason: HOSPADM

## 2024-07-24 RX ORDER — LORAZEPAM 0.5 MG/1
0.5 TABLET ORAL EVERY 4 HOURS PRN
Status: DISCONTINUED | OUTPATIENT
Start: 2024-07-24 | End: 2024-07-25 | Stop reason: HOSPADM

## 2024-07-24 RX ORDER — MORPHINE SULFATE 10 MG/5ML
4 SOLUTION ORAL EVERY 4 HOURS PRN
Status: DISCONTINUED | OUTPATIENT
Start: 2024-07-24 | End: 2024-07-24

## 2024-07-24 RX ORDER — METOCLOPRAMIDE 10 MG/1
10 TABLET ORAL
Status: DISCONTINUED | OUTPATIENT
Start: 2024-07-24 | End: 2024-07-25 | Stop reason: HOSPADM

## 2024-07-24 RX ADMIN — QUETIAPINE FUMARATE 50 MG: 25 TABLET ORAL at 08:51

## 2024-07-24 RX ADMIN — CASTOR OIL AND BALSAM, PERU: 788; 87 OINTMENT TOPICAL at 08:52

## 2024-07-24 RX ADMIN — FOLIC ACID 1 MG: 1 TABLET ORAL at 08:52

## 2024-07-24 RX ADMIN — METOCLOPRAMIDE 10 MG: 10 TABLET ORAL at 21:24

## 2024-07-24 RX ADMIN — QUETIAPINE FUMARATE 50 MG: 25 TABLET ORAL at 21:24

## 2024-07-24 RX ADMIN — LORAZEPAM 0.5 MG: 0.5 TABLET ORAL at 17:39

## 2024-07-24 RX ADMIN — LEVOTHYROXINE SODIUM 25 MCG: 0.03 TABLET ORAL at 05:41

## 2024-07-24 RX ADMIN — ACETAMINOPHEN 325MG 650 MG: 325 TABLET ORAL at 05:43

## 2024-07-24 RX ADMIN — LACTULOSE 20 G: 10 SOLUTION ORAL at 08:54

## 2024-07-24 RX ADMIN — SENNOSIDES AND DOCUSATE SODIUM 2 TABLET: 50; 8.6 TABLET ORAL at 08:54

## 2024-07-24 RX ADMIN — METOCLOPRAMIDE 10 MG: 10 TABLET ORAL at 05:41

## 2024-07-24 RX ADMIN — POLYETHYLENE GLYCOL 3350 17 G: 17 POWDER, FOR SOLUTION ORAL at 08:54

## 2024-07-24 RX ADMIN — CARVEDILOL 6.25 MG: 6.25 TABLET, FILM COATED ORAL at 17:39

## 2024-07-24 RX ADMIN — VENLAFAXINE HYDROCHLORIDE 150 MG: 75 CAPSULE, EXTENDED RELEASE ORAL at 08:51

## 2024-07-24 RX ADMIN — CASTOR OIL AND BALSAM, PERU: 788; 87 OINTMENT TOPICAL at 21:27

## 2024-07-24 RX ADMIN — CARVEDILOL 6.25 MG: 6.25 TABLET, FILM COATED ORAL at 08:52

## 2024-07-24 RX ADMIN — METOCLOPRAMIDE 10 MG: 10 TABLET ORAL at 17:39

## 2024-07-24 RX ADMIN — GABAPENTIN 100 MG: 100 CAPSULE ORAL at 21:24

## 2024-07-24 RX ADMIN — ASPIRIN 81 MG: 81 TABLET, CHEWABLE ORAL at 08:51

## 2024-07-24 ASSESSMENT — PAIN DESCRIPTION - DESCRIPTORS: DESCRIPTORS: ACHING

## 2024-07-24 ASSESSMENT — PAIN DESCRIPTION - ORIENTATION: ORIENTATION: LOWER;MID

## 2024-07-24 ASSESSMENT — PAIN SCALES - GENERAL: PAINLEVEL_OUTOF10: 8

## 2024-07-24 ASSESSMENT — PAIN DESCRIPTION - LOCATION: LOCATION: BACK

## 2024-07-24 NOTE — CONSULTS
[facility]    Met with patient's niece Junie and brother Wei regarding plan for patient. Family would like to stop dialysis at this time and pursue hospice at LTC facility. PEG no longer necessary for family as they would not like to prolong his decline.     I will reach out to  about LTC placement. If patient starts to exhibit acute symptoms during placement, I can reassess for Inpatient eligibility.     Thank you for the referral,  Radha Porter  [facility] RN  [phone]

## 2024-07-24 NOTE — PLAN OF CARE
Problem: Discharge Planning  Goal: Discharge to home or other facility with appropriate resources  7/24/2024 0011 by Raghavendra Silverman RN  Outcome: Progressing  Flowsheets (Taken 7/24/2024 0011)  Discharge to home or other facility with appropriate resources: Identify barriers to discharge with patient and caregiver     Problem: Pain  Goal: Verbalizes/displays adequate comfort level or baseline comfort level  7/23/2024 1059 by Saundra Felix RN  Outcome: Progressing     Problem: Safety - Adult  Goal: Free from fall injury  7/24/2024 0011 by Raghavendra Silverman RN  Outcome: Progressing  Flowsheets (Taken 7/24/2024 0011)  Free From Fall Injury: Instruct family/caregiver on patient safety     Problem: Skin/Tissue Integrity  Goal: Absence of new skin breakdown  Description: 1.  Monitor for areas of redness and/or skin breakdown  2.  Assess vascular access sites hourly  3.  Every 4-6 hours minimum:  Change oxygen saturation probe site  4.  Every 4-6 hours:  If on nasal continuous positive airway pressure, respiratory therapy assess nares and determine need for appliance change or resting period.  7/24/2024 0011 by Raghavendra Silverman RN  Outcome: Progressing  Note: Turn q2h; apply wound Tx per order.     Problem: ABCDS Injury Assessment  Goal: Absence of physical injury  7/24/2024 0011 by Raghavendra Silverman RN  Outcome: Progressing  Flowsheets (Taken 7/24/2024 0011)  Absence of Physical Injury: Implement safety measures based on patient assessment

## 2024-07-24 NOTE — PROGRESS NOTES
Resting in bed. Turned and repositioned. Reminded to not pull at NG tube. MD made aware of loss of IV access. Will continue to monitor.

## 2024-07-24 NOTE — PROGRESS NOTES
Treatment time: 3.5 hours  Net UF: 1500 ml     Pre weight: 82.2 kg  Post weight:80.7 kg    Access used: cvc    Access function: good with  ml/min     Medications or blood products given: na     Regular outpatient schedule: mwf     Summary of response to treatment: Patient tolerated treatment well and without any complications. Patient remained stable throughout entire treatment. Copy of dialysis treatment record placed in chart, to be scanned into EMR.

## 2024-07-24 NOTE — PROGRESS NOTES
Hospitalist Progress Note      PCP: Tania Moses MD    Date of Admission: 7/11/2024    Subjective: pt cont to be pleasantly confused, transferred out of ICU, awaiting hospice eval    Medications:  Reviewed    Infusion Medications    sodium chloride Stopped (07/18/24 2149)    dextrose       Scheduled Medications    metoclopramide  10 mg Oral 4x Daily AC & HS    QUEtiapine  50 mg Oral BID    gabapentin  100 mg Oral Nightly    [Held by provider] metoclopramide  5 mg IntraVENous Q6H    venlafaxine  150 mg Oral Daily with breakfast    lactulose  20 g Oral Daily    [Held by provider] buPROPion  75 mg Oral Q72H    [Held by provider] sennosides-docusate sodium  2 tablet Oral BID    [Held by provider] polyethylene glycol  17 g Oral Daily    buPROPion  75 mg Oral Q72H    balsum peru-castor oil   Topical BID    aspirin  81 mg Oral Daily    carvedilol  6.25 mg Oral BID WC    [Held by provider] cinacalcet  60 mg Oral Once per day on Mon Wed Fri    levothyroxine  25 mcg Oral Daily    vitamin D  50,000 Units Oral Q30 Days    sodium chloride flush  5-40 mL IntraVENous 2 times per day    [Held by provider] heparin (porcine)  5,000 Units SubCUTAneous 3 times per day    folic acid  1 mg Oral Daily    thiamine  100 mg IntraVENous Daily     PRN Meds: LORazepam, morphine, acetaminophen **OR** acetaminophen, oxyCODONE-acetaminophen, sodium chloride flush, sodium chloride, ondansetron **OR** ondansetron, labetalol, hydrALAZINE, heparin (porcine), glucose, dextrose bolus **OR** dextrose bolus, glucagon (rDNA), dextrose      Intake/Output Summary (Last 24 hours) at 7/24/2024 1514  Last data filed at 7/23/2024 2135  Gross per 24 hour   Intake 10 ml   Output --   Net 10 ml       Physical Exam Performed:    BP (!) 156/94   Pulse 86   Temp 98 °F (36.7 °C) (Oral)   Resp 16   Ht 1.778 m (5' 10\")   Wt 82.2 kg (181 lb 3.5 oz)   SpO2 99%   BMI 26.00 kg/m²       General appearance: anxious, awake  Lungs: diminished  cont meds PO crushed in arely Aguilar MD

## 2024-07-24 NOTE — PROGRESS NOTES
improve  Patient with belching with all PO trials; suspect underlying esophageal involvements; ETOH does predispose patient to potential for esophageal comorbidities  will continue to monitor for PO/MBS readiness pending goals/plan of care.    Plan  Continue npo; if NPO is declined and PO diet is initiated, patient will remain at risk for penetration/aspiration d/t mental status, but may be most optimal with puree diet texture with thin liquids?  Continue oral care  Family considering pursuing hospice care; ST to follow up pending goals/plan of care    4.  MEDICAL OR COGNITIVE/BEHAVIORAL FACTORS WHICH CAN EXACERBATE: Medical DX; medical co-morbidities; mental status      Recommended Diet and Intervention 7/24/2024:  Diet Solids Recommendation:  Npo; NGT feeding per MD Liquid Consistency Recommendation:  Npo; NGT feeding per MD Recommended form of Meds: Via alternate means     Compensatory Swallowing Strategies:  Oral care    Dysphagia Therapeutic Intervention:  SHORT TERM DYSPHAGIA GOALS/PLAN OF CARE: Speech therapy for dysphagia tx 3-5 times/week; unless otherwise notified when mental status/medical status improves  Goals  The patient/caregiver will demonstrate understanding of compensatory strategies for improved swallowing safety with max cues: ongoing  The patient will improve oral motor function via therapeutic oral motor exercises to 5/5 each trial., : ongoing  The patient will demonstrate improved sensory/motor swallowing responses via neuromuscular sensory stimulation and/or pharyngolaryngeal ex.: ongoing      Dysphagia Prognosis: fair, guarded  Barriers to progress: Medical DX; medical co-morbidities; mental status    Discharge Recommendations: pending plan of care, continued while on acute medical floor and at d/c      THERAPY DATA  Pain:  Did not state    Vision: Pt unable to provide history information; maintains eye contact  Hearing: Pt unable to provide history information; pt response to name and  localizing bilaterally to name    Cognitive/behavioral/communication:  Pt oriented to self and 'hospital' but not to date or course of admit or reason for admit. Follows one step commands inconsistently. confusion    Dentition: Edentulous  Vocal Quality:  Weak  Volitional Cough: Weak; inconsistently elicited volitional cough weak/wet  Volitional Swallow: Absent    Patient Positioning: Upright in bed  ~70 to 80 degrees; reduced trunk control as session progresses    PO Trials: oral care completed   Ice chips x5 : oral holding; weak swallow; no overt signs/symptoms of penetration/aspiration  IDDSI 0 (thin) x5 : oral holding; weak swallow; no overt signs/symptoms of penetration/aspiration  IDDSI 2 (mildly thick) x5 : oral holding; weak swallow; no overt signs/symptoms of penetration/aspiration  IDDSI 3 (moderately thick) x5 : oral holding; weak swallow; no overt signs/symptoms of penetration/aspiration  IDDSI Puree x5 : oral holding; weak swallow; no overt signs/symptoms of penetration/aspiration    Dysphagia Tx:   Direct Dysphagia tx: ongoing assessment for po readiness or instrumental readiness. Slowly progressing to MBS/PO readiness   Dysphagia ex: unable to condition pt to dysphagia dx or dysphagia strategies.   Training in compensatory strategies: ongoing pt ed  Pt response to ex/training: Pt with no demonstrated understanding      Eating Assistance: Dependent       EDUCATION:   Provided education regarding role of SLP, results of assessment, recommendations and general speech pathology plan of care.   Patient Response: Pt requires ongoing learning, No evidence of comprehension  Family education: Family not present/unavailable  Discussed with RN (Saundra) and messageleiws ASHLEY (Jeff)    If patient discharges prior to next visit, this note will serve as discharge.     Timed Code Minutes: 10  Total Treatment Minutes: 30    Electronically signed by:    Landy Vela MA, CCC-SLP, #[id number]  Speech-Language

## 2024-07-24 NOTE — PROGRESS NOTES
[facility]  Phone: [phone]  Fax: [phone]  [employer]     SUBJECTIVE:  We are following this patient for ESRD management.    HPI:  Breathing OK denies SOB   ROS:  In bed.  Undergoing HD at this time Palliative care was consulted and hospice meeting scheduled   PMFSH:  medications reviewed.    Physical Exam:    VITALS:  BP (!) 156/94   Pulse 86   Temp 98 °F (36.7 °C) (Oral)   Resp 16   Ht 1.778 m (5' 10\")   Wt 82.2 kg (181 lb 3.5 oz)   SpO2 99%   BMI 26.00 kg/m²   24HR INTAKE/OUTPUT:    Intake/Output Summary (Last 24 hours) at 7/24/2024 1401  Last data filed at 7/23/2024 2135  Gross per 24 hour   Intake 10 ml   Output --   Net 10 ml     Constitutional: NAD   Respiratory:  clear  Gastrointestinal: No  tenderness.  Normal Bowel Sounds  Cardiovascular:  S1, S2 Irregular   Edema:   no edema  Acc Rt TDC     DATA:    CBC:  Lab Results   Component Value Date/Time    WBC 6.9 07/24/2024 05:41 AM    RBC 3.48 07/24/2024 05:41 AM    HGB 11.7 07/24/2024 05:41 AM    HCT 34.4 07/24/2024 05:41 AM    MCV 98.7 07/24/2024 05:41 AM    MCH 33.7 07/24/2024 05:41 AM    MCHC 34.1 07/24/2024 05:41 AM    RDW 18.3 07/24/2024 05:41 AM     07/24/2024 05:41 AM    MPV 8.8 07/24/2024 05:41 AM     CMP:  Lab Results   Component Value Date/Time     07/24/2024 05:41 AM    K 4.7 07/24/2024 05:41 AM    K 3.4 07/15/2024 04:45 AM    CL 93 07/24/2024 05:41 AM    CO2 25 07/24/2024 05:41 AM    BUN 41 07/24/2024 05:41 AM    CREATININE 4.9 07/24/2024 05:41 AM    GFRAA 12 02/18/2022 12:11 AM    AGRATIO 1.4 07/11/2024 08:34 PM    LABGLOM 12 07/24/2024 05:41 AM    LABGLOM 10 03/20/2024 10:13 AM    GLUCOSE 107 07/24/2024 05:41 AM    CALCIUM 10.9 07/24/2024 05:41 AM    BILITOT 0.8 07/22/2024 06:06 PM    ALKPHOS 119 07/22/2024 06:06 PM    AST 35 07/22/2024 06:06 PM    ALT 63 07/22/2024 06:06 PM      Hepatic Function Panel:   Lab Results   Component Value Date/Time    ALKPHOS 119 07/22/2024 06:06 PM    ALT 63

## 2024-07-24 NOTE — CONSULTS
[facility]    Set up meeting with patient's niece Junie today at 2PM. Will follow up afterward.     Thank you for the referral,  Radha Porter  [facility] RN  [phone]

## 2024-07-25 VITALS
SYSTOLIC BLOOD PRESSURE: 165 MMHG | RESPIRATION RATE: 18 BRPM | DIASTOLIC BLOOD PRESSURE: 90 MMHG | TEMPERATURE: 97.4 F | HEART RATE: 95 BPM | WEIGHT: 180.78 LBS | HEIGHT: 70 IN | BODY MASS INDEX: 25.88 KG/M2 | OXYGEN SATURATION: 92 %

## 2024-07-25 LAB
ALBUMIN SERPL-MCNC: 3.2 G/DL (ref 3.4–5)
ANION GAP SERPL CALCULATED.3IONS-SCNC: 16 MMOL/L (ref 3–16)
BUN SERPL-MCNC: 25 MG/DL (ref 7–20)
CALCIUM SERPL-MCNC: 10.2 MG/DL (ref 8.3–10.6)
CHLORIDE SERPL-SCNC: 93 MMOL/L (ref 99–110)
CO2 SERPL-SCNC: 24 MMOL/L (ref 21–32)
CREAT SERPL-MCNC: 3.5 MG/DL (ref 0.8–1.3)
DEPRECATED RDW RBC AUTO: 18.3 % (ref 12.4–15.4)
GFR SERPLBLD CREATININE-BSD FMLA CKD-EPI: 19 ML/MIN/{1.73_M2}
GLUCOSE BLD-MCNC: 101 MG/DL (ref 70–99)
GLUCOSE BLD-MCNC: 106 MG/DL (ref 70–99)
GLUCOSE BLD-MCNC: 107 MG/DL (ref 70–99)
GLUCOSE BLD-MCNC: 120 MG/DL (ref 70–99)
GLUCOSE SERPL-MCNC: 92 MG/DL (ref 70–99)
HCT VFR BLD AUTO: 34.9 % (ref 40.5–52.5)
HGB BLD-MCNC: 11.7 G/DL (ref 13.5–17.5)
MCH RBC QN AUTO: 33.2 PG (ref 26–34)
MCHC RBC AUTO-ENTMCNC: 33.4 G/DL (ref 31–36)
MCV RBC AUTO: 99.3 FL (ref 80–100)
PERFORMED ON: ABNORMAL
PHOSPHATE SERPL-MCNC: 3.5 MG/DL (ref 2.5–4.9)
PLATELET # BLD AUTO: 167 K/UL (ref 135–450)
PMV BLD AUTO: 8.4 FL (ref 5–10.5)
POTASSIUM SERPL-SCNC: 4.2 MMOL/L (ref 3.5–5.1)
RBC # BLD AUTO: 3.52 M/UL (ref 4.2–5.9)
SODIUM SERPL-SCNC: 133 MMOL/L (ref 136–145)
WBC # BLD AUTO: 6.6 K/UL (ref 4–11)

## 2024-07-25 PROCEDURE — 6370000000 HC RX 637 (ALT 250 FOR IP): Performed by: INTERNAL MEDICINE

## 2024-07-25 PROCEDURE — 36415 COLL VENOUS BLD VENIPUNCTURE: CPT

## 2024-07-25 PROCEDURE — 85027 COMPLETE CBC AUTOMATED: CPT

## 2024-07-25 PROCEDURE — 6370000000 HC RX 637 (ALT 250 FOR IP): Performed by: HOSPITALIST

## 2024-07-25 PROCEDURE — 6370000000 HC RX 637 (ALT 250 FOR IP): Performed by: REGISTERED NURSE

## 2024-07-25 PROCEDURE — 94760 N-INVAS EAR/PLS OXIMETRY 1: CPT

## 2024-07-25 PROCEDURE — 80069 RENAL FUNCTION PANEL: CPT

## 2024-07-25 RX ADMIN — METOCLOPRAMIDE 10 MG: 10 TABLET ORAL at 06:25

## 2024-07-25 RX ADMIN — QUETIAPINE FUMARATE 50 MG: 25 TABLET ORAL at 08:24

## 2024-07-25 RX ADMIN — LEVOTHYROXINE SODIUM 25 MCG: 0.03 TABLET ORAL at 06:25

## 2024-07-25 RX ADMIN — CASTOR OIL AND BALSAM, PERU: 788; 87 OINTMENT TOPICAL at 08:25

## 2024-07-25 RX ADMIN — FOLIC ACID 1 MG: 1 TABLET ORAL at 08:27

## 2024-07-25 RX ADMIN — QUETIAPINE FUMARATE 50 MG: 25 TABLET ORAL at 19:56

## 2024-07-25 RX ADMIN — LORAZEPAM 0.5 MG: 0.5 TABLET ORAL at 19:56

## 2024-07-25 RX ADMIN — LORAZEPAM 0.5 MG: 0.5 TABLET ORAL at 08:24

## 2024-07-25 RX ADMIN — Medication 4 MG: at 17:17

## 2024-07-25 RX ADMIN — VENLAFAXINE HYDROCHLORIDE 150 MG: 75 CAPSULE, EXTENDED RELEASE ORAL at 08:24

## 2024-07-25 RX ADMIN — ASPIRIN 81 MG: 81 TABLET, CHEWABLE ORAL at 08:24

## 2024-07-25 RX ADMIN — CARVEDILOL 6.25 MG: 6.25 TABLET, FILM COATED ORAL at 08:24

## 2024-07-25 RX ADMIN — LORAZEPAM 0.5 MG: 0.5 TABLET ORAL at 13:57

## 2024-07-25 RX ADMIN — LORAZEPAM 0.5 MG: 0.5 TABLET ORAL at 02:25

## 2024-07-25 ASSESSMENT — PAIN SCALES - WONG BAKER: WONGBAKER_NUMERICALRESPONSE: NO HURT

## 2024-07-25 ASSESSMENT — PAIN DESCRIPTION - LOCATION: LOCATION: BACK

## 2024-07-25 ASSESSMENT — PAIN SCALES - GENERAL
PAINLEVEL_OUTOF10: 0
PAINLEVEL_OUTOF10: 7

## 2024-07-25 ASSESSMENT — PAIN DESCRIPTION - ORIENTATION: ORIENTATION: MID

## 2024-07-25 ASSESSMENT — PAIN DESCRIPTION - DESCRIPTORS: DESCRIPTORS: ACHING

## 2024-07-25 NOTE — PLAN OF CARE
Problem: Discharge Planning  Goal: Discharge to home or other facility with appropriate resources  7/25/2024 1335 by Vaishali Cortes, RN  Outcome: Progressing  7/25/2024 0533 by Salas Lowry, RN  Outcome: Progressing

## 2024-07-25 NOTE — PROGRESS NOTES
Occupational Therapy Discharge  Cal Sanchez  7/25/2024  [id number]    Per chart review, pt is pursuing hospice, will d/c from acute OT.    Electronically signed by Rai Celeste OTR/L [id number] on 7/25/24 at 10:26 AM EDT

## 2024-07-25 NOTE — CARE COORDINATION
Discharge order noted.   Spoke to Radha with [facility], says she doesn't have an IP Hospice bed available at this time but the goal is to get patient to IP unit when bed opens. Radha to follow up with family & CM later today.    Electronically signed by Iliana Torres RN Case Management on 7/25/2024 at 10:32 AM

## 2024-07-25 NOTE — PROGRESS NOTES
[facility]  Phone: [phone]  Fax: [phone]  [employer]     SUBJECTIVE:  We are following this patient for ESRD management.    HPI:  Breathing OK denies SOB   ROS:  In bed.  Hospice note reviewed, plan for in patient hospice and no further HD   PMFSH:  medications reviewed.    Physical Exam:    VITALS:  BP (!) 176/83   Pulse 94   Temp 97.6 °F (36.4 °C) (Oral)   Resp 14   Ht 1.778 m (5' 10\")   Wt 82 kg (180 lb 12.4 oz)   SpO2 96%   BMI 25.94 kg/m²   24HR INTAKE/OUTPUT:    Intake/Output Summary (Last 24 hours) at 7/25/2024 1536  Last data filed at 7/25/2024 0600  Gross per 24 hour   Intake 0 ml   Output --   Net 0 ml     Constitutional: confused  Respiratory:  clear  Gastrointestinal: No  tenderness.  Normal Bowel Sounds  Cardiovascular:  S1, S2 Irregular   Edema:   no edema  Acc Rt TDC     DATA:    CBC:  Lab Results   Component Value Date/Time    WBC 6.6 07/25/2024 05:00 AM    RBC 3.52 07/25/2024 05:00 AM    HGB 11.7 07/25/2024 05:00 AM    HCT 34.9 07/25/2024 05:00 AM    MCV 99.3 07/25/2024 05:00 AM    MCH 33.2 07/25/2024 05:00 AM    MCHC 33.4 07/25/2024 05:00 AM    RDW 18.3 07/25/2024 05:00 AM     07/25/2024 05:00 AM    MPV 8.4 07/25/2024 05:00 AM     CMP:  Lab Results   Component Value Date/Time     07/25/2024 05:00 AM    K 4.2 07/25/2024 05:00 AM    K 3.4 07/15/2024 04:45 AM    CL 93 07/25/2024 05:00 AM    CO2 24 07/25/2024 05:00 AM    BUN 25 07/25/2024 05:00 AM    CREATININE 3.5 07/25/2024 05:00 AM    GFRAA 12 02/18/2022 12:11 AM    AGRATIO 1.4 07/11/2024 08:34 PM    LABGLOM 19 07/25/2024 05:00 AM    LABGLOM 10 03/20/2024 10:13 AM    GLUCOSE 92 07/25/2024 05:00 AM    CALCIUM 10.2 07/25/2024 05:00 AM    BILITOT 0.8 07/22/2024 06:06 PM    ALKPHOS 119 07/22/2024 06:06 PM    AST 35 07/22/2024 06:06 PM    ALT 63 07/22/2024 06:06 PM      Hepatic Function Panel:   Lab Results   Component Value Date/Time    ALKPHOS 119 07/22/2024 06:06 PM    ALT 63 07/22/2024 06:06

## 2024-07-25 NOTE — PLAN OF CARE
Problem: Pain  Goal: Verbalizes/displays adequate comfort level or baseline comfort level  7/25/2024 0533 by Salas Lowry RN  Outcome: Progressing     Problem: Discharge Planning  Goal: Discharge to home or other facility with appropriate resources  Outcome: Progressing     Problem: Safety - Adult  Goal: Free from fall injury  Outcome: Progressing     Problem: Skin/Tissue Integrity  Goal: Absence of new skin breakdown  Description: 1.  Monitor for areas of redness and/or skin breakdown  2.  Assess vascular access sites hourly  3.  Every 4-6 hours minimum:  Change oxygen saturation probe site  4.  Every 4-6 hours:  If on nasal continuous positive airway pressure, respiratory therapy assess nares and determine need for appliance change or resting period.  Outcome: Progressing     Problem: ABCDS Injury Assessment  Goal: Absence of physical injury  Outcome: Progressing     Problem: Neurosensory - Adult  Goal: Achieves stable or improved neurological status  Outcome: Progressing

## 2024-07-25 NOTE — CONSULTS
[facility]    Spoke with Junie and Wei regarding plan to move patient to Inpatient Hospice today. Bed available after shift change.     [facility] to move patient to [facility] at 8:30PM.    Thank you for the referral,  Radha Porter  [facility] RN  [phone]

## 2024-07-25 NOTE — PROGRESS NOTES
Physical Therapy  Attempt/Discharge    Pt pursuing hospice.  Will discontinue therapy in the acute setting at this time. ;    Electronically signed by Abelardo Rausch, PT [id number] on 7/25/2024 at 8:43 AM

## 2024-07-25 NOTE — PROGRESS NOTES
Speech Therapy Note regarding Dysphagia    Chart review:   Hospice consult and documentation noted with plan to d/c to IP Hospice this pm  GI  and consideration for PEG was discontinued 7/23/2024 7/24/2024 documentation noted    Plan: will d/c SLP at this time due to Plan for Hospice; unless otherwise notified if medical/family poc has changed.    Signed  Reba Cobb MS,CCC,SLP [id number]  Speech and Language Pathologist  7/25/2024 at 1339 pm

## 2024-07-25 NOTE — CARE COORDINATION
Case Management Discharge Note          Date / Time of Note: 7/25/2024 2:16 PM                  Patient Name: Cal Sanchez   YOB: 1960  Diagnosis: Acute delirium [R41.0]  Elevated troponin [R79.89]  IV drug user [F19.90]  Bilateral pleural effusion [J90]  Atrial fibrillation with rapid ventricular response (HCC) [I48.91]  ESRD on hemodialysis (HCC) [N18.6, Z99.2]  Severe sepsis (HCC) [A41.9, R65.20]  Altered mental status, unspecified altered mental status type [R41.82]   Date / Time: 7/11/2024  8:15 PM    Financial:  Payor: [employer] [place] / Plan: [employer] [place] DUAL / Product Type: *No Product type* /      Pharmacy:    [facility] [id number] [address]  - P [phone] - F [phone]  [address]   [facility] [address]  Phone: [phone] Fax: [phone]      Assistance purchasing medications?:    Assistance provided by Case Management: None at this time    DISCHARGE Disposition: Hospice Services    Hospice Services:  Location: Inpatient Unit  Agency: [facility]  Phone: [phone]    Consents signed: Yes    Transportation:  Transportation PLAN for discharge: EMS transportation   Mode of Transport: Ambulance stretcher - BLS  Name of Transport Company: [facility]  Phone: [phone]  Time of Transport: 8:30pm     Transport form completed: to be completed by [facility]    IMM Completed:   Not Indicated    Additional CM Notes: Per [facility] rep Garcia, patient to discharge later today to [facility]. Informs me transport has been arranged for 8:30pm this evening.    The Plan for Transition of Care is related to the following treatment goals of Acute delirium [R41.0]  Elevated troponin [R79.89]  IV drug user [F19.90]  Bilateral pleural effusion [J90]  Atrial fibrillation with rapid ventricular response (HCC) [I48.91]  ESRD on hemodialysis (HCC) [N18.6, Z99.2]  Severe sepsis (HCC) [A41.9, R65.20]  Altered mental

## 2024-07-25 NOTE — PROGRESS NOTES
Pt continued to pull off tele leads during the night. Leads replaced and Pt continued to pull them off. No orders in place for tele so tele monitoring was Dc'd. Pt continuously attempted to get out of the bed during the night. Pt educated on the importance of using the call light and not attempting to get out of bed. Pt pulled bedside table over and attempted to put it in the bed. Pt redirected.    Electronically signed by Salas Lowry RN on 7/25/2024 at 6:47 AM

## 2024-07-26 NOTE — DISCHARGE SUMMARY
Pt picked up by transport via stretcher to go to [facility] inpatient at 8:20 PM. Belongings transported with patient. No tele or IV present. All questions answered at this time.    Electronically signed by Salas Lowry RN on 7/25/2024 at 8:26 PM

## (undated) DEVICE — GOWN,SIRUS,POLYRNF,BRTHSLV,XL,30/CS: Brand: MEDLINE

## (undated) DEVICE — BANDAGE COMPR W4INXL15FT BGE E SGL LAYERED CLP CLSR

## (undated) DEVICE — GLOVE,SURG,SENSICARE SLT,LF,PF,7.5: Brand: MEDLINE

## (undated) DEVICE — PADDING,UNDERCAST,COTTON, 4"X4YD STERILE: Brand: MEDLINE

## (undated) DEVICE — SOLUTION SCRB 4OZ 7.5% POVIDONE IOD ANTIMIC BTL

## (undated) DEVICE — DRAPE,REIN 53X77,STERILE: Brand: MEDLINE

## (undated) DEVICE — SOLUTION IRRIG 3000ML 0.9% SOD CHL USP UROMATIC PLAS CONT

## (undated) DEVICE — HANDPIECE SET WITH HIGH FLOW TIP AND SUCTION TUBE: Brand: INTERPULSE

## (undated) DEVICE — MERCY HEALTH WEST TURNOVER: Brand: MEDLINE INDUSTRIES, INC.

## (undated) DEVICE — SPONGE LAP W18XL18IN WHT COT 4 PLY FLD STRUNG RADPQ DISP ST 2 PER PACK

## (undated) DEVICE — STOCKINETTE,IMPERVIOUS,12X48,STERILE: Brand: MEDLINE

## (undated) DEVICE — PODIATRY: Brand: MEDLINE INDUSTRIES, INC.

## (undated) DEVICE — SOLUTION PREP PAINT POV IOD FOR SKIN MUCOUS MEM

## (undated) DEVICE — GLOVE,SURG,SENSICARE SLT,LF,PF,8: Brand: MEDLINE

## (undated) DEVICE — BANDAGE GZ W2XL75IN ST RAYON POLY CNFRM STRTCH LTWT

## (undated) DEVICE — GLOVE SURG SZ 85 CRM LTX FREE POLYISOPRENE POLYMER BEAD ANTI

## (undated) DEVICE — PREMIUM DRY TRAY LF: Brand: MEDLINE INDUSTRIES, INC.

## (undated) DEVICE — SUTURE ETHLN SZ 4-0 L18IN NONABSORBABLE BLK L19MM FS-2 3/8 662G

## (undated) DEVICE — SUTURE ETHLN SZ 3-0 L18IN NONABSORBABLE BLK FS-1 L24MM 3/8 663H